# Patient Record
Sex: MALE | Race: WHITE | Employment: OTHER | URBAN - METROPOLITAN AREA
[De-identification: names, ages, dates, MRNs, and addresses within clinical notes are randomized per-mention and may not be internally consistent; named-entity substitution may affect disease eponyms.]

---

## 2017-01-05 ENCOUNTER — HOSPITAL ENCOUNTER (OUTPATIENT)
Dept: OTHER | Age: 77
Discharge: HOME | End: 2017-01-05
Attending: INTERNAL MEDICINE | Admitting: INTERNAL MEDICINE

## 2017-01-05 LAB
ALBUMIN SERPL-MCNC: 3.9 GM/DL (ref 3.4–5)
ALP BLD-CCNC: 83 IU/L (ref 40–129)
ALT SERPL-CCNC: 12 U/L (ref 10–40)
ANION GAP SERPL CALCULATED.3IONS-SCNC: 15 MMOL/L (ref 4–16)
AST SERPL-CCNC: 26 IU/L (ref 15–37)
BILIRUB SERPL-MCNC: 0.6 MG/DL (ref 0–1)
BUN BLDV-MCNC: 13 MG/DL (ref 6–23)
CALCIUM SERPL-MCNC: 9.7 MG/DL (ref 8.3–10.6)
CHLORIDE BLD-SCNC: 94 MMOL/L (ref 99–110)
CO2: 26 MMOL/L (ref 21–32)
CREAT SERPL-MCNC: 1 MG/DL (ref 0.9–1.3)
FERRITIN: 144 NG/ML (ref 30–400)
GFR AFRICAN AMERICAN: >60 ML/MIN/1.73M2
GFR NON-AFRICAN AMERICAN: >60 ML/MIN/1.73M2
GLUCOSE FASTING: 135 MG/DL (ref 70–99)
POTASSIUM SERPL-SCNC: 3.8 MMOL/L (ref 3.5–5.1)
SODIUM BLD-SCNC: 135 MMOL/L (ref 135–145)
TOTAL PROTEIN: 6.2 GM/DL (ref 6.4–8.2)
VITAMIN B-12: 658.4 PG/ML (ref 211–911)

## 2017-01-06 LAB
ALBUMIN ELP: 3 GM/DL (ref 3.2–5.6)
ALPHA-1-GLOBULIN: 0.3 GM/DL (ref 0.1–0.4)
ALPHA-2-GLOBULIN: 0.8 GM/DL (ref 0.4–1.2)
BETA GLOBULIN: 1.1 GM/DL (ref 0.5–1.3)
GAMMA GLOBULIN: 1 GM/DL (ref 0.5–1.6)
IMMUNOFIXATION ELECTROPHORESIS 1: NORMAL
PATHOLOGIST: ABNORMAL
TOTAL PROTEIN: 6.2 GM/DL (ref 6.4–8.2)

## 2017-01-08 LAB
KAPPA QUANT FREE LIGHT CHAINS: 2.55
KAPPA/LAMBDA FREE LIGHT CHAIN RATIO: 0.9
LAMBDA FREE LIGHT CHAINS URINE/ VOL: 2.83

## 2017-04-19 ENCOUNTER — HOSPITAL ENCOUNTER (OUTPATIENT)
Dept: LAB | Age: 77
Discharge: HOME | End: 2017-04-19
Attending: FAMILY MEDICINE | Admitting: FAMILY MEDICINE

## 2017-04-19 LAB
CHOLESTEROL, FASTING: 124 MG/DL
ESTIMATED AVERAGE GLUCOSE: 131 MG/DL
HBA1C MFR BLD: 6.2 % (ref 4.2–6.3)
HDLC SERPL-MCNC: 54 MG/DL
LDL CHOLESTEROL DIRECT: 70 MG/DL
TRIGLYCERIDE, FASTING: 80 MG/DL
VITAMIN D 25-HYDROXY: 60 NG/ML

## 2017-05-01 ENCOUNTER — OFFICE VISIT (OUTPATIENT)
Dept: CARDIOLOGY CLINIC | Age: 77
End: 2017-05-01

## 2017-05-01 VITALS
HEART RATE: 84 BPM | WEIGHT: 296 LBS | HEIGHT: 71 IN | BODY MASS INDEX: 41.44 KG/M2 | DIASTOLIC BLOOD PRESSURE: 66 MMHG | RESPIRATION RATE: 16 BRPM | SYSTOLIC BLOOD PRESSURE: 98 MMHG

## 2017-05-01 DIAGNOSIS — I48.19 ATRIAL FIBRILLATION, PERSISTENT (HCC): ICD-10-CM

## 2017-05-01 DIAGNOSIS — I45.10 RBBB: ICD-10-CM

## 2017-05-01 DIAGNOSIS — Z98.61 HISTORY OF PTCA: Primary | ICD-10-CM

## 2017-05-01 DIAGNOSIS — I10 ESSENTIAL HYPERTENSION: ICD-10-CM

## 2017-05-01 DIAGNOSIS — G89.29 CHRONIC MIDLINE LOW BACK PAIN WITHOUT SCIATICA: ICD-10-CM

## 2017-05-01 DIAGNOSIS — M54.50 CHRONIC MIDLINE LOW BACK PAIN WITHOUT SCIATICA: ICD-10-CM

## 2017-05-01 DIAGNOSIS — E78.2 MIXED HYPERLIPIDEMIA: ICD-10-CM

## 2017-05-01 PROCEDURE — 99214 OFFICE O/P EST MOD 30 MIN: CPT | Performed by: INTERNAL MEDICINE

## 2017-10-25 ENCOUNTER — HOSPITAL ENCOUNTER (OUTPATIENT)
Dept: MAMMOGRAPHY | Age: 77
Discharge: HOME | End: 2017-10-25
Attending: FAMILY MEDICINE | Admitting: FAMILY MEDICINE

## 2017-10-25 DIAGNOSIS — M85.80 OTHER SPECIFIED DISORDERS OF BONE DENSITY AND STRUCTURE, UNSPECIFIED SITE (CODE): ICD-10-CM

## 2017-10-25 DIAGNOSIS — M85.80 OSTEOPENIA, UNSPECIFIED LOCATION: ICD-10-CM

## 2017-10-25 LAB
ALBUMIN SERPL-MCNC: 3.8 GM/DL (ref 3.4–5)
ALP BLD-CCNC: 74 IU/L (ref 40–129)
ALT SERPL-CCNC: 15 U/L (ref 10–40)
ANION GAP SERPL CALCULATED.3IONS-SCNC: 10 MMOL/L (ref 4–16)
AST SERPL-CCNC: 32 IU/L (ref 15–37)
BASOPHILS ABSOLUTE: 0 K/CU MM
BASOPHILS RELATIVE PERCENT: 0.2 % (ref 0–1)
BILIRUB SERPL-MCNC: 0.8 MG/DL (ref 0–1)
BUN BLDV-MCNC: 10 MG/DL (ref 6–23)
CALCIUM SERPL-MCNC: 9.2 MG/DL (ref 8.3–10.6)
CHLORIDE BLD-SCNC: 95 MMOL/L (ref 99–110)
CHOLESTEROL, FASTING: 133 MG/DL
CO2: 33 MMOL/L (ref 21–32)
CREAT SERPL-MCNC: 1.1 MG/DL (ref 0.9–1.3)
DIFFERENTIAL TYPE: ABNORMAL
EOSINOPHILS ABSOLUTE: 0.1 K/CU MM
EOSINOPHILS RELATIVE PERCENT: 1.5 % (ref 0–3)
ESTIMATED AVERAGE GLUCOSE: 120 MG/DL
FERRITIN: 197 NG/ML (ref 30–400)
FOLATE: 9.1 NG/ML (ref 3.1–17.5)
GFR AFRICAN AMERICAN: >60 ML/MIN/1.73M2
GFR NON-AFRICAN AMERICAN: >60 ML/MIN/1.73M2
GLUCOSE FASTING: 123 MG/DL (ref 70–99)
HBA1C MFR BLD: 5.8 % (ref 4.2–6.3)
HCT VFR BLD CALC: 48.5 % (ref 42–52)
HDLC SERPL-MCNC: 55 MG/DL
HEMOGLOBIN: 16.5 GM/DL (ref 13.5–18)
IMMATURE NEUTROPHIL %: 0.2 % (ref 0–0.43)
IRON: 130 UG/DL (ref 59–158)
LDL CHOLESTEROL DIRECT: 68 MG/DL
LYMPHOCYTES ABSOLUTE: 1.4 K/CU MM
LYMPHOCYTES RELATIVE PERCENT: 16.8 % (ref 24–44)
MCH RBC QN AUTO: 34.7 PG (ref 27–31)
MCHC RBC AUTO-ENTMCNC: 34 % (ref 32–36)
MCV RBC AUTO: 101.9 FL (ref 78–100)
MONOCYTES ABSOLUTE: 0.8 K/CU MM
MONOCYTES RELATIVE PERCENT: 9.9 % (ref 0–4)
PCT TRANSFERRIN: 42 % (ref 10–44)
PDW BLD-RTO: 13.5 % (ref 11.7–14.9)
PLATELET # BLD: 204 K/CU MM (ref 140–440)
PMV BLD AUTO: 11.3 FL (ref 7.5–11.1)
POTASSIUM SERPL-SCNC: 3.7 MMOL/L (ref 3.5–5.1)
PROSTATE SPECIFIC ANTIGEN: 1.01 NG/ML (ref 0–4)
RBC # BLD: 4.76 M/CU MM (ref 4.6–6.2)
SEGMENTED NEUTROPHILS ABSOLUTE COUNT: 5.8 K/CU MM
SEGMENTED NEUTROPHILS RELATIVE PERCENT: 71.4 % (ref 36–66)
SODIUM BLD-SCNC: 138 MMOL/L (ref 135–145)
T3 FREE: 3 PG/ML (ref 2.3–4.2)
T4 FREE: 1.45 NG/DL (ref 0.9–1.8)
TOTAL IMMATURE NEUTOROPHIL: 0.02 K/CU MM
TOTAL IRON BINDING CAPACITY: 312 UG/DL (ref 250–450)
TOTAL PROTEIN: 6.3 GM/DL (ref 6.4–8.2)
TRIGLYCERIDE, FASTING: 82 MG/DL
TSH HIGH SENSITIVITY: 1.49 UIU/ML (ref 0.27–4.2)
UNSATURATED IRON BINDING CAPACITY: 182 UG/DL (ref 110–370)
VITAMIN B-12: 754.1 PG/ML (ref 211–911)
VITAMIN D 25-HYDROXY: 60 NG/ML
WBC # BLD: 8.2 K/CU MM (ref 4–10.5)

## 2017-10-30 ENCOUNTER — OFFICE VISIT (OUTPATIENT)
Dept: CARDIOLOGY CLINIC | Age: 77
End: 2017-10-30

## 2017-10-30 VITALS
WEIGHT: 288 LBS | DIASTOLIC BLOOD PRESSURE: 72 MMHG | BODY MASS INDEX: 40.32 KG/M2 | HEART RATE: 80 BPM | RESPIRATION RATE: 14 BRPM | SYSTOLIC BLOOD PRESSURE: 110 MMHG | HEIGHT: 71 IN

## 2017-10-30 DIAGNOSIS — I48.19 ATRIAL FIBRILLATION, PERSISTENT (HCC): Primary | ICD-10-CM

## 2017-10-30 DIAGNOSIS — E78.2 MIXED HYPERLIPIDEMIA: ICD-10-CM

## 2017-10-30 DIAGNOSIS — Z98.61 HISTORY OF PTCA: ICD-10-CM

## 2017-10-30 DIAGNOSIS — I10 ESSENTIAL HYPERTENSION: ICD-10-CM

## 2017-10-30 PROCEDURE — 93000 ELECTROCARDIOGRAM COMPLETE: CPT | Performed by: INTERNAL MEDICINE

## 2017-10-30 PROCEDURE — 99214 OFFICE O/P EST MOD 30 MIN: CPT | Performed by: INTERNAL MEDICINE

## 2017-10-30 RX ORDER — NITROGLYCERIN 0.4 MG/1
0.4 TABLET SUBLINGUAL EVERY 5 MIN PRN
Qty: 25 TABLET | Refills: 3 | Status: SHIPPED | OUTPATIENT
Start: 2017-10-30 | End: 2019-01-08 | Stop reason: SDUPTHER

## 2017-10-30 NOTE — ASSESSMENT & PLAN NOTE
Secondary prevention by aggressive risk modification and lifestyle changes such counseled. Patient is clinically stable from this point of view.

## 2017-10-30 NOTE — PROGRESS NOTES
Emma Currie Matt  [id number]  Janneth Cook MD      Chief complaint and HPI:  Bennett Cheung  is a 40-year-old gentleman follows up for persistent chronic atrial fibrillation, coronary artery disease status post PCI in the past, hyper lipidemia and obesity. He denies any palpitations, syncope or near-syncope. He denies any chest pain, orthopnea or paroxysmal nocturnal dyspnea. He does not exercise he is fairly sedentary in his lifestyle. He also admits that he does not eat healthy. Had had blood test wanted to know the results. He does not smoke anymore. Rest of the Cardiovascular system review is otherwise unchanged from prior encounter. 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 Past surgical history:  has a past surgical history that includes joint replacement (2004) and Coronary angioplasty with stent. Social History:   Social History   Substance Use Topics    Smoking status: Former Smoker     Packs/day: 1.50     Years: 25.00     Quit date: 11/5/1990    Smokeless tobacco: Current User     Types: Chew      Comment: Chews tobacco         Alcohol use 33.6 oz/week     56 Cans of beer per week      Comment: 6-8 beers daily     Family history: family history includes Cancer in his sister; Heart Disease in his father and mother. ALLERGIES:  Lovenox [enoxaparin sodium] and Morphine  Prior to Admission medications    Medication Sig Start Date End Date Taking?  Authorizing Provider   rivaroxaban daily. Yes Historical Provider, MD     Constitutional:  /72 (Site: Left Arm, Position: Sitting, Cuff Size: Large Adult)   Pulse 80   Resp 14   Ht 5' 11\" (1.803 m)   Wt 288 lb (130.6 kg)   BMI 40.17 kg/m²    Body mass index is 40.17 kg/m². Wt Readings from Last 3 Encounters:   10/30/17 288 lb (130.6 kg)   05/01/17 296 lb (134.3 kg)   10/31/16 292 lb (132.5 kg)     General exam: obese awake alert oriented x 3 in NAD   Head and Neck: Normocephalic. Neck is supple . Wears glasses   Carotids: no Bruits. No thyromegaly  Jugular venous pressure: Not elevated. Heart[de-identified] Heart sounds are normal. No murmur  Peripheral Pulses: 1+  Extremities: no edema   Chest: Normal  Lungs:right upper chest coarse rales or rub posteriorly. No basal rales   Abdomen: Soft non tender. Bowel sounds are normal. No organomegaly or ascites.   Musculoskeletal: WNL   Skin: Normal in color and texture.  No rash   Psychiatric: Normal mood and effect.    Neurologic exam:  No focal deficit    ECG today showed atrial fibrillation with RBBB, 85 bpm    LAB REVIEW:  CBC:   Lab Results   Component Value Date    WBC 8.2 10/25/2017    HGB 16.5 10/25/2017    HCT 48.5 10/25/2017     10/25/2017     Triglyceride, Fasting 82  <150 MG/DL Final 10/25/2017 10:15 AM [facility]   Cholesterol, Fasting 133  <200 MG/DL Final 10/25/2017 10:15 AM [address] [place] [address]    HDL 55  >40 MG/DL Final 10/25/2017 10:15 AM [address] [place] [address]    LDL Direct 68          Lipids:   Lab Results   Component Value Date    CHOL 132 07/14/2016    TRIG 73 07/14/2016    HDL 55 10/25/2017    LDLCALC 56 09/06/2012    LDLDIRECT 68 10/25/2017     Renal:   Lab Results   Component Value Date    BUN 10 10/25/2017    CREATININE 1.1 10/25/2017     10/25/2017    K 3.7 10/25/2017     ALT 15  10 - 40 U/L Final 10/25/2017 10:15 AM [facility]   AST 32          TSH, High Sensitivity 1.490  0.270 PT/INR:   Lab Results   Component Value Date    INR 0.92 03/14/2011     IMPRESSION and RECOMMENDATIONS:      Obesity, Class II, 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 Hyperlipidemia  Well controlled on current medications, reviewed individually with patient. History of PTCA  Secondary prevention by aggressive risk modification and lifestyle changes such counseled. Patient is clinically stable from this point of view. Hypertension  Well controlled on current medications, reviewed individually with patient. Atrial fibrillation, persistent  Rate is well-controlled and he is anticoagulated. Continue current cardiovascular medications which have been reviewed and discussed individually with you. Patient is counseled for low cholesterol, low sodium and low fat diet. Also counseled for increase in fresh fruits and vegetables and healthy lifestyles. Counseled extensively for calorie counting reduction and serving size and activity modification for weight loss. Appropriate prescriptions if needed on this visit are addressed. After visit summery is provided. Questions answered and patient verbalizes understanding. Follow up in office in  6 months, sooner if needed. Johnny Chau MD, 10/30/2017 12:08 PM     Please note this report has been produced using speech recognition software and may contain errors related to that system including errors in grammar, punctuation, and spelling, as well as words and phrases that may be inappropriate.  If there are any questions or concerns please feel free to contact the dictating provider for clarification

## 2018-05-07 ENCOUNTER — OFFICE VISIT (OUTPATIENT)
Dept: CARDIOLOGY CLINIC | Age: 78
End: 2018-05-07

## 2018-05-07 VITALS
HEIGHT: 70 IN | BODY MASS INDEX: 42.09 KG/M2 | WEIGHT: 294 LBS | HEART RATE: 80 BPM | DIASTOLIC BLOOD PRESSURE: 70 MMHG | SYSTOLIC BLOOD PRESSURE: 112 MMHG

## 2018-05-07 DIAGNOSIS — E78.2 MIXED HYPERLIPIDEMIA: ICD-10-CM

## 2018-05-07 DIAGNOSIS — I48.19 ATRIAL FIBRILLATION, PERSISTENT (HCC): ICD-10-CM

## 2018-05-07 DIAGNOSIS — I10 ESSENTIAL HYPERTENSION: ICD-10-CM

## 2018-05-07 DIAGNOSIS — Z98.61 HISTORY OF PTCA: Primary | ICD-10-CM

## 2018-05-07 PROCEDURE — G8417 CALC BMI ABV UP PARAM F/U: HCPCS | Performed by: INTERNAL MEDICINE

## 2018-05-07 PROCEDURE — 4004F PT TOBACCO SCREEN RCVD TLK: CPT | Performed by: INTERNAL MEDICINE

## 2018-05-07 PROCEDURE — 1123F ACP DISCUSS/DSCN MKR DOCD: CPT | Performed by: INTERNAL MEDICINE

## 2018-05-07 PROCEDURE — 4040F PNEUMOC VAC/ADMIN/RCVD: CPT | Performed by: INTERNAL MEDICINE

## 2018-05-07 PROCEDURE — 99214 OFFICE O/P EST MOD 30 MIN: CPT | Performed by: INTERNAL MEDICINE

## 2018-05-07 PROCEDURE — G8427 DOCREV CUR MEDS BY ELIG CLIN: HCPCS | Performed by: INTERNAL MEDICINE

## 2018-05-16 ENCOUNTER — HOSPITAL ENCOUNTER (OUTPATIENT)
Dept: LAB | Age: 78
Discharge: HOME | End: 2018-05-16
Attending: FAMILY MEDICINE | Admitting: FAMILY MEDICINE

## 2018-05-16 LAB
ALBUMIN SERPL-MCNC: 3.8 GM/DL (ref 3.4–5)
ALP BLD-CCNC: 78 IU/L (ref 40–129)
ALT SERPL-CCNC: 13 U/L (ref 10–40)
ANION GAP SERPL CALCULATED.3IONS-SCNC: 14 MMOL/L (ref 4–16)
AST SERPL-CCNC: 31 IU/L (ref 15–37)
BASOPHILS ABSOLUTE: 0 K/CU MM
BASOPHILS RELATIVE PERCENT: 0.2 % (ref 0–1)
BILIRUB SERPL-MCNC: 0.7 MG/DL (ref 0–1)
BUN BLDV-MCNC: 9 MG/DL (ref 6–23)
CALCIUM SERPL-MCNC: 9.5 MG/DL (ref 8.3–10.6)
CHLORIDE BLD-SCNC: 92 MMOL/L (ref 99–110)
CHOLESTEROL, FASTING: 133 MG/DL
CO2: 29 MMOL/L (ref 21–32)
CREAT SERPL-MCNC: 1.2 MG/DL (ref 0.9–1.3)
DIFFERENTIAL TYPE: ABNORMAL
EOSINOPHILS ABSOLUTE: 0.1 K/CU MM
EOSINOPHILS RELATIVE PERCENT: 1.1 % (ref 0–3)
ESTIMATED AVERAGE GLUCOSE: 117 MG/DL
FERRITIN: 159 NG/ML (ref 30–400)
GFR AFRICAN AMERICAN: >60 ML/MIN/1.73M2
GFR NON-AFRICAN AMERICAN: 59 ML/MIN/1.73M2
GLUCOSE FASTING: 132 MG/DL (ref 70–99)
HBA1C MFR BLD: 5.7 % (ref 4.2–6.3)
HCT VFR BLD CALC: 48.9 % (ref 42–52)
HDLC SERPL-MCNC: 57 MG/DL
HEMOGLOBIN: 16.2 GM/DL (ref 13.5–18)
IMMATURE NEUTROPHIL %: 0.4 % (ref 0–0.43)
IRON: 107 UG/DL (ref 59–158)
LDL CHOLESTEROL DIRECT: 76 MG/DL
LYMPHOCYTES ABSOLUTE: 1.2 K/CU MM
LYMPHOCYTES RELATIVE PERCENT: 13.6 % (ref 24–44)
MCH RBC QN AUTO: 33.8 PG (ref 27–31)
MCHC RBC AUTO-ENTMCNC: 33.1 % (ref 32–36)
MCV RBC AUTO: 101.9 FL (ref 78–100)
MONOCYTES ABSOLUTE: 0.7 K/CU MM
MONOCYTES RELATIVE PERCENT: 7.2 % (ref 0–4)
PCT TRANSFERRIN: 33 % (ref 10–44)
PDW BLD-RTO: 14 % (ref 11.7–14.9)
PLATELET # BLD: 203 K/CU MM (ref 140–440)
PMV BLD AUTO: 11.7 FL (ref 7.5–11.1)
POTASSIUM SERPL-SCNC: 3.4 MMOL/L (ref 3.5–5.1)
RBC # BLD: 4.8 M/CU MM (ref 4.6–6.2)
SEGMENTED NEUTROPHILS ABSOLUTE COUNT: 7 K/CU MM
SEGMENTED NEUTROPHILS RELATIVE PERCENT: 77.5 % (ref 36–66)
SODIUM BLD-SCNC: 135 MMOL/L (ref 135–145)
TOTAL IMMATURE NEUTOROPHIL: 0.04 K/CU MM
TOTAL IRON BINDING CAPACITY: 321 UG/DL (ref 250–450)
TOTAL PROTEIN: 6.5 GM/DL (ref 6.4–8.2)
TRIGLYCERIDE, FASTING: 66 MG/DL
UNSATURATED IRON BINDING CAPACITY: 214 UG/DL (ref 110–370)
VITAMIN B-12: 804.7 PG/ML (ref 211–911)
VITAMIN D 25-HYDROXY: 60 NG/ML
WBC # BLD: 9.1 K/CU MM (ref 4–10.5)

## 2018-05-18 LAB
KAPPA QUANT FREE LIGHT CHAINS: 2.16
KAPPA/LAMBDA FREE LIGHT CHAIN RATIO: 0.56
LAMBDA FREE LIGHT CHAINS URINE/ VOL: 3.88

## 2018-05-21 LAB
ALBUMIN ELP: 3.4 GM/DL (ref 3.2–5.6)
ALPHA-1-GLOBULIN: 0.3 GM/DL (ref 0.1–0.4)
ALPHA-2-GLOBULIN: 0.8 GM/DL (ref 0.4–1.2)
BETA GLOBULIN: 1.1 GM/DL (ref 0.5–1.3)
GAMMA GLOBULIN: 0.9 GM/DL (ref 0.5–1.6)
IMMUNOFIXATION ELECTROPHORESIS 1: NORMAL
TOTAL PROTEIN: 6.5 GM/DL (ref 6.4–8.2)

## 2018-05-25 ENCOUNTER — HOSPITAL ENCOUNTER (OUTPATIENT)
Dept: OTHER | Age: 78
Discharge: HOME | End: 2018-05-25
Attending: INTERNAL MEDICINE | Admitting: INTERNAL MEDICINE

## 2018-05-25 LAB
ALBUMIN SERPL-MCNC: 3.7 GM/DL (ref 3.4–5)
ALP BLD-CCNC: 77 IU/L (ref 40–129)
ALT SERPL-CCNC: 12 U/L (ref 10–40)
ANION GAP SERPL CALCULATED.3IONS-SCNC: 14 MMOL/L (ref 4–16)
AST SERPL-CCNC: 25 IU/L (ref 15–37)
BILIRUB SERPL-MCNC: 0.7 MG/DL (ref 0–1)
BUN BLDV-MCNC: 9 MG/DL (ref 6–23)
CALCIUM SERPL-MCNC: 9.2 MG/DL (ref 8.3–10.6)
CHLORIDE BLD-SCNC: 92 MMOL/L (ref 99–110)
CO2: 28 MMOL/L (ref 21–32)
CREAT SERPL-MCNC: 1 MG/DL (ref 0.9–1.3)
GFR AFRICAN AMERICAN: >60 ML/MIN/1.73M2
GFR NON-AFRICAN AMERICAN: >60 ML/MIN/1.73M2
GLUCOSE BLD-MCNC: 93 MG/DL (ref 70–99)
LACTATE DEHYDROGENASE: 149 IU/L (ref 120–246)
POTASSIUM SERPL-SCNC: 3.7 MMOL/L (ref 3.5–5.1)
SODIUM BLD-SCNC: 134 MMOL/L (ref 135–145)
TOTAL PROTEIN: 5.9 GM/DL (ref 6.4–8.2)

## 2018-05-29 LAB
ALBUMIN ELP: 3.3 GM/DL (ref 3.2–5.6)
ALPHA-1-GLOBULIN: 0.2 GM/DL (ref 0.1–0.4)
ALPHA-2-GLOBULIN: 0.7 GM/DL (ref 0.4–1.2)
BETA GLOBULIN: 1 GM/DL (ref 0.5–1.3)
GAMMA GLOBULIN: 0.8 GM/DL (ref 0.5–1.6)
IMMUNOFIXATION ELECTROPHORESIS 1: NORMAL
TOTAL PROTEIN: 5.9 GM/DL (ref 6.4–8.2)

## 2018-06-06 ENCOUNTER — HOSPITAL ENCOUNTER (OUTPATIENT)
Dept: LAB | Age: 78
Discharge: HOME | End: 2018-06-06
Attending: FAMILY MEDICINE | Admitting: FAMILY MEDICINE

## 2018-06-06 LAB
ANION GAP SERPL CALCULATED.3IONS-SCNC: 12 MMOL/L (ref 4–16)
BUN BLDV-MCNC: 13 MG/DL (ref 6–23)
CALCIUM SERPL-MCNC: 9.2 MG/DL (ref 8.3–10.6)
CHLORIDE BLD-SCNC: 93 MMOL/L (ref 99–110)
CO2: 31 MMOL/L (ref 21–32)
CREAT SERPL-MCNC: 1.2 MG/DL (ref 0.9–1.3)
GFR AFRICAN AMERICAN: >60 ML/MIN/1.73M2
GFR NON-AFRICAN AMERICAN: 59 ML/MIN/1.73M2
GLUCOSE BLD-MCNC: 109 MG/DL (ref 70–99)
POTASSIUM SERPL-SCNC: 3.3 MMOL/L (ref 3.5–5.1)
SODIUM BLD-SCNC: 136 MMOL/L (ref 135–145)

## 2018-06-28 ENCOUNTER — HOSPITAL ENCOUNTER (OUTPATIENT)
Dept: LAB | Age: 78
Discharge: HOME | End: 2018-06-28
Attending: FAMILY MEDICINE | Admitting: FAMILY MEDICINE

## 2018-06-28 LAB
ANION GAP SERPL CALCULATED.3IONS-SCNC: 15 MMOL/L (ref 4–16)
BUN BLDV-MCNC: 11 MG/DL (ref 6–23)
CALCIUM SERPL-MCNC: 9.1 MG/DL (ref 8.3–10.6)
CHLORIDE BLD-SCNC: 94 MMOL/L (ref 99–110)
CO2: 26 MMOL/L (ref 21–32)
CREAT SERPL-MCNC: 1.2 MG/DL (ref 0.9–1.3)
GFR AFRICAN AMERICAN: >60 ML/MIN/1.73M2
GFR NON-AFRICAN AMERICAN: 59 ML/MIN/1.73M2
GLUCOSE BLD-MCNC: 130 MG/DL (ref 70–99)
POTASSIUM SERPL-SCNC: 3.4 MMOL/L (ref 3.5–5.1)
SODIUM BLD-SCNC: 135 MMOL/L (ref 135–145)

## 2018-07-26 ENCOUNTER — HOSPITAL ENCOUNTER (OUTPATIENT)
Dept: LAB | Age: 78
Discharge: HOME | End: 2018-07-26
Attending: FAMILY MEDICINE | Admitting: FAMILY MEDICINE

## 2018-07-26 LAB
ANION GAP SERPL CALCULATED.3IONS-SCNC: 7 MMOL/L (ref 4–16)
BUN BLDV-MCNC: 10 MG/DL (ref 6–23)
CALCIUM SERPL-MCNC: 9.5 MG/DL (ref 8.3–10.6)
CHLORIDE BLD-SCNC: 96 MMOL/L (ref 99–110)
CO2: 35 MMOL/L (ref 21–32)
CREAT SERPL-MCNC: 1.2 MG/DL (ref 0.9–1.3)
GFR AFRICAN AMERICAN: >60 ML/MIN/1.73M2
GFR NON-AFRICAN AMERICAN: 59 ML/MIN/1.73M2
GLUCOSE BLD-MCNC: 129 MG/DL (ref 70–99)
POTASSIUM SERPL-SCNC: 4 MMOL/L (ref 3.5–5.1)
SODIUM BLD-SCNC: 138 MMOL/L (ref 135–145)

## 2018-10-01 ENCOUNTER — HOSPITAL ENCOUNTER (OUTPATIENT)
Age: 78
Discharge: HOME | End: 2018-10-01
Payer: MEDICARE

## 2018-10-01 LAB
ALBUMIN SERPL-MCNC: 3.5 GM/DL (ref 3.4–5)
ALP BLD-CCNC: 75 IU/L (ref 40–129)
ALT SERPL-CCNC: 12 U/L (ref 10–40)
ANION GAP SERPL CALCULATED.3IONS-SCNC: 13 MMOL/L (ref 4–16)
AST SERPL-CCNC: 27 IU/L (ref 15–37)
BASOPHILS ABSOLUTE: 0 K/CU MM
BASOPHILS RELATIVE PERCENT: 0.3 % (ref 0–1)
BILIRUB SERPL-MCNC: 0.7 MG/DL (ref 0–1)
BUN BLDV-MCNC: 10 MG/DL (ref 6–23)
CALCIUM SERPL-MCNC: 9.2 MG/DL (ref 8.3–10.6)
CHLORIDE BLD-SCNC: 100 MMOL/L (ref 99–110)
CHOLESTEROL, FASTING: 128 MG/DL
CO2: 30 MMOL/L (ref 21–32)
CREAT SERPL-MCNC: 1.3 MG/DL (ref 0.9–1.3)
DIFFERENTIAL TYPE: ABNORMAL
EOSINOPHILS ABSOLUTE: 0.1 K/CU MM
EOSINOPHILS RELATIVE PERCENT: 1.6 % (ref 0–3)
ESTIMATED AVERAGE GLUCOSE: 117 MG/DL
FERRITIN: 261 NG/ML (ref 30–400)
FOLATE: 10 NG/ML (ref 3.1–17.5)
GFR AFRICAN AMERICAN: >60 ML/MIN/1.73M2
GFR NON-AFRICAN AMERICAN: 53 ML/MIN/1.73M2
GLUCOSE FASTING: 128 MG/DL (ref 70–99)
HBA1C MFR BLD: 5.7 % (ref 4.2–6.3)
HCT VFR BLD CALC: 49 % (ref 42–52)
HDLC SERPL-MCNC: 53 MG/DL
HEMOGLOBIN: 15.7 GM/DL (ref 13.5–18)
IMMATURE NEUTROPHIL %: 0.4 % (ref 0–0.43)
IRON: 124 UG/DL (ref 59–158)
LDL CHOLESTEROL DIRECT: 66 MG/DL
LYMPHOCYTES ABSOLUTE: 1.2 K/CU MM
LYMPHOCYTES RELATIVE PERCENT: 17.2 % (ref 24–44)
MCH RBC QN AUTO: 33.6 PG (ref 27–31)
MCHC RBC AUTO-ENTMCNC: 32 % (ref 32–36)
MCV RBC AUTO: 104.9 FL (ref 78–100)
MONOCYTES ABSOLUTE: 0.7 K/CU MM
MONOCYTES RELATIVE PERCENT: 10.4 % (ref 0–4)
PCT TRANSFERRIN: 42 % (ref 10–44)
PDW BLD-RTO: 14.3 % (ref 11.7–14.9)
PLATELET # BLD: 186 K/CU MM (ref 140–440)
PMV BLD AUTO: 11.6 FL (ref 7.5–11.1)
POTASSIUM SERPL-SCNC: 4.4 MMOL/L (ref 3.5–5.1)
RBC # BLD: 4.67 M/CU MM (ref 4.6–6.2)
SEGMENTED NEUTROPHILS ABSOLUTE COUNT: 4.7 K/CU MM
SEGMENTED NEUTROPHILS RELATIVE PERCENT: 70.1 % (ref 36–66)
SODIUM BLD-SCNC: 143 MMOL/L (ref 135–145)
TOTAL IMMATURE NEUTOROPHIL: 0.03 K/CU MM
TOTAL IRON BINDING CAPACITY: 294 UG/DL (ref 250–450)
TOTAL PROTEIN: 6 GM/DL (ref 6.4–8.2)
TRIGLYCERIDE, FASTING: 87 MG/DL
UNSATURATED IRON BINDING CAPACITY: 170 UG/DL (ref 110–370)
VITAMIN B-12: >2000 PG/ML (ref 211–911)
VITAMIN D 25-HYDROXY: 60 NG/ML
WBC # BLD: 6.8 K/CU MM (ref 4–10.5)

## 2018-10-01 PROCEDURE — 85025 COMPLETE CBC W/AUTO DIFF WBC: CPT

## 2018-10-01 PROCEDURE — 80053 COMPREHEN METABOLIC PANEL: CPT

## 2018-10-01 PROCEDURE — 82306 VITAMIN D 25 HYDROXY: CPT

## 2018-10-01 PROCEDURE — 82746 ASSAY OF FOLIC ACID SERUM: CPT

## 2018-10-01 PROCEDURE — 83036 HEMOGLOBIN GLYCOSYLATED A1C: CPT

## 2018-10-01 PROCEDURE — 83540 ASSAY OF IRON: CPT

## 2018-10-01 PROCEDURE — 36415 COLL VENOUS BLD VENIPUNCTURE: CPT

## 2018-10-01 PROCEDURE — 80061 LIPID PANEL: CPT

## 2018-10-01 PROCEDURE — 82607 VITAMIN B-12: CPT

## 2018-10-01 PROCEDURE — 82728 ASSAY OF FERRITIN: CPT

## 2018-10-01 PROCEDURE — 83550 IRON BINDING TEST: CPT

## 2019-01-08 ENCOUNTER — OFFICE VISIT (OUTPATIENT)
Dept: CARDIOLOGY CLINIC | Age: 79
End: 2019-01-08
Payer: MEDICARE

## 2019-01-08 VITALS
HEIGHT: 71 IN | RESPIRATION RATE: 16 BRPM | DIASTOLIC BLOOD PRESSURE: 78 MMHG | BODY MASS INDEX: 40.88 KG/M2 | SYSTOLIC BLOOD PRESSURE: 106 MMHG | HEART RATE: 80 BPM | WEIGHT: 292 LBS

## 2019-01-08 DIAGNOSIS — Z98.61 HISTORY OF PTCA: Primary | ICD-10-CM

## 2019-01-08 DIAGNOSIS — I10 ESSENTIAL HYPERTENSION: ICD-10-CM

## 2019-01-08 DIAGNOSIS — I48.19 ATRIAL FIBRILLATION, PERSISTENT (HCC): ICD-10-CM

## 2019-01-08 DIAGNOSIS — E78.2 MIXED HYPERLIPIDEMIA: ICD-10-CM

## 2019-01-08 DIAGNOSIS — I25.10 CORONARY ARTERY DISEASE INVOLVING NATIVE CORONARY ARTERY OF NATIVE HEART WITHOUT ANGINA PECTORIS: ICD-10-CM

## 2019-01-08 PROCEDURE — G8427 DOCREV CUR MEDS BY ELIG CLIN: HCPCS | Performed by: INTERNAL MEDICINE

## 2019-01-08 PROCEDURE — 4040F PNEUMOC VAC/ADMIN/RCVD: CPT | Performed by: INTERNAL MEDICINE

## 2019-01-08 PROCEDURE — G8484 FLU IMMUNIZE NO ADMIN: HCPCS | Performed by: INTERNAL MEDICINE

## 2019-01-08 PROCEDURE — G8598 ASA/ANTIPLAT THER USED: HCPCS | Performed by: INTERNAL MEDICINE

## 2019-01-08 PROCEDURE — 4004F PT TOBACCO SCREEN RCVD TLK: CPT | Performed by: INTERNAL MEDICINE

## 2019-01-08 PROCEDURE — 1123F ACP DISCUSS/DSCN MKR DOCD: CPT | Performed by: INTERNAL MEDICINE

## 2019-01-08 PROCEDURE — G8417 CALC BMI ABV UP PARAM F/U: HCPCS | Performed by: INTERNAL MEDICINE

## 2019-01-08 PROCEDURE — 99214 OFFICE O/P EST MOD 30 MIN: CPT | Performed by: INTERNAL MEDICINE

## 2019-01-08 PROCEDURE — 1101F PT FALLS ASSESS-DOCD LE1/YR: CPT | Performed by: INTERNAL MEDICINE

## 2019-01-08 RX ORDER — MIRTAZAPINE 30 MG/1
30 TABLET, FILM COATED ORAL NIGHTLY
COMMUNITY

## 2019-01-08 RX ORDER — NITROGLYCERIN 0.4 MG/1
0.4 TABLET SUBLINGUAL EVERY 5 MIN PRN
Qty: 25 TABLET | Refills: 3 | Status: SHIPPED | OUTPATIENT
Start: 2019-01-08 | End: 2021-08-02 | Stop reason: SDUPTHER

## 2019-03-20 ENCOUNTER — HOSPITAL ENCOUNTER (OUTPATIENT)
Age: 79
Discharge: HOME | End: 2019-03-20
Payer: MEDICARE

## 2019-03-20 LAB
ALBUMIN SERPL-MCNC: 3.6 GM/DL (ref 3.4–5)
ALP BLD-CCNC: 74 IU/L (ref 40–129)
ALT SERPL-CCNC: 12 U/L (ref 10–40)
ANION GAP SERPL CALCULATED.3IONS-SCNC: 5 MMOL/L (ref 4–16)
AST SERPL-CCNC: 28 IU/L (ref 15–37)
BILIRUB SERPL-MCNC: 0.7 MG/DL (ref 0–1)
BUN BLDV-MCNC: 10 MG/DL (ref 6–23)
CALCIUM SERPL-MCNC: 9.2 MG/DL (ref 8.3–10.6)
CHLORIDE BLD-SCNC: 96 MMOL/L (ref 99–110)
CHOLESTEROL, FASTING: 137 MG/DL
CO2: 36 MMOL/L (ref 21–32)
CREAT SERPL-MCNC: 1.1 MG/DL (ref 0.9–1.3)
ESTIMATED AVERAGE GLUCOSE: 117 MG/DL
GFR AFRICAN AMERICAN: >60 ML/MIN/1.73M2
GFR NON-AFRICAN AMERICAN: >60 ML/MIN/1.73M2
GLUCOSE FASTING: 119 MG/DL (ref 70–99)
HBA1C MFR BLD: 5.7 % (ref 4.2–6.3)
HDLC SERPL-MCNC: 52 MG/DL
LDL CHOLESTEROL DIRECT: 87 MG/DL
POTASSIUM SERPL-SCNC: 4.1 MMOL/L (ref 3.5–5.1)
SODIUM BLD-SCNC: 137 MMOL/L (ref 135–145)
TOTAL PROTEIN: 6.2 GM/DL (ref 6.4–8.2)
TRIGLYCERIDE, FASTING: 76 MG/DL
VITAMIN D 25-HYDROXY: 60 NG/ML

## 2019-03-20 PROCEDURE — 80061 LIPID PANEL: CPT

## 2019-03-20 PROCEDURE — 36415 COLL VENOUS BLD VENIPUNCTURE: CPT

## 2019-03-20 PROCEDURE — 83036 HEMOGLOBIN GLYCOSYLATED A1C: CPT

## 2019-03-20 PROCEDURE — 82306 VITAMIN D 25 HYDROXY: CPT

## 2019-03-20 PROCEDURE — 80053 COMPREHEN METABOLIC PANEL: CPT

## 2019-07-23 ENCOUNTER — OFFICE VISIT (OUTPATIENT)
Dept: CARDIOLOGY CLINIC | Age: 79
End: 2019-07-23
Payer: MEDICARE

## 2019-07-23 VITALS
DIASTOLIC BLOOD PRESSURE: 70 MMHG | WEIGHT: 293 LBS | HEART RATE: 80 BPM | SYSTOLIC BLOOD PRESSURE: 112 MMHG | HEIGHT: 71 IN | BODY MASS INDEX: 41.02 KG/M2 | RESPIRATION RATE: 20 BRPM

## 2019-07-23 DIAGNOSIS — M54.50 CHRONIC MIDLINE LOW BACK PAIN WITHOUT SCIATICA: ICD-10-CM

## 2019-07-23 DIAGNOSIS — I10 ESSENTIAL HYPERTENSION: ICD-10-CM

## 2019-07-23 DIAGNOSIS — Z98.61 HISTORY OF PTCA: Primary | ICD-10-CM

## 2019-07-23 DIAGNOSIS — I48.19 ATRIAL FIBRILLATION, PERSISTENT (HCC): ICD-10-CM

## 2019-07-23 DIAGNOSIS — G89.29 CHRONIC MIDLINE LOW BACK PAIN WITHOUT SCIATICA: ICD-10-CM

## 2019-07-23 DIAGNOSIS — E78.2 MIXED HYPERLIPIDEMIA: ICD-10-CM

## 2019-07-23 PROCEDURE — 99214 OFFICE O/P EST MOD 30 MIN: CPT | Performed by: INTERNAL MEDICINE

## 2019-07-23 PROCEDURE — 1123F ACP DISCUSS/DSCN MKR DOCD: CPT | Performed by: INTERNAL MEDICINE

## 2019-07-23 PROCEDURE — G8417 CALC BMI ABV UP PARAM F/U: HCPCS | Performed by: INTERNAL MEDICINE

## 2019-07-23 PROCEDURE — G8427 DOCREV CUR MEDS BY ELIG CLIN: HCPCS | Performed by: INTERNAL MEDICINE

## 2019-07-23 PROCEDURE — 4004F PT TOBACCO SCREEN RCVD TLK: CPT | Performed by: INTERNAL MEDICINE

## 2019-07-23 PROCEDURE — 4040F PNEUMOC VAC/ADMIN/RCVD: CPT | Performed by: INTERNAL MEDICINE

## 2019-07-23 PROCEDURE — G8598 ASA/ANTIPLAT THER USED: HCPCS | Performed by: INTERNAL MEDICINE

## 2019-07-23 PROCEDURE — 93000 ELECTROCARDIOGRAM COMPLETE: CPT | Performed by: INTERNAL MEDICINE

## 2019-07-23 NOTE — PROGRESS NOTES
Cee Briggs Matt  [id number]  Olga Proctor MD    Chief complaint and HPI:  Khloe Villaseñor  is a 55-year-old male follows up for persistent atrial fibrillation, hypertension, hyperlipidemia and coronary artery disease status post prior coronary intervention. He denies any chest pain or increasing shortness of breath or fatigue. He complains of low back pain which has been chronic and is bothering him. At one time he has seen Dr. Marcela Velasquez for low back pain and he was advised to have surgery but patient did not want to have it done. Now he is thinking about it. He is not able to lose much weight. He has been compliant with his medications. He is not a smoker. Rest of the Cardiovascular system review is otherwise unchanged from prior encounter. 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 Past surgical history:  has a past surgical history that includes joint replacement () and Coronary angioplasty with stent. Social History:   Social History     Tobacco Use    Smoking status: Former Smoker     Packs/day: 1.50     Years: 25.00     Pack years: 37.50     Last attempt to quit: 1990     Years since quittin.7    Smokeless tobacco: Current User     Types: Chew    Tobacco comment: Chews tobacco        Substance Use Topics    Alcohol use:  Yes     Alcohol/week: 56.0 standard drinks     Types: 56 Cans of beer per week     Comment: 6-8 beers daily     Family history: family history includes Cancer in 03/20/2019     PT/INR:   Lab Results   Component Value Date    INR 0.92 03/14/2011     IMPRESSION and RECOMMENDATIONS:      History of PTCA  Clinically stable. Continue aggressive risk modification for secondary prevention. Atrial fibrillation, persistent ([address]  Rate is controlled and he is on anticoagulation therapy. Continue the same. Hypertension  Well-controlled on current medications. Continue the same. Hyperlipidemia  Recent LDL was 87. Continue current statin therapy. Chronic midline low back pain without sciatica  Patient is to follow-up with PCP also Dr. Tobi Harper for further recommendations. He is counseled to lose weight. Continue current cardiovascular medications which have been reviewed and discussed individually with you. Counseled for calorie counting, reduction in serving size and exercise and lifestyle modification for weight loss. Appropriate prescriptions if needed on this visit are addressed. After visit summery is provided. Questions answered and patient verbalizes understanding. Follow up in office in 6 months, sooner if needed. Lionel Zacarias MD, 7/23/2019 12:25 PM     Please note this report has been partially produced using speech recognition software and may contain errors related to that system including errors in grammar, punctuation, and spelling, as well as words and phrases that may be inappropriate. If there are any questions or concerns please feel free to contact the dictating provider for clarification.

## 2019-07-23 NOTE — PATIENT INSTRUCTIONS
Continue current cardiovascular medications which have been reviewed and discussed individually with you. Counseled for calorie counting, reduction in serving size and exercise and lifestyle modification for weight loss. Appropriate prescriptions if needed on this visit are addressed. After visit summery is provided. Questions answered and patient verbalizes understanding. Follow up in office in 6 months, sooner if needed.

## 2019-10-18 ENCOUNTER — HOSPITAL ENCOUNTER (OUTPATIENT)
Age: 79
Discharge: HOME | End: 2019-10-18
Payer: MEDICARE

## 2019-10-18 LAB
ALBUMIN SERPL-MCNC: 3.9 GM/DL (ref 3.4–5)
ALP BLD-CCNC: 74 IU/L (ref 40–129)
ALT SERPL-CCNC: 17 U/L (ref 10–40)
ANION GAP SERPL CALCULATED.3IONS-SCNC: 16 MMOL/L (ref 4–16)
AST SERPL-CCNC: 34 IU/L (ref 15–37)
BASOPHILS ABSOLUTE: 0 K/CU MM
BASOPHILS RELATIVE PERCENT: 0.6 % (ref 0–1)
BILIRUB SERPL-MCNC: 0.6 MG/DL (ref 0–1)
BUN BLDV-MCNC: 15 MG/DL (ref 6–23)
CALCIUM SERPL-MCNC: 9.5 MG/DL (ref 8.3–10.6)
CHLORIDE BLD-SCNC: 97 MMOL/L (ref 99–110)
CO2: 27 MMOL/L (ref 21–32)
CREAT SERPL-MCNC: 1.4 MG/DL (ref 0.9–1.3)
DIFFERENTIAL TYPE: ABNORMAL
EOSINOPHILS ABSOLUTE: 0.2 K/CU MM
EOSINOPHILS RELATIVE PERCENT: 2.3 % (ref 0–3)
ESTIMATED AVERAGE GLUCOSE: 126 MG/DL
GFR AFRICAN AMERICAN: 59 ML/MIN/1.73M2
GFR NON-AFRICAN AMERICAN: 49 ML/MIN/1.73M2
GLUCOSE FASTING: 133 MG/DL (ref 70–99)
HBA1C MFR BLD: 6 % (ref 4.2–6.3)
HCT VFR BLD CALC: 49.3 % (ref 42–52)
HEMOGLOBIN: 16.1 GM/DL (ref 13.5–18)
IMMATURE NEUTROPHIL %: 0.3 % (ref 0–0.43)
LYMPHOCYTES ABSOLUTE: 1.5 K/CU MM
LYMPHOCYTES RELATIVE PERCENT: 20.9 % (ref 24–44)
MCH RBC QN AUTO: 34.5 PG (ref 27–31)
MCHC RBC AUTO-ENTMCNC: 32.7 % (ref 32–36)
MCV RBC AUTO: 105.8 FL (ref 78–100)
MONOCYTES ABSOLUTE: 0.7 K/CU MM
MONOCYTES RELATIVE PERCENT: 10.4 % (ref 0–4)
PDW BLD-RTO: 14.2 % (ref 11.7–14.9)
PLATELET # BLD: 156 K/CU MM (ref 140–440)
PMV BLD AUTO: 12.3 FL (ref 7.5–11.1)
POTASSIUM SERPL-SCNC: 4.3 MMOL/L (ref 3.5–5.1)
RBC # BLD: 4.66 M/CU MM (ref 4.6–6.2)
SEGMENTED NEUTROPHILS ABSOLUTE COUNT: 4.6 K/CU MM
SEGMENTED NEUTROPHILS RELATIVE PERCENT: 65.5 % (ref 36–66)
SODIUM BLD-SCNC: 140 MMOL/L (ref 135–145)
TOTAL IMMATURE NEUTOROPHIL: 0.02 K/CU MM
TOTAL PROTEIN: 6.8 GM/DL (ref 6.4–8.2)
TOTAL PROTEIN: 6.8 GM/DL (ref 6.4–8.2)
WBC # BLD: 6.9 K/CU MM (ref 4–10.5)

## 2019-10-18 PROCEDURE — 83883 ASSAY NEPHELOMETRY NOT SPEC: CPT

## 2019-10-18 PROCEDURE — 80053 COMPREHEN METABOLIC PANEL: CPT

## 2019-10-18 PROCEDURE — 83036 HEMOGLOBIN GLYCOSYLATED A1C: CPT

## 2019-10-18 PROCEDURE — 86320 SERUM IMMUNOELECTROPHORESIS: CPT

## 2019-10-18 PROCEDURE — 86334 IMMUNOFIX E-PHORESIS SERUM: CPT

## 2019-10-18 PROCEDURE — 82232 ASSAY OF BETA-2 PROTEIN: CPT

## 2019-10-18 PROCEDURE — 84165 PROTEIN E-PHORESIS SERUM: CPT

## 2019-10-18 PROCEDURE — 80061 LIPID PANEL: CPT

## 2019-10-18 PROCEDURE — 85025 COMPLETE CBC W/AUTO DIFF WBC: CPT

## 2019-10-18 PROCEDURE — 82306 VITAMIN D 25 HYDROXY: CPT

## 2019-10-18 PROCEDURE — 36415 COLL VENOUS BLD VENIPUNCTURE: CPT

## 2019-10-19 LAB
BETA-2 MICROGLOBULIN: 3.4 MG/L (ref 1.1–2.4)
BETA-2 MICROGLOBULIN: ABNORMAL MG/L (ref 1.1–2.4)

## 2019-10-20 LAB
KAPPA QUANT FREE LIGHT CHAINS: 2.44 MG/DL (ref 0.33–1.94)
KAPPA/LAMBDA FREE LIGHT CHAIN RATIO: 0.85 (ref 0.26–1.65)
KAPPA/LAMBDA FREE LIGHT CHAIN RATIO: ABNORMAL (ref 0.26–1.65)
LAMBDA FREE LIGHT CHAINS URINE/ VOL: 2.88 MG/DL (ref 0.57–2.63)

## 2019-10-21 LAB
CHOLESTEROL, FASTING: 141 MG/DL
HDLC SERPL-MCNC: 46 MG/DL
LDL CHOLESTEROL DIRECT: 81 MG/DL
TRIGLYCERIDE, FASTING: 96 MG/DL
VITAMIN D 25-HYDROXY: 60 NG/ML

## 2019-10-24 LAB
Lab: NORMAL
Lab: NORMAL
TEST NAME: NORMAL

## 2019-10-25 LAB
Lab: NORMAL
Lab: NORMAL
TEST NAME: NORMAL

## 2020-01-27 ENCOUNTER — OFFICE VISIT (OUTPATIENT)
Dept: CARDIOLOGY CLINIC | Age: 80
End: 2020-01-27
Payer: MEDICARE

## 2020-01-27 VITALS
BODY MASS INDEX: 41.02 KG/M2 | SYSTOLIC BLOOD PRESSURE: 126 MMHG | HEART RATE: 84 BPM | WEIGHT: 293 LBS | RESPIRATION RATE: 16 BRPM | DIASTOLIC BLOOD PRESSURE: 74 MMHG | HEIGHT: 71 IN

## 2020-01-27 PROCEDURE — 4004F PT TOBACCO SCREEN RCVD TLK: CPT | Performed by: INTERNAL MEDICINE

## 2020-01-27 PROCEDURE — G8427 DOCREV CUR MEDS BY ELIG CLIN: HCPCS | Performed by: INTERNAL MEDICINE

## 2020-01-27 PROCEDURE — 4040F PNEUMOC VAC/ADMIN/RCVD: CPT | Performed by: INTERNAL MEDICINE

## 2020-01-27 PROCEDURE — 1123F ACP DISCUSS/DSCN MKR DOCD: CPT | Performed by: INTERNAL MEDICINE

## 2020-01-27 PROCEDURE — G8484 FLU IMMUNIZE NO ADMIN: HCPCS | Performed by: INTERNAL MEDICINE

## 2020-01-27 PROCEDURE — G8417 CALC BMI ABV UP PARAM F/U: HCPCS | Performed by: INTERNAL MEDICINE

## 2020-01-27 PROCEDURE — 99214 OFFICE O/P EST MOD 30 MIN: CPT | Performed by: INTERNAL MEDICINE

## 2020-01-27 NOTE — PATIENT INSTRUCTIONS
Continue current cardiovascular medications which have been reviewed and discussed individually with you. Counseled for calorie counting, reduction in serving size and exercise and lifestyle modification for weight loss. Primary/secondary prevention is the goal by aggressive risk modification, healthy and therapeutic life style changes for cardiovascular risk reduction. Various goals are discussed and questions answered. Appropriate prescriptions if needed on this visit are addressed. After visit summery is provided. Questions answered and patient verbalizes understanding. Follow up in 6 months with ECG,  sooner if needed.

## 2020-01-27 NOTE — PROGRESS NOTES
Stuart Encinas Matt    Rachid Villa MD    Chief complaint and HPI:  Sorin Bonilla  is a 78 y.o. male following up for known coronary artery disease status post PCI in the past and has hypertension, hyperlipidemia, persistent atrial fibrillation and chronic obesity. Denies any chest pain or increasing shortness of breath or palpitations. Denies any dizziness, syncope or near-syncope. He is physically not very active and has not been able to lose much weight. He doesn't smoke. All his medications are reviewed. He has been compliant to his medications. Rest of the Cardiovascular system review is otherwise unchanged from prior encounter. 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 Past surgical history:  has a past surgical history that includes joint replacement () and Coronary angioplasty with stent. Social History:   Social History     Tobacco Use    Smoking status: Former Smoker     Packs/day: 1.50     Years: 25.00     Pack years: 37.50     Last attempt to quit: 1990     Years since quittin.2    Smokeless tobacco: Current User     Types: Chew    Tobacco comment: Chews tobacco        Substance Use Topics    Alcohol use: Yes     Alcohol/week: 56.0 standard drinks     Types: 56 Cans of beer per week     Comment: 6-8 beers daily or less     Family history: family history includes Cancer in his sister; Heart Disease in his father and mother.   ALLERGIES:  Lovenox [enoxaparin Yes Historical Provider, MD   lansoprazole (PREVACID) 30 MG capsule Take 30 mg by mouth daily. Yes Historical Provider, MD     Vitals:    01/27/20 1112   BP: 126/74   Site: Left Upper Arm   Position: Sitting   Cuff Size: Large Adult   Pulse: 84   Resp: 16   Weight: 293 lb (132.9 kg)   Height: 5' 11\" (1.803 m)      Body mass index is 40.87 kg/m². Wt Readings from Last 3 Encounters:   01/27/20 293 lb (132.9 kg)   07/23/19 293 lb (132.9 kg)   01/08/19 292 lb (132.5 kg)     Constitutional:  Patient is obese pleasant male in no apparent distress. Eyes: Pupils are equal.  He wears glasses. NECK: No JVP or thyromegaly  Cardiovascular: Auscultation: Irregular S1 and S2.  1/6 systolic murmur noted in aortic area. .  Carotids are negative for bruits. Peripheral pulses: Pedal pulses are 1+ and equal.  Respiratory:  Respiratory effort is normal.  right posterior rib click on inspiration. Otherwise breath sounds are clear to auscultation. Extremities: Trace right ankle edema noted. No clubbing,  Cyanosis, petechiae. SKIN: Warm and well perfused, no pallor or cyanosis  Abdomen:  No masses or tenderness. No organomegaly noted. Neurologic:  Oriented to time, place, and person and non-anxious. No focal neurological deficit noted. Psychiatric: Normal mood and effect.     LAB REVIEW:    CBC:   Lab Results   Component Value Date    WBC 6.9 10/18/2019    HGB 16.1 10/18/2019    HCT 49.3 10/18/2019     10/18/2019     Lipids:   Triglyceride, Fasting 96  <150 MG/DL Final 10/18/2019 10:20 AM [facility]   Cholesterol, Fasting 141  <200 MG/DL Final 10/18/2019 10:20 AM [address] [place] [address]    (NOTE)   Cardiovascular risk evaluation guidelines:   Low Risk        <200 mg/dL   Average Risk    200-240 mg/dL   High Risk       >240 mg/dL    HDL 46  >40 MG/DL Final 10/18/2019 10:20 AM [address] [place] [address]    LDL Direct 81          Renal:   Lab Results   Component

## 2020-02-18 ENCOUNTER — HOSPITAL ENCOUNTER (OUTPATIENT)
Age: 80
Discharge: HOME | End: 2020-02-18
Payer: MEDICARE

## 2020-02-18 LAB
ANION GAP SERPL CALCULATED.3IONS-SCNC: 8 MMOL/L (ref 4–16)
BUN BLDV-MCNC: 10 MG/DL (ref 6–23)
CALCIUM SERPL-MCNC: 9.1 MG/DL (ref 8.3–10.6)
CHLORIDE BLD-SCNC: 94 MMOL/L (ref 99–110)
CO2: 33 MMOL/L (ref 21–32)
CREAT SERPL-MCNC: 1.1 MG/DL (ref 0.9–1.3)
GFR AFRICAN AMERICAN: >60 ML/MIN/1.73M2
GFR NON-AFRICAN AMERICAN: >60 ML/MIN/1.73M2
GLUCOSE FASTING: 92 MG/DL (ref 70–99)
POTASSIUM SERPL-SCNC: 3.3 MMOL/L (ref 3.5–5.1)
SODIUM BLD-SCNC: 135 MMOL/L (ref 135–145)

## 2020-02-18 PROCEDURE — 80048 BASIC METABOLIC PNL TOTAL CA: CPT

## 2020-02-18 PROCEDURE — 36415 COLL VENOUS BLD VENIPUNCTURE: CPT

## 2020-06-17 ENCOUNTER — HOSPITAL ENCOUNTER (OUTPATIENT)
Age: 80
Discharge: HOME | End: 2020-06-17
Payer: MEDICARE

## 2020-06-17 LAB
ALBUMIN SERPL-MCNC: 3.6 GM/DL (ref 3.4–5)
ALP BLD-CCNC: 82 IU/L (ref 40–129)
ALT SERPL-CCNC: 15 U/L (ref 10–40)
ANION GAP SERPL CALCULATED.3IONS-SCNC: 8 MMOL/L (ref 4–16)
AST SERPL-CCNC: 35 IU/L (ref 15–37)
BILIRUB SERPL-MCNC: 0.6 MG/DL (ref 0–1)
BUN BLDV-MCNC: 11 MG/DL (ref 6–23)
CALCIUM SERPL-MCNC: 9.7 MG/DL (ref 8.3–10.6)
CHLORIDE BLD-SCNC: 94 MMOL/L (ref 99–110)
CHOLESTEROL: 127 MG/DL
CO2: 35 MMOL/L (ref 21–32)
CREAT SERPL-MCNC: 1.2 MG/DL (ref 0.9–1.3)
ESTIMATED AVERAGE GLUCOSE: 114 MG/DL
GFR AFRICAN AMERICAN: >60 ML/MIN/1.73M2
GFR NON-AFRICAN AMERICAN: 58 ML/MIN/1.73M2
GLUCOSE BLD-MCNC: 118 MG/DL (ref 70–99)
HBA1C MFR BLD: 5.6 % (ref 4.2–6.3)
HDLC SERPL-MCNC: 53 MG/DL
LDL CHOLESTEROL DIRECT: 65 MG/DL
POTASSIUM SERPL-SCNC: 3.8 MMOL/L (ref 3.5–5.1)
SODIUM BLD-SCNC: 137 MMOL/L (ref 135–145)
TOTAL PROTEIN: 6.1 GM/DL (ref 6.4–8.2)
TRIGL SERPL-MCNC: 81 MG/DL

## 2020-06-17 PROCEDURE — 83036 HEMOGLOBIN GLYCOSYLATED A1C: CPT

## 2020-06-17 PROCEDURE — 83721 ASSAY OF BLOOD LIPOPROTEIN: CPT

## 2020-06-17 PROCEDURE — 80053 COMPREHEN METABOLIC PANEL: CPT

## 2020-06-17 PROCEDURE — 36415 COLL VENOUS BLD VENIPUNCTURE: CPT

## 2020-06-17 PROCEDURE — 80061 LIPID PANEL: CPT

## 2020-07-27 ENCOUNTER — VIRTUAL VISIT (OUTPATIENT)
Dept: CARDIOLOGY CLINIC | Age: 80
End: 2020-07-27
Payer: MEDICARE

## 2020-07-27 PROCEDURE — 99213 OFFICE O/P EST LOW 20 MIN: CPT | Performed by: INTERNAL MEDICINE

## 2020-07-27 NOTE — PROGRESS NOTES
Jayla Clark is a 78 y.o. male evaluated via telephone on 7/27/2020. Consent:  He and/or health care decision maker is aware that that he may receive a bill for this telephone service, depending on his insurance coverage, and has provided verbal consent to proceed: Yes  Prior to Visit Medications    Medication Sig Taking? Authorizing Provider   rivaroxaban (XARELTO) 20 MG TABS tablet Take 1 tablet by mouth every 24 hours Yes Ephraim Bañuelos MD   mirtazapine (REMERON) 30 MG tablet Take 30 mg by mouth nightly Yes Historical Provider, MD   nitroGLYCERIN (NITROSTAT) 0.4 MG SL tablet Place 1 tablet under the tongue every 5 minutes as needed for Chest pain Yes Ephraim Bañuelos MD   Cholecalciferol (VITAMIN D3) 2000 UNITS CAPS Take 1 capsule by mouth daily Yes Historical Provider, MD   losartan (COZAAR) 25 MG tablet Take 25 mg by mouth daily Yes Historical Provider, MD   pravastatin (PRAVACHOL) 80 MG tablet TAKE ONE TABLET BY MOUTH EVERY DAY Yes Ephraim Bañuelos MD   carvedilol (COREG) 3.125 MG tablet Take 3.125 mg by mouth 2 times daily (with meals) Yes Historical Provider, MD   hydrochlorothiazide (MICROZIDE) 12.5 MG capsule Take 2 tablets every AM and 1 tablet every PM. Yes Historical Provider, MD   fluorouracil (EFUDEX) 5 % cream Apply topically 2 times daily as needed  Yes Historical Provider, MD   HYDROcodone-acetaminophen (NORCO)  MG per tablet Take 1 tablet by mouth every 6 hours as needed for Pain Yes Historical Provider, MD   vitamin B-12 (CYANOCOBALAMIN) 1000 MCG tablet Take 1,000 mcg by mouth daily Yes Historical Provider, MD   albuterol (PROVENTIL HFA;VENTOLIN HFA) 108 (90 BASE) MCG/ACT inhaler Inhale 2 puffs into the lungs every 6 hours as needed for Wheezing Yes Historical Provider, MD   ferrous gluconate 325 (37.5 FE) MG TABS Take 325 mg by mouth 2 times daily. Yes Historical Provider, MD   potassium chloride (MICRO-K) 10 MEQ CR capsule Takes 2 tabs in a.m. and 1 tab in p.m.  Yes Historical Provider, MD   aspirin 81 MG EC tablet Take 1 tablet by mouth daily. Yes Lorena Walker MD   tiotropium (SPIRIVA) 18 MCG inhalation capsule Inhale 18 mcg into the lungs daily  Yes Historical Provider, MD   lansoprazole (PREVACID) 30 MG capsule Take 30 mg by mouth daily. Yes Historical Provider, MD     Social History     Tobacco Use    Smoking status: Former Smoker     Packs/day: 1.50     Years: 25.00     Pack years: 37.50     Last attempt to quit: 1990     Years since quittin.7    Smokeless tobacco: Current User     Types: Chew    Tobacco comment: Chews tobacco        Substance Use Topics    Alcohol use: Yes     Alcohol/week: 56.0 standard drinks     Types: 56 Cans of beer per week     Comment: 6-8 beers daily or less    Drug use: No      Social History     Tobacco Use    Smoking status: Former Smoker     Packs/day: 1.50     Years: 25.00     Pack years: 37.50     Last attempt to quit: 1990     Years since quittin.7    Smokeless tobacco: Current User     Types: Chew    Tobacco comment: Chews tobacco        Substance Use Topics    Alcohol use:  Yes     Alcohol/week: 56.0 standard drinks     Types: 56 Cans of beer per week     Comment: 6-8 beers daily or less     Vital Signs: (As obtained by patient/caregiver or practitioner observation)  Patient-Reported Vitals 2020   Patient-Reported Weight 290   Patient-Reported Height 5 11        Wt Readings from Last 3 Encounters:   20 293 lb (132.9 kg)   19 293 lb (132.9 kg)   19 292 lb (132.5 kg)     Lab Results   Component Value Date    WBC 6.9 10/18/2019    HGB 16.1 10/18/2019    HCT 49.3 10/18/2019     10/18/2019     Lab Results   Component Value Date    CHOL 127 2020    TRIG 81 2020    HDL 53 2020    LDLCALC 56 2012    LDLDIRECT 65 2020     Lab Results   Component Value Date    BUN 11 2020    CREATININE 1.2 2020     2020    K 3.8 2020     Lab Results Component Value Date    LABA1C 5.6 06/17/2020     Documentation: I reviewed patient's cardiovascular medications and active cardiovascular diagnosis in his chart. I reviewed the last office visit note and followed up on symptoms patient  had reported on previous visit. Multiple questions related to his medications and cardiovascular problem list addressed. Most recent relevant labs are reviewed and discussed with patient. Concerns and questions related to lab results are also discussed and patient verbalized understanding and asked relevant questions. I communicated with the patient and/or health care decision maker about coronary artery disease, hypertension and hyperlipidemia and persistent at fibrillation. Denies any palpitations or chest pains or shortness of breath. She stays home mostly and tries to walk on treadmill for exercise and has been compliant to his medications. Details of this discussion including any medical advice provided:     CAD s/p prox lad bare metal stent 2011  Clinically stable. Continue aggressive risk modification for secondary prevention. Atrial fibrillation, persistent  Rate is controlled and patient is anticoagulative. Continue both. Hypertension  Patient does not monitor blood pressure at home. Continue current medications. Hyperlipidemia  Well-controlled on current medications continue the same. I affirm this is a Patient Initiated Episode with an Established Patient who has not had a related appointment within my department in the past 7 days or scheduled within the next 24 hours.     Total Time: minutes: 11-20 minutes    Note: not billable if this call serves to triage the patient into an appointment for the relevant concern      nIgris Dumont

## 2021-02-01 ENCOUNTER — VIRTUAL VISIT (OUTPATIENT)
Dept: CARDIOLOGY CLINIC | Age: 81
End: 2021-02-01
Payer: MEDICARE

## 2021-02-01 DIAGNOSIS — E78.00 PURE HYPERCHOLESTEROLEMIA: ICD-10-CM

## 2021-02-01 DIAGNOSIS — Z98.61 HISTORY OF PTCA: ICD-10-CM

## 2021-02-01 DIAGNOSIS — I48.19 ATRIAL FIBRILLATION, PERSISTENT (HCC): Primary | ICD-10-CM

## 2021-02-01 DIAGNOSIS — I10 ESSENTIAL HYPERTENSION: ICD-10-CM

## 2021-02-01 PROCEDURE — G8484 FLU IMMUNIZE NO ADMIN: HCPCS | Performed by: INTERNAL MEDICINE

## 2021-02-01 PROCEDURE — G8427 DOCREV CUR MEDS BY ELIG CLIN: HCPCS | Performed by: INTERNAL MEDICINE

## 2021-02-01 PROCEDURE — G8421 BMI NOT CALCULATED: HCPCS | Performed by: INTERNAL MEDICINE

## 2021-02-01 PROCEDURE — 99214 OFFICE O/P EST MOD 30 MIN: CPT | Performed by: INTERNAL MEDICINE

## 2021-02-01 PROCEDURE — 4040F PNEUMOC VAC/ADMIN/RCVD: CPT | Performed by: INTERNAL MEDICINE

## 2021-02-01 PROCEDURE — 1123F ACP DISCUSS/DSCN MKR DOCD: CPT | Performed by: INTERNAL MEDICINE

## 2021-02-01 PROCEDURE — 4004F PT TOBACCO SCREEN RCVD TLK: CPT | Performed by: INTERNAL MEDICINE

## 2021-02-01 NOTE — ASSESSMENT & PLAN NOTE
Rate is controlled and he is anticoagulated on Xarelto. Chads vascular score is 3. Continue current therapy.

## 2021-02-01 NOTE — PROGRESS NOTES
Colby Bundy (: 1940) is a [de-identified] y.o. male,Established patient, here for evaluation of the following chief complaint(s): Atrial Fibrillation (VV for 6 month check. Pt denies any new cardiac sx.), 6 Month Follow-Up, and Hypertension    Prior to Admission medications    Medication Sig Start Date End Date Taking? Authorizing Provider   rivaroxaban (XARELTO) 20 MG TABS tablet Take 1 tablet by mouth every 24 hours 20  Yes Aydin Mansfield MD   mirtazapine (REMERON) 30 MG tablet Take 30 mg by mouth nightly   Yes Historical Provider, MD   nitroGLYCERIN (NITROSTAT) 0.4 MG SL tablet Place 1 tablet under the tongue every 5 minutes as needed for Chest pain 19  Yes Aydin Mansfield MD   Cholecalciferol (VITAMIN D3) 2000 UNITS CAPS Take 1 capsule by mouth daily   Yes Historical Provider, MD   losartan (COZAAR) 25 MG tablet Take 25 mg by mouth daily   Yes Historical Provider, MD   pravastatin (PRAVACHOL) 80 MG tablet TAKE ONE TABLET BY MOUTH EVERY DAY 11/2/15  Yes Aydin Mansfield MD   carvedilol (COREG) 3.125 MG tablet Take 3.125 mg by mouth 2 times daily (with meals)   Yes Historical Provider, MD   hydrochlorothiazide (MICROZIDE) 12.5 MG capsule Take 2 tablets every AM and 1 tablet every PM.   Yes Historical Provider, MD   fluorouracil (EFUDEX) 5 % cream Apply topically 2 times daily as needed    Yes Historical Provider, MD   HYDROcodone-acetaminophen (NORCO)  MG per tablet Take 1 tablet by mouth every 6 hours as needed for Pain   Yes Historical Provider, MD   vitamin B-12 (CYANOCOBALAMIN) 1000 MCG tablet Take 1,000 mcg by mouth daily   Yes Historical Provider, MD   albuterol (PROVENTIL HFA;VENTOLIN HFA) 108 (90 BASE) MCG/ACT inhaler Inhale 2 puffs into the lungs every 6 hours as needed for Wheezing   Yes Historical Provider, MD   ferrous gluconate 325 (37.5 FE) MG TABS Take 325 mg by mouth 2 times daily.    Yes Historical Provider, MD potassium chloride (MICRO-K) 10 MEQ CR capsule Takes 2 tabs in a.m. and 1 tab in p.m. Yes Historical Provider, MD   aspirin 81 MG EC tablet Take 1 tablet by mouth daily. 4/12/13  Yes Hemal Song MD   tiotropium (SPIRIVA) 18 MCG inhalation capsule Inhale 18 mcg into the lungs daily    Yes Historical Provider, MD   lansoprazole (PREVACID) 30 MG capsule Take 30 mg by mouth daily. Yes Historical Provider, MD     Allergies   Allergen Reactions    Lovenox [Enoxaparin Sodium]     Morphine Rash     SUBJECTIVE/OBJECTIVE:    HPI 51-year-old male who follows up for known coronary artery disease status post percutaneous interventions in 2011 and has persistent atrial fibrillation hypertension and hyperlipidemia. He denies any cardiac symptoms. He stays home by himself and using all the precautions for COVID-19 and has already had his second vaccine last week. He follows CDC guidelines very well. Does not need any prescriptions today and I have reviewed his medications and most recent labs with him and addressed related questions. Review of Systems unchanged from his previous visits. Patient-Reported Vitals 2/1/2021   Patient-Reported Weight 288   Patient-Reported Height 5 11   Patient-Reported Systolic 948   Patient-Reported Diastolic 66   Patient-Reported Pulse 107      Physical Exam is not done as it was an audio visit. Patient does not have a smart phone or iPad.   Lab Results   Component Value Date    WBC 6.9 10/18/2019    HGB 16.1 10/18/2019    HCT 49.3 10/18/2019     10/18/2019     Lab Results   Component Value Date    CHOL 127 06/17/2020    CHOLFAST 141 10/18/2019    TRIG 81 06/17/2020    TRIGLYCFAST 96 10/18/2019    HDL 53 06/17/2020    LDLCALC 56 09/06/2012   LDL was 65 on June 17, 2020  Lab Results   Component Value Date    BUN 11 06/17/2020    CREATININE 1.2 06/17/2020     06/17/2020    K 3.8 06/17/2020     Lab Results   Component Value Date    LABA1C 5.6 06/17/2020 ASSESSMENT/PLAN:  1. Atrial fibrillation, persistent  Assessment & Plan:  Rate is controlled and he is anticoagulated on Xarelto. Chads vascular score is 3. Continue current therapy. 2. CAD s/p prox lad bare metal stent 2011  Assessment & Plan:  Clinically stable. Continue aggressive risk factor modification for secondary prevention. 3. Pure hypercholesterolemia  Assessment & Plan:  Well-controlled with the last LDL reported 64. Continue current statin therapy. 4. Essential hypertension  Assessment & Plan:  Fairly well-controlled on current medications continue the same. On this date 02/01/21 I have spent 20 minutes reviewing previous notes, test results and face to face with the patient discussing the diagnosis and importance of compliance with the treatment plan as well as documenting on the day of the visit. Chloe Caceres is a [de-identified] y.o. male being evaluated by a Virtual Visit (video visit) encounter to address concerns as mentioned above. A caregiver was present when appropriate. Due to this being a TeleHealth encounter (During ANUZJ-25 public health emergency), evaluation of the following organ systems was limited: Vitals/Constitutional/EENT/Resp/CV/GI//MS/Neuro/Skin/Heme-Lymph-Imm. Pursuant to the emergency declaration under the [facility], [address] [place] [address] authority and the [employer] Act, this Virtual Visit was conducted with patient's (and/or legal guardian's) consent, to reduce the patient's risk of exposure to COVID-19 and provide necessary medical care. The patient (and/or legal guardian) has also been advised to contact this office for worsening conditions or problems, and seek emergency medical treatment and/or call 911 if deemed necessary.      Patient identification was verified at the start of the visit: Yes Services were provided through phone discussion virtually to substitute for in-person clinic visit. Patient and provider were located at their individual homes. An electronic signature was used to authenticate this note.     --Caden Monge MD

## 2021-03-16 ENCOUNTER — HOSPITAL ENCOUNTER (OUTPATIENT)
Age: 81
Discharge: HOME | End: 2021-03-16
Payer: MEDICARE

## 2021-03-16 LAB
ALBUMIN SERPL-MCNC: 3.7 GM/DL (ref 3.4–5)
ALP BLD-CCNC: 73 IU/L (ref 40–129)
ALT SERPL-CCNC: 10 U/L (ref 10–40)
ANION GAP SERPL CALCULATED.3IONS-SCNC: 8 MMOL/L (ref 4–16)
AST SERPL-CCNC: 25 IU/L (ref 15–37)
BASOPHILS ABSOLUTE: 0 K/CU MM
BASOPHILS RELATIVE PERCENT: 0.3 % (ref 0–1)
BILIRUB SERPL-MCNC: 0.4 MG/DL (ref 0–1)
BUN BLDV-MCNC: 14 MG/DL (ref 6–23)
CALCIUM SERPL-MCNC: 9.6 MG/DL (ref 8.3–10.6)
CHLORIDE BLD-SCNC: 96 MMOL/L (ref 99–110)
CO2: 30 MMOL/L (ref 21–32)
CREAT SERPL-MCNC: 1.3 MG/DL (ref 0.9–1.3)
DIFFERENTIAL TYPE: ABNORMAL
EOSINOPHILS ABSOLUTE: 0.1 K/CU MM
EOSINOPHILS RELATIVE PERCENT: 1.8 % (ref 0–3)
FERRITIN: 104 NG/ML (ref 30–400)
FOLATE: 11.4 NG/ML (ref 3.1–17.5)
GFR AFRICAN AMERICAN: >60 ML/MIN/1.73M2
GFR NON-AFRICAN AMERICAN: 53 ML/MIN/1.73M2
GLUCOSE FASTING: 127 MG/DL (ref 70–99)
HCT VFR BLD CALC: 46.7 % (ref 42–52)
HEMOGLOBIN: 15 GM/DL (ref 13.5–18)
IMMATURE NEUTROPHIL %: 2.4 % (ref 0–0.43)
IRON: 120 UG/DL (ref 59–158)
LYMPHOCYTES ABSOLUTE: 1.4 K/CU MM
LYMPHOCYTES RELATIVE PERCENT: 19.2 % (ref 24–44)
MCH RBC QN AUTO: 32.8 PG (ref 27–31)
MCHC RBC AUTO-ENTMCNC: 32.1 % (ref 32–36)
MCV RBC AUTO: 102.2 FL (ref 78–100)
MONOCYTES ABSOLUTE: 0.5 K/CU MM
MONOCYTES RELATIVE PERCENT: 6.6 % (ref 0–4)
PCT TRANSFERRIN: 34 % (ref 10–44)
PDW BLD-RTO: 14.3 % (ref 11.7–14.9)
PLATELET # BLD: 154 K/CU MM (ref 140–440)
PMV BLD AUTO: 12.2 FL (ref 7.5–11.1)
POTASSIUM SERPL-SCNC: 4 MMOL/L (ref 3.5–5.1)
RBC # BLD: 4.57 M/CU MM (ref 4.6–6.2)
SEGMENTED NEUTROPHILS ABSOLUTE COUNT: 5 K/CU MM
SEGMENTED NEUTROPHILS RELATIVE PERCENT: 69.7 % (ref 36–66)
SODIUM BLD-SCNC: 134 MMOL/L (ref 135–145)
TOTAL IMMATURE NEUTOROPHIL: 0.17 K/CU MM
TOTAL IRON BINDING CAPACITY: 351 UG/DL (ref 250–450)
TOTAL PROTEIN: 6 GM/DL (ref 6.4–8.2)
UNSATURATED IRON BINDING CAPACITY: 231 UG/DL (ref 110–370)
VITAMIN B-12: 850.1 PG/ML (ref 211–911)
WBC # BLD: 7.2 K/CU MM (ref 4–10.5)

## 2021-03-16 PROCEDURE — 82607 VITAMIN B-12: CPT

## 2021-03-16 PROCEDURE — 83540 ASSAY OF IRON: CPT

## 2021-03-16 PROCEDURE — 80053 COMPREHEN METABOLIC PANEL: CPT

## 2021-03-16 PROCEDURE — 82746 ASSAY OF FOLIC ACID SERUM: CPT

## 2021-03-16 PROCEDURE — 82728 ASSAY OF FERRITIN: CPT

## 2021-03-16 PROCEDURE — 83550 IRON BINDING TEST: CPT

## 2021-03-16 PROCEDURE — 85025 COMPLETE CBC W/AUTO DIFF WBC: CPT

## 2021-03-16 PROCEDURE — 36415 COLL VENOUS BLD VENIPUNCTURE: CPT

## 2021-08-02 ENCOUNTER — OFFICE VISIT (OUTPATIENT)
Dept: CARDIOLOGY CLINIC | Age: 81
End: 2021-08-02
Payer: MEDICARE

## 2021-08-02 VITALS
HEART RATE: 88 BPM | SYSTOLIC BLOOD PRESSURE: 122 MMHG | HEIGHT: 70 IN | DIASTOLIC BLOOD PRESSURE: 74 MMHG | WEIGHT: 282.6 LBS | BODY MASS INDEX: 40.46 KG/M2

## 2021-08-02 DIAGNOSIS — E78.2 MIXED HYPERLIPIDEMIA: ICD-10-CM

## 2021-08-02 DIAGNOSIS — I10 ESSENTIAL HYPERTENSION: ICD-10-CM

## 2021-08-02 DIAGNOSIS — Z98.61 HISTORY OF PTCA: ICD-10-CM

## 2021-08-02 DIAGNOSIS — I48.19 ATRIAL FIBRILLATION, PERSISTENT (HCC): Primary | ICD-10-CM

## 2021-08-02 PROCEDURE — 99214 OFFICE O/P EST MOD 30 MIN: CPT | Performed by: INTERNAL MEDICINE

## 2021-08-02 PROCEDURE — 4004F PT TOBACCO SCREEN RCVD TLK: CPT | Performed by: INTERNAL MEDICINE

## 2021-08-02 PROCEDURE — 1123F ACP DISCUSS/DSCN MKR DOCD: CPT | Performed by: INTERNAL MEDICINE

## 2021-08-02 PROCEDURE — 4040F PNEUMOC VAC/ADMIN/RCVD: CPT | Performed by: INTERNAL MEDICINE

## 2021-08-02 PROCEDURE — G8417 CALC BMI ABV UP PARAM F/U: HCPCS | Performed by: INTERNAL MEDICINE

## 2021-08-02 PROCEDURE — G8427 DOCREV CUR MEDS BY ELIG CLIN: HCPCS | Performed by: INTERNAL MEDICINE

## 2021-08-02 RX ORDER — NITROGLYCERIN 0.4 MG/1
0.4 TABLET SUBLINGUAL EVERY 5 MIN PRN
Qty: 25 TABLET | Refills: 3 | Status: SHIPPED | OUTPATIENT
Start: 2021-08-02

## 2021-08-02 NOTE — ASSESSMENT & PLAN NOTE
Last lipids are from June last year. LDL was 65. Continue pravastatin 80 mg daily and repeat lipids.

## 2021-08-02 NOTE — PROGRESS NOTES
Andrei Ricks (:  1940) is a [de-identified] y.o. male,     Chief Complaint   Patient presents with    6 Month Follow-Up     Patient here for 6 month follow up. Patient denies CP, palpitations or dizziness. Does report SOB with exertion and edema but reports no changes for several years. Patient did not bring medication list or bottles, did verbally confirm    Atrial Fibrillation    Hypertension     Patient is here for follow up for chronic persistent atrial fibrillation and has coronary artery disease status post PCI in  and has hypertension hyperlipidemia chronic obesity and chronic back problems. He walks with a cane. Denies any cardiac symptoms. He has been compliant with his medications. His encounters with PCP are reviewed and care everywhere. He is fully vaccinated against COVID-19. Allergies   Allergen Reactions    Lovenox [Enoxaparin Sodium]     Morphine Rash     Prior to Admission medications    Medication Sig Start Date End Date Taking?  Authorizing Provider   nitroGLYCERIN (NITROSTAT) 0.4 MG SL tablet Place 1 tablet under the tongue every 5 minutes as needed for Chest pain 21  Yes Cecile Souza MD   rivaroxaban (XARELTO) 20 MG TABS tablet Take 1 tablet by mouth every 24 hours 20  Yes Cecile Souza MD   mirtazapine (REMERON) 30 MG tablet Take 30 mg by mouth nightly   Yes Historical Provider, MD   Cholecalciferol (VITAMIN D3) 2000 UNITS CAPS Take 1 capsule by mouth daily   Yes Historical Provider, MD   losartan (COZAAR) 25 MG tablet Take 25 mg by mouth daily   Yes Historical Provider, MD   pravastatin (PRAVACHOL) 80 MG tablet TAKE ONE TABLET BY MOUTH EVERY DAY 11/2/15  Yes Cecile Souza MD   carvedilol (COREG) 3.125 MG tablet Take 3.125 mg by mouth 2 times daily (with meals)   Yes Historical Provider, MD   hydrochlorothiazide (MICROZIDE) 12.5 MG capsule Take 2 tablets every AM and 1 tablet every PM.   Yes Historical Provider, MD   fluorouracil (EFUDEX) 5 % cream Apply topically 2 times daily as needed    Yes Historical Provider, MD   HYDROcodone-acetaminophen (NORCO)  MG per tablet Take 1 tablet by mouth every 6 hours as needed for Pain   Yes Historical Provider, MD   vitamin B-12 (CYANOCOBALAMIN) 1000 MCG tablet Take 1,000 mcg by mouth daily   Yes Historical Provider, MD   albuterol (PROVENTIL HFA;VENTOLIN HFA) 108 (90 BASE) MCG/ACT inhaler Inhale 2 puffs into the lungs every 6 hours as needed for Wheezing   Yes Historical Provider, MD   ferrous gluconate 325 (37.5 FE) MG TABS Take 325 mg by mouth 2 times daily. Yes Historical Provider, MD   potassium chloride (MICRO-K) 10 MEQ CR capsule Takes 2 tabs in a.m. and 1 tab in p.m. Yes Historical Provider, MD   aspirin 81 MG EC tablet Take 1 tablet by mouth daily. Patient taking differently: Take 81 mg by mouth daily \"take occassionally\" 4/12/13  Yes Noah Flood MD   tiotropium (SPIRIVA) 18 MCG inhalation capsule Inhale 18 mcg into the lungs daily    Yes Historical Provider, MD   lansoprazole (PREVACID) 30 MG capsule Take 30 mg by mouth daily. Yes Historical Provider, MD     Past Medical History:   Diagnosis Date    AR (aortic regurgitation)     mild to mod    Atrial fibrillation, new onset ([address]     CAD (coronary artery disease)     History of angioplasty    Chronic midline low back pain without sciatica 5/1/2017    Has seen Dr. Columbus Harada and had shots    COPD ([facility]) [facility]     Family history of coronary artery disease     Fatigue 10/2016    H/O cardiovascular stress test 2/21/2012    mild ischemia in the basal inferior and mid inferior regions ef is 47    H/O chest x-ray 10/20/2016    Right pleural effusion resolved.  H/O Doppler ultrasound 10/2016    CAROTID-[place]. arteries patent w/less than 50% stenosis in the internal carotid arteries.     H/O echocardiogram 2/17/15    Aortic sclerosis without stenosis, normal LV size and wall motion w/low normal systolic function, LA dilatation, RV dilatation, mild pulmonary HTN, EF 50-55%.  History of cardiac cath 7/2/1996    LV uniform LV contractility RCa dominatn 50-60 prox stenosis  LM patent  LAD mild mid plaquing diag has 70 ostial narrowing ramus minimal plaquing cx normal    History of PTCA 08/15/2011    prox lad bare metal stent 8/2011    Hyperlipidemia     Hypertension     Obesity     Obesity, Class II, BMI 35-39.9, with comorbidity 4/17/2014    RBBB     Risk for falls 10/26/2015    Tachycardia       Vitals:    08/02/21 1105   BP: 122/74   Pulse: 88   Weight: 282 lb 9.6 oz (128.2 kg)   Height: 5' 10\" (1.778 m)      Body mass index is 40.55 kg/m². Wt Readings from Last 3 Encounters:   08/02/21 282 lb 9.6 oz (128.2 kg)   01/27/20 293 lb (132.9 kg)   07/23/19 293 lb (132.9 kg)     Constitutional:  Patient is obese pleasant man in no apparent distress. HEENT: Is wearing glasses and facemask. Cardiovascular: Auscultation: Normal S1 and S2. No significant murmurs noted. Carotids are negative for bruits. Abdominal aorta is nonpalpable. No epigastric bruit noted. Peripheral pulses: Pedal pulses are diminished. Respiratory:  Respiratory effort is normal. Breath sounds are clear to auscultation. Extremities:  No edema, clubbing,  Cyanosis, petechiae. Abdomen:  No masses or tenderness. No organomegaly noted. Neurologic:  Oriented to time, place, and person and non-anxious. No focal neurological deficit noted. Psychiatric: Normal mood and effect.      Pertinent records reviewed and discussed with patient and results are as follow:    Lab Results   Component Value Date    WBC 7.2 03/16/2021    HGB 15.0 03/16/2021    HCT 46.7 03/16/2021     03/16/2021     Lab Results   Component Value Date    CHOL 127 06/17/2020    CHOLFAST 141 10/18/2019    TRIG 81 06/17/2020    TRIGLYCFAST 96 10/18/2019    HDL 53 06/17/2020    LDLCALC 56 09/06/2012    LDLDIRECT 65 06/17/2020     Lab Results   Component Value Date    BUN 14 03/16/2021    CREATININE 1.3 03/16/2021     03/16/2021    K 4.0 03/16/2021     Lab Results   Component Value Date    INR 0.92 03/14/2011     Lab Results   Component Value Date    LABA1C 5.6 06/17/2020     ASSESSMENT/PLAN:    1. Atrial fibrillation, persistent  Assessment & Plan:  Chads vascular score is 3. He is on Xarelto for anticoagulation therapy. Rate is well controlled. 2. CAD s/p prox lad bare metal stent 2011  Assessment & Plan:  Clinically stable. Continue aggressive risk factor modification for secondary prevention. 3. Mixed hyperlipidemia  Assessment & Plan:  Last lipids are from June last year. LDL was 65. Continue pravastatin 80 mg daily and repeat lipids. 4. Essential hypertension  Assessment & Plan:  Well-controlled on current medications. Continue the same. Continue current cardiovascular medications which have been reviewed and discussed individually with you. Counseled for calorie counting, reduction in serving size and exercise and lifestyle modification for weight loss. Use maximum fall precautions. Follow-up in 6 months, sooner if needed. An electronic signature was used to authenticate this note.     --Denise Nash MD

## 2021-08-02 NOTE — PATIENT INSTRUCTIONS
Continue current cardiovascular medications which have been reviewed and discussed individually with you. Counseled for calorie counting, reduction in serving size and exercise and lifestyle modification for weight loss. Use maximum fall precautions. Follow-up in 6 months, sooner if needed.

## 2021-09-07 NOTE — ASSESSMENT & PLAN NOTE
Focus notes completed by HARIS Herrera during recent admission. Will route to her for further review to verify if appt on 9/15 is needed per patient request.    Well-controlled on current medications continue the same.

## 2021-10-26 ENCOUNTER — HOSPITAL ENCOUNTER (OUTPATIENT)
Age: 81
Discharge: HOME | End: 2021-10-26
Payer: MEDICARE

## 2021-10-26 LAB
ALBUMIN SERPL-MCNC: 3.5 GM/DL (ref 3.4–5)
ALP BLD-CCNC: 81 IU/L (ref 40–129)
ALT SERPL-CCNC: 8 U/L (ref 10–40)
ANION GAP SERPL CALCULATED.3IONS-SCNC: 8 MMOL/L (ref 4–16)
AST SERPL-CCNC: 23 IU/L (ref 15–37)
BASOPHILS ABSOLUTE: 0 K/CU MM
BASOPHILS RELATIVE PERCENT: 0.1 % (ref 0–1)
BILIRUB SERPL-MCNC: 0.6 MG/DL (ref 0–1)
BUN BLDV-MCNC: 13 MG/DL (ref 6–23)
CALCIUM SERPL-MCNC: 9.4 MG/DL (ref 8.3–10.6)
CHLORIDE BLD-SCNC: 96 MMOL/L (ref 99–110)
CHOLESTEROL, FASTING: 136 MG/DL
CO2: 29 MMOL/L (ref 21–32)
CREAT SERPL-MCNC: 1.2 MG/DL (ref 0.9–1.3)
DIFFERENTIAL TYPE: ABNORMAL
EOSINOPHILS ABSOLUTE: 0.1 K/CU MM
EOSINOPHILS RELATIVE PERCENT: 1.5 % (ref 0–3)
ESTIMATED AVERAGE GLUCOSE: 114 MG/DL
FERRITIN: 136 NG/ML (ref 30–400)
GFR AFRICAN AMERICAN: >60 ML/MIN/1.73M2
GFR NON-AFRICAN AMERICAN: 58 ML/MIN/1.73M2
GLUCOSE FASTING: 112 MG/DL (ref 70–99)
HBA1C MFR BLD: 5.6 % (ref 4.2–6.3)
HCT VFR BLD CALC: 46.1 % (ref 42–52)
HDLC SERPL-MCNC: 49 MG/DL
HEMOGLOBIN: 15.4 GM/DL (ref 13.5–18)
IMMATURE NEUTROPHIL %: 1.2 % (ref 0–0.43)
IRON: 103 UG/DL (ref 59–158)
LDL CHOLESTEROL DIRECT: 62 MG/DL
LYMPHOCYTES ABSOLUTE: 1.8 K/CU MM
LYMPHOCYTES RELATIVE PERCENT: 24.5 % (ref 24–44)
MCH RBC QN AUTO: 33 PG (ref 27–31)
MCHC RBC AUTO-ENTMCNC: 33.4 % (ref 32–36)
MCV RBC AUTO: 98.9 FL (ref 78–100)
MONOCYTES ABSOLUTE: 0.6 K/CU MM
MONOCYTES RELATIVE PERCENT: 7.4 % (ref 0–4)
PCT TRANSFERRIN: 31 % (ref 10–44)
PDW BLD-RTO: 14.1 % (ref 11.7–14.9)
PLATELET # BLD: 106 K/CU MM (ref 140–440)
PMV BLD AUTO: 13.1 FL (ref 7.5–11.1)
POTASSIUM SERPL-SCNC: 3.5 MMOL/L (ref 3.5–5.1)
RBC # BLD: 4.66 M/CU MM (ref 4.6–6.2)
SEGMENTED NEUTROPHILS ABSOLUTE COUNT: 4.8 K/CU MM
SEGMENTED NEUTROPHILS RELATIVE PERCENT: 65.3 % (ref 36–66)
SODIUM BLD-SCNC: 133 MMOL/L (ref 135–145)
TOTAL IMMATURE NEUTOROPHIL: 0.09 K/CU MM
TOTAL IRON BINDING CAPACITY: 331 UG/DL (ref 250–450)
TOTAL PROTEIN: 6.2 GM/DL (ref 6.4–8.2)
TRIGLYCERIDE, FASTING: 69 MG/DL
UNSATURATED IRON BINDING CAPACITY: 228 UG/DL (ref 110–370)
WBC # BLD: 7.4 K/CU MM (ref 4–10.5)

## 2021-10-26 PROCEDURE — 83550 IRON BINDING TEST: CPT

## 2021-10-26 PROCEDURE — 85025 COMPLETE CBC W/AUTO DIFF WBC: CPT

## 2021-10-26 PROCEDURE — 80061 LIPID PANEL: CPT

## 2021-10-26 PROCEDURE — 82728 ASSAY OF FERRITIN: CPT

## 2021-10-26 PROCEDURE — 83540 ASSAY OF IRON: CPT

## 2021-10-26 PROCEDURE — 83036 HEMOGLOBIN GLYCOSYLATED A1C: CPT

## 2021-10-26 PROCEDURE — 80053 COMPREHEN METABOLIC PANEL: CPT

## 2021-10-26 PROCEDURE — 36415 COLL VENOUS BLD VENIPUNCTURE: CPT

## 2022-03-15 ENCOUNTER — OFFICE VISIT (OUTPATIENT)
Dept: CARDIOLOGY CLINIC | Age: 82
End: 2022-03-15
Payer: MEDICARE

## 2022-03-15 VITALS
HEIGHT: 71 IN | SYSTOLIC BLOOD PRESSURE: 128 MMHG | DIASTOLIC BLOOD PRESSURE: 68 MMHG | BODY MASS INDEX: 39.48 KG/M2 | HEART RATE: 85 BPM | WEIGHT: 282 LBS

## 2022-03-15 DIAGNOSIS — E66.01 CLASS 2 SEVERE OBESITY DUE TO EXCESS CALORIES WITH SERIOUS COMORBIDITY AND BODY MASS INDEX (BMI) OF 39.0 TO 39.9 IN ADULT (HCC): ICD-10-CM

## 2022-03-15 DIAGNOSIS — I10 PRIMARY HYPERTENSION: ICD-10-CM

## 2022-03-15 DIAGNOSIS — I48.19 ATRIAL FIBRILLATION, PERSISTENT (HCC): Primary | ICD-10-CM

## 2022-03-15 DIAGNOSIS — Z98.61 HISTORY OF PTCA: ICD-10-CM

## 2022-03-15 DIAGNOSIS — G89.29 CHRONIC MIDLINE LOW BACK PAIN WITHOUT SCIATICA: ICD-10-CM

## 2022-03-15 DIAGNOSIS — M54.50 CHRONIC MIDLINE LOW BACK PAIN WITHOUT SCIATICA: ICD-10-CM

## 2022-03-15 DIAGNOSIS — E78.00 PURE HYPERCHOLESTEROLEMIA: ICD-10-CM

## 2022-03-15 PROCEDURE — G8484 FLU IMMUNIZE NO ADMIN: HCPCS | Performed by: INTERNAL MEDICINE

## 2022-03-15 PROCEDURE — G8427 DOCREV CUR MEDS BY ELIG CLIN: HCPCS | Performed by: INTERNAL MEDICINE

## 2022-03-15 PROCEDURE — 4004F PT TOBACCO SCREEN RCVD TLK: CPT | Performed by: INTERNAL MEDICINE

## 2022-03-15 PROCEDURE — 4040F PNEUMOC VAC/ADMIN/RCVD: CPT | Performed by: INTERNAL MEDICINE

## 2022-03-15 PROCEDURE — 99214 OFFICE O/P EST MOD 30 MIN: CPT | Performed by: INTERNAL MEDICINE

## 2022-03-15 PROCEDURE — 93000 ELECTROCARDIOGRAM COMPLETE: CPT | Performed by: INTERNAL MEDICINE

## 2022-03-15 PROCEDURE — G8417 CALC BMI ABV UP PARAM F/U: HCPCS | Performed by: INTERNAL MEDICINE

## 2022-03-15 PROCEDURE — 1123F ACP DISCUSS/DSCN MKR DOCD: CPT | Performed by: INTERNAL MEDICINE

## 2022-03-15 NOTE — PROGRESS NOTES
Sam Neal (:  1940) is a 80 y.o. male,     Chief Complaint   Patient presents with    Hyperlipidemia     Patient denies chest palpitations and dizziness, SOB on exertion and edema that hasn't increased on the last several years.  Hypertension    Coronary Artery Disease     Patient is here for follow up for chronic atrial fibrillation, hypertension and hyperlipidemia and chronic obesity and disabling sciatica. Has been walking with a cane. Has taken vaccination against COVID-19. Unable to lose much weight as he has weaknesses with a lot of unhealthy snacks which she admits to it. He is short of breath on any activities which has not changed much since last visit. As long as he is not doing anything he is asymptomatic. Denies any orthopnea or paroxysmal nocturnal dyspnea or significant leg swelling. Allergies   Allergen Reactions    Lovenox [Enoxaparin Sodium]     Morphine Rash     Prior to Admission medications    Medication Sig Start Date End Date Taking?  Authorizing Provider   nitroGLYCERIN (NITROSTAT) 0.4 MG SL tablet Place 1 tablet under the tongue every 5 minutes as needed for Chest pain 21  Yes Marjan Lynn MD   rivaroxaban (XARELTO) 20 MG TABS tablet Take 1 tablet by mouth every 24 hours 20  Yes Marjan Lynn MD   mirtazapine (REMERON) 30 MG tablet Take 30 mg by mouth nightly   Yes Historical Provider, MD   Cholecalciferol (VITAMIN D3) 2000 UNITS CAPS Take 1 capsule by mouth daily   Yes Historical Provider, MD   losartan (COZAAR) 25 MG tablet Take 25 mg by mouth daily   Yes Historical Provider, MD   pravastatin (PRAVACHOL) 80 MG tablet TAKE ONE TABLET BY MOUTH EVERY DAY 11/2/15  Yes Marjan Lynn MD   carvedilol (COREG) 3.125 MG tablet Take 3.125 mg by mouth 2 times daily (with meals)   Yes Historical Provider, MD   hydrochlorothiazide (MICROZIDE) 12.5 MG capsule Take 2 tablets every AM and 1 tablet every PM.   Yes Historical Provider, MD   fluorouracil (EFUDEX) 5 % cream Apply topically 2 times daily as needed    Yes Historical Provider, MD   HYDROcodone-acetaminophen (NORCO)  MG per tablet Take 1 tablet by mouth every 6 hours as needed for Pain   Yes Historical Provider, MD   vitamin B-12 (CYANOCOBALAMIN) 1000 MCG tablet Take 1,000 mcg by mouth daily   Yes Historical Provider, MD   albuterol (PROVENTIL HFA;VENTOLIN HFA) 108 (90 BASE) MCG/ACT inhaler Inhale 2 puffs into the lungs every 6 hours as needed for Wheezing   Yes Historical Provider, MD   ferrous gluconate 325 (37.5 FE) MG TABS Take 325 mg by mouth 2 times daily. Yes Historical Provider, MD   potassium chloride (MICRO-K) 10 MEQ CR capsule Takes 2 tabs in a.m. and 1 tab in p.m. Yes Historical Provider, MD   aspirin 81 MG EC tablet Take 1 tablet by mouth daily. Patient taking differently: Take 81 mg by mouth daily \"take occassionally\" 4/12/13  Yes Little Bedoya MD   tiotropium (SPIRIVA) 18 MCG inhalation capsule Inhale 18 mcg into the lungs daily    Yes Historical Provider, MD   lansoprazole (PREVACID) 30 MG capsule Take 30 mg by mouth daily. Yes Historical Provider, MD     Past Medical History:   Diagnosis Date    AR (aortic regurgitation)     mild to mod    Atrial fibrillation, new onset ([address]     CAD (coronary artery disease)     History of angioplasty    Chronic midline low back pain without sciatica 5/1/2017    Has seen Dr. Artis Ramon and had shots    COPD (ch[facility] obstructive [facility] disease) [facility] [address]     Family history of coronary artery disease     Fatigue 10/2016    H/O cardiovascular stress test 2/21/2012    mild ischemia in the basal inferior and mid inferior regions ef is 47    H/O chest x-ray 10/20/2016    Right pleural effusion resolved.  H/O Doppler ultrasound 10/2016    CAROTID-[place]. arteries patent w/less than 50% stenosis in the internal carotid arteries.     H/O echocardiogram 2/17/15    Aortic sclerosis without stenosis, normal LV size and wall motion w/low normal systolic function, LA dilatation, RV dilatation, mild pulmonary HTN, EF 50-55%.  History of cardiac cath 7/2/1996    LV uniform LV contractility RCa dominatn 50-60 prox stenosis  LM patent  LAD mild mid plaquing diag has 70 ostial narrowing ramus minimal plaquing cx normal    History of PTCA 08/15/2011    prox lad bare metal stent 8/2011    Hyperlipidemia     Hypertension     Obesity     Obesity, Class II, BMI 35-39.9, with comorbidity 4/17/2014    RBBB     Risk for falls 10/26/2015    Tachycardia       Vitals:    03/15/22 1113   BP: 128/68   Pulse: 85   Weight: 282 lb (127.9 kg)   Height: 5' 11\" (1.803 m)      Body mass index is 39.33 kg/m². Wt Readings from Last 3 Encounters:   03/15/22 282 lb (127.9 kg)   08/02/21 282 lb 9.6 oz (128.2 kg)   01/27/20 293 lb (132.9 kg)     Constitutional:  Patient is obese male in no apparent distress. Weight has remained unchanged since last visit. Maribelus Corbett HEENT: Is wearing glasses and facemask. Cardiovascular: Auscultation: Normal S1 and S2. No significant murmurs or gallops noted. Respiratory:  Respiratory effort is normal. Breath sounds are diminished but clear to auscultation. Extremities:  No edema, clubbing,  Cyanosis, petechiae. Abdomen:  No masses or tenderness. No organomegaly noted. Neurologic:  Oriented to time, place, and person and non-anxious. No focal neurological deficit noted. Psychiatric: Normal mood and effect. EKG is consistent with atrial fibrillation with rate of 85 bpm and right bundle branch block. Nonspecific ST abnormalities are noted. Left axis deviation is present. Prior to EKG of 2019 there is no significant change.     Pertinent records reviewed and discussed with patient and results are as follow:    Lab Results   Component Value Date    WBC 7.4 10/26/2021    HGB 15.4 10/26/2021    HCT 46.1 10/26/2021     10/26/2021     Lab Results   Component Value Date    CHOL 127 06/17/2020    CHOLFAST 136 10/26/2021    TRIG 81 06/17/2020    TRIGLYCFAST 69 10/26/2021    HDL 49 10/26/2021    LDLCALC 56 09/06/2012    LDLDIRECT 62 10/26/2021     Lab Results   Component Value Date    BUN 13 10/26/2021    CREATININE 1.2 10/26/2021     10/26/2021    K 3.5 10/26/2021     Lab Results   Component Value Date    INR 0.92 03/14/2011     Lab Results   Component Value Date    LABA1C 5.6 10/26/2021     ASSESSMENT/PLAN:    1. Atrial fibrillation, persistent ([address]  Assessment & Plan:  Rate is well controlled on carvedilol. Chads vascular score is 3 and he is on Xarelto for anticoagulation therapy. Continue the same. Orders:  -     EKG 12 lead; Future  2. CAD s/p prox LAD bare metal stent 2011  Assessment & Plan:  Clinically stable. Continue aggressive risk factor modification for primary prevention. 3. Primary hypertension  Assessment & Plan:  Well-controlled on losartan and carvedilol. Continue the same. 4. Pure hypercholesterolemia  Assessment & Plan:  Recent LDL was 62 on October 26, 2021 on pravastatin 80 mg daily. Continue the same   5. Chronic midline low back pain without sciatica  Assessment & Plan:  Counseled for maximum fall precautions and weight management. 6. Class 2 severe obesity due to excess calories with serious comorbidity and body mass index (BMI) of 39.0 to 39.9 in adult [facility])  Assessment & Plan:  Counseled extensively for dietary modification for weight loss. Patient has been eating donuts and ice cream and we have advised him to replace this with  fruits and vegetables and he can make smoothies for healthier consumption without much added sugar in it. He agrees and will try. Continue current cardiovascular medications which have been reviewed and discussed individually with you. Counseled for calorie counting, reduction in serving size and exercise and lifestyle modification for weight loss.   Primary prevention is the goal by aggressive risk modification, healthy and therapeutic life style changes for cardiovascular risk reduction. Various goals are discussed and questions answered. Follow-up in 12 months, sooner if needed. On this date 3/15/2022 I have spent 25 minutes reviewing previous notes, test results and face to face with the patient discussing the diagnosis and importance of compliance with the treatment plan as well as documenting on the day of the visit. An electronic signature was used to authenticate this note.     --Meghan Gonzales MD

## 2022-03-15 NOTE — PATIENT INSTRUCTIONS
Continue current cardiovascular medications which have been reviewed and discussed individually with you. Counseled for calorie counting, reduction in serving size and exercise and lifestyle modification for weight loss. Primary prevention is the goal by aggressive risk modification, healthy and therapeutic life style changes for cardiovascular risk reduction. Various goals are discussed and questions answered. Follow-up in 12 months, sooner if needed.

## 2022-03-15 NOTE — ASSESSMENT & PLAN NOTE
Rate is well controlled on carvedilol. Chads vascular score is 3 and he is on Xarelto for anticoagulation therapy. Continue the same.

## 2022-03-15 NOTE — ASSESSMENT & PLAN NOTE
Counseled extensively for dietary modification for weight loss. Patient has been eating donuts and ice cream and we have advised him to replace this with  fruits and vegetables and he can make smoothies for healthier consumption without much added sugar in it. He agrees and will try.

## 2022-04-19 ENCOUNTER — HOSPITAL ENCOUNTER (OUTPATIENT)
Age: 82
Discharge: HOME | End: 2022-04-19
Payer: MEDICARE

## 2022-04-19 LAB
ALBUMIN SERPL-MCNC: 3.4 GM/DL (ref 3.4–5)
ALP BLD-CCNC: 75 IU/L (ref 40–129)
ALT SERPL-CCNC: 9 U/L (ref 10–40)
ANION GAP SERPL CALCULATED.3IONS-SCNC: 10 MMOL/L (ref 4–16)
AST SERPL-CCNC: 23 IU/L (ref 15–37)
BASOPHILS ABSOLUTE: 0 K/CU MM
BASOPHILS RELATIVE PERCENT: 0.1 % (ref 0–1)
BILIRUB SERPL-MCNC: 0.5 MG/DL (ref 0–1)
BUN BLDV-MCNC: 15 MG/DL (ref 6–23)
CALCIUM SERPL-MCNC: 9.4 MG/DL (ref 8.3–10.6)
CHLORIDE BLD-SCNC: 98 MMOL/L (ref 99–110)
CHOLESTEROL, FASTING: 115 MG/DL
CO2: 27 MMOL/L (ref 21–32)
CREAT SERPL-MCNC: 1.2 MG/DL (ref 0.9–1.3)
DIFFERENTIAL TYPE: ABNORMAL
EOSINOPHILS ABSOLUTE: 0.1 K/CU MM
EOSINOPHILS RELATIVE PERCENT: 0.7 % (ref 0–3)
ESTIMATED AVERAGE GLUCOSE: 117 MG/DL
FERRITIN: 118 NG/ML (ref 30–400)
GFR AFRICAN AMERICAN: >60 ML/MIN/1.73M2
GFR NON-AFRICAN AMERICAN: 58 ML/MIN/1.73M2
GLUCOSE FASTING: 108 MG/DL (ref 70–99)
HBA1C MFR BLD: 5.7 % (ref 4.2–6.3)
HCT VFR BLD CALC: 43.2 % (ref 42–52)
HDLC SERPL-MCNC: 47 MG/DL
HEMOGLOBIN: 14.2 GM/DL (ref 13.5–18)
IMMATURE NEUTROPHIL %: 1.2 % (ref 0–0.43)
IRON: 92 UG/DL (ref 59–158)
LDL CHOLESTEROL CALCULATED: 57 MG/DL
LYMPHOCYTES ABSOLUTE: 1.4 K/CU MM
LYMPHOCYTES RELATIVE PERCENT: 19.6 % (ref 24–44)
MCH RBC QN AUTO: 33.1 PG (ref 27–31)
MCHC RBC AUTO-ENTMCNC: 32.9 % (ref 32–36)
MCV RBC AUTO: 100.7 FL (ref 78–100)
MONOCYTES ABSOLUTE: 0.5 K/CU MM
MONOCYTES RELATIVE PERCENT: 6.3 % (ref 0–4)
PCT TRANSFERRIN: 28 % (ref 10–44)
PDW BLD-RTO: 14.6 % (ref 11.7–14.9)
PLATELET # BLD: 86 K/CU MM (ref 140–440)
PMV BLD AUTO: ABNORMAL FL (ref 7.5–11.1)
POTASSIUM SERPL-SCNC: 4.7 MMOL/L (ref 3.5–5.1)
RBC # BLD: 4.29 M/CU MM (ref 4.6–6.2)
SEGMENTED NEUTROPHILS ABSOLUTE COUNT: 5.2 K/CU MM
SEGMENTED NEUTROPHILS RELATIVE PERCENT: 72.1 % (ref 36–66)
SODIUM BLD-SCNC: 135 MMOL/L (ref 135–145)
T3 FREE: 2.7 PG/ML (ref 2.3–4.2)
T4 FREE: 1.38 NG/DL (ref 0.9–1.8)
TOTAL IMMATURE NEUTOROPHIL: 0.09 K/CU MM
TOTAL IRON BINDING CAPACITY: 331 UG/DL (ref 250–450)
TOTAL PROTEIN: 5.5 GM/DL (ref 6.4–8.2)
TRIGLYCERIDE, FASTING: 57 MG/DL
TSH HIGH SENSITIVITY: 1.93 UIU/ML (ref 0.27–4.2)
UNSATURATED IRON BINDING CAPACITY: 239 UG/DL (ref 110–370)
WBC # BLD: 7.3 K/CU MM (ref 4–10.5)

## 2022-04-19 PROCEDURE — 36415 COLL VENOUS BLD VENIPUNCTURE: CPT

## 2022-04-19 PROCEDURE — 80061 LIPID PANEL: CPT

## 2022-04-19 PROCEDURE — 83550 IRON BINDING TEST: CPT

## 2022-04-19 PROCEDURE — 85025 COMPLETE CBC W/AUTO DIFF WBC: CPT

## 2022-04-19 PROCEDURE — 84481 FREE ASSAY (FT-3): CPT

## 2022-04-19 PROCEDURE — 83540 ASSAY OF IRON: CPT

## 2022-04-19 PROCEDURE — 82728 ASSAY OF FERRITIN: CPT

## 2022-04-19 PROCEDURE — 82607 VITAMIN B-12: CPT

## 2022-04-19 PROCEDURE — 84439 ASSAY OF FREE THYROXINE: CPT

## 2022-04-19 PROCEDURE — 80053 COMPREHEN METABOLIC PANEL: CPT

## 2022-04-19 PROCEDURE — 84443 ASSAY THYROID STIM HORMONE: CPT

## 2022-04-19 PROCEDURE — 83036 HEMOGLOBIN GLYCOSYLATED A1C: CPT

## 2022-04-19 PROCEDURE — 82746 ASSAY OF FOLIC ACID SERUM: CPT

## 2022-04-23 LAB
FOLATE: 6.5 NG/ML (ref 3.1–17.5)
VITAMIN B-12: 998.5 PG/ML (ref 211–911)

## 2022-10-05 ENCOUNTER — HOSPITAL ENCOUNTER (OUTPATIENT)
Age: 82
Discharge: HOME | End: 2022-10-05
Payer: MEDICARE

## 2022-10-05 LAB
ALBUMIN SERPL-MCNC: 3.8 GM/DL (ref 3.4–5)
ALP BLD-CCNC: 83 IU/L (ref 40–129)
ALT SERPL-CCNC: 10 U/L (ref 10–40)
ANION GAP SERPL CALCULATED.3IONS-SCNC: 10 MMOL/L (ref 4–16)
AST SERPL-CCNC: 31 IU/L (ref 15–37)
BILIRUB SERPL-MCNC: 0.7 MG/DL (ref 0–1)
BUN BLDV-MCNC: 14 MG/DL (ref 6–23)
CALCIUM SERPL-MCNC: 9.4 MG/DL (ref 8.3–10.6)
CHLORIDE BLD-SCNC: 95 MMOL/L (ref 99–110)
CHOLESTEROL, FASTING: 116 MG/DL
CO2: 27 MMOL/L (ref 21–32)
CREAT SERPL-MCNC: 1 MG/DL (ref 0.9–1.3)
DIFFERENTIAL TYPE: ABNORMAL
EOSINOPHILS ABSOLUTE: 0.1 K/CU MM
EOSINOPHILS RELATIVE PERCENT: 1 % (ref 0–3)
ESTIMATED AVERAGE GLUCOSE: 103 MG/DL
FERRITIN: 143 NG/ML (ref 30–400)
GFR AFRICAN AMERICAN: >60 ML/MIN/1.73M2
GFR NON-AFRICAN AMERICAN: >60 ML/MIN/1.73M2
GLUCOSE FASTING: 107 MG/DL (ref 70–99)
HBA1C MFR BLD: 5.2 % (ref 4.2–6.3)
HCT VFR BLD CALC: 42.4 % (ref 42–52)
HDLC SERPL-MCNC: 57 MG/DL
HEMOGLOBIN: 14.3 GM/DL (ref 13.5–18)
IRON: 83 UG/DL (ref 59–158)
LDL CHOLESTEROL CALCULATED: 47 MG/DL
LYMPHOCYTES ABSOLUTE: 1.6 K/CU MM
LYMPHOCYTES RELATIVE PERCENT: 26 % (ref 24–44)
MCH RBC QN AUTO: 33.3 PG (ref 27–31)
MCHC RBC AUTO-ENTMCNC: 33.7 % (ref 32–36)
MCV RBC AUTO: 98.6 FL (ref 78–100)
MONOCYTES ABSOLUTE: 0.4 K/CU MM
MONOCYTES RELATIVE PERCENT: 7 % (ref 0–4)
PCT TRANSFERRIN: 28 % (ref 10–44)
PDW BLD-RTO: 14.4 % (ref 11.7–14.9)
PLATELET # BLD: 123 K/CU MM (ref 140–440)
PLT MORPHOLOGY: ABNORMAL
PMV BLD AUTO: 13.4 FL (ref 7.5–11.1)
POTASSIUM SERPL-SCNC: 3.6 MMOL/L (ref 3.5–5.1)
RBC # BLD: 4.3 M/CU MM (ref 4.6–6.2)
RBC # BLD: ABNORMAL 10*6/UL
SEGMENTED NEUTROPHILS ABSOLUTE COUNT: 3.9 K/CU MM
SEGMENTED NEUTROPHILS RELATIVE PERCENT: 66 % (ref 36–66)
SODIUM BLD-SCNC: 132 MMOL/L (ref 135–145)
T4 FREE: 1.42 NG/DL (ref 0.9–1.8)
TOTAL IRON BINDING CAPACITY: 300 UG/DL (ref 250–450)
TOTAL PROTEIN: 6.5 GM/DL (ref 6.4–8.2)
TRIGLYCERIDE, FASTING: 59 MG/DL
TSH HIGH SENSITIVITY: 1.45 UIU/ML (ref 0.27–4.2)
UNSATURATED IRON BINDING CAPACITY: 217 UG/DL (ref 110–370)
WBC # BLD: 6 K/CU MM (ref 4–10.5)

## 2022-10-05 PROCEDURE — 80053 COMPREHEN METABOLIC PANEL: CPT

## 2022-10-05 PROCEDURE — 36415 COLL VENOUS BLD VENIPUNCTURE: CPT

## 2022-10-05 PROCEDURE — 83550 IRON BINDING TEST: CPT

## 2022-10-05 PROCEDURE — 83540 ASSAY OF IRON: CPT

## 2022-10-05 PROCEDURE — 85027 COMPLETE CBC AUTOMATED: CPT

## 2022-10-05 PROCEDURE — 83036 HEMOGLOBIN GLYCOSYLATED A1C: CPT

## 2022-10-05 PROCEDURE — 84443 ASSAY THYROID STIM HORMONE: CPT

## 2022-10-05 PROCEDURE — 84439 ASSAY OF FREE THYROXINE: CPT

## 2022-10-05 PROCEDURE — 80061 LIPID PANEL: CPT

## 2022-10-05 PROCEDURE — 85007 BL SMEAR W/DIFF WBC COUNT: CPT

## 2022-10-05 PROCEDURE — 82728 ASSAY OF FERRITIN: CPT

## 2022-12-24 ENCOUNTER — APPOINTMENT (OUTPATIENT)
Dept: CT IMAGING | Age: 82
End: 2022-12-24
Payer: MEDICARE

## 2022-12-24 ENCOUNTER — HOSPITAL ENCOUNTER (EMERGENCY)
Age: 82
Discharge: ANOTHER HOSPITAL | End: 2022-12-24
Attending: EMERGENCY MEDICINE
Payer: MEDICARE

## 2022-12-24 ENCOUNTER — HOSPITAL ENCOUNTER (INPATIENT)
Age: 82
LOS: 4 days | Discharge: HOME HEALTH | DRG: 392 | End: 2022-12-28
Attending: INTERNAL MEDICINE | Admitting: INTERNAL MEDICINE
Payer: MEDICARE

## 2022-12-24 ENCOUNTER — APPOINTMENT (OUTPATIENT)
Dept: GENERAL RADIOLOGY | Age: 82
End: 2022-12-24
Payer: MEDICARE

## 2022-12-24 VITALS
TEMPERATURE: 98 F | RESPIRATION RATE: 18 BRPM | DIASTOLIC BLOOD PRESSURE: 56 MMHG | OXYGEN SATURATION: 100 % | SYSTOLIC BLOOD PRESSURE: 86 MMHG | HEART RATE: 110 BPM

## 2022-12-24 DIAGNOSIS — K92.2 GASTROINTESTINAL HEMORRHAGE, UNSPECIFIED GASTROINTESTINAL HEMORRHAGE TYPE: ICD-10-CM

## 2022-12-24 DIAGNOSIS — E86.1 HYPOTENSION DUE TO HYPOVOLEMIA: ICD-10-CM

## 2022-12-24 DIAGNOSIS — R53.83 OTHER FATIGUE: ICD-10-CM

## 2022-12-24 DIAGNOSIS — I95.89 HYPOTENSION DUE TO HYPOVOLEMIA: ICD-10-CM

## 2022-12-24 DIAGNOSIS — K92.2 GASTROINTESTINAL HEMORRHAGE, UNSPECIFIED GASTROINTESTINAL HEMORRHAGE TYPE: Primary | ICD-10-CM

## 2022-12-24 DIAGNOSIS — N17.9 AKI (ACUTE KIDNEY INJURY) (HCC): Primary | ICD-10-CM

## 2022-12-24 LAB
ABO/RH: NORMAL
ALBUMIN SERPL-MCNC: 2.8 GM/DL (ref 3.4–5)
ALP BLD-CCNC: 48 IU/L (ref 40–129)
ALT SERPL-CCNC: 8 U/L (ref 10–40)
ANION GAP SERPL CALCULATED.3IONS-SCNC: 10 MMOL/L (ref 4–16)
ANION GAP SERPL CALCULATED.3IONS-SCNC: 16 MMOL/L (ref 4–16)
ANTIBODY SCREEN: NEGATIVE
AST SERPL-CCNC: 17 IU/L (ref 15–37)
BILIRUB SERPL-MCNC: 0.4 MG/DL (ref 0–1)
BUN BLDV-MCNC: 39 MG/DL (ref 6–23)
BUN BLDV-MCNC: 42 MG/DL (ref 6–23)
CALCIUM SERPL-MCNC: 8.5 MG/DL (ref 8.3–10.6)
CALCIUM SERPL-MCNC: 8.9 MG/DL (ref 8.3–10.6)
CHLORIDE BLD-SCNC: 102 MMOL/L (ref 99–110)
CHLORIDE BLD-SCNC: 105 MMOL/L (ref 99–110)
CO2: 20 MMOL/L (ref 21–32)
CO2: 24 MMOL/L (ref 21–32)
CREAT SERPL-MCNC: 1.4 MG/DL (ref 0.9–1.3)
CREAT SERPL-MCNC: 1.5 MG/DL (ref 0.9–1.3)
DIFFERENTIAL TYPE: ABNORMAL
EKG DIAGNOSIS: NORMAL
EKG Q-T INTERVAL: 376 MS
EKG QRS DURATION: 144 MS
EKG QTC CALCULATION (BAZETT): 513 MS
EKG R AXIS: -62 DEGREES
EKG T AXIS: 6 DEGREES
EKG VENTRICULAR RATE: 112 BPM
GFR SERPL CREATININE-BSD FRML MDRD: 46 ML/MIN/1.73M2
GFR SERPL CREATININE-BSD FRML MDRD: 50 ML/MIN/1.73M2
GLUCOSE BLD-MCNC: 158 MG/DL (ref 70–99)
GLUCOSE BLD-MCNC: 98 MG/DL (ref 70–99)
HCT VFR BLD CALC: 22.4 % (ref 42–52)
HCT VFR BLD CALC: 24.1 % (ref 42–52)
HEMOGLOBIN: 7 GM/DL (ref 13.5–18)
HEMOGLOBIN: 7.3 GM/DL (ref 13.5–18)
LACTATE: 1.7 MMOL/L (ref 0.5–1.9)
LACTIC ACID, SEPSIS: 5.6 MMOL/L (ref 0.5–1.9)
LYMPHOCYTES ABSOLUTE: 0.3 K/CU MM
LYMPHOCYTES RELATIVE PERCENT: 2 % (ref 24–44)
MAGNESIUM: 1.4 MG/DL (ref 1.8–2.4)
MCH RBC QN AUTO: 35.1 PG (ref 27–31)
MCHC RBC AUTO-ENTMCNC: 30.3 % (ref 32–36)
MCV RBC AUTO: 115.9 FL (ref 78–100)
PDW BLD-RTO: 17.3 % (ref 11.7–14.9)
PLATELET # BLD: 91 K/CU MM (ref 140–440)
PLT MORPHOLOGY: ABNORMAL
PMV BLD AUTO: 14.4 FL (ref 7.5–11.1)
POTASSIUM SERPL-SCNC: 3.6 MMOL/L (ref 3.5–5.1)
POTASSIUM SERPL-SCNC: 3.8 MMOL/L (ref 3.5–5.1)
RBC # BLD: 2.08 M/CU MM (ref 4.6–6.2)
RBC # BLD: ABNORMAL 10*6/UL
SARS-COV-2, NAAT: NOT DETECTED
SEGMENTED NEUTROPHILS ABSOLUTE COUNT: 13.9 K/CU MM
SEGMENTED NEUTROPHILS RELATIVE PERCENT: 98 % (ref 36–66)
SODIUM BLD-SCNC: 138 MMOL/L (ref 135–145)
SODIUM BLD-SCNC: 139 MMOL/L (ref 135–145)
TOTAL PROTEIN: 4.6 GM/DL (ref 6.4–8.2)
TROPONIN T: 0.02 NG/ML
WBC # BLD: 14.2 K/CU MM (ref 4–10.5)

## 2022-12-24 PROCEDURE — 85018 HEMOGLOBIN: CPT

## 2022-12-24 PROCEDURE — 80053 COMPREHEN METABOLIC PANEL: CPT

## 2022-12-24 PROCEDURE — 85027 COMPLETE CBC AUTOMATED: CPT

## 2022-12-24 PROCEDURE — 2500000003 HC RX 250 WO HCPCS: Performed by: EMERGENCY MEDICINE

## 2022-12-24 PROCEDURE — 87186 SC STD MICRODIL/AGAR DIL: CPT

## 2022-12-24 PROCEDURE — 70450 CT HEAD/BRAIN W/O DYE: CPT

## 2022-12-24 PROCEDURE — 2060000000 HC ICU INTERMEDIATE R&B

## 2022-12-24 PROCEDURE — 87040 BLOOD CULTURE FOR BACTERIA: CPT

## 2022-12-24 PROCEDURE — 96375 TX/PRO/DX INJ NEW DRUG ADDON: CPT

## 2022-12-24 PROCEDURE — 96365 THER/PROPH/DIAG IV INF INIT: CPT

## 2022-12-24 PROCEDURE — 86850 RBC ANTIBODY SCREEN: CPT

## 2022-12-24 PROCEDURE — 86900 BLOOD TYPING SEROLOGIC ABO: CPT

## 2022-12-24 PROCEDURE — 6360000002 HC RX W HCPCS: Performed by: EMERGENCY MEDICINE

## 2022-12-24 PROCEDURE — 96361 HYDRATE IV INFUSION ADD-ON: CPT

## 2022-12-24 PROCEDURE — 2580000003 HC RX 258: Performed by: EMERGENCY MEDICINE

## 2022-12-24 PROCEDURE — G0379 DIRECT REFER HOSPITAL OBSERV: HCPCS

## 2022-12-24 PROCEDURE — 93005 ELECTROCARDIOGRAM TRACING: CPT | Performed by: EMERGENCY MEDICINE

## 2022-12-24 PROCEDURE — 2580000003 HC RX 258: Performed by: INTERNAL MEDICINE

## 2022-12-24 PROCEDURE — 71046 X-RAY EXAM CHEST 2 VIEWS: CPT

## 2022-12-24 PROCEDURE — 86901 BLOOD TYPING SEROLOGIC RH(D): CPT

## 2022-12-24 PROCEDURE — 83605 ASSAY OF LACTIC ACID: CPT

## 2022-12-24 PROCEDURE — 80048 BASIC METABOLIC PNL TOTAL CA: CPT

## 2022-12-24 PROCEDURE — 85007 BL SMEAR W/DIFF WBC COUNT: CPT

## 2022-12-24 PROCEDURE — 85014 HEMATOCRIT: CPT

## 2022-12-24 PROCEDURE — 96366 THER/PROPH/DIAG IV INF ADDON: CPT

## 2022-12-24 PROCEDURE — 87077 CULTURE AEROBIC IDENTIFY: CPT

## 2022-12-24 PROCEDURE — 70496 CT ANGIOGRAPHY HEAD: CPT

## 2022-12-24 PROCEDURE — G0378 HOSPITAL OBSERVATION PER HR: HCPCS

## 2022-12-24 PROCEDURE — 99285 EMERGENCY DEPT VISIT HI MDM: CPT

## 2022-12-24 PROCEDURE — 83735 ASSAY OF MAGNESIUM: CPT

## 2022-12-24 PROCEDURE — 6360000004 HC RX CONTRAST MEDICATION: Performed by: EMERGENCY MEDICINE

## 2022-12-24 PROCEDURE — C9113 INJ PANTOPRAZOLE SODIUM, VIA: HCPCS | Performed by: EMERGENCY MEDICINE

## 2022-12-24 PROCEDURE — 84484 ASSAY OF TROPONIN QUANT: CPT

## 2022-12-24 PROCEDURE — 93010 ELECTROCARDIOGRAM REPORT: CPT | Performed by: INTERNAL MEDICINE

## 2022-12-24 PROCEDURE — 87635 SARS-COV-2 COVID-19 AMP PRB: CPT

## 2022-12-24 PROCEDURE — 36415 COLL VENOUS BLD VENIPUNCTURE: CPT

## 2022-12-24 PROCEDURE — 86922 COMPATIBILITY TEST ANTIGLOB: CPT

## 2022-12-24 RX ORDER — 0.9 % SODIUM CHLORIDE 0.9 %
1000 INTRAVENOUS SOLUTION INTRAVENOUS ONCE
Status: COMPLETED | OUTPATIENT
Start: 2022-12-24 | End: 2022-12-24

## 2022-12-24 RX ORDER — HYDROCODONE BITARTRATE AND ACETAMINOPHEN 10; 325 MG/1; MG/1
1 TABLET ORAL EVERY 6 HOURS PRN
Status: DISCONTINUED | OUTPATIENT
Start: 2022-12-24 | End: 2022-12-28 | Stop reason: HOSPADM

## 2022-12-24 RX ORDER — MAGNESIUM SULFATE IN WATER 40 MG/ML
2000 INJECTION, SOLUTION INTRAVENOUS ONCE
Status: COMPLETED | OUTPATIENT
Start: 2022-12-24 | End: 2022-12-24

## 2022-12-24 RX ORDER — TRANEXAMIC ACID 10 MG/ML
1000 INJECTION, SOLUTION INTRAVENOUS ONCE
Status: COMPLETED | OUTPATIENT
Start: 2022-12-24 | End: 2022-12-24

## 2022-12-24 RX ORDER — ACETAMINOPHEN 325 MG/1
650 TABLET ORAL EVERY 6 HOURS PRN
Status: DISCONTINUED | OUTPATIENT
Start: 2022-12-24 | End: 2022-12-28 | Stop reason: HOSPADM

## 2022-12-24 RX ORDER — ONDANSETRON 4 MG/1
4 TABLET, ORALLY DISINTEGRATING ORAL EVERY 8 HOURS PRN
Status: DISCONTINUED | OUTPATIENT
Start: 2022-12-24 | End: 2022-12-24

## 2022-12-24 RX ORDER — LANOLIN ALCOHOL/MO/W.PET/CERES
1000 CREAM (GRAM) TOPICAL DAILY
Status: DISCONTINUED | OUTPATIENT
Start: 2022-12-25 | End: 2022-12-28 | Stop reason: HOSPADM

## 2022-12-24 RX ORDER — SODIUM CHLORIDE 9 MG/ML
INJECTION, SOLUTION INTRAVENOUS PRN
Status: DISCONTINUED | OUTPATIENT
Start: 2022-12-24 | End: 2022-12-25

## 2022-12-24 RX ORDER — SODIUM CHLORIDE 0.9 % (FLUSH) 0.9 %
5-40 SYRINGE (ML) INJECTION PRN
Status: DISCONTINUED | OUTPATIENT
Start: 2022-12-24 | End: 2022-12-28 | Stop reason: HOSPADM

## 2022-12-24 RX ORDER — SODIUM CHLORIDE 0.9 % (FLUSH) 0.9 %
5-40 SYRINGE (ML) INJECTION EVERY 12 HOURS SCHEDULED
Status: DISCONTINUED | OUTPATIENT
Start: 2022-12-24 | End: 2022-12-28 | Stop reason: HOSPADM

## 2022-12-24 RX ORDER — ONDANSETRON 2 MG/ML
4 INJECTION INTRAMUSCULAR; INTRAVENOUS EVERY 6 HOURS PRN
Status: DISCONTINUED | OUTPATIENT
Start: 2022-12-24 | End: 2022-12-24

## 2022-12-24 RX ORDER — SODIUM CHLORIDE 9 MG/ML
INJECTION, SOLUTION INTRAVENOUS PRN
Status: DISCONTINUED | OUTPATIENT
Start: 2022-12-24 | End: 2022-12-28 | Stop reason: HOSPADM

## 2022-12-24 RX ORDER — SODIUM CHLORIDE 9 MG/ML
INJECTION, SOLUTION INTRAVENOUS CONTINUOUS
Status: DISCONTINUED | OUTPATIENT
Start: 2022-12-24 | End: 2022-12-25

## 2022-12-24 RX ORDER — ALBUTEROL SULFATE 90 UG/1
2 AEROSOL, METERED RESPIRATORY (INHALATION) EVERY 6 HOURS PRN
Status: DISCONTINUED | OUTPATIENT
Start: 2022-12-24 | End: 2022-12-28 | Stop reason: HOSPADM

## 2022-12-24 RX ORDER — ACETAMINOPHEN 650 MG/1
650 SUPPOSITORY RECTAL EVERY 6 HOURS PRN
Status: DISCONTINUED | OUTPATIENT
Start: 2022-12-24 | End: 2022-12-28 | Stop reason: HOSPADM

## 2022-12-24 RX ORDER — PANTOPRAZOLE SODIUM 40 MG/10ML
40 INJECTION, POWDER, LYOPHILIZED, FOR SOLUTION INTRAVENOUS ONCE
Status: DISCONTINUED | OUTPATIENT
Start: 2022-12-24 | End: 2022-12-24

## 2022-12-24 RX ORDER — SODIUM CHLORIDE 9 MG/ML
INJECTION, SOLUTION INTRAVENOUS PRN
Status: DISCONTINUED | OUTPATIENT
Start: 2022-12-24 | End: 2022-12-24 | Stop reason: HOSPADM

## 2022-12-24 RX ORDER — PANTOPRAZOLE SODIUM 40 MG/10ML
80 INJECTION, POWDER, LYOPHILIZED, FOR SOLUTION INTRAVENOUS ONCE
Status: COMPLETED | OUTPATIENT
Start: 2022-12-24 | End: 2022-12-24

## 2022-12-24 RX ADMIN — SODIUM CHLORIDE, PRESERVATIVE FREE 10 ML: 5 INJECTION INTRAVENOUS at 21:52

## 2022-12-24 RX ADMIN — TRANEXAMIC ACID 1000 MG: 10 INJECTION, SOLUTION INTRAVENOUS at 13:40

## 2022-12-24 RX ADMIN — PANTOPRAZOLE SODIUM 80 MG: 40 INJECTION, POWDER, FOR SOLUTION INTRAVENOUS at 13:36

## 2022-12-24 RX ADMIN — SODIUM CHLORIDE 1000 ML: 9 INJECTION, SOLUTION INTRAVENOUS at 12:36

## 2022-12-24 RX ADMIN — SODIUM CHLORIDE: 9 INJECTION, SOLUTION INTRAVENOUS at 23:24

## 2022-12-24 RX ADMIN — MAGNESIUM SULFATE HEPTAHYDRATE 2000 MG: 40 INJECTION, SOLUTION INTRAVENOUS at 13:46

## 2022-12-24 RX ADMIN — IOPAMIDOL 75 ML: 755 INJECTION, SOLUTION INTRAVENOUS at 13:27

## 2022-12-24 RX ADMIN — SODIUM CHLORIDE 1000 ML: 9 INJECTION, SOLUTION INTRAVENOUS at 21:46

## 2022-12-24 ASSESSMENT — PAIN - FUNCTIONAL ASSESSMENT: PAIN_FUNCTIONAL_ASSESSMENT: NONE - DENIES PAIN

## 2022-12-24 ASSESSMENT — LIFESTYLE VARIABLES
HOW OFTEN DO YOU HAVE A DRINK CONTAINING ALCOHOL: NEVER
HOW MANY STANDARD DRINKS CONTAINING ALCOHOL DO YOU HAVE ON A TYPICAL DAY: PATIENT DOES NOT DRINK

## 2022-12-24 NOTE — ED NOTES
Called [facility] and spoke with Sirisha Helm, was given bed #2012 and will callback with JAY JAY [place]  12/24/22 [id number]

## 2022-12-24 NOTE — ED TRIAGE NOTES
To ER per EMS with complaints of weakness. Per EMS patient with near syncopal episode.   Patient states emesis x 1 this AM that was \"dark\"

## 2022-12-24 NOTE — CONSENT
Informed Consent for Blood Component Transfusion Note    I have discussed with the patient the rationale for blood component transfusion; its benefits in treating or preventing fatigue, organ damage, or death; and its risk which includes mild transfusion reactions, rare risk of blood borne infection, or more serious but rare reactions. I have discussed the alternatives to transfusion, including the risk and consequences of not receiving transfusion. The patient had an opportunity to ask questions and had agreed to proceed with transfusion of blood components.     Electronically signed by Jyoti Cruz MD on 12/24/22 at 2:31 PM EST

## 2022-12-24 NOTE — ED PROVIDER NOTES
[address]      Pt Name: Narciso Avitia  MRN: [id number]  [facility] [id number]/7247  Date of evaluation: 12/24/2022  Provider: Usha Jacobson MD  Insurance:  Payor: [employer] Case / Plan: [employer] [employer] [facility] [employer] / Product Type: *No Product type* /   Current Code Status   Code Status: Not on file     CHIEF COMPLAINT       Chief Complaint   Patient presents with    Fatigue         HISTORY OF PRESENT ILLNESS    HPI    Nursing Notes were reviewed. This is a 80 y.o. male who presents to the emergency department with some lethargy. Patient describes some mild generalized abdominal pain over the past 24 hours. He describes ongoing weakness this morning. After waking, the patient found he was no longer able to stand and called 911 due to his severe weakness and inability to ambulate. He denies headache. Denies chest pain difficulty breathing. He does describe 1 episode of emesis that appeared to be coffee-ground in nature. He describes ongoing black/tarry stools. Denies recent fevers. He denies any prior history of gastrointestinal bleeding. Patient is currently taking Xarelto. REVIEW OF SYSTEMS       Review of Systems    10 Systems were reviewed with this patient and all were negative with exception of those noted in the history of present illness above.       PAST MEDICAL HISTORY     Past Medical History:   Diagnosis Date    AR (aortic regurgitation)     mild to mod    Atrial fibrillation, new onset (HCC)     CAD (coronary artery disease)     History of angioplasty    Chronic midline low back pain without sciatica 5/1/2017    Has seen Dr. Lyudmila Lal and had shots    COPD (chronic obstructive pulmonary disease) ([facility] [address]     Family history of coronary artery disease     Fatigue 10/2016    H/O cardiovascular stress test 2/21/2012    mild ischemia in the basal inferior and mid inferior regions ef is 47    H/O chest x-ray 10/20/2016    Right pleural effusion resolved. H/O Doppler ultrasound 10/2016    CAROTID-[place]. arteries patent w/less than 50% stenosis in the internal carotid arteries. H/O echocardiogram 2/17/15    Aortic sclerosis without stenosis, normal LV size and wall motion w/low normal systolic function, LA dilatation, RV dilatation, mild pulmonary HTN, EF 50-55%.     History of cardiac cath 7/2/1996    LV uniform LV contractility RCa dominatn 50-60 prox stenosis  LM patent  LAD mild mid plaquing diag has 70 ostial narrowing ramus minimal plaquing cx normal    History of PTCA 08/15/2011    prox lad bare metal stent 8/2011    Hyperlipidemia     Hypertension     Obesity     Obesity, Class II, BMI 35-39.9, with comorbidity 4/17/2014    RBBB     Risk for falls 10/26/2015    Tachycardia          SURGICAL HISTORY       Past Surgical History:   Procedure Laterality Date    CORONARY ANGIOPLASTY WITH STENT PLACEMENT      JOINT REPLACEMENT  2004    partial left knee         CURRENT MEDICATIONS       Discharge Medication List as of 12/24/2022  5:19 PM        CONTINUE these medications which have NOT CHANGED    Details   nitroGLYCERIN (NITROSTAT) 0.4 MG SL tablet Place 1 tablet under the tongue every 5 minutes as needed for Chest pain, Disp-25 tablet, R-3Normal      rivaroxaban (XARELTO) 20 MG TABS tablet Take 1 tablet by mouth every 24 hours, Disp-90 tablet, R-3Normal      mirtazapine (REMERON) 30 MG tablet Take 30 mg by mouth nightlyHistorical Med      Cholecalciferol (VITAMIN D3) 2000 UNITS CAPS Take 1 capsule by mouth daily      losartan (COZAAR) 25 MG tablet Take 25 mg by mouth daily      pravastatin (PRAVACHOL) 80 MG tablet TAKE ONE TABLET BY MOUTH EVERY DAY, Disp-30 tablet, R-6      carvedilol (COREG) 3.125 MG tablet Take 3.125 mg by mouth 2 times daily (with meals)      hydrochlorothiazide (MICROZIDE) 12.5 MG capsule Take 2 tablets every AM and 1 tablet every PM.      fluorouracil (EFUDEX) 5 % cream Apply topically 2 times daily as needed , Topical, 2 TIMES DAILY PRN, Until Discontinued, Historical Med      HYDROcodone-acetaminophen (NORCO)  MG per tablet Take 1 tablet by mouth every 6 hours as needed for Pain      vitamin B-12 (CYANOCOBALAMIN) 1000 MCG tablet Take 1,000 mcg by mouth daily      albuterol (PROVENTIL HFA;VENTOLIN HFA) 108 (90 BASE) MCG/ACT inhaler Inhale 2 puffs into the lungs every 6 hours as needed for Wheezing      ferrous gluconate 325 (37.5 FE) MG TABS Take 325 mg by mouth 2 times daily. potassium chloride (MICRO-K) 10 MEQ CR capsule Takes 2 tabs in a.m. and 1 tab in p. m. Historical Med      aspirin 81 MG EC tablet Take 1 tablet by mouth daily. , Disp-30 tablet, R-6      tiotropium (SPIRIVA) 18 MCG inhalation capsule Inhale 18 mcg into the lungs daily Historical Med      lansoprazole (PREVACID) 30 MG capsule Take 30 mg by mouth daily. ALLERGIES     Lovenox [enoxaparin sodium] and Morphine    FAMILY HISTORY       Family History   Problem Relation Age of Onset    Heart Disease Mother     Heart Disease Father     Cancer Sister           SOCIAL HISTORY       Social History     Socioeconomic History    Marital status:      Spouse name: None    Number of children: 4    Years of education: None    Highest education level: None   Occupational History    Occupation: retired   Tobacco Use    Smoking status: Former     Packs/day: 1.50     Years: 25.00     Pack years: 37.50     Types: Cigarettes     Quit date: 1990     Years since quittin.1    Smokeless tobacco: Current     Types: Chew    Tobacco comments:     Chews tobacco        Vaping Use    Vaping Use: Never used   Substance and Sexual Activity    Alcohol use:  Yes     Alcohol/week: 56.0 standard drinks     Types: 56 Cans of beer per week     Comment: 6-8 beers daily or less    Drug use: No    Sexual activity: Not Currently     Partners: Female     Comment:        PHYSICAL EXAM       ED Triage Vitals [22 1213]   BP Temp Temp Source Heart Rate Resp SpO2 Height Weight   (!) 70/42 98 °F (36.7 °C) Oral (!) 116 18 96 % -- --       Physical Exam  Vitals and nursing note reviewed. Constitutional:       Appearance: He is ill-appearing. HENT:      Head: Normocephalic and atraumatic. Nose: Nose normal.      Mouth/Throat:      Mouth: Mucous membranes are moist.   Eyes:      Extraocular Movements: Extraocular movements intact. Comments: Pale conjunctiva   Cardiovascular:      Rate and Rhythm: Normal rate and regular rhythm. Pulses: Normal pulses. Heart sounds: Murmur heard. Pulmonary:      Effort: Pulmonary effort is normal.      Breath sounds: Normal breath sounds. Abdominal:      Palpations: Abdomen is soft. Tenderness: There is no abdominal tenderness. Musculoskeletal:         General: Normal range of motion. Cervical back: Normal range of motion. Right lower leg: Edema present. Left lower leg: Edema present. Skin:     General: Skin is warm and dry. Capillary Refill: Capillary refill takes 2 to 3 seconds. Coloration: Skin is pale. Skin is not jaundiced. Neurological:      General: No focal deficit present. Mental Status: He is alert and oriented to person, place, and time. Psychiatric:         Mood and Affect: Mood normal.         Behavior: Behavior normal.       Vitals:    Vitals:    12/24/22 1213 12/24/22 1300 12/24/22 1348 12/24/22 1633   BP: (!) 70/42 (!) 91/58 95/61 (!) 86/56   Pulse: (!) 116 (!) 106 (!) 108 (!) 110   Resp: 18 20  18   Temp: 98 °F (36.7 °C)      TempSrc: Oral      SpO2: 96% 97%  100%       DIAGNOSTIC RESULTS     EKG: All EKG's are interpreted by the Emergency Department Physician who either signs or Co-signs this chart in the absence of a cardiologist.    Atrial fibrillation with rapid ventricular response. Ventricular rate of 112. . QTc is 513. There are no abnormal ST elevations or depressions.   There is no ectopy prior EKG from March 2022 shows rate controlled atrial fibrillation, otherwise unchanged    RADIOLOGY:   Non-plain film images such as CT, Ultrasound and MRI are read by the radiologist. Plain radiographic images are visualized and preliminarily interpreted by the emergency physician with the below findings:    Interpretation per the Radiologist below, if available at the time of this note:    XR CHEST (2 VW)   Final Result   No radiographic evidence of acute pulmonary disease. CT Head W/O Contrast   Final Result   1. No acute intracranial abnormality. 2. Mild-to-moderate global parenchymal volume loss with mild chronic   microvascular ischemic changes. 3. Minimal string of enhancement within the entirety of the left cervical   vertebral artery with no significant enhancement within the proximal left V4   segment. This is of uncertain chronicity. 4. Atherosclerosis contributes to less than 50% stenosis of the cervical ICAs   bilaterally by NASCET criteria. 5. Atherosclerosis involving the intracranial ICAs contribute to moderate   stenosis of the right cavernous ICA and moderate stenosis of the left   supraclinoid ICA. CTA HEAD NECK W CONTRAST   Final Result   1. No acute intracranial abnormality. 2. Mild-to-moderate global parenchymal volume loss with mild chronic   microvascular ischemic changes. 3. Minimal string of enhancement within the entirety of the left cervical   vertebral artery with no significant enhancement within the proximal left V4   segment. This is of uncertain chronicity. 4. Atherosclerosis contributes to less than 50% stenosis of the cervical ICAs   bilaterally by NASCET criteria. 5. Atherosclerosis involving the intracranial ICAs contribute to moderate   stenosis of the right cavernous ICA and moderate stenosis of the left   supraclinoid ICA.              LABS:  Labs Reviewed   CBC WITH AUTO DIFFERENTIAL - Abnormal; Notable for the following components:       Result Value    WBC 14.2 (*)     RBC 2.08 (*) Hemoglobin 7.3 (*)     Hematocrit 24.1 (*)     .9 (*)     MCH 35.1 (*)     MCHC 30.3 (*)     RDW 17.3 (*)     Platelets 91 (*)     MPV 14.4 (*)     Segs Relative 98.0 (*)     Lymphocytes % 2.0 (*)     All other components within normal limits   COMPREHENSIVE METABOLIC PANEL - Abnormal; Notable for the following components:    CO2 20 (*)     BUN 42 (*)     Creatinine 1.5 (*)     Est, Glom Filt Rate 46 (*)     Glucose 158 (*)     Albumin 2.8 (*)     Total Protein 4.6 (*)     ALT 8 (*)     All other components within normal limits   MAGNESIUM - Abnormal; Notable for the following components:    Magnesium 1.4 (*)     All other components within normal limits   TROPONIN - Abnormal; Notable for the following components:    Troponin T 0.016 (*)     All other components within normal limits   LACTATE, SEPSIS - Abnormal; Notable for the following components:    Lactic Acid, Sepsis 5.6 (*)     All other components within normal limits   COVID-19, RAPID   CULTURE, BLOOD 1   CULTURE, BLOOD 1   URINALYSIS   LACTATE, SEPSIS   HEMOGLOBIN AND HEMATOCRIT   POCT GLUCOSE   TYPE AND SCREEN   PREPARE RBC (CROSSMATCH)       All other labs were within normal range or not returned as of this dictation.     MEDICAL DECISION MAKING     Medications   0.9 % sodium chloride infusion (has no administration in time range)   0.9 % sodium chloride bolus (0 mLs IntraVENous Stopped 12/24/22 1327)   iopamidol (ISOVUE-370) 76 % injection 75 mL (75 mLs IntraVENous Given 12/24/22 1327)   pantoprazole (PROTONIX) injection 80 mg (80 mg IntraVENous Given 12/24/22 1336)   tranexamic acid-NaCl IVPB premix 1,000 mg (0 mg IntraVENous Stopped 12/24/22 1355)   magnesium sulfate 2000 mg in 50 mL IVPB premix (0 mg IntraVENous Stopped 12/24/22 1633)             [place] Coma Scale  Eye Opening: Spontaneous  Best Verbal Response: Oriented  Best Motor Response: Obeys commands  [place] Coma Scale Score: 15                     [place] Assessment  BP: (!) 86/56  Heart Rate: (!) 110                 MDM  Patient arrived in the emergency department hypotensive. Physical exam, clinical signs and symptoms, indicate ongoing GI hemorrhage. Hemoglobin is greater than 7, however the patient has hypotension and evidence of ongoing hemorrhage and as a result he is appropriate for urgent transfusion with packed red blood cells. His anticoagulation was first with Kcentra. He was given TXA. Given Protonix 80 mg    Patient was also shown to be hypomagnesemic, this was replaced in the emergency department. The patient was discussed with Dr Roger Dupree external neurologist at [phone]. He is recommended transfer to [facility] and he will have endoscopy in the morning. 1400 was discussed with the intensivist at [facility] and she feels the patient is appropriate for stepdown given his improvement of his blood pressure 95/61        Clinical Diagnoses Addressed  1. Gastrointestinal hemorrhage, unspecified gastrointestinal hemorrhage type    2. Other fatigue    3. Hypotension due to hypovolemia            CRITICAL CARE TIME   Total Critical Care time was 90 minutes, excluding separately reportable procedures. There was a high probability of clinically significant/life threatening deterioration in the patient's condition which required my urgent intervention. CONSULTS:  IP CONSULT TO GI    PROCEDURES:  Unless otherwise noted below, none     Procedures      FINAL IMPRESSION      1. Gastrointestinal hemorrhage, unspecified gastrointestinal hemorrhage type    2. Other fatigue    3. Hypotension due to hypovolemia          DISPOSITION/PLAN   DISPOSITION Decision To Transfer 12/24/2022 02:33:31 PM      PATIENT REFERRED TO:  No follow-up provider specified. DISCHARGE MEDICATIONS:  Discharge Medication List as of 12/24/2022  5:19 PM        Controlled Substances Monitoring:     No flowsheet data found.     Lou Strong MD (electronically signed)  Attending Emergency Physician            Danelle Xiao MD  12/24/22 3921

## 2022-12-24 NOTE — ED NOTES
LA 5.6 reported to [facility].      Mandy Nolen RN  12/24/22 [address], RN  12/24/22 9925

## 2022-12-25 ENCOUNTER — ANESTHESIA (OUTPATIENT)
Dept: ENDOSCOPY | Age: 82
End: 2022-12-25
Payer: MEDICARE

## 2022-12-25 ENCOUNTER — ANESTHESIA EVENT (OUTPATIENT)
Dept: ENDOSCOPY | Age: 82
End: 2022-12-25
Payer: MEDICARE

## 2022-12-25 LAB
HCT VFR BLD CALC: 25.4 % (ref 42–52)
HCT VFR BLD CALC: 28.7 % (ref 42–52)
HEMOGLOBIN: 8.1 GM/DL (ref 13.5–18)
HEMOGLOBIN: 8.4 GM/DL (ref 13.5–18)

## 2022-12-25 PROCEDURE — 6360000002 HC RX W HCPCS: Performed by: INTERNAL MEDICINE

## 2022-12-25 PROCEDURE — G0378 HOSPITAL OBSERVATION PER HR: HCPCS

## 2022-12-25 PROCEDURE — 94640 AIRWAY INHALATION TREATMENT: CPT

## 2022-12-25 PROCEDURE — 2580000003 HC RX 258: Performed by: ANESTHESIOLOGY

## 2022-12-25 PROCEDURE — 94761 N-INVAS EAR/PLS OXIMETRY MLT: CPT

## 2022-12-25 PROCEDURE — C9113 INJ PANTOPRAZOLE SODIUM, VIA: HCPCS | Performed by: STUDENT IN AN ORGANIZED HEALTH CARE EDUCATION/TRAINING PROGRAM

## 2022-12-25 PROCEDURE — 2580000003 HC RX 258: Performed by: INTERNAL MEDICINE

## 2022-12-25 PROCEDURE — 3700000001 HC ADD 15 MINUTES (ANESTHESIA): Performed by: INTERNAL MEDICINE

## 2022-12-25 PROCEDURE — P9016 RBC LEUKOCYTES REDUCED: HCPCS

## 2022-12-25 PROCEDURE — 85014 HEMATOCRIT: CPT

## 2022-12-25 PROCEDURE — 6370000000 HC RX 637 (ALT 250 FOR IP): Performed by: STUDENT IN AN ORGANIZED HEALTH CARE EDUCATION/TRAINING PROGRAM

## 2022-12-25 PROCEDURE — A4216 STERILE WATER/SALINE, 10 ML: HCPCS | Performed by: INTERNAL MEDICINE

## 2022-12-25 PROCEDURE — 1200000000 HC SEMI PRIVATE

## 2022-12-25 PROCEDURE — 36430 TRANSFUSION BLD/BLD COMPNT: CPT

## 2022-12-25 PROCEDURE — 6370000000 HC RX 637 (ALT 250 FOR IP): Performed by: INTERNAL MEDICINE

## 2022-12-25 PROCEDURE — 96374 THER/PROPH/DIAG INJ IV PUSH: CPT

## 2022-12-25 PROCEDURE — 3700000000 HC ANESTHESIA ATTENDED CARE: Performed by: INTERNAL MEDICINE

## 2022-12-25 PROCEDURE — 0DJ08ZZ INSPECTION OF UPPER INTESTINAL TRACT, VIA NATURAL OR ARTIFICIAL OPENING ENDOSCOPIC: ICD-10-PCS | Performed by: INTERNAL MEDICINE

## 2022-12-25 PROCEDURE — 36415 COLL VENOUS BLD VENIPUNCTURE: CPT

## 2022-12-25 PROCEDURE — C9113 INJ PANTOPRAZOLE SODIUM, VIA: HCPCS | Performed by: INTERNAL MEDICINE

## 2022-12-25 PROCEDURE — 6360000002 HC RX W HCPCS: Performed by: STUDENT IN AN ORGANIZED HEALTH CARE EDUCATION/TRAINING PROGRAM

## 2022-12-25 PROCEDURE — 2580000003 HC RX 258: Performed by: STUDENT IN AN ORGANIZED HEALTH CARE EDUCATION/TRAINING PROGRAM

## 2022-12-25 PROCEDURE — 2709999900 HC NON-CHARGEABLE SUPPLY: Performed by: INTERNAL MEDICINE

## 2022-12-25 PROCEDURE — 6360000002 HC RX W HCPCS: Performed by: ANESTHESIOLOGY

## 2022-12-25 PROCEDURE — A4216 STERILE WATER/SALINE, 10 ML: HCPCS | Performed by: STUDENT IN AN ORGANIZED HEALTH CARE EDUCATION/TRAINING PROGRAM

## 2022-12-25 PROCEDURE — 85018 HEMOGLOBIN: CPT

## 2022-12-25 PROCEDURE — 6370000000 HC RX 637 (ALT 250 FOR IP): Performed by: NURSE PRACTITIONER

## 2022-12-25 PROCEDURE — 3609017100 HC EGD: Performed by: INTERNAL MEDICINE

## 2022-12-25 RX ORDER — PROPOFOL 10 MG/ML
INJECTION, EMULSION INTRAVENOUS PRN
Status: DISCONTINUED | OUTPATIENT
Start: 2022-12-25 | End: 2022-12-25 | Stop reason: SDUPTHER

## 2022-12-25 RX ORDER — PRAVASTATIN SODIUM 40 MG
80 TABLET ORAL NIGHTLY
Status: DISCONTINUED | OUTPATIENT
Start: 2022-12-25 | End: 2022-12-28 | Stop reason: HOSPADM

## 2022-12-25 RX ORDER — MIRTAZAPINE 15 MG/1
30 TABLET, FILM COATED ORAL NIGHTLY
Status: DISCONTINUED | OUTPATIENT
Start: 2022-12-25 | End: 2022-12-28 | Stop reason: HOSPADM

## 2022-12-25 RX ORDER — LANOLIN ALCOHOL/MO/W.PET/CERES
3 CREAM (GRAM) TOPICAL ONCE
Status: COMPLETED | OUTPATIENT
Start: 2022-12-25 | End: 2022-12-25

## 2022-12-25 RX ORDER — CARVEDILOL 6.25 MG/1
3.12 TABLET ORAL 2 TIMES DAILY WITH MEALS
Status: DISCONTINUED | OUTPATIENT
Start: 2022-12-25 | End: 2022-12-28 | Stop reason: HOSPADM

## 2022-12-25 RX ORDER — SODIUM CHLORIDE 9 MG/ML
INJECTION, SOLUTION INTRAVENOUS CONTINUOUS PRN
Status: DISCONTINUED | OUTPATIENT
Start: 2022-12-25 | End: 2022-12-25 | Stop reason: SDUPTHER

## 2022-12-25 RX ADMIN — SODIUM CHLORIDE, PRESERVATIVE FREE 10 ML: 5 INJECTION INTRAVENOUS at 21:16

## 2022-12-25 RX ADMIN — CARVEDILOL 3.12 MG: 6.25 TABLET, FILM COATED ORAL at 18:27

## 2022-12-25 RX ADMIN — Medication 3 MG: at 01:15

## 2022-12-25 RX ADMIN — PRAVASTATIN SODIUM 80 MG: 40 TABLET ORAL at 21:16

## 2022-12-25 RX ADMIN — PANTOPRAZOLE SODIUM 40 MG: 40 INJECTION, POWDER, FOR SOLUTION INTRAVENOUS at 08:35

## 2022-12-25 RX ADMIN — TIOTROPIUM BROMIDE INHALATION SPRAY 2 PUFF: 3.12 SPRAY, METERED RESPIRATORY (INHALATION) at 12:15

## 2022-12-25 RX ADMIN — HYDROCODONE BITARTRATE AND ACETAMINOPHEN 1 TABLET: 10; 325 TABLET ORAL at 04:43

## 2022-12-25 RX ADMIN — HYDROCODONE BITARTRATE AND ACETAMINOPHEN 1 TABLET: 10; 325 TABLET ORAL at 13:15

## 2022-12-25 RX ADMIN — PROPOFOL 80 MG: 10 INJECTION, EMULSION INTRAVENOUS at 10:03

## 2022-12-25 RX ADMIN — CYANOCOBALAMIN TAB 1000 MCG 1000 MCG: 1000 TAB at 08:35

## 2022-12-25 RX ADMIN — SODIUM CHLORIDE: 9 INJECTION, SOLUTION INTRAVENOUS at 09:55

## 2022-12-25 RX ADMIN — MIRTAZAPINE 30 MG: 15 TABLET, FILM COATED ORAL at 21:16

## 2022-12-25 RX ADMIN — SODIUM CHLORIDE, PRESERVATIVE FREE 40 MG: 5 INJECTION INTRAVENOUS at 21:16

## 2022-12-25 ASSESSMENT — PAIN SCALES - GENERAL
PAINLEVEL_OUTOF10: 8
PAINLEVEL_OUTOF10: 9
PAINLEVEL_OUTOF10: 5

## 2022-12-25 ASSESSMENT — PAIN DESCRIPTION - ORIENTATION
ORIENTATION: LOWER;RIGHT
ORIENTATION: RIGHT

## 2022-12-25 ASSESSMENT — PAIN DESCRIPTION - LOCATION
LOCATION: BACK
LOCATION: BACK

## 2022-12-25 NOTE — OP NOTE
Operative Note      Patient: Molina Acosta  YOB: 1940  MRN: [id number]    Date of Procedure: 12/25/2022    [facility]      BRIEF OP REPORT:    Impression:    1) EGD showing normal esophagus apart from mild grade 1 esophagitis at GE junction   2) stomach mucosa normal apart from some mild inflammation in the fundus   3) duodenal mucosa nondiagnostic        Suggest:   1) treat symptomatically   2) advance to soft diet today, will start clear liquid diet tomorrow and plan for colonoscopy on Tuesday patient agrees   3) keep hemoglobin more than 7     Full EGD/COLONOSCOPY report available by going to \"chart review\" then \"procedures\" then  \"EGD/Colonoscopy\"  then \"View Endoscopy Report\"      Electronically signed by Jeannine Bedoya MD on 12/25/2022 at 10:14 AM

## 2022-12-25 NOTE — H&P
History and Physical      Name:  Danyelle Aviles /Age/Sex:   (80 y.o. male)   MRN & CSN:  [id number] & [id number] Encounter Date/Time: 2022 9:06 PM EST   Location:  -A PCP: Kirsty Camarillo, [address] [facility] Day: 1    Assessment and Plan:     #. Acute Post hemorrhagic anemia secondary to GI bleed  -Hemoglobin 7.3, was 14.3 (10/2022). -Patient is on rivaroxaban-last dose today morning.  -Patient received Kcentra in [place] [facility]. -Repeat H&H, type and screen ordered  -Transfuse if hemoglobin less than 7. #.  GI bleed  -N.p.o. after midnight  -IV Protonix  -GI consult    #. Lactic acidosis  -Secondary to hypoperfusion from blood loss  -Patient received IV fluids  -Repeat lactic acid level    #. MC on CKD  -Creatinine 1.5, was 1-10/5/2022  -IV fluids and repeat BMP    #. Hypomagnesemia-replaced in [place] [facility]  -Monitor with repeat level    #. Detectable troponin  -Could be secondary to blood loss causing supply demand mismatch. #.  Coronary artery disease  -History of PTCA and LAD stent 2011  -Patient is on aspirin, carvedilol, losartan, pravastatin  -Hold medications and resume when appropriate. #.  Hypretension-carvedilol, losartan, hydrochlorothiazide  -Patient's blood pressure on the lower side-hold    #. Persistent atrial fibrillation  -Patient is on carvedilol, Xarelto-hold    #. COPD, not on home oxygen  -Does not appear to be in exacerbation  -Patient is on tiotropium, albuterol HFA as needed. #.  Chronic thrombocytopenia. #.  GERD-on lansoprazole. #.  Morbid obesity with BMI 39.33    #. Chronic back pain-on Norco  #. Depression/insomnia-on mirtazapine-resume when appropriate. Disposition:   Current Living situation: home    Diet Diet NPO Exceptions are: Ice Chips   DVT Prophylaxis [x] SCDs   Code Status Full Code. Discussed with patient   Surrogate Decision Maker/ POA Daughter-in-law-Claudia     History from:   EMR, patient.     History of Present Illness: Chief Complaint: Upper GI bleed  Shahab Mason is a 80 y.o. male with coronary artery disease, persistent atrial fibrillation, on anticoagulation, GERD, COPD, not on home oxygen, hypertension, chronic back pain, morbid obesity presented to [place] ED with complaints of dizziness. Patient reported that he had dizziness today morning. Unable to provide more details. Admitted to having 1 episode of coffee-ground emesis while in the ER. Denying any chest pain, has chronic shortness of breath,, denied any abdomen pain, denied any urinary complaints. Patient reports that he has dark-colored stool, but he is on iron supplements. Patient denied noticing any bright red blood. Patient lives alone, independent of his ADLs and IADLs. Vitals on presentation to ED-BP 70/42, , RR 18, temp 98, saturating 96% on room air. Lab work was significant for CO2 20, BUN 42, creatinine 1.5, lactic acid 4.6, magnesium 1.4, troponin 0.016, hemoglobin 7.3, WBC 14.2, platelets 91. Rapid COVID-negative. CT head-no acute abnormality, CTA head and neck-no LVO. Chest x-ray-no acute cardiopulmonary process. Patient received 1 L NS bolus, IV Protonix bolus, magnesium sulfate, Tranexamic acid. Gi consulted from ED. Review of Systems: Need 10 Elements   10 point review of systems conducted and pertinent positives and negatives as per HPI. Objective:   No intake or output data in the 24 hours ending 12/24/22 2106   Vitals:   Vitals:    12/24/22 1852   BP: (!) 120/48   Pulse: (!) 113   Resp: 17   Temp: 99.4 °F (37.4 °C)   TempSrc: Axillary   SpO2: 100%       Medications Prior to Admission   Reviewed medications with patient    Prior to Admission medications    Medication Sig Start Date End Date Taking?  Authorizing Provider   nitroGLYCERIN (NITROSTAT) 0.4 MG SL tablet Place 1 tablet under the tongue every 5 minutes as needed for Chest pain 8/2/21   Ti Galeas MD   rivaroxaban (XARELTO) 20 MG TABS tablet Take 1 tablet by mouth every 24 hours 1/27/20   Turner Carlin MD   mirtazapine (REMERON) 30 MG tablet Take 30 mg by mouth nightly    Historical Provider, MD   Cholecalciferol (VITAMIN D3) 2000 UNITS CAPS Take 1 capsule by mouth daily    Historical Provider, MD   losartan (COZAAR) 25 MG tablet Take 25 mg by mouth daily    Historical Provider, MD   pravastatin (PRAVACHOL) 80 MG tablet TAKE ONE TABLET BY MOUTH EVERY DAY 11/2/15   Turner Carlin MD   carvedilol (COREG) 3.125 MG tablet Take 3.125 mg by mouth 2 times daily (with meals)    Historical Provider, MD   hydrochlorothiazide (MICROZIDE) 12.5 MG capsule Take 2 tablets every AM and 1 tablet every PM.    Historical Provider, MD   fluorouracil (EFUDEX) 5 % cream Apply topically 2 times daily as needed     Historical Provider, MD   HYDROcodone-acetaminophen (NORCO)  MG per tablet Take 1 tablet by mouth every 6 hours as needed for Pain    Historical Provider, MD   vitamin B-12 (CYANOCOBALAMIN) 1000 MCG tablet Take 1,000 mcg by mouth daily    Historical Provider, MD   albuterol (PROVENTIL HFA;VENTOLIN HFA) 108 (90 BASE) MCG/ACT inhaler Inhale 2 puffs into the lungs every 6 hours as needed for Wheezing    Historical Provider, MD   ferrous gluconate 325 (37.5 FE) MG TABS Take 325 mg by mouth 2 times daily. Historical Provider, MD   potassium chloride (MICRO-K) 10 MEQ CR capsule Takes 2 tabs in a.m. and 1 tab in p.m. Historical Provider, MD   aspirin 81 MG EC tablet Take 1 tablet by mouth daily. Patient taking differently: Take 81 mg by mouth daily \"take occassionally\" 4/12/13   Turner Carlin MD   tiotropium (SPIRIVA) 18 MCG inhalation capsule Inhale 18 mcg into the lungs daily     Historical Provider, MD   lansoprazole (PREVACID) 30 MG capsule Take 30 mg by mouth daily. Historical Provider, MD       Physical Exam: Need 8 Elements   Physical Exam     GEN  -Awake, alert, NAD.   EYES   -PERRL.    HENT  -MM are moist.   RESP  -LS CTA equal bilat, no wheezes, rales or rhonchi. Symmetric chest movement. No respiratory distress noted. C/V  -irregular, tachycardia without appreciable M/R/G. No JVD or carotid bruits. Peripheral pulses equal bilaterally and palpable. No peripheral edema. No reproducible chest wall tenderness. GI  -Abdomen is soft, non-distended, no significant tenderness. No masses or guarding. + BS in all quadrants. Rectal exam deferred.   -No CVA tenderness. Santiago catheter is not present. MS  -B/L extremities -No gross joint deformities. No swelling, intact sensation symmetrical.   SKIN  -Normal coloration, warm, dry. NEURO  - Awake, alert, oriented x 3, no focal deficits. PSYC  - Appropriate affect. Past Medical History:   Reviewed patient's past medical, surgical, social, family history and allergies. PMHx   Past Medical History:   Diagnosis Date    AR (aortic regurgitation)     mild to mod    Atrial fibrillation, new onset (HCC)     CAD (coronary artery disease)     History of angioplasty    Chronic midline low back pain without sciatica 5/1/2017    Has seen Dr. Marianne Lehman and had shots    COPD (chronic obstructive pulmonary disease) ([facility] [address]     Family history of coronary artery disease     Fatigue 10/2016    H/O cardiovascular stress test 2/21/2012    mild ischemia in the basal inferior and mid inferior regions ef is 47    H/O chest x-ray 10/20/2016    Right pleural effusion resolved. H/O Doppler ultrasound 10/2016    CAROTID-[place]. arteries patent w/less than 50% stenosis in the internal carotid arteries. H/O echocardiogram 2/17/15    Aortic sclerosis without stenosis, normal LV size and wall motion w/low normal systolic function, LA dilatation, RV dilatation, mild pulmonary HTN, EF 50-55%.     History of cardiac cath 7/2/1996    LV uniform LV contractility RCa dominatn 50-60 prox stenosis  LM patent  LAD mild mid plaquing diag has 70 ostial narrowing ramus minimal plaquing cx normal    History of PTCA 08/15/2011    prox lad bare metal stent 2011    Hyperlipidemia     Hypertension     Obesity     Obesity, Class II, BMI 35-39.9, with comorbidity 2014    RBBB     Risk for falls 10/26/2015    Tachycardia      PSHX:  has a past surgical history that includes joint replacement () and Coronary angioplasty with stent. Allergies: Allergies   Allergen Reactions    Lovenox [Enoxaparin Sodium]     Morphine Rash     Fam HX: family history includes Cancer in his sister; Heart Disease in his father and mother. Soc HX:   Social History     Socioeconomic History    Marital status:     Number of children: 4   Occupational History    Occupation: retired   Tobacco Use    Smoking status: Former     Packs/day: 1.50     Years: 25.00     Pack years: 37.50     Types: Cigarettes     Quit date: 1990     Years since quittin.1    Smokeless tobacco: Current     Types: Chew    Tobacco comments:     Chews tobacco        Vaping Use    Vaping Use: Never used   Substance and Sexual Activity    Alcohol use:  Yes     Alcohol/week: 56.0 standard drinks     Types: 56 Cans of beer per week     Comment: 6-8 beers daily or less    Drug use: No    Sexual activity: Not Currently     Partners: Female     Comment:        Medications:   Medications:    sodium chloride flush  5-40 mL IntraVENous 2 times per day    [START ON 2022] pantoprazole (PROTONIX) 40 mg injection  40 mg IntraVENous Daily      Infusions:    sodium chloride      sodium chloride       PRN Meds: sodium chloride flush, 5-40 mL, PRN  sodium chloride, , PRN  ondansetron, 4 mg, Q8H PRN   Or  ondansetron, 4 mg, Q6H PRN  acetaminophen, 650 mg, Q6H PRN   Or  acetaminophen, 650 mg, Q6H PRN        Labs      CBC:   Recent Labs     22  1215   WBC 14.2*   HGB 7.3*   PLT 91*     BMP:    Recent Labs     22  1215      K 3.6      CO2 20*   BUN 42*   CREATININE 1.5*   GLUCOSE 158*     Hepatic:   Recent Labs     22  1215   AST 17   ALT 8*   BILITOT 0.4   ALKPHOS 48 Lipids:   Lab Results   Component Value Date/Time    CHOL 127 06/17/2020 08:03 AM    HDL 57 10/05/2022 09:30 AM    TRIG 81 06/17/2020 08:03 AM     Hemoglobin A1C:   Lab Results   Component Value Date/Time    LABA1C 5.2 10/05/2022 09:20 AM     TSH: No results found for: TSH  Troponin:   Lab Results   Component Value Date/Time    TROPONINT 0.016 12/24/2022 12:15 PM    TROPONINT <0.010 07/23/2015 07:30 AM    TROPONINT <0.010 07/04/2015 03:10 PM     Lactic Acid: No results for input(s): LACTA in the last 72 hours. BNP: No results for input(s): PROBNP in the last 72 hours. UA:  Lab Results   Component Value Date/Time    NITRU NEGATIVE 07/04/2015 04:55 PM    COLORU YELLOW 07/04/2015 04:55 PM    WBCUA NO CELLS SEEN 07/04/2015 04:55 PM    RBCUA NO CELLS SEEN 07/04/2015 04:55 PM    MUCUS 1+ 07/04/2015 04:55 PM    BACTERIA RARE 07/04/2015 04:55 PM    CLARITYU CLEAR 07/04/2015 04:55 PM    SPECGRAV 1.015 07/04/2015 04:55 PM    LEUKOCYTESUR NEGATIVE 07/04/2015 04:55 PM    UROBILINOGEN 0.2 07/04/2015 04:55 PM    BILIRUBINUR NEGATIVE 07/04/2015 04:55 PM    BLOODU NEGATIVE 07/04/2015 04:55 PM    KETUA NEGATIVE 07/04/2015 04:55 PM     Urine Cultures: No results found for: [place]  Blood Cultures: No results found for: BC  No results found for: BLOODCULT2  Organism: No results found for: ORG    Imaging/Diagnostics Last 24 Hours   XR CHEST (2 VW)    Result Date: 12/24/2022  EXAMINATION: TWO XRAY VIEWS OF THE CHEST 12/24/2022 1:05 pm COMPARISON: Chest x-ray dated 10/04/2016. HISTORY: ORDERING SYSTEM PROVIDED HISTORY: Weakness TECHNOLOGIST PROVIDED HISTORY: Reason for exam:->Weakness FINDINGS: LINES/TUBES/OTHER: Left subclavian port is noted with its tip projecting over the proximal SVC. HEART/MEDIASTINUM: The cardiomediastinal silhouette is stable. PLEURA/LUNGS: There are no focal consolidations or pleural effusions. There is no appreciable pneumothorax. BONES/SOFT TISSUE: No acute abnormality.      No radiographic evidence of acute pulmonary disease. CT Head W/O Contrast    Result Date: 12/24/2022  EXAMINATION: CTA OF THE HEAD AND NECK WITH CONTRAST; CT OF THE HEAD WITHOUT CONTRAST 12/24/2022 1:06 pm: TECHNIQUE: CTA of the head and neck was performed with the administration of intravenous contrast. Multiplanar reformatted images are provided for review. MIP images are provided for review. Stenosis of the internal carotid arteries measured using NASCET criteria. Automated exposure control, iterative reconstruction, and/or weight based adjustment of the mA/kV was utilized to reduce the radiation dose to as low as reasonably achievable.; CT of the head was performed without the administration of intravenous contrast. Automated exposure control, iterative reconstruction, and/or weight based adjustment of the mA/kV was utilized to reduce the radiation dose to as low as reasonably achievable. Noncontrast CT of the head with reconstructed 2-D images are also provided for review. COMPARISON: CT head 07/04/2015. HISTORY: ORDERING SYSTEM PROVIDED HISTORY: Unprovoked Syncope TECHNOLOGIST PROVIDED HISTORY: Reason for exam:->Unprovoked Syncope Has a \"code stroke\" or \"stroke alert\" been called? ->No Decision Support Exception - unselect if not a suspected or confirmed emergency medical condition->Emergency Medical Condition (MA) Initial evaluation. FINDINGS: CT HEAD: BRAIN/VENTRICLES:  No acute intracranial hemorrhage or extraaxial fluid collection. Grey-white differentiation is maintained. No evidence of mass, mass effect or midline shift. No evidence of hydrocephalus. There are areas of hypoattenuation in the periventricular and subcortical white matter, which is nonspecific, but may represent chronic microvasvular ischemic change. There is mild-to-moderate global parenchymal volume loss. Atherosclerosis of the intracranial vasculature is noted. ORBITS: The visualized portion of the orbits demonstrate no acute abnormality.  SINUSES:  The visualized paranasal sinuses and mastoid air cells demonstrate no acute abnormality. SOFT TISSUES/SKULL: No acute abnormality of the visualized skull or soft tissues. CTA NECK: Motion degrades images limiting evaluation. AORTIC ARCH/ARCH VESSELS: There is atherosclerosis of the aortic arch and arch vessels. No convincing flow limiting stenosis of the innominate or visualized subclavian arteries. The distal left subclavian artery is obscured due to in flowing contrast. CAROTID ARTERIES: Atherosclerosis is seen of the common carotid arteries bilateral without evidence of a flow limiting stenosis. Atherosclerosis of the carotid bulbs and proximal internal carotid arteries bilaterally. Atherosclerosis contributes to less than 50% stenosis of the cervical ICAs bilaterally by NASCET criteria. VERTEBRAL ARTERIES: No significant stenosis seen of the right vertebral artery. There is minimal string of enhancement within the entirety of the left vertebral artery with no significant enhancement within the proximal left V4 segment. SOFT TISSUES: No acute abnormality within the visualized portion of the lungs or mediastinum. BONES: The osseous structures appear osteopenic. No convincing acute osseous abnormality seen. Multilevel degenerative changes of the cervical spine noted. There appear to be multiple dental caries. CTA HEAD: ANTERIOR CIRCULATION: Diffuse atherosclerosis of the internal carotid arteries bilaterally. Moderate stenosis of the right cavernous ICA with moderate stenosis of the left supraclinoid ICA. No significant stenosis seen of the anterior cerebral or middle cerebral arteries. No aneurysm identified. POSTERIOR CIRCULATION: No significant enhancement within the proximal left V4 segment. There is atherosclerosis involving the right V4 segment contributing to mild stenosis. No significant stenosis seen of the basilar artery. No significant stenosis of the posterior cerebral arteries.   No convincing aneurysm identified. The left PICA is seen likely due to retrograde flow. OTHER: No dural venous sinus thrombosis on this non-dedicated study. 1. No acute intracranial abnormality. 2. Mild-to-moderate global parenchymal volume loss with mild chronic microvascular ischemic changes. 3. Minimal string of enhancement within the entirety of the left cervical vertebral artery with no significant enhancement within the proximal left V4 segment. This is of uncertain chronicity. 4. Atherosclerosis contributes to less than 50% stenosis of the cervical ICAs bilaterally by NASCET criteria. 5. Atherosclerosis involving the intracranial ICAs contribute to moderate stenosis of the right cavernous ICA and moderate stenosis of the left supraclinoid ICA. CTA HEAD NECK W CONTRAST    Result Date: 12/24/2022  EXAMINATION: CTA OF THE HEAD AND NECK WITH CONTRAST; CT OF THE HEAD WITHOUT CONTRAST 12/24/2022 1:06 pm: TECHNIQUE: CTA of the head and neck was performed with the administration of intravenous contrast. Multiplanar reformatted images are provided for review. MIP images are provided for review. Stenosis of the internal carotid arteries measured using NASCET criteria. Automated exposure control, iterative reconstruction, and/or weight based adjustment of the mA/kV was utilized to reduce the radiation dose to as low as reasonably achievable.; CT of the head was performed without the administration of intravenous contrast. Automated exposure control, iterative reconstruction, and/or weight based adjustment of the mA/kV was utilized to reduce the radiation dose to as low as reasonably achievable. Noncontrast CT of the head with reconstructed 2-D images are also provided for review. COMPARISON: CT head 07/04/2015. HISTORY: ORDERING SYSTEM PROVIDED HISTORY: Unprovoked Syncope TECHNOLOGIST PROVIDED HISTORY: Reason for exam:->Unprovoked Syncope Has a \"code stroke\" or \"stroke alert\" been called? ->No Decision Support Exception - unselect if not a suspected or confirmed emergency medical condition->Emergency Medical Condition (MA) Initial evaluation. FINDINGS: CT HEAD: BRAIN/VENTRICLES:  No acute intracranial hemorrhage or extraaxial fluid collection. Grey-white differentiation is maintained. No evidence of mass, mass effect or midline shift. No evidence of hydrocephalus. There are areas of hypoattenuation in the periventricular and subcortical white matter, which is nonspecific, but may represent chronic microvasvular ischemic change. There is mild-to-moderate global parenchymal volume loss. Atherosclerosis of the intracranial vasculature is noted. ORBITS: The visualized portion of the orbits demonstrate no acute abnormality. SINUSES:  The visualized paranasal sinuses and mastoid air cells demonstrate no acute abnormality. SOFT TISSUES/SKULL: No acute abnormality of the visualized skull or soft tissues. CTA NECK: Motion degrades images limiting evaluation. AORTIC ARCH/ARCH VESSELS: There is atherosclerosis of the aortic arch and arch vessels. No convincing flow limiting stenosis of the innominate or visualized subclavian arteries. The distal left subclavian artery is obscured due to in flowing contrast. CAROTID ARTERIES: Atherosclerosis is seen of the common carotid arteries bilateral without evidence of a flow limiting stenosis. Atherosclerosis of the carotid bulbs and proximal internal carotid arteries bilaterally. Atherosclerosis contributes to less than 50% stenosis of the cervical ICAs bilaterally by NASCET criteria. VERTEBRAL ARTERIES: No significant stenosis seen of the right vertebral artery. There is minimal string of enhancement within the entirety of the left vertebral artery with no significant enhancement within the proximal left V4 segment. SOFT TISSUES: No acute abnormality within the visualized portion of the lungs or mediastinum. BONES: The osseous structures appear osteopenic.   No convincing acute osseous abnormality seen. Multilevel degenerative changes of the cervical spine noted. There appear to be multiple dental caries. CTA HEAD: ANTERIOR CIRCULATION: Diffuse atherosclerosis of the internal carotid arteries bilaterally. Moderate stenosis of the right cavernous ICA with moderate stenosis of the left supraclinoid ICA. No significant stenosis seen of the anterior cerebral or middle cerebral arteries. No aneurysm identified. POSTERIOR CIRCULATION: No significant enhancement within the proximal left V4 segment. There is atherosclerosis involving the right V4 segment contributing to mild stenosis. No significant stenosis seen of the basilar artery. No significant stenosis of the posterior cerebral arteries. No convincing aneurysm identified. The left PICA is seen likely due to retrograde flow. OTHER: No dural venous sinus thrombosis on this non-dedicated study. 1. No acute intracranial abnormality. 2. Mild-to-moderate global parenchymal volume loss with mild chronic microvascular ischemic changes. 3. Minimal string of enhancement within the entirety of the left cervical vertebral artery with no significant enhancement within the proximal left V4 segment. This is of uncertain chronicity. 4. Atherosclerosis contributes to less than 50% stenosis of the cervical ICAs bilaterally by NASCET criteria. 5. Atherosclerosis involving the intracranial ICAs contribute to moderate stenosis of the right cavernous ICA and moderate stenosis of the left supraclinoid ICA. Personally reviewed Lab Studies, Imaging.     Electronically signed by Erica Ge MD on 12/24/2022 at 9:06 PM

## 2022-12-25 NOTE — PLAN OF CARE
Problem: Discharge Planning  Goal: Discharge to home or other facility with appropriate resources  12/25/2022 1103 by Christiano Mcdaniel RN  Outcome: Progressing  12/25/2022 0529 by Kina Ricks RN  Outcome: Progressing     Problem: Skin/Tissue Integrity  Goal: Absence of new skin breakdown  Description: 1. Monitor for areas of redness and/or skin breakdown  2. Assess vascular access sites hourly  3. Every 4-6 hours minimum:  Change oxygen saturation probe site  4. Every 4-6 hours:  If on nasal continuous positive airway pressure, respiratory therapy assess nares and determine need for appliance change or resting period.   12/25/2022 1103 by Christiano Mcdaniel RN  Outcome: Progressing  12/25/2022 0529 by Kina Ricks RN  Outcome: Progressing     Problem: ABCDS Injury Assessment  Goal: Absence of physical injury  12/25/2022 1103 by Christiano Mcdaniel RN  Outcome: Progressing  12/25/2022 0529 by Kina Ricks RN  Outcome: Progressing     Problem: Safety - Adult  Goal: Free from fall injury  12/25/2022 1103 by Christiano Mcdaniel RN  Outcome: Progressing  12/25/2022 0529 by Kina Ricks RN  Outcome: Progressing

## 2022-12-25 NOTE — CONSULTS
[address]  Gastroenterology Consultation    2022  9:41 AM    Patient:    Raysa Rangel  : [birth year]   80 y.o. MRN: [id number]  Admitted: 2022  7:58 PM ATT: Marie De La Cruz MD   [id number]  AdmitDx: Upper GI bleed [K92.2]  PCP: Fara Torres MD    Reason for Consult:   Anemia GI bleed    IMPRESSION and RECOMMENDATIONS:  Acute anemia  Hematemesis  Possible melena  On Xarelto  For A. fib  May have peptic ulcer disease  Agree with stopping Xarelto for now  Keep hemoglobin more than 7  Continue Protonix infusion  EGD today        Patient Active Problem List   Diagnosis Code    CAD s/p prox LAD bare metal stent  Z98.61    RBBB I45.10    COPD (chronic obstructive pulmonary disease) ([facility] [address] J44.9    DJD (degenerative joint disease) M19.90    Atrial fibrillation, persistent I48.19    Hypertension I10    Hyperlipidemia E78.5    Obesity E66.9    Risk for falls Z91.81    Fatigue R53.83    Family history of coronary artery disease Z82.49    Chronic midline low back pain without sciatica M54.50, G89.29    Upper GI bleed K92.2             Requesting Physician:  Marie De La Cruz MD      History Obtained From:  Patient and review of all records    HISTORY OF PRESENT ILLNESS:                The patient is a 80 y.o. male with significant past medical history as below who presents with above mentioned causes in reason for consult. He is a 80 y.o. male with coronary artery disease, persistent atrial fibrillation, on anticoagulation, GERD, COPD, not on home oxygen, hypertension, chronic back pain, morbid obesity presented to THE [facility] ED with complaints of dizziness. Patient reported that he had dizziness today morning. Unable to provide more details. Admitted to having 1 episode of coffee-ground emesis while in the ER. Denying any chest pain, has chronic shortness of breath,, denied any abdomen pain, denied any urinary complaints.   Patient reports that he has dark-colored stool, but he is on iron supplements    Past Medical History:        Diagnosis Date    AR (aortic regurgitation)     mild to mod    Atrial fibrillation, new onset (HCC)     CAD (coronary artery disease)     History of angioplasty    Chronic midline low back pain without sciatica 5/1/2017    Has seen Dr. Johnnie Gray and had shots    COPD (chronic obstructive pulmonary disease) ([address]     Family history of coronary artery disease     Fatigue 10/2016    H/O cardiovascular stress test 2/21/2012    mild ischemia in the basal inferior and mid inferior regions ef is 47    H/O chest x-ray 10/20/2016    Right pleural effusion resolved. H/O Doppler ultrasound 10/2016    CAROTID-[place]. arteries patent w/less than 50% stenosis in the internal carotid arteries. H/O echocardiogram 2/17/15    Aortic sclerosis without stenosis, normal LV size and wall motion w/low normal systolic function, LA dilatation, RV dilatation, mild pulmonary HTN, EF 50-55%. History of cardiac cath 7/2/1996    LV uniform LV contractility RCa dominatn 50-60 prox stenosis  LM patent  LAD mild mid plaquing diag has 70 ostial narrowing ramus minimal plaquing cx normal    History of PTCA 08/15/2011    prox lad bare metal stent 8/2011    Hyperlipidemia     Hypertension     Obesity     Obesity, Class II, BMI 35-39.9, with comorbidity 4/17/2014    RBBB     Risk for falls 10/26/2015    Tachycardia        Past Surgical History:        Procedure Laterality Date    [address] REPLACEMENT  2004    partial left knee         Current Medications:    Medications    Prior to Admission medications    Medication Sig Start Date End Date Taking?  Authorizing Provider   nitroGLYCERIN (NITROSTAT) 0.4 MG SL tablet Place 1 tablet under the tongue every 5 minutes as needed for Chest pain 8/2/21   Juwan Vega MD   rivaroxaban (XARELTO) 20 MG TABS tablet Take 1 tablet by mouth every 24 hours 1/27/20 Sae Cage MD   mirtazapine (REMERON) 30 MG tablet Take 30 mg by mouth nightly    Historical Provider, MD   Cholecalciferol (VITAMIN D3) 2000 UNITS CAPS Take 1 capsule by mouth daily    Historical Provider, MD   losartan (COZAAR) 25 MG tablet Take 25 mg by mouth daily    Historical Provider, MD   pravastatin (PRAVACHOL) 80 MG tablet TAKE ONE TABLET BY MOUTH EVERY DAY 11/2/15   Sae Cage MD   carvedilol (COREG) 3.125 MG tablet Take 3.125 mg by mouth 2 times daily (with meals)    Historical Provider, MD   hydrochlorothiazide (MICROZIDE) 12.5 MG capsule Take 2 tablets every AM and 1 tablet every PM.    Historical Provider, MD   fluorouracil (EFUDEX) 5 % cream Apply topically 2 times daily as needed     Historical Provider, MD   HYDROcodone-acetaminophen (NORCO)  MG per tablet Take 1 tablet by mouth every 6 hours as needed for Pain    Historical Provider, MD   vitamin B-12 (CYANOCOBALAMIN) 1000 MCG tablet Take 1,000 mcg by mouth daily    Historical Provider, MD   albuterol (PROVENTIL HFA;VENTOLIN HFA) 108 (90 BASE) MCG/ACT inhaler Inhale 2 puffs into the lungs every 6 hours as needed for Wheezing    Historical Provider, MD   ferrous gluconate 325 (37.5 FE) MG TABS Take 325 mg by mouth 2 times daily. Historical Provider, MD   potassium chloride (MICRO-K) 10 MEQ CR capsule Takes 2 tabs in a.m. and 1 tab in p.m. Historical Provider, MD   aspirin 81 MG EC tablet Take 1 tablet by mouth daily. Patient taking differently: Take 81 mg by mouth daily \"take occassionally\" 4/12/13   Sae Cage MD   tiotropium (SPIRIVA) 18 MCG inhalation capsule Inhale 18 mcg into the lungs daily     Historical Provider, MD   lansoprazole (PREVACID) 30 MG capsule Take 30 mg by mouth daily.       Historical Provider, MD      Scheduled Medications:    sodium chloride flush  5-40 mL IntraVENous 2 times per day    pantoprazole (PROTONIX) 40 mg injection  40 mg IntraVENous Daily    tiotropium  2 puff Inhalation Daily vitamin B-12  1,000 mcg Oral Daily     Infusions:    sodium chloride      sodium chloride       PRN Medications: sodium chloride flush, sodium chloride, acetaminophen **OR** acetaminophen, albuterol sulfate HFA, HYDROcodone-acetaminophen, sodium chloride  Allergies: Allergies   Allergen Reactions    Lovenox [Enoxaparin Sodium]     Morphine Rash         Allergies:  Lovenox [enoxaparin sodium] and Morphine    Social History:   TOBACCO:   reports that he quit smoking about 32 years ago. He has a 37.50 pack-year smoking history. His smokeless tobacco use includes chew. ETOH:   reports current alcohol use of about 56.0 standard drinks per week. Family History:       Problem Relation Age of Onset    Heart Disease Mother     Heart Disease Father     Cancer Sister      No family history of colon cancer, Crohn's disease, or ulcerative colitis.     REVIEW OF SYSTEMS:      Negative apart from HPI    PHYSICAL EXAM:      Vitals:    /64   Pulse (!) 109   Temp 99.3 °F (37.4 °C) (Oral)   Resp 19   Ht 5' 11\" (1.803 m)   Wt 270 lb (122.5 kg) Comment: bed scale not zeroed  SpO2 100%   BMI 37.66 kg/m²     General Appearance:    Alert, cooperative, ,mild  distress, appears stated age   HEENT:  Pale conjunctive, Conjunctiva clear, Ears Normal, Normal nares,  gums normal   Neck:   Supple, no JVD, No lymph nodes   Lungs:     Clear to auscultation bilaterally,    Chest Wall:    No tenderness or deformity    Heart:     S1 and S2 normal,   Abdomen:     Soft, non-tender, bowel sounds active all four quadrants,     no masses, no organomegaly, no ascitis   Rectal:    Patient refused   Extremities:  , no cyanosis or edema       Skin:   Skin , no major lesions   Lymph nodes:   No abnormality   Neurologic:   No focal deficits, moving all four extremities            DATA:    ABGs: No results found for: PHART, PO2ART, SVY6MXH  CBC:   Recent Labs     12/24/22  1215 12/24/22  2144 12/25/22  0548   WBC 14.2*  --   --    HGB 7.3* 7.0* 8.1*   PLT 91*  --   --      BMP:    Recent Labs     12/24/22  1215 12/24/22  2144    139   K 3.6 3.8    105   CO2 20* 24   BUN 42* 39*   CREATININE 1.5* 1.4*   GLUCOSE 158* 98     Magnesium:   Lab Results   Component Value Date/Time    MG 1.4 12/24/2022 12:15 PM     Hepatic:   Recent Labs     12/24/22  1215   AST 17   ALT 8*   BILITOT 0.4   ALKPHOS 48     No results for input(s): LIPASE, AMYLASE in the last 72 hours. No results for input(s): PROTIME, INR in the last 72 hours. No results for input(s): PTT in the last 72 hours. Lipids: No results for input(s): CHOL, HDL in the last 72 hours. Invalid input(s): LDLCALCU  INR: No results for input(s): INR in the last 72 hours.   TSH: No results found for: TSH    Intake/Output Summary (Last 24 hours) at 12/25/2022 0941  Last data filed at 12/25/2022 0450  Gross per 24 hour   Intake 1392.75 ml   Output 400 ml   Net 992.75 ml      sodium chloride      sodium chloride         Imaging Studies    Reviewed in detail        Lila Alva MD  12/25/2022  9:41 AM

## 2022-12-25 NOTE — PROGRESS NOTES
V2.0  [facility] [place] [facility]ist Progress Note      Name:  Livia Bernabe /Age/Sex:   (80 y.o. male)   MRN & CSN:  [id number] & [id number] Encounter Date/Time: 2022 3:45 PM EST    Location:  -A PCP: Lan Quijano MD       Hospital Day: 2    Assessment and Plan:   Livia Bernabe is a 80 y.o. male with past medical history of persistent atrial fibrillation, GERD, COPD, chronic back pain, hypertension, obesity who presented at [facility] with a complaint of dizziness. States that he started feeling some nausea and abdominal discomfort on Friday after which he had 1 episode of vomiting at home and another 1 in the ER. Dizziness  Unknown NIHSS on arrival  NIHSS today 0. CT head no acute abnormality  CT angio head and neck no LVO  Likely a combination of acute blood loss anemia in addition to hypotension    Acute blood loss anemia likely secondary to GI bleed  Hemoglobin 7.3 with hemodynamic manifestation  Patient given Kcentra at [facility]  EGD 2022: LA grade a esophagitis with erythematous mucosa in the stomach. No active source of bleed. Continue Protonix IV 40 mg twice daily  Trend H&H and transfuse if hemoglobin less than 7  Colonoscopy planned for Tuesday    Lactic acidosis, resolved  Likely type II in the setting of acute blood loss anemia    Persistent atrial fibrillation  Continue Coreg  Hold Xarelto until cleared by gastroenterology    GERD  Currently on IV Protonix 40 mg twice daily    COPD  Not in exacerbation  Continue Spiriva    Coronary artery disease  History of PTCA and LAD stent in 2011  Hold aspirin, continue atorvastatin    Hypertension  Currently holding losartan and hydrochlorothiazide  Resume carvedilol given the rapid ventricular response    Obesity, BMI 37.66  Counseled regarding diet modification and weight loss    Chronic back pain with opioid dependence  PDMP reviewed, continue Norco as needed      Diet ADULT DIET;  Dysphagia - Soft and Bite Sized  ADULT DIET; Clear Liquid   DVT Prophylaxis [] Lovenox, []  Heparin, [x] SCDs, [x] Ambulation,  [] Eliquis, [] Xarelto  [] Coumadin   Code Status Full Code   Disposition From: Home alone   Expected Disposition: Home  Estimated Date of Discharge: 3-4 days  Patient requires continued admission due to colonoscopy, GI clearance   Surrogate Decision Maker/ POA Child     Subjective:     Chief Complaint: Dizziness, hematemesis       Joel Hawley is a 80 y.o. male who presents with dizziness and hematemesis. Patient was seen and evaluated bedside earlier this morning. Patient was initially n.p.o. for the procedure. Reevaluated postprocedure. Heart rate above 100 but stayed below 120s throughout my encounter. Hemodynamically stable. Reports that his dizziness is better. Objective: Intake/Output Summary (Last 24 hours) at 12/25/2022 1714  Last data filed at 12/25/2022 1011  Gross per 24 hour   Intake 1542.75 ml   Output 400 ml   Net 1142.75 ml        Vitals:   Vitals:    12/25/22 1542   BP: (!) 145/75   Pulse: (!) 117   Resp: 22   Temp: 100.1 °F (37.8 °C)   SpO2: 98%       Physical Exam:   Physical Exam  Vitals reviewed. Constitutional:       Appearance: Normal appearance. He is obese. HENT:      Head: Normocephalic and atraumatic. Nose: Nose normal.      Mouth/Throat:      Mouth: Mucous membranes are dry. Pharynx: Oropharynx is clear. Eyes:      General: No scleral icterus. Conjunctiva/sclera: Conjunctivae normal.   Cardiovascular:      Rate and Rhythm: Tachycardia present. Rhythm irregular. Pulses: Normal pulses. Heart sounds: Murmur heard. Pulmonary:      Effort: Pulmonary effort is normal.      Breath sounds: Normal breath sounds. No wheezing, rhonchi or rales. Abdominal:      General: Bowel sounds are normal. There is no distension. Palpations: Abdomen is soft. Tenderness: There is no abdominal tenderness. Musculoskeletal:         General: No deformity. Normal range of motion. Cervical back: Neck supple. No rigidity. Right lower leg: Edema present. Left lower leg: Edema present. Skin:     Coloration: Skin is pale. Skin is not jaundiced. Neurological:      General: No focal deficit present. Mental Status: He is alert and oriented to person, place, and time. Mental status is at baseline.           Medications:   Medications:    pantoprazole (PROTONIX) 40 mg injection  40 mg IntraVENous Q12H    carvedilol  3.125 mg Oral BID WC    pravastatin  80 mg Oral Nightly    mirtazapine  30 mg Oral Nightly    sodium chloride flush  5-40 mL IntraVENous 2 times per day    tiotropium  2 puff Inhalation Daily    vitamin B-12  1,000 mcg Oral Daily      Infusions:    sodium chloride       PRN Meds: sodium chloride flush, 5-40 mL, PRN  sodium chloride, , PRN  acetaminophen, 650 mg, Q6H PRN   Or  acetaminophen, 650 mg, Q6H PRN  albuterol sulfate HFA, 2 puff, Q6H PRN  HYDROcodone-acetaminophen, 1 tablet, Q6H PRN      Labs      Recent Results (from the past 24 hour(s))   Basic Metabolic Panel w/ Reflex to MG    Collection Time: 12/24/22  9:44 PM   Result Value Ref Range    Sodium 139 135 - 145 MMOL/L    Potassium 3.8 3.5 - 5.1 MMOL/L    Chloride 105 99 - 110 mMol/L    CO2 24 21 - 32 MMOL/L    Anion Gap 10 4 - 16    BUN 39 (H) 6 - 23 MG/DL    Creatinine 1.4 (H) 0.9 - 1.3 MG/DL    Est, Glom Filt Rate 50 (L) >60 mL/min/1.73m2    Glucose 98 70 - 99 MG/DL    Calcium 8.5 8.3 - 10.6 MG/DL   Hemoglobin and Hematocrit    Collection Time: 12/24/22  9:44 PM   Result Value Ref Range    Hemoglobin 7.0 (L) 13.5 - 18.0 GM/DL    Hematocrit 22.4 (L) 42 - 52 %   Lactic Acid    Collection Time: 12/24/22  9:44 PM   Result Value Ref Range    Lactate 1.7 0.5 - 1.9 mMOL/L   TYPE AND SCREEN    Collection Time: 12/24/22  9:44 PM   Result Value Ref Range    ABO/Rh A POSITIVE     Antibody Screen NEGATIVE     Unit Number [id number]     Component LEUKO-POOR RED CELLS     Unit Divison 00 Status ISSUED     Transfusion Status OK TO TRANSFUSE     Crossmatch Result COMPATIBLE    Hemoglobin and Hematocrit    Collection Time: 12/25/22  5:48 AM   Result Value Ref Range    Hemoglobin 8.1 (L) 13.5 - 18.0 GM/DL    Hematocrit 25.4 (L) 42 - 52 %   Hemoglobin and Hematocrit    Collection Time: 12/25/22  2:29 PM   Result Value Ref Range    Hemoglobin 8.4 (L) 13.5 - 18.0 GM/DL    Hematocrit 28.7 (L) 42 - 52 %        Imaging/Diagnostics Last 24 Hours   XR CHEST (2 VW)    Result Date: 12/24/2022  EXAMINATION: TWO XRAY VIEWS OF THE CHEST 12/24/2022 1:05 pm COMPARISON: Chest x-ray dated 10/04/2016. HISTORY: ORDERING SYSTEM PROVIDED HISTORY: Weakness TECHNOLOGIST PROVIDED HISTORY: Reason for exam:->Weakness FINDINGS: LINES/TUBES/OTHER: Left subclavian port is noted with its tip projecting over the proximal SVC. HEART/MEDIASTINUM: The cardiomediastinal silhouette is stable. PLEURA/LUNGS: There are no focal consolidations or pleural effusions. There is no appreciable pneumothorax. BONES/SOFT TISSUE: No acute abnormality. No radiographic evidence of acute pulmonary disease. CT Head W/O Contrast    Result Date: 12/24/2022  EXAMINATION: CTA OF THE HEAD AND NECK WITH CONTRAST; CT OF THE HEAD WITHOUT CONTRAST 12/24/2022 1:06 pm: TECHNIQUE: CTA of the head and neck was performed with the administration of intravenous contrast. Multiplanar reformatted images are provided for review. MIP images are provided for review. Stenosis of the internal carotid arteries measured using NASCET criteria. Automated exposure control, iterative reconstruction, and/or weight based adjustment of the mA/kV was utilized to reduce the radiation dose to as low as reasonably achievable.; CT of the head was performed without the administration of intravenous contrast. Automated exposure control, iterative reconstruction, and/or weight based adjustment of the mA/kV was utilized to reduce the radiation dose to as low as reasonably achievable. Noncontrast CT of the head with reconstructed 2-D images are also provided for review. COMPARISON: CT head 07/04/2015. HISTORY: ORDERING SYSTEM PROVIDED HISTORY: Unprovoked Syncope TECHNOLOGIST PROVIDED HISTORY: Reason for exam:->Unprovoked Syncope Has a \"code stroke\" or \"stroke alert\" been called? ->No Decision Support Exception - unselect if not a suspected or confirmed emergency medical condition->Emergency Medical Condition (MA) Initial evaluation. FINDINGS: CT HEAD: BRAIN/VENTRICLES:  No acute intracranial hemorrhage or extraaxial fluid collection. Grey-white differentiation is maintained. No evidence of mass, mass effect or midline shift. No evidence of hydrocephalus. There are areas of hypoattenuation in the periventricular and subcortical white matter, which is nonspecific, but may represent chronic microvasvular ischemic change. There is mild-to-moderate global parenchymal volume loss. Atherosclerosis of the intracranial vasculature is noted. ORBITS: The visualized portion of the orbits demonstrate no acute abnormality. SINUSES:  The visualized paranasal sinuses and mastoid air cells demonstrate no acute abnormality. SOFT TISSUES/SKULL: No acute abnormality of the visualized skull or soft tissues. CTA NECK: Motion degrades images limiting evaluation. AORTIC ARCH/ARCH VESSELS: There is atherosclerosis of the aortic arch and arch vessels. No convincing flow limiting stenosis of the innominate or visualized subclavian arteries. The distal left subclavian artery is obscured due to in flowing contrast. CAROTID ARTERIES: Atherosclerosis is seen of the common carotid arteries bilateral without evidence of a flow limiting stenosis. Atherosclerosis of the carotid bulbs and proximal internal carotid arteries bilaterally. Atherosclerosis contributes to less than 50% stenosis of the cervical ICAs bilaterally by NASCET criteria. VERTEBRAL ARTERIES: No significant stenosis seen of the right vertebral artery.   There is minimal string of enhancement within the entirety of the left vertebral artery with no significant enhancement within the proximal left V4 segment. SOFT TISSUES: No acute abnormality within the visualized portion of the lungs or mediastinum. BONES: The osseous structures appear osteopenic. No convincing acute osseous abnormality seen. Multilevel degenerative changes of the cervical spine noted. There appear to be multiple dental caries. CTA HEAD: ANTERIOR CIRCULATION: Diffuse atherosclerosis of the internal carotid arteries bilaterally. Moderate stenosis of the right cavernous ICA with moderate stenosis of the left supraclinoid ICA. No significant stenosis seen of the anterior cerebral or middle cerebral arteries. No aneurysm identified. POSTERIOR CIRCULATION: No significant enhancement within the proximal left V4 segment. There is atherosclerosis involving the right V4 segment contributing to mild stenosis. No significant stenosis seen of the basilar artery. No significant stenosis of the posterior cerebral arteries. No convincing aneurysm identified. The left PICA is seen likely due to retrograde flow. OTHER: No dural venous sinus thrombosis on this non-dedicated study. 1. No acute intracranial abnormality. 2. Mild-to-moderate global parenchymal volume loss with mild chronic microvascular ischemic changes. 3. Minimal string of enhancement within the entirety of the left cervical vertebral artery with no significant enhancement within the proximal left V4 segment. This is of uncertain chronicity. 4. Atherosclerosis contributes to less than 50% stenosis of the cervical ICAs bilaterally by NASCET criteria. 5. Atherosclerosis involving the intracranial ICAs contribute to moderate stenosis of the right cavernous ICA and moderate stenosis of the left supraclinoid ICA.      CTA HEAD NECK W CONTRAST    Result Date: 12/24/2022  EXAMINATION: CTA OF THE HEAD AND NECK WITH CONTRAST; CT OF THE HEAD WITHOUT CONTRAST 12/24/2022 1:06 pm: TECHNIQUE: CTA of the head and neck was performed with the administration of intravenous contrast. Multiplanar reformatted images are provided for review. MIP images are provided for review. Stenosis of the internal carotid arteries measured using NASCET criteria. Automated exposure control, iterative reconstruction, and/or weight based adjustment of the mA/kV was utilized to reduce the radiation dose to as low as reasonably achievable.; CT of the head was performed without the administration of intravenous contrast. Automated exposure control, iterative reconstruction, and/or weight based adjustment of the mA/kV was utilized to reduce the radiation dose to as low as reasonably achievable. Noncontrast CT of the head with reconstructed 2-D images are also provided for review. COMPARISON: CT head 07/04/2015. HISTORY: ORDERING SYSTEM PROVIDED HISTORY: Unprovoked Syncope TECHNOLOGIST PROVIDED HISTORY: Reason for exam:->Unprovoked Syncope Has a \"code stroke\" or \"stroke alert\" been called? ->No Decision Support Exception - unselect if not a suspected or confirmed emergency medical condition->Emergency Medical Condition (MA) Initial evaluation. FINDINGS: CT HEAD: BRAIN/VENTRICLES:  No acute intracranial hemorrhage or extraaxial fluid collection. Grey-white differentiation is maintained. No evidence of mass, mass effect or midline shift. No evidence of hydrocephalus. There are areas of hypoattenuation in the periventricular and subcortical white matter, which is nonspecific, but may represent chronic microvasvular ischemic change. There is mild-to-moderate global parenchymal volume loss. Atherosclerosis of the intracranial vasculature is noted. ORBITS: The visualized portion of the orbits demonstrate no acute abnormality. SINUSES:  The visualized paranasal sinuses and mastoid air cells demonstrate no acute abnormality.  SOFT TISSUES/SKULL: No acute abnormality of the visualized skull or soft tissues. CTA NECK: Motion degrades images limiting evaluation. AORTIC ARCH/ARCH VESSELS: There is atherosclerosis of the aortic arch and arch vessels. No convincing flow limiting stenosis of the innominate or visualized subclavian arteries. The distal left subclavian artery is obscured due to in flowing contrast. CAROTID ARTERIES: Atherosclerosis is seen of the common carotid arteries bilateral without evidence of a flow limiting stenosis. Atherosclerosis of the carotid bulbs and proximal internal carotid arteries bilaterally. Atherosclerosis contributes to less than 50% stenosis of the cervical ICAs bilaterally by NASCET criteria. VERTEBRAL ARTERIES: No significant stenosis seen of the right vertebral artery. There is minimal string of enhancement within the entirety of the left vertebral artery with no significant enhancement within the proximal left V4 segment. SOFT TISSUES: No acute abnormality within the visualized portion of the lungs or mediastinum. BONES: The osseous structures appear osteopenic. No convincing acute osseous abnormality seen. Multilevel degenerative changes of the cervical spine noted. There appear to be multiple dental caries. CTA HEAD: ANTERIOR CIRCULATION: Diffuse atherosclerosis of the internal carotid arteries bilaterally. Moderate stenosis of the right cavernous ICA with moderate stenosis of the left supraclinoid ICA. No significant stenosis seen of the anterior cerebral or middle cerebral arteries. No aneurysm identified. POSTERIOR CIRCULATION: No significant enhancement within the proximal left V4 segment. There is atherosclerosis involving the right V4 segment contributing to mild stenosis. No significant stenosis seen of the basilar artery. No significant stenosis of the posterior cerebral arteries. No convincing aneurysm identified. The left PICA is seen likely due to retrograde flow. OTHER: No dural venous sinus thrombosis on this non-dedicated study.      1. No acute intracranial abnormality. 2. Mild-to-moderate global parenchymal volume loss with mild chronic microvascular ischemic changes. 3. Minimal string of enhancement within the entirety of the left cervical vertebral artery with no significant enhancement within the proximal left V4 segment. This is of uncertain chronicity. 4. Atherosclerosis contributes to less than 50% stenosis of the cervical ICAs bilaterally by NASCET criteria. 5. Atherosclerosis involving the intracranial ICAs contribute to moderate stenosis of the right cavernous ICA and moderate stenosis of the left supraclinoid ICA. EGD    Result Date: 12/25/2022  Site: [place] [id number] Patient Name: Chuy Ruiz Procedure Date: 12/25/2022 MRN: [id number] YOB: 1940 Gender: Male Attending MD: Lexus Sims Referring MD:       Valente Freeman Indications:        -  Hematemesis Medications:        -  See the Anesthesia note for documentation of the administered medications Complications:        -  No immediate complications. Estimated Blood Loss:        -  Estimated blood loss: none. Procedure:        - The scope was introduced through the mouth and advanced to the second part           of the duodenum.        -  The upper GI endoscopy was accomplished without difficulty. -  The patient tolerated the procedure well. Findings:        -  LA Grade A (one or more mucosal breaks less than 5 mm, not extending           between tops of 2 mucosal folds) esophagitis with no bleeding was found at the           gastroesophageal junction.        -  No other significant abnormalities were identified in a careful examination           of the esophagus.        -  Patchy mildly erythematous mucosa without bleeding was found in the gastric           fundus.        -  No other significant abnormalities were identified in a careful examination           of the stomach.         -  The examined duodenum was normal. Impression:        -  LA Grade A reflux esophagitis with no bleeding.        -  Erythematous mucosa in the gastric fundus.        -  Normal examined duodenum.        -  No specimens collected. Recommendation:        -  Clear liquid diet today. -  [facility]. - can consider c scope Procedure Code(s):        - 77846, Esophagogastroduodenoscopy, flexible, transoral; diagnostic,           including collection of specimen(s) by brushing or washing, when performed           (separate procedure) Diagnosis Code(s):        - K92.0, Hematemesis        - K21.00, Gastro-esophageal reflux disease with esophagitis, without bleeding        - K31.89, Other diseases of stomach and duodenum       CPT(R) - 2022 copyright [employer]. All Rights Reserved. The CPT codes, CCI edits and ICD codes generated are intended as suggestions       and were generated based on input data. These codes are preliminary and upon        review may be revised to meet current compliance and payer requirements. The provider is responsible for the final determination of appropriate codes,       and modifiers. Scope Withdrawal Time:       00:03:53 Signature Name: Jayla Wang MD Signature Statement: This document has been electronically signed.  Note Initiated On:12/25/2022 Signature Date:12/25/2022 10:17 AM      Electronically signed by Marie De La Cruz MD on 12/25/2022 at 5:14 PM

## 2022-12-25 NOTE — PROGRESS NOTES
4 Eyes Skin Assessment     NAME:  Mihai Gutierrez  YOB: 1940  MEDICAL RECORD NUMBER:  [id number]    The patient is being assessed for  Admission    I agree that One RN have performed a thorough Head to Toe Skin Assessment on the patient. ALL assessment sites listed below have been assessed. Areas assessed by both nurses:    Head, Face, Ears, Shoulders, Back, Chest, Arms, Elbows, Hands, Sacrum. Buttock, Coccyx, Ischium, and Legs. Feet and Heels        Does the Patient have a Wound?  No noted wound(s) just scattered bruising       [place] Prevention initiated by RN: NA   Wound Care Orders initiated by RN: NA    Pressure Injury (Stage 3,4, Unstageable, DTI, NWPT, and Complex wounds) if present place referral order by RN under : NA    New and Established Ostomies, if present place, referral order under : NA      Nurse 1 eSignature: Electronically signed by Brian Guo RN on 12/24/22 at 7:01 PM EST    **SHARE this note so that the co-signing nurse is able to place an eSignature**    Nurse 2 eSignature: Electronically signed by Teresa العلي RN on 12/24/22 at 7:37 PM EST

## 2022-12-25 NOTE — ANESTHESIA PRE PROCEDURE
Department of Anesthesiology  Preprocedure Note       Name:  Teri Morse   Age:  80 y.o.  :  1940                                          MRN:  [id number]         Date:  2022      Surgeon: Garfield Cartwright):  Kofi Yates MD    Procedure: Procedure(s):  EGD DIAGNOSTIC ONLY    Medications prior to admission:   Prior to Admission medications    Medication Sig Start Date End Date Taking? Authorizing Provider   nitroGLYCERIN (NITROSTAT) 0.4 MG SL tablet Place 1 tablet under the tongue every 5 minutes as needed for Chest pain 21   Tere Herndon MD   rivaroxaban (XARELTO) 20 MG TABS tablet Take 1 tablet by mouth every 24 hours 20   Tere Herndon MD   mirtazapine (REMERON) 30 MG tablet Take 30 mg by mouth nightly    Historical Provider, MD   Cholecalciferol (VITAMIN D3) 2000 UNITS CAPS Take 1 capsule by mouth daily    Historical Provider, MD   losartan (COZAAR) 25 MG tablet Take 25 mg by mouth daily    Historical Provider, MD   pravastatin (PRAVACHOL) 80 MG tablet TAKE ONE TABLET BY MOUTH EVERY DAY 11/2/15   Tere Herndon MD   carvedilol (COREG) 3.125 MG tablet Take 3.125 mg by mouth 2 times daily (with meals)    Historical Provider, MD   hydrochlorothiazide (MICROZIDE) 12.5 MG capsule Take 2 tablets every AM and 1 tablet every PM.    Historical Provider, MD   fluorouracil (EFUDEX) 5 % cream Apply topically 2 times daily as needed     Historical Provider, MD   HYDROcodone-acetaminophen (NORCO)  MG per tablet Take 1 tablet by mouth every 6 hours as needed for Pain    Historical Provider, MD   vitamin B-12 (CYANOCOBALAMIN) 1000 MCG tablet Take 1,000 mcg by mouth daily    Historical Provider, MD   albuterol (PROVENTIL HFA;VENTOLIN HFA) 108 (90 BASE) MCG/ACT inhaler Inhale 2 puffs into the lungs every 6 hours as needed for Wheezing    Historical Provider, MD   ferrous gluconate 325 (37.5 FE) MG TABS Take 325 mg by mouth 2 times daily.     Historical Provider, MD   potassium chloride (MICRO-K) 10 MEQ CR capsule Takes 2 tabs in a.m. and 1 tab in p.m. Historical Provider, MD   aspirin 81 MG EC tablet Take 1 tablet by mouth daily. Patient taking differently: Take 81 mg by mouth daily \"take occassionally\" 4/12/13   Sunita Koehler MD   tiotropium (SPIRIVA) 18 MCG inhalation capsule Inhale 18 mcg into the lungs daily     Historical Provider, MD   lansoprazole (PREVACID) 30 MG capsule Take 30 mg by mouth daily. Historical Provider, MD       Current medications:    Current Facility-Administered Medications   Medication Dose Route Frequency Provider Last Rate Last Admin    sodium chloride flush 0.9 % injection 5-40 mL  5-40 mL IntraVENous 2 times per day Renate Ortega MD   10 mL at 12/24/22 2152    sodium chloride flush 0.9 % injection 5-40 mL  5-40 mL IntraVENous PRN Renate Ortega MD        0.9 % sodium chloride infusion   IntraVENous PRN Renate Ortega MD        acetaminophen (TYLENOL) tablet 650 mg  650 mg Oral Q6H PRN Renate Ortega MD        Or   Maria Esther Abts acetaminophen (TYLENOL) suppository 650 mg  650 mg Rectal Q6H PRN Renate Ortega MD        pantoprazole (PROTONIX) 40 mg in sodium chloride (PF) 0.9 % 10 mL injection  40 mg IntraVENous Daily Renate Ortega MD   40 mg at 12/25/22 0835    albuterol sulfate HFA (PROVENTIL;VENTOLIN;PROAIR) 108 (90 Base) MCG/ACT inhaler 2 puff  2 puff Inhalation Q6H PRN Renate Ortega MD        HYDROcodone-acetaminophen (NORCO)  MG per tablet 1 tablet  1 tablet Oral Q6H PRN Renate Ortega MD   1 tablet at 12/25/22 0443    tiotropium (SPIRIVA RESPIMAT) 2.5 MCG/ACT inhaler 2 puff  2 puff Inhalation Daily Renate Ortega MD        vitamin B-12 (CYANOCOBALAMIN) tablet 1,000 mcg  1,000 mcg Oral Daily Renate Ortega MD   1,000 mcg at 12/25/22 0835    0.9 % sodium chloride infusion   IntraVENous PRN Renate Ortega MD           Allergies:     Allergies   Allergen Reactions    Lovenox [Enoxaparin Sodium]  Morphine Rash       Problem List:    Patient Active Problem List   Diagnosis Code    CAD s/p prox LAD bare metal stent 2011 Z98.61    RBBB I45.10    COPD (chronic obstructive pulmonary disease) ([facility] [address] J44.9    DJD (degenerative joint disease) M19.90    Atrial fibrillation, persistent I48.19    Hypertension I10    Hyperlipidemia E78.5    Obesity E66.9    Risk for falls Z91.81    Fatigue R53.83    Family history of coronary artery disease Z82.49    Chronic midline low back pain without sciatica M54.50, G89.29    Upper GI bleed K92.2       Past Medical History:        Diagnosis Date    AR (aortic regurgitation)     mild to mod    Atrial fibrillation, new onset ([address]     CAD (coronary artery disease)     History of angioplasty    Chronic midline low back pain without sciatica 5/1/2017    Has seen Dr. Gem Murphy and had shots    COPD (chronic obstructive pulmonary disease) ([facility] [address]     Family history of coronary artery disease     Fatigue 10/2016    H/O cardiovascular stress test 2/21/2012    mild ischemia in the basal inferior and mid inferior regions ef is 47    H/O chest x-ray 10/20/2016    Right pleural effusion resolved.  H/O Doppler ultrasound 10/2016    CAROTID-[place]. arteries patent w/less than 50% stenosis in the internal carotid arteries.  H/O echocardiogram 2/17/15    Aortic sclerosis without stenosis, normal LV size and wall motion w/low normal systolic function, LA dilatation, RV dilatation, mild pulmonary HTN, EF 50-55%.     History of cardiac cath 7/2/1996    LV uniform LV contractility RCa dominatn 50-60 prox stenosis  LM patent  LAD mild mid plaquing diag has 70 ostial narrowing ramus minimal plaquing cx normal    History of PTCA 08/15/2011    prox lad bare metal stent 8/2011    Hyperlipidemia     Hypertension     Obesity     Obesity, Class II, BMI 35-39.9, with comorbidity 4/17/2014    RBBB     Risk for falls 10/26/2015    Tachycardia        Past Surgical History: Procedure Laterality Date    CORONARY ANGIOPLASTY WITH STENT PLACEMENT      JOINT REPLACEMENT  2004    partial left knee       Social History:    Social History     Tobacco Use    Smoking status: Former     Packs/day: 1.50     Years: 25.00     Pack years: 37.50     Types: Cigarettes     Quit date: 1990     Years since quittin.1    Smokeless tobacco: Current     Types: Chew    Tobacco comments:     Chews tobacco        Substance Use Topics    Alcohol use: Yes     Alcohol/week: 56.0 standard drinks     Types: 56 Cans of beer per week     Comment: 6-8 beers daily or less                                Ready to quit: Not Answered  Counseling given: Not Answered  Tobacco comments: Chews tobacco           Vital Signs (Current):   Vitals:    22 0033 22 0129 22 0153 22 0450   BP: (!) 117/51 (!) 113/55 (!) 97/59 119/64   Pulse: (!) 120 (!) 114 (!) 112 (!) 109   Resp:    Temp: 98.1 °F (36.7 °C) 98.5 °F (36.9 °C) 98.7 °F (37.1 °C) 99.3 °F (37.4 °C)   TempSrc: Oral   Oral   SpO2: 100% 100% 100% 100%   Weight:  270 lb (122.5 kg)     Height:  5' 11\" (1.803 m)                                                BP Readings from Last 3 Encounters:   22 119/64   22 (!) 86/56   03/15/22 128/68       NPO Status: Time of last liquid consumption: 0840 (sip with med)                        Time of last solid consumption: 0100                        Date of last liquid consumption: 22                        Date of last solid food consumption: 22    BMI:   Wt Readings from Last 3 Encounters:   22 270 lb (122.5 kg)   03/15/22 282 lb (127.9 kg)   21 282 lb 9.6 oz (128.2 kg)     Body mass index is 37.66 kg/m².     CBC:   Lab Results   Component Value Date/Time    WBC 14.2 2022 12:15 PM    RBC 2.08 2022 12:15 PM    HGB 8.1 2022 05:48 AM    HCT 25.4 2022 05:48 AM    .9 2022 12:15 PM    RDW 17.3 2022 12:15 PM    PLT 91 12/24/2022 12:15 PM       CMP:   Lab Results   Component Value Date/Time     12/24/2022 09:44 PM    K 3.8 12/24/2022 09:44 PM     12/24/2022 09:44 PM    CO2 24 12/24/2022 09:44 PM    BUN 39 12/24/2022 09:44 PM    CREATININE 1.4 12/24/2022 09:44 PM    GFRAA >60 10/05/2022 09:30 AM    LABGLOM 50 12/24/2022 09:44 PM    GLUCOSE 98 12/24/2022 09:44 PM    PROT 4.6 12/24/2022 12:15 PM    PROT 6.0 03/18/2013 10:32 AM    CALCIUM 8.5 12/24/2022 09:44 PM    BILITOT 0.4 12/24/2022 12:15 PM    ALKPHOS 48 12/24/2022 12:15 PM    AST 17 12/24/2022 12:15 PM    ALT 8 12/24/2022 12:15 PM       POC Tests: No results for input(s): POCGLU, POCNA, POCK, POCCL, POCBUN, POCHEMO, POCHCT in the last 72 hours.     Coags:   Lab Results   Component Value Date/Time    PROTIME 9.9 03/14/2011 11:30 PM    INR 0.92 03/14/2011 11:30 PM    APTT 15.0 03/14/2011 11:30 PM       HCG (If Applicable): No results found for: PREGTESTUR, PREGSERUM, HCG, HCGQUANT     ABGs: No results found for: PHART, PO2ART, IGM2ZFW, AUE0ATD, BEART, [id number]     Type & Screen (If Applicable):  No results found for: LABABO, LABRH    Drug/Infectious Status (If Applicable):  No results found for: HIV, HEPCAB    COVID-19 Screening (If Applicable):   Lab Results   Component Value Date/Time    COVID19 NOT DETECTED 12/24/2022 12:40 PM           Anesthesia Evaluation  Patient summary reviewed no history of anesthetic complications:   Airway: Mallampati: III  TM distance: >3 FB   Neck ROM: full  Mouth opening: > = 3 FB   Dental:    (+) poor dentition      Pulmonary:   (+) COPD:                            ROS comment: Former smoker   Cardiovascular:  Exercise tolerance: poor (<4 METS),   (+) hypertension:, valvular problems/murmurs: AI, CAD:, CABG/stent:, dysrhythmias: atrial fibrillation,          Beta Blocker:  Dose within 24 Hrs         Neuro/Psych:   Negative Neuro/Psych ROS              GI/Hepatic/Renal:   (+) renal disease: CRI, morbid obesity          Endo/Other:    (+) blood dyscrasia: anemia, arthritis: OA., .                 Abdominal:             Vascular:   + PVD, aortic or cerebral, . Other Findings:           Anesthesia Plan      MAC     ASA 3 - emergent       Induction: intravenous. Anesthetic plan and risks discussed with patient.

## 2022-12-25 NOTE — ANESTHESIA POSTPROCEDURE EVALUATION
Department of Anesthesiology  Postprocedure Note    Patient: Annelise Chiang  MRN: [id number]  Birthdate: [birth year]  Date of evaluation: 12/25/2022      Procedure Summary     Date: 12/25/22 Room / Location: [address] [place] [address]    Anesthesia Start: 2325 Anesthesia Stop: 1535    Procedure: EGD DIAGNOSTIC ONLY Diagnosis:       Anemia, unspecified type      (Anemia, unspecified type [D64.9])    Surgeons: Ghanshyam Montgomery MD Responsible Provider: Collette Hal, DO    Anesthesia Type: MAC ASA Status: 3 - Emergent          Anesthesia Type: No value filed.     Lars Phase I:      Lars Phase II:        Anesthesia Post Evaluation    Patient location during evaluation: bedside  Patient participation: complete - patient participated  Level of consciousness: sleepy but conscious  Airway patency: patent  Nausea & Vomiting: no nausea  Complications: no  Cardiovascular status: hemodynamically stable  Respiratory status: acceptable  Hydration status: euvolemic

## 2022-12-25 NOTE — PLAN OF CARE
Problem: Discharge Planning  Goal: Discharge to home or other facility with appropriate resources  Outcome: Progressing     Problem: Skin/Tissue Integrity  Goal: Absence of new skin breakdown  Description: 1. Monitor for areas of redness and/or skin breakdown  2. Assess vascular access sites hourly  3. Every 4-6 hours minimum:  Change oxygen saturation probe site  4. Every 4-6 hours:  If on nasal continuous positive airway pressure, respiratory therapy assess nares and determine need for appliance change or resting period.   Outcome: Progressing     Problem: ABCDS Injury Assessment  Goal: Absence of physical injury  Outcome: Progressing     Problem: Safety - Adult  Goal: Free from fall injury  Outcome: Progressing   Jaylin Horner RN

## 2022-12-26 LAB
ABO/RH: NORMAL
ANION GAP SERPL CALCULATED.3IONS-SCNC: 8 MMOL/L (ref 4–16)
ANISOCYTOSIS: ABNORMAL
ANTIBODY SCREEN: NEGATIVE
ATYPICAL LYMPHOCYTE ABSOLUTE COUNT: ABNORMAL
BANDED NEUTROPHILS ABSOLUTE COUNT: 0.25 K/CU MM
BANDED NEUTROPHILS RELATIVE PERCENT: 6 % (ref 5–11)
BASOPHILIC STIPPLING: PRESENT
BUN BLDV-MCNC: 31 MG/DL (ref 6–23)
CALCIUM SERPL-MCNC: 7.9 MG/DL (ref 8.3–10.6)
CHLORIDE BLD-SCNC: 102 MMOL/L (ref 99–110)
CO2: 22 MMOL/L (ref 21–32)
COMPONENT: NORMAL
CREAT SERPL-MCNC: 1.5 MG/DL (ref 0.9–1.3)
CROSSMATCH RESULT: NORMAL
DIFFERENTIAL TYPE: ABNORMAL
GFR SERPL CREATININE-BSD FRML MDRD: 46 ML/MIN/1.73M2
GLUCOSE BLD-MCNC: 109 MG/DL (ref 70–99)
GLUCOSE BLD-MCNC: 115 MG/DL (ref 70–99)
GLUCOSE BLD-MCNC: 122 MG/DL (ref 70–99)
GLUCOSE BLD-MCNC: 122 MG/DL (ref 70–99)
GLUCOSE BLD-MCNC: 142 MG/DL (ref 70–99)
HCT VFR BLD CALC: 24.3 % (ref 42–52)
HEMOGLOBIN: 7.8 GM/DL (ref 13.5–18)
LYMPHOCYTES ABSOLUTE: 0.4 K/CU MM
LYMPHOCYTES RELATIVE PERCENT: 10 % (ref 24–44)
MAGNESIUM: 1.9 MG/DL (ref 1.8–2.4)
MCH RBC QN AUTO: 35 PG (ref 27–31)
MCHC RBC AUTO-ENTMCNC: 32.1 % (ref 32–36)
MCV RBC AUTO: 109 FL (ref 78–100)
METAMYELOCYTES ABSOLUTE COUNT: 0.04 K/CU MM
METAMYELOCYTES PERCENT: 1 %
MONOCYTES ABSOLUTE: 0.2 K/CU MM
MONOCYTES RELATIVE PERCENT: 4 % (ref 0–4)
PDW BLD-RTO: 20.5 % (ref 11.7–14.9)
PHOSPHORUS: 2.6 MG/DL (ref 2.5–4.9)
PLATELET # BLD: 165 K/CU MM (ref 140–440)
PLT MORPHOLOGY: ABNORMAL
POLYCHROMASIA: ABNORMAL
POTASSIUM SERPL-SCNC: 3.9 MMOL/L (ref 3.5–5.1)
RBC # BLD: 2.23 M/CU MM (ref 4.6–6.2)
SEGMENTED NEUTROPHILS ABSOLUTE COUNT: 3.3 K/CU MM
SEGMENTED NEUTROPHILS RELATIVE PERCENT: 79 % (ref 36–66)
SODIUM BLD-SCNC: 132 MMOL/L (ref 135–145)
STATUS: NORMAL
TRANSFUSION STATUS: NORMAL
UNIT DIVISION: 0
UNIT NUMBER: NORMAL
WBC # BLD: 4.2 K/CU MM (ref 4–10.5)

## 2022-12-26 PROCEDURE — A4216 STERILE WATER/SALINE, 10 ML: HCPCS | Performed by: STUDENT IN AN ORGANIZED HEALTH CARE EDUCATION/TRAINING PROGRAM

## 2022-12-26 PROCEDURE — 97530 THERAPEUTIC ACTIVITIES: CPT

## 2022-12-26 PROCEDURE — 6360000002 HC RX W HCPCS: Performed by: STUDENT IN AN ORGANIZED HEALTH CARE EDUCATION/TRAINING PROGRAM

## 2022-12-26 PROCEDURE — 2580000003 HC RX 258: Performed by: INTERNAL MEDICINE

## 2022-12-26 PROCEDURE — 2580000003 HC RX 258: Performed by: STUDENT IN AN ORGANIZED HEALTH CARE EDUCATION/TRAINING PROGRAM

## 2022-12-26 PROCEDURE — G0378 HOSPITAL OBSERVATION PER HR: HCPCS

## 2022-12-26 PROCEDURE — 84100 ASSAY OF PHOSPHORUS: CPT

## 2022-12-26 PROCEDURE — 6370000000 HC RX 637 (ALT 250 FOR IP): Performed by: STUDENT IN AN ORGANIZED HEALTH CARE EDUCATION/TRAINING PROGRAM

## 2022-12-26 PROCEDURE — C9113 INJ PANTOPRAZOLE SODIUM, VIA: HCPCS | Performed by: STUDENT IN AN ORGANIZED HEALTH CARE EDUCATION/TRAINING PROGRAM

## 2022-12-26 PROCEDURE — 6370000000 HC RX 637 (ALT 250 FOR IP): Performed by: NURSE PRACTITIONER

## 2022-12-26 PROCEDURE — 6370000000 HC RX 637 (ALT 250 FOR IP): Performed by: INTERNAL MEDICINE

## 2022-12-26 PROCEDURE — 83735 ASSAY OF MAGNESIUM: CPT

## 2022-12-26 PROCEDURE — 96376 TX/PRO/DX INJ SAME DRUG ADON: CPT

## 2022-12-26 PROCEDURE — 82962 GLUCOSE BLOOD TEST: CPT

## 2022-12-26 PROCEDURE — 97166 OT EVAL MOD COMPLEX 45 MIN: CPT

## 2022-12-26 PROCEDURE — 1200000000 HC SEMI PRIVATE

## 2022-12-26 PROCEDURE — 85027 COMPLETE CBC AUTOMATED: CPT

## 2022-12-26 PROCEDURE — 97535 SELF CARE MNGMENT TRAINING: CPT

## 2022-12-26 PROCEDURE — 97162 PT EVAL MOD COMPLEX 30 MIN: CPT

## 2022-12-26 PROCEDURE — 80048 BASIC METABOLIC PNL TOTAL CA: CPT

## 2022-12-26 PROCEDURE — 94640 AIRWAY INHALATION TREATMENT: CPT

## 2022-12-26 PROCEDURE — 36415 COLL VENOUS BLD VENIPUNCTURE: CPT

## 2022-12-26 PROCEDURE — 85007 BL SMEAR W/DIFF WBC COUNT: CPT

## 2022-12-26 PROCEDURE — 94761 N-INVAS EAR/PLS OXIMETRY MLT: CPT

## 2022-12-26 RX ORDER — SIMETHICONE 80 MG
80 TABLET,CHEWABLE ORAL EVERY 6 HOURS PRN
Status: DISCONTINUED | OUTPATIENT
Start: 2022-12-26 | End: 2022-12-28 | Stop reason: HOSPADM

## 2022-12-26 RX ORDER — BISACODYL 5 MG/1
10 TABLET, DELAYED RELEASE ORAL EVERY 8 HOURS
Status: COMPLETED | OUTPATIENT
Start: 2022-12-26 | End: 2022-12-26

## 2022-12-26 RX ADMIN — CARVEDILOL 3.12 MG: 6.25 TABLET, FILM COATED ORAL at 09:32

## 2022-12-26 RX ADMIN — SODIUM CHLORIDE, PRESERVATIVE FREE 10 ML: 5 INJECTION INTRAVENOUS at 09:33

## 2022-12-26 RX ADMIN — BISACODYL 10 MG: 5 TABLET, COATED ORAL at 20:54

## 2022-12-26 RX ADMIN — POLYETHYLENE GLYCOL 3350, SODIUM SULFATE ANHYDROUS, SODIUM BICARBONATE, SODIUM CHLORIDE, POTASSIUM CHLORIDE 4000 ML: 236; 22.74; 6.74; 5.86; 2.97 POWDER, FOR SOLUTION ORAL at 14:46

## 2022-12-26 RX ADMIN — TIOTROPIUM BROMIDE INHALATION SPRAY 2 PUFF: 3.12 SPRAY, METERED RESPIRATORY (INHALATION) at 08:58

## 2022-12-26 RX ADMIN — PRAVASTATIN SODIUM 80 MG: 40 TABLET ORAL at 20:54

## 2022-12-26 RX ADMIN — SODIUM CHLORIDE, PRESERVATIVE FREE 40 MG: 5 INJECTION INTRAVENOUS at 09:32

## 2022-12-26 RX ADMIN — SODIUM CHLORIDE, PRESERVATIVE FREE 10 ML: 5 INJECTION INTRAVENOUS at 20:54

## 2022-12-26 RX ADMIN — CARVEDILOL 3.12 MG: 6.25 TABLET, FILM COATED ORAL at 17:18

## 2022-12-26 RX ADMIN — CYANOCOBALAMIN TAB 1000 MCG 1000 MCG: 1000 TAB at 09:32

## 2022-12-26 RX ADMIN — MIRTAZAPINE 30 MG: 15 TABLET, FILM COATED ORAL at 20:54

## 2022-12-26 RX ADMIN — SODIUM CHLORIDE, PRESERVATIVE FREE 40 MG: 5 INJECTION INTRAVENOUS at 20:54

## 2022-12-26 RX ADMIN — BISACODYL 10 MG: 5 TABLET, COATED ORAL at 12:46

## 2022-12-26 NOTE — PROGRESS NOTES
V2.0  [facility] [place]      Name:  Judit Tan /Age/Sex:   (80 y.o. male)   MRN & CSN:  [id number] & [id number] Encounter Date/Time: 2022 3:45 PM EST    Location:  [address] [place] [address] PCP: Jeyson Fierro MD       Hospital Day: 3    Assessment and Plan:   Judit Tan is a 80 y.o. male with past medical history of persistent atrial fibrillation, GERD, COPD, chronic back pain, hypertension, obesity who presented at [facility] ER with a complaint of dizziness. States that he started feeling some nausea and abdominal discomfort on Friday after which he had 1 episode of vomiting at home and another 1 in the ER. Dizziness  Unknown NIHSS on arrival  NIHSS today 0. CT head no acute abnormality  CT angio head and neck no LVO  Likely a combination of acute blood loss anemia in addition to hypotension    Acute blood loss anemia likely secondary to GI bleed  Hemoglobin 7.3 with hemodynamic manifestation  Patient given Kcentra at [facility] ER  EGD 2022: LA grade a esophagitis with erythematous mucosa in the stomach. No active source of bleed. Continue Protonix IV 40 mg twice daily  Trend H&H and transfuse if hemoglobin less than 7  Colonoscopy planned for Tuesday    Lactic acidosis, resolved  Likely type II in the setting of acute blood loss anemia    Persistent atrial fibrillation  Continue Coreg  Hold Xarelto until cleared by gastroenterology    GERD  Currently on IV Protonix 40 mg twice daily    COPD  Not in exacerbation  Continue Spiriva    Coronary artery disease  History of PTCA and LAD stent in 2011  Hold aspirin, continue atorvastatin    Hypertension  Currently holding losartan and hydrochlorothiazide  Resume carvedilol given the rapid ventricular response    Obesity, BMI 37.66  Counseled regarding diet modification and weight loss    Chronic back pain with opioid dependence  PDMP reviewed, continue Norco as needed      Diet ADULT DIET;  Clear Liquid  Diet NPO Exceptions are: Ice Chips   DVT Prophylaxis [] Lovenox, []  Heparin, [x] SCDs, [x] Ambulation,  [] Eliquis, [] Xarelto  [] Coumadin   Code Status Full Code   Disposition From: Home alone   Expected Disposition: Home  Estimated Date of Discharge: 3-4 days  Patient requires continued admission due to colonoscopy, GI clearance   Surrogate Decision Maker/ POA Child     Subjective:     Chief Complaint: Dizziness, hematemesis       Jacquelin Escalona is a 80 y.o. male who presents with dizziness and hematemesis. Patient was seen and evaluated bedside earlier this morning. No complaints. Objective: Intake/Output Summary (Last 24 hours) at 12/26/2022 1537  Last data filed at 12/25/2022 2114  Gross per 24 hour   Intake 120 ml   Output --   Net 120 ml          Vitals:   Vitals:    12/26/22 1449   BP: 111/69   Pulse: 95   Resp: 18   Temp: 98.3 °F (36.8 °C)   SpO2: 98%       Physical Exam:   Physical Exam  Vitals reviewed. Constitutional:       Appearance: Normal appearance. He is obese. HENT:      Head: Normocephalic and atraumatic. Nose: Nose normal.      Mouth/Throat:      Mouth: Mucous membranes are dry. Pharynx: Oropharynx is clear. Eyes:      General: No scleral icterus. Conjunctiva/sclera: Conjunctivae normal.   Cardiovascular:      Rate and Rhythm: Tachycardia present. Rhythm irregular. Pulses: Normal pulses. Heart sounds: Murmur heard. Pulmonary:      Effort: Pulmonary effort is normal.      Breath sounds: Normal breath sounds. No wheezing, rhonchi or rales. Abdominal:      General: Bowel sounds are normal. There is no distension. Palpations: Abdomen is soft. Tenderness: There is no abdominal tenderness. Musculoskeletal:         General: No deformity. Normal range of motion. Cervical back: Neck supple. No rigidity. Right lower leg: Edema present. Left lower leg: Edema present. Skin:     Coloration: Skin is pale. Skin is not jaundiced.    Neurological: General: No focal deficit present. Mental Status: He is alert and oriented to person, place, and time. Mental status is at baseline.           Medications:   Medications:    bisacodyl  10 mg Oral Q8H    pantoprazole (PROTONIX) 40 mg injection  40 mg IntraVENous Q12H    carvedilol  3.125 mg Oral BID WC    pravastatin  80 mg Oral Nightly    mirtazapine  30 mg Oral Nightly    sodium chloride flush  5-40 mL IntraVENous 2 times per day    tiotropium  2 puff Inhalation Daily    vitamin B-12  1,000 mcg Oral Daily      Infusions:    sodium chloride       PRN Meds: sodium chloride flush, 5-40 mL, PRN  sodium chloride, , PRN  acetaminophen, 650 mg, Q6H PRN   Or  acetaminophen, 650 mg, Q6H PRN  albuterol sulfate HFA, 2 puff, Q6H PRN  HYDROcodone-acetaminophen, 1 tablet, Q6H PRN      Labs      Recent Results (from the past 24 hour(s))   POCT Glucose    Collection Time: 12/26/22  9:21 AM   Result Value Ref Range    POC Glucose 142 (H) 70 - 99 MG/DL   Basic Metabolic Panel    Collection Time: 12/26/22 11:13 AM   Result Value Ref Range    Sodium 132 (L) 135 - 145 MMOL/L    Potassium 3.9 3.5 - 5.1 MMOL/L    Chloride 102 99 - 110 mMol/L    CO2 22 21 - 32 MMOL/L    Anion Gap 8 4 - 16    BUN 31 (H) 6 - 23 MG/DL    Creatinine 1.5 (H) 0.9 - 1.3 MG/DL    Est, Glom Filt Rate 46 (L) >60 mL/min/1.73m2    Glucose 122 (H) 70 - 99 MG/DL    Calcium 7.9 (L) 8.3 - 10.6 MG/DL   Magnesium    Collection Time: 12/26/22 11:13 AM   Result Value Ref Range    Magnesium 1.9 1.8 - 2.4 mg/dl   Phosphorus    Collection Time: 12/26/22 11:13 AM   Result Value Ref Range    Phosphorus 2.6 2.5 - 4.9 MG/DL   CBC with Auto Differential    Collection Time: 12/26/22 11:13 AM   Result Value Ref Range    WBC 4.2 4.0 - 10.5 K/CU MM    RBC 2.23 (L) 4.6 - 6.2 M/CU MM    Hemoglobin 7.8 (L) 13.5 - 18.0 GM/DL    Hematocrit 24.3 (L) 42 - 52 %    .0 (H) 78 - 100 FL    MCH 35.0 (H) 27 - 31 PG    MCHC 32.1 32.0 - 36.0 %    RDW 20.5 (H) 11.7 - 14.9 %    Platelets 786 140 - 440 K/CU MM    Metamyelocytes Relative 1 (H) 0.0 %    Bands Relative 6 5 - 11 %    Segs Relative 79.0 (H) 36 - 66 %    Lymphocytes % 10.0 (L) 24 - 44 %    Monocytes % 4.0 0 - 4 %    Metamyelocytes Absolute 0.04 K/CU MM    Bands Absolute 0.25 K/CU MM    Segs Absolute 3.3 K/CU MM    Lymphocytes Absolute 0.4 K/CU MM    Monocytes Absolute 0.2 K/CU MM    Differential Type MANUAL DIFFERENTIAL     Anisocytosis 2+     Polychromasia 1+     Basophilic Stippling PRESENT     Atypical Lymphocytes Absolute 1+     PLT Morphology LARGE    POCT Glucose    Collection Time: 12/26/22 11:53 AM   Result Value Ref Range    POC Glucose 122 (H) 70 - 99 MG/DL        Imaging/Diagnostics Last 24 Hours   XR CHEST (2 VW)    Result Date: 12/24/2022  EXAMINATION: TWO XRAY VIEWS OF THE CHEST 12/24/2022 1:05 pm COMPARISON: Chest x-ray dated 10/04/2016. HISTORY: ORDERING SYSTEM PROVIDED HISTORY: Weakness TECHNOLOGIST PROVIDED HISTORY: Reason for exam:->Weakness FINDINGS: LINES/TUBES/OTHER: Left subclavian port is noted with its tip projecting over the proximal SVC. HEART/MEDIASTINUM: The cardiomediastinal silhouette is stable. PLEURA/LUNGS: There are no focal consolidations or pleural effusions. There is no appreciable pneumothorax. BONES/SOFT TISSUE: No acute abnormality. No radiographic evidence of acute pulmonary disease. CT Head W/O Contrast    Result Date: 12/24/2022  EXAMINATION: CTA OF THE HEAD AND NECK WITH CONTRAST; CT OF THE HEAD WITHOUT CONTRAST 12/24/2022 1:06 pm: TECHNIQUE: CTA of the head and neck was performed with the administration of intravenous contrast. Multiplanar reformatted images are provided for review. MIP images are provided for review. Stenosis of the internal carotid arteries measured using NASCET criteria.  Automated exposure control, iterative reconstruction, and/or weight based adjustment of the mA/kV was utilized to reduce the radiation dose to as low as reasonably achievable.; CT of the head was performed without the administration of intravenous contrast. Automated exposure control, iterative reconstruction, and/or weight based adjustment of the mA/kV was utilized to reduce the radiation dose to as low as reasonably achievable. Noncontrast CT of the head with reconstructed 2-D images are also provided for review. COMPARISON: CT head 07/04/2015. HISTORY: ORDERING SYSTEM PROVIDED HISTORY: Unprovoked Syncope TECHNOLOGIST PROVIDED HISTORY: Reason for exam:->Unprovoked Syncope Has a \"code stroke\" or \"stroke alert\" been called? ->No Decision Support Exception - unselect if not a suspected or confirmed emergency medical condition->Emergency Medical Condition (MA) Initial evaluation. FINDINGS: CT HEAD: BRAIN/VENTRICLES:  No acute intracranial hemorrhage or extraaxial fluid collection. Grey-white differentiation is maintained. No evidence of mass, mass effect or midline shift. No evidence of hydrocephalus. There are areas of hypoattenuation in the periventricular and subcortical white matter, which is nonspecific, but may represent chronic microvasvular ischemic change. There is mild-to-moderate global parenchymal volume loss. Atherosclerosis of the intracranial vasculature is noted. ORBITS: The visualized portion of the orbits demonstrate no acute abnormality. SINUSES:  The visualized paranasal sinuses and mastoid air cells demonstrate no acute abnormality. SOFT TISSUES/SKULL: No acute abnormality of the visualized skull or soft tissues. CTA NECK: Motion degrades images limiting evaluation. AORTIC ARCH/ARCH VESSELS: There is atherosclerosis of the aortic arch and arch vessels. No convincing flow limiting stenosis of the innominate or visualized subclavian arteries. The distal left subclavian artery is obscured due to in flowing contrast. CAROTID ARTERIES: Atherosclerosis is seen of the common carotid arteries bilateral without evidence of a flow limiting stenosis.   Atherosclerosis of the carotid bulbs and proximal internal carotid arteries bilaterally. Atherosclerosis contributes to less than 50% stenosis of the cervical ICAs bilaterally by NASCET criteria. VERTEBRAL ARTERIES: No significant stenosis seen of the right vertebral artery. There is minimal string of enhancement within the entirety of the left vertebral artery with no significant enhancement within the proximal left V4 segment. SOFT TISSUES: No acute abnormality within the visualized portion of the lungs or mediastinum. BONES: The osseous structures appear osteopenic. No convincing acute osseous abnormality seen. Multilevel degenerative changes of the cervical spine noted. There appear to be multiple dental caries. CTA HEAD: ANTERIOR CIRCULATION: Diffuse atherosclerosis of the internal carotid arteries bilaterally. Moderate stenosis of the right cavernous ICA with moderate stenosis of the left supraclinoid ICA. No significant stenosis seen of the anterior cerebral or middle cerebral arteries. No aneurysm identified. POSTERIOR CIRCULATION: No significant enhancement within the proximal left V4 segment. There is atherosclerosis involving the right V4 segment contributing to mild stenosis. No significant stenosis seen of the basilar artery. No significant stenosis of the posterior cerebral arteries. No convincing aneurysm identified. The left PICA is seen likely due to retrograde flow. OTHER: No dural venous sinus thrombosis on this non-dedicated study. 1. No acute intracranial abnormality. 2. Mild-to-moderate global parenchymal volume loss with mild chronic microvascular ischemic changes. 3. Minimal string of enhancement within the entirety of the left cervical vertebral artery with no significant enhancement within the proximal left V4 segment. This is of uncertain chronicity. 4. Atherosclerosis contributes to less than 50% stenosis of the cervical ICAs bilaterally by NASCET criteria.  5. Atherosclerosis involving the intracranial ICAs contribute to moderate stenosis of the right cavernous ICA and moderate stenosis of the left supraclinoid ICA. CTA HEAD NECK W CONTRAST    Result Date: 12/24/2022  EXAMINATION: CTA OF THE HEAD AND NECK WITH CONTRAST; CT OF THE HEAD WITHOUT CONTRAST 12/24/2022 1:06 pm: TECHNIQUE: CTA of the head and neck was performed with the administration of intravenous contrast. Multiplanar reformatted images are provided for review. MIP images are provided for review. Stenosis of the internal carotid arteries measured using NASCET criteria. Automated exposure control, iterative reconstruction, and/or weight based adjustment of the mA/kV was utilized to reduce the radiation dose to as low as reasonably achievable.; CT of the head was performed without the administration of intravenous contrast. Automated exposure control, iterative reconstruction, and/or weight based adjustment of the mA/kV was utilized to reduce the radiation dose to as low as reasonably achievable. Noncontrast CT of the head with reconstructed 2-D images are also provided for review. COMPARISON: CT head 07/04/2015. HISTORY: ORDERING SYSTEM PROVIDED HISTORY: Unprovoked Syncope TECHNOLOGIST PROVIDED HISTORY: Reason for exam:->Unprovoked Syncope Has a \"code stroke\" or \"stroke alert\" been called? ->No Decision Support Exception - unselect if not a suspected or confirmed emergency medical condition->Emergency Medical Condition (MA) Initial evaluation. FINDINGS: CT HEAD: BRAIN/VENTRICLES:  No acute intracranial hemorrhage or extraaxial fluid collection. Grey-white differentiation is maintained. No evidence of mass, mass effect or midline shift. No evidence of hydrocephalus. There are areas of hypoattenuation in the periventricular and subcortical white matter, which is nonspecific, but may represent chronic microvasvular ischemic change. There is mild-to-moderate global parenchymal volume loss. Atherosclerosis of the intracranial vasculature is noted.  ORBITS: The visualized portion of the orbits demonstrate no acute abnormality. SINUSES:  The visualized paranasal sinuses and mastoid air cells demonstrate no acute abnormality. SOFT TISSUES/SKULL: No acute abnormality of the visualized skull or soft tissues. CTA NECK: Motion degrades images limiting evaluation. AORTIC ARCH/ARCH VESSELS: There is atherosclerosis of the aortic arch and arch vessels. No convincing flow limiting stenosis of the innominate or visualized subclavian arteries. The distal left subclavian artery is obscured due to in flowing contrast. CAROTID ARTERIES: Atherosclerosis is seen of the common carotid arteries bilateral without evidence of a flow limiting stenosis. Atherosclerosis of the carotid bulbs and proximal internal carotid arteries bilaterally. Atherosclerosis contributes to less than 50% stenosis of the cervical ICAs bilaterally by NASCET criteria. VERTEBRAL ARTERIES: No significant stenosis seen of the right vertebral artery. There is minimal string of enhancement within the entirety of the left vertebral artery with no significant enhancement within the proximal left V4 segment. SOFT TISSUES: No acute abnormality within the visualized portion of the lungs or mediastinum. BONES: The osseous structures appear osteopenic. No convincing acute osseous abnormality seen. Multilevel degenerative changes of the cervical spine noted. There appear to be multiple dental caries. CTA HEAD: ANTERIOR CIRCULATION: Diffuse atherosclerosis of the internal carotid arteries bilaterally. Moderate stenosis of the right cavernous ICA with moderate stenosis of the left supraclinoid ICA. No significant stenosis seen of the anterior cerebral or middle cerebral arteries. No aneurysm identified. POSTERIOR CIRCULATION: No significant enhancement within the proximal left V4 segment. There is atherosclerosis involving the right V4 segment contributing to mild stenosis.   No significant stenosis seen of the basilar artery. No significant stenosis of the posterior cerebral arteries. No convincing aneurysm identified. The left PICA is seen likely due to retrograde flow. OTHER: No dural venous sinus thrombosis on this non-dedicated study. 1. No acute intracranial abnormality. 2. Mild-to-moderate global parenchymal volume loss with mild chronic microvascular ischemic changes. 3. Minimal string of enhancement within the entirety of the left cervical vertebral artery with no significant enhancement within the proximal left V4 segment. This is of uncertain chronicity. 4. Atherosclerosis contributes to less than 50% stenosis of the cervical ICAs bilaterally by NASCET criteria. 5. Atherosclerosis involving the intracranial ICAs contribute to moderate stenosis of the right cavernous ICA and moderate stenosis of the left supraclinoid ICA. EGD    Result Date: 12/25/2022  Site: [place] [id number] Patient Name: Ba Barrera Procedure Date: 12/25/2022 MRN: [id number] YOB: 1940 Gender: Male Attending MD: Yina Bae Referring MD:       Roya Meza Indications:        -  Hematemesis Medications:        -  See the Anesthesia note for documentation of the administered medications Complications:        -  No immediate complications. Estimated Blood Loss:        -  Estimated blood loss: none. Procedure:        - The scope was introduced through the mouth and advanced to the second part           of the duodenum.        -  The upper GI endoscopy was accomplished without difficulty. -  The patient tolerated the procedure well.  Findings:        -  LA Grade A (one or more mucosal breaks less than 5 mm, not extending           between tops of 2 mucosal folds) esophagitis with no bleeding was found at the           gastroesophageal junction.        -  No other significant abnormalities were identified in a careful examination           of the esophagus.        -  Patchy mildly erythematous mucosa without bleeding was found in the gastric           fundus.        -  No other significant abnormalities were identified in a careful examination           of the stomach. -  The examined duodenum was normal. Impression:        -  LA Grade A reflux esophagitis with no bleeding.        -  Erythematous mucosa in the gastric fundus.        -  Normal examined duodenum.        -  No specimens collected. Recommendation:        -  Clear liquid diet today. -  [facility]. - can consider c scope Procedure Code(s):        - 73334, Esophagogastroduodenoscopy, flexible, transoral; diagnostic,           including collection of specimen(s) by brushing or washing, when performed           (separate procedure) Diagnosis Code(s):        - K92.0, Hematemesis        - K21.00, Gastro-esophageal reflux disease with esophagitis, without bleeding        - K31.89, Other diseases of stomach and duodenum       CPT(R) - 2022 copyright [employer]. All Rights Reserved. The CPT codes, CCI edits and ICD codes generated are intended as suggestions       and were generated based on input data. These codes are preliminary and upon        review may be revised to meet current compliance and payer requirements. The provider is responsible for the final determination of appropriate codes,       and modifiers. Scope Withdrawal Time:       00:03:53 Signature Name: Leti De La Torre MD Signature Statement: This document has been electronically signed.  Note Initiated On:12/25/2022 Signature Date:12/25/2022 10:17 AM      Electronically signed by Gayland Curling, MD on 12/26/2022 at 3:37 PM

## 2022-12-26 NOTE — PROGRESS NOTES
Occupational Therapy    [facility] ACUTE CARE OCCUPATIONAL THERAPY EVALUATION  Paradise Gutierrez, 1940, 4106/4106-A, 12/26/2022    History  [place]:  There were no encounter diagnoses. Patient  has a past medical history of AR (aortic regurgitation), Atrial fibrillation, new onset ([address] CAD (coronary artery disease), Chronic midline low back pain without sciatica, COPD (chronic obstructive pulmonary disease) ([address] Family history of coronary artery disease, Fatigue, H/O cardiovascular stress test, H/O chest x-ray, H/O Doppler ultrasound, H/O echocardiogram, History of cardiac cath, History of PTCA, Hyperlipidemia, Hypertension, Obesity, Obesity, Class II, 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 Patient  has a past surgical history that includes joint replacement (2004); Coronary angioplasty with stent; and Upper gastrointestinal endoscopy (N/A, 12/25/2022). Subjective:  Patient states:  \"I'll do as much as I can\".     Pain:  No.    Communication with other providers:  Handoff to RN, co-eval with PT Cheryl Joaquin  Restrictions: General Precautions, Fall Risk    Home Setup/Prior level of function  Social/Functional History  Lives With: Alone  Type of Home: House  Home Layout: One level, Performs ADL's on one level  Home Access: Stairs to enter with rails  Entrance Stairs - Number of Steps: 2-3  Entrance Stairs - Rails: Left (pt has stairs to front or back door)  Bathroom Shower/Tub: Walk-in shower  Bathroom Toilet: Handicap height  Bathroom Equipment: Grab bars in shower, Built-in shower seat, Grab bars around toilet  Home Equipment: [address]  Has the patient had two or more falls in the past year or any fall with injury in the past year?: Yes (2-3 pt is unsure of #)  ADL Assistance: Independent  Homemaking Assistance: Independent  Homemaking Responsibilities: Yes  Ambulation Assistance: Independent  Transfer Assistance: Independent  Active : Yes  Occupation: Retired  Type of Occupation: tool+    Examination of body systems (includes body structures/functions, activity/participation limitations):  Observation:  Sitting upright in chair upon arrival, agreeable to therapy  Vision:  Glasses  Hearing:  [facility]  Cardiopulmonary:  No 02 needs      Body Systems and functions:  ROM R/L:  WFL. Strength R/L:  4+/5,   Sensation: WFL  Tone: Normal  Coordination: WFL  Perception: WNL    Activities of Daily Living (ADLs):  Feeding: Angie  Grooming: CGA (washing hands in stand at sink, washing face w/ warm washlcoth)  UB bathing: Supervision  LB bathing: CGA (washing suleman area/buttocks sitting upright on standard commode w/ wet wipes)  UB dressing: Supervision  LB dressing: Angel   Toileting: CGA (pt toileted on standard commode this session, see LB bathing/dressing for details)    Cognitive and Psychosocial Functioning:  Overall cognitive status: WFL  Affect: Normal        Mobility:  Supine to sit:  DNT pt received sitting upright in chair  Transfers: CGA from reclining chair up to RW, Angel from stnadard commode  Sitting balance:  Supervision. Standing balance:  CGA w/ RW.   Functional Mobility CGA w/ RW in room and to/from bathroom only (See PT notes for gait details only)  Toilet/Shower Transfers: Angel to/from standard commode w/ instruction in use of grab bars)             AM-PAC Daily Activity Inpatient   How much help for putting on and taking off regular lower body clothing?: A Little  How much help for Bathing?: A Little  How much help for Toileting?: A Little  How much help for putting on and taking off regular upper body clothing?: None  How much help for taking care of personal grooming?: A Little  How much help for eating meals?: None  AM-[facility] Inpatient Daily Activity Raw Score: 20  AM-PAC Inpatient ADL T-Scale Score : 42.03  ADL Inpatient CMS 0-100% Score: 38.32  ADL Inpatient CMS G-Code Modifier : CJ    Treatment:  Self Care Training:   Cues were given for safety, sequence, UE/LE placement, visual cues, and balance. Activities performed today included grooming, LB bathing, toileting, toilet transfer     Safety: patient left in chair with chair alarm, call light within reach, RN notified, gait belt used. Assessment:  Pt is a 79 yo male admitted from home for upper GI bleed. Pt at baseline is Independent for ADLs Independent for high level IADLs and Independent for functional transfers/mobility. Pt currently presents w/ deficits in ADL and high level IADL independence, functional activity tolerance, dynamic sitting and standing balance and tolerance and functional transfers, BUE strength. Pt would benefit from continued acute care OT services w/ discharge to ARU vs swing bed  Complexity: Moderate  Prognosis: Good, no significant barriers to participation at this time. Occupational Therapy Plan  Times Per Week: 3x+  Times Per Day:  Once a day  Current Treatment Recommendations: Strengthening, ROM, Balance training, Functional mobility training, Endurance training, Patient/Caregiver education & training, Self-Care / ADL, Safety education & training, Pain management, Equipment evaluation, education, & procurement, Positioning, Home management training     Equipment: defer    Goals:  Pt goal: go home  Time Frame for STGs: discharge  Goal 1: Pt will perform UE ADLs Independent  Goal 2: Pt will perform LE ADLs Independent  Goal 3: Pt will perform toileting Independent  Goal 4: Pt will perform functional transfer w/ AD Angie  Goal 5: Pt will perform functional mobility w/ AD Angie  Goal 6: Pt will perform therex/theract in order to increase functional activity tolerance and dynamic standing balance    Treatment plan:  Pt will perform functional task in stand reaching in all 3 planes in order to increase dynamic standing balance and functional activity tolerance    Recommendations for NURSING activity: Up to chair for all 3 meals and up to standard commode for all toileting needs    Time:   Time in: 1143  Time out: 1210  Timed treatment minutes: 12 minutes  Total time: 27 minutes    Electronically signed by:    Christi GLOVER/L [id number]  2:37 PM,12/26/2022

## 2022-12-26 NOTE — PROGRESS NOTES
Laboratory called with positive blood culture results, Gram positive Cocci in 3/4 blood culture sample bottles. This information has been passed to the attending via perfect serve.

## 2022-12-26 NOTE — CONSULTS
[address], 1940, 4106/4106-A, 12/26/2022    History  [place]:  There were no encounter diagnoses. Patient  has a past medical history of AR (aortic regurgitation), Atrial fibrillation, new onset ([address] CAD (coronary artery disease), Chronic midline low back pain without sciatica, COPD (chronic obstructive pulmonary disease) ([address] Family history of coronary artery disease, Fatigue, H/O cardiovascular stress test, H/O chest x-ray, H/O Doppler ultrasound, H/O echocardiogram, History of cardiac cath, History of PTCA, Hyperlipidemia, Hypertension, Obesity, Obesity, Class II, 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 Patient  has a past surgical history that includes joint replacement (2004); Coronary angioplasty with stent; and Upper gastrointestinal endoscopy (N/A, 12/25/2022). Subjective:  Patient states:  \"Can I ask you nice ladies what they did with my billfold? \". Pain:  Pt reports no pain.     Communication with other providers:  Handoff to RN, co-eval with Malcom PINON for pt endurance  Restrictions: Low fall risk, general    Home Setup/Prior level of function  Social/Functional History  Lives With: Alone  Type of Home: House  Home Layout: One level, Performs ADL's on one level  Home Access: Stairs to enter with rails  Entrance Stairs - Number of Steps: 2-3  Entrance Stairs - Rails: Left (pt has stairs to front or back door)  Bathroom Shower/Tub: Walk-in shower  Bathroom Toilet: Handicap height  Bathroom Equipment: Grab bars in shower, Built-in shower seat, Grab bars around toilet  Home Equipment: [employer]  Has the patient had two or more falls in the past year or any fall with injury in the past year?: Yes (2-3 pt is unsure of #)  ADL Assistance: Marli: Independent  Homemaking Responsibilities: Yes  Ambulation Assistance: Independent  Transfer Assistance: Independent  Active : Yes  Occupation: Retired  Type of Occupation: tool+    Examination of body systems (includes body structures/functions, activity/participation limitations):  Observation:  Pt is awake, up in chair upon arrival  Vision:  WFL  Hearin [address], hearing aides not present  Cardiopulmonary:  on RA, stable  Cognition: WFL, see OT/SLP note for further evaluation. Musculoskeletal  ROM R/L:  WFL BLE. Strength R/L:  Generally 4/5, L hip flexor 3+/5, decreased in function and endurance. Neuro:  Pt reports intact Le sensation, decreased balance  Gait pattern: Pt demonstrates step-through pattern with decreased foot clearance, increased UE support on RW, decreased chaya. Mobility:  Transfers: CGA-Min A  Sitting balance:  SUP. Standing balance:  CGA. Gait: CGA    [facility] 6 Clicks Inpatient Mobility:  AM-PAC Inpatient Mobility Raw Score : 17    Treatment:  *upon entry pt is seated up in recliner. Sitting balance: Pt demonstrates ability to WS anteriorly and scoot to front of chair with cues, UE support, min increased effort, SBA.     STS: From recliner to RW with CGA, min cues for UE placement. Pt with BUE push-off from arm rest, increased effort to upright d/t decreased LE power. Initial standing balance at recliner with UE support on RW, CGA. No notable postural sway, no LOB. Gait: Pt reports fatigue, requests short gait distance. Pt AMB with RW x8 ft to sink, CGA. Pt demonstrates step-through pattern with decreased foot clearance, increased UE support on RW, decreased chaya. Standing balance: Pt standing dynamic balance at in-room sink x3 min with CGA-brief SBA for hygiene tasks. Noted pt able to tolerate brief periods of unsupported stance with increased postural sway, regressed to x1 UE support for balance. Gait: AMB x8 ft to BR with RW CGA, PT cues and CGA for safety in RTS at commode, pt use of grab bar for support.   Seated on commode with nearby SUP ~4+ min, increased difficulty with suleman care see OT note. Sit>stand from commode with use of grab bars, Min A required d/t decreased LE power and low surface. Additional standing balance at BR sink x1 min for hand hygiene CGA. AMB from BR to chair x8 ft with RW, CGA. RTS in chair with UE support, decreased eccentric control, CGA. Education: Discussed role of PT, POC    Safety: patient left in recliner with chair alarm, call light within reach, RN notified, gait belt used. Assessment:    Pt is a 79 y/o male admitted 12/24 with c/o  Upper GIB. Patient with significant h/o AR (aortic regurgitation), Atrial fibrillation, new onset ([address] CAD (coronary artery disease), Chronic midline low back pain without sciatica, COPD , see chart. Per pt report pt has been performing ADLs/IADLs with Mod I at home, AMB with SPC, manages household tasks, multiple falls at home. At this time pt appears to be functioning below baseline. Pt is now presenting with impairments in LE strength, functional endurance, safety awareness, balance, gait, fall risk. Pt would benefit from skilled PT services in order to address impairments and promote return to PLOF. PT to recommend d/c to ARU v. Swing bed for rehab as pt is typically Mod I. Complexity: Moderate  Prognosis: Good, no significant barriers to participation at this time.   Plan General Plan: 3-5 times per week/week, 1 week,   Discharge Recommendations: IP Rehab, Other (comment) (ARU v Swing)  Equipment: Requires FWW    Goals:  Short Term Goals  Time Frame for Short Term Goals: 1 week  Short Term Goal 1: Pt will AMB x40 ft with RW, chair follow, SBA  Short Term Goal 2: Pt will perform x5 reps repeated STS to RW with UE support, SBA  Short Term Goal 3: Pt will tolerate standing LE TherEx x15 ea with UE support CGA  Short Term Goal 4: Pt will perform all bed mobility tasks with SBA       Treatment plan:  Bed mobility, transfers, balance, gait, TA, TX    Recommendations for NURSING mobility: AMB to BR with RW CGA, Min A on commode     Time:   Time in: 11:43  Time out: 12:10  Timed treatment minutes: 15  Total time: 27    Electronically signed by:    Tatiana Treadwell, PT  30/53/6731, 4:73 PM  PT Lic #: [id number]

## 2022-12-26 NOTE — PROGRESS NOTES
[facility]        Progress Note       2022  5:37 PM    Patient:    Carmen Valadez  :    80 y.o. MRN: [id number]  Admitted: 2022  7:58 PM ATT: Pastor Smith MD   4106/4106-A  AdmitDx: Upper GI bleed [K92.2]  PCP: Sil Anne MD    SUBJECTIVE:  Chart reviewed, events noted  Patient feeling ok. No complaints. No BM. Tolerating diet. No N/V. Denies abdominal pain. Denies gross blood loss. S/p EGD with grade 1 esophagitis otherwise normal.  Hgb 7.8 today.     ROS:  The positive ROS will be identified in bold     CONSTITUTIONAL:  Neg  Weight loss, fatigue, fever  MOUTH/THROAT:  Neg  Bleeding gums, hoarseness or sore throat  RESPIRATORY:   Neg SOB, wheeze, cough, hemoptysis or bronchitis  CARDIOVASCULAR:  Neg Chest pain, palpitations, dyspnea on exertion, edema  GASTROINTESTINAL:  SEE HPI  HEMATOLOGIC/LYMPHATIC:  Neg  Anemia, bleeding tendency  MUSCULOSKELETAL: Neg  New myalgias, joint pain, swelling or stiffness  NEUROLOGICAL:  Neg  Loss of Consciousness, memory loss, forgetfulness, periods of confusion, difficulty concentrating, seizures, insomnia, aphasia    SKIN:  Neg No itching, rashes, or sores  PSYCHIATRIC:  Neg Depression, personality changes, anxiety    OBJECTIVE:      /69   Pulse 95   Temp 98.3 °F (36.8 °C) (Oral)   Resp 18   Ht 5' 11\" (1.803 m)   Wt 270 lb (122.5 kg) Comment: bed scale not zeroed  SpO2 98%   BMI 37.66 kg/m²     NAD, appears comfortable, sitting up in chair  Lips and mucous membranes pink and moist  RRR, Nl s1s2, no murmurs, no JVD  Lungs CTA bilaterally, respirations even and unlabored   Abdomen soft, ND, NT, no HSM, bowel sounds normal  Skin pink, warm, dry and CR brisk   2+ edema bilateral lower extremities, some sores on toes  AAOx3      CBC:   Recent Labs     22  1215 22  2144 22  0548 22  1429 22  1113   WBC 14.2*  --   --   --  4.2   HGB 7.3*   < > 8.1* 8.4* 7.8*   PLT 91*  -- --   --  165    < > = values in this interval not displayed. BMP:    Recent Labs     12/24/22  1215 12/24/22  2144 12/26/22  1113    139 132*   K 3.6 3.8 3.9    105 102   CO2 20* 24 22   BUN 42* 39* 31*   CREATININE 1.5* 1.4* 1.5*   GLUCOSE 158* 98 122*     Magnesium:   Lab Results   Component Value Date/Time    MG 1.9 12/26/2022 11:13 AM     Hepatic:   Recent Labs     12/24/22  1215   AST 17   ALT 8*   BILITOT 0.4   ALKPHOS 48     INR: No results for input(s): INR in the last 72 hours.       Intake/Output Summary (Last 24 hours) at 12/26/2022 1737  Last data filed at 12/25/2022 2114  Gross per 24 hour   Intake 120 ml   Output --   Net 120 ml         Medications    Scheduled Medications:    bisacodyl  10 mg Oral Q8H    pantoprazole (PROTONIX) 40 mg injection  40 mg IntraVENous Q12H    carvedilol  3.125 mg Oral BID WC    pravastatin  80 mg Oral Nightly    mirtazapine  30 mg Oral Nightly    sodium chloride flush  5-40 mL IntraVENous 2 times per day    tiotropium  2 puff Inhalation Daily    vitamin B-12  1,000 mcg Oral Daily     PRN Medications: sodium chloride flush, sodium chloride, acetaminophen **OR** acetaminophen, albuterol sulfate HFA, HYDROcodone-acetaminophen  Infusions:    sodium chloride           Patient Active Problem List   Diagnosis Code    CAD s/p prox LAD bare metal stent 2011 Z98.61    RBBB I45.10    COPD (chronic obstructive pulmonary disease) ([facility] J44.9    DJD (degenerative joint disease) M19.90    Atrial fibrillation, persistent I48.19    Hypertension I10    Hyperlipidemia E78.5    Obesity E66.9    Risk for falls Z91.81    Fatigue R53.83    Family history of coronary artery disease Z82.49    Chronic midline low back pain without sciatica M54.50, G89.29    Upper GI bleed K92.2        ASSESSMENT AND RECOMMENDATIONS:    Anemia, GI bleed:  Hgb 7.8  Patient denies gross blood loss in the past 24 hours  S/p EGD mild grade 1 esophagitis at GE junction mild inflammation in stomach, duodenal mucosa nondiagnostic    Recommend:   Continue Protonix twice daily  Clear liquid diet today  GoLytely bowel prep today  Colonoscopy tomorrow morning around 730   Consent signed, in chart    Discussed plan of care with patient and RN     Patient clinical, biochemical, and radiological information discussed with Dr. Harkins Officer. He agrees with the assessment and plan. Lafrances Skiff, APRN - CNP, CNP  12/26/2022  5:37 PM     Unexplained anemia  EGD mild esophagitis only  C-scope a.m. Abdomen soft otherwise    I saw and evaluated the patient myself. I personally reviewed the chart, labs, and imaging studies and provided a substantive portion of the care for this patient. I personally performed all aspects of medical decision making for this encounter. I have spoken with the patient, nursing staff and provided them with appropriate verbal and written instructions. I have reviewed and verified this documentation done by [facility] and it accurately reflects our care and I have added a separate note to reflect my clinical impression and suggestions.     [id number]

## 2022-12-27 ENCOUNTER — ANESTHESIA EVENT (OUTPATIENT)
Dept: ENDOSCOPY | Age: 82
DRG: 392 | End: 2022-12-27
Payer: MEDICARE

## 2022-12-27 ENCOUNTER — ANESTHESIA (OUTPATIENT)
Dept: ENDOSCOPY | Age: 82
DRG: 392 | End: 2022-12-27
Payer: MEDICARE

## 2022-12-27 LAB
ANION GAP SERPL CALCULATED.3IONS-SCNC: 11 MMOL/L (ref 4–16)
BASOPHILS ABSOLUTE: 0 K/CU MM
BASOPHILS RELATIVE PERCENT: 0 % (ref 0–1)
BUN BLDV-MCNC: 28 MG/DL (ref 6–23)
CALCIUM SERPL-MCNC: 7.7 MG/DL (ref 8.3–10.6)
CHLORIDE BLD-SCNC: 103 MMOL/L (ref 99–110)
CO2: 24 MMOL/L (ref 21–32)
CREAT SERPL-MCNC: 1.4 MG/DL (ref 0.9–1.3)
DIFFERENTIAL TYPE: ABNORMAL
EOSINOPHILS ABSOLUTE: 0 K/CU MM
EOSINOPHILS RELATIVE PERCENT: 1.1 % (ref 0–3)
GFR SERPL CREATININE-BSD FRML MDRD: 50 ML/MIN/1.73M2
GLUCOSE BLD-MCNC: 87 MG/DL (ref 70–99)
HCT VFR BLD CALC: 26.2 % (ref 42–52)
HEMOGLOBIN: 8.4 GM/DL (ref 13.5–18)
IMMATURE NEUTROPHIL %: 1.4 % (ref 0–0.43)
LYMPHOCYTES ABSOLUTE: 0.8 K/CU MM
LYMPHOCYTES RELATIVE PERCENT: 27.1 % (ref 24–44)
MAGNESIUM: 1.8 MG/DL (ref 1.8–2.4)
MCH RBC QN AUTO: 34.3 PG (ref 27–31)
MCHC RBC AUTO-ENTMCNC: 32.1 % (ref 32–36)
MCV RBC AUTO: 106.9 FL (ref 78–100)
MONOCYTES ABSOLUTE: 0.5 K/CU MM
MONOCYTES RELATIVE PERCENT: 16.2 % (ref 0–4)
NUCLEATED RBC %: 1.1 %
PDW BLD-RTO: 19.8 % (ref 11.7–14.9)
PHOSPHORUS: 2.7 MG/DL (ref 2.5–4.9)
PLATELET # BLD: 80 K/CU MM (ref 140–440)
POTASSIUM SERPL-SCNC: 2.8 MMOL/L (ref 3.5–5.1)
RBC # BLD: 2.45 M/CU MM (ref 4.6–6.2)
SEGMENTED NEUTROPHILS ABSOLUTE COUNT: 1.5 K/CU MM
SEGMENTED NEUTROPHILS RELATIVE PERCENT: 54.2 % (ref 36–66)
SODIUM BLD-SCNC: 138 MMOL/L (ref 135–145)
TOTAL IMMATURE NEUTOROPHIL: 0.04 K/CU MM
TOTAL NUCLEATED RBC: 0 K/CU MM
WBC # BLD: 2.8 K/CU MM (ref 4–10.5)

## 2022-12-27 PROCEDURE — 6370000000 HC RX 637 (ALT 250 FOR IP): Performed by: INTERNAL MEDICINE

## 2022-12-27 PROCEDURE — 96376 TX/PRO/DX INJ SAME DRUG ADON: CPT

## 2022-12-27 PROCEDURE — 84100 ASSAY OF PHOSPHORUS: CPT

## 2022-12-27 PROCEDURE — 88305 TISSUE EXAM BY PATHOLOGIST: CPT | Performed by: PATHOLOGY

## 2022-12-27 PROCEDURE — 6360000002 HC RX W HCPCS: Performed by: NURSE ANESTHETIST, CERTIFIED REGISTERED

## 2022-12-27 PROCEDURE — G0378 HOSPITAL OBSERVATION PER HR: HCPCS

## 2022-12-27 PROCEDURE — 2709999900 HC NON-CHARGEABLE SUPPLY: Performed by: INTERNAL MEDICINE

## 2022-12-27 PROCEDURE — 3700000001 HC ADD 15 MINUTES (ANESTHESIA): Performed by: INTERNAL MEDICINE

## 2022-12-27 PROCEDURE — 94640 AIRWAY INHALATION TREATMENT: CPT

## 2022-12-27 PROCEDURE — 2580000003 HC RX 258: Performed by: INTERNAL MEDICINE

## 2022-12-27 PROCEDURE — 36415 COLL VENOUS BLD VENIPUNCTURE: CPT

## 2022-12-27 PROCEDURE — 6360000002 HC RX W HCPCS: Performed by: INTERNAL MEDICINE

## 2022-12-27 PROCEDURE — 3700000000 HC ANESTHESIA ATTENDED CARE: Performed by: INTERNAL MEDICINE

## 2022-12-27 PROCEDURE — 2500000003 HC RX 250 WO HCPCS: Performed by: NURSE ANESTHETIST, CERTIFIED REGISTERED

## 2022-12-27 PROCEDURE — 6360000002 HC RX W HCPCS: Performed by: NURSE PRACTITIONER

## 2022-12-27 PROCEDURE — 96375 TX/PRO/DX INJ NEW DRUG ADDON: CPT

## 2022-12-27 PROCEDURE — 85025 COMPLETE CBC W/AUTO DIFF WBC: CPT

## 2022-12-27 PROCEDURE — 80048 BASIC METABOLIC PNL TOTAL CA: CPT

## 2022-12-27 PROCEDURE — 94761 N-INVAS EAR/PLS OXIMETRY MLT: CPT

## 2022-12-27 PROCEDURE — 0DBL8ZZ EXCISION OF TRANSVERSE COLON, VIA NATURAL OR ARTIFICIAL OPENING ENDOSCOPIC: ICD-10-PCS | Performed by: INTERNAL MEDICINE

## 2022-12-27 PROCEDURE — C9113 INJ PANTOPRAZOLE SODIUM, VIA: HCPCS | Performed by: INTERNAL MEDICINE

## 2022-12-27 PROCEDURE — 83735 ASSAY OF MAGNESIUM: CPT

## 2022-12-27 PROCEDURE — 3609010600 HC COLONOSCOPY POLYPECTOMY SNARE/COLD BIOPSY: Performed by: INTERNAL MEDICINE

## 2022-12-27 PROCEDURE — 1200000000 HC SEMI PRIVATE

## 2022-12-27 RX ORDER — POTASSIUM CHLORIDE 7.45 MG/ML
10 INJECTION INTRAVENOUS
Status: DISPENSED | OUTPATIENT
Start: 2022-12-27 | End: 2022-12-27

## 2022-12-27 RX ORDER — POTASSIUM CHLORIDE 20 MEQ/1
40 TABLET, EXTENDED RELEASE ORAL ONCE
Status: COMPLETED | OUTPATIENT
Start: 2022-12-27 | End: 2022-12-27

## 2022-12-27 RX ORDER — PROPOFOL 10 MG/ML
INJECTION, EMULSION INTRAVENOUS PRN
Status: DISCONTINUED | OUTPATIENT
Start: 2022-12-27 | End: 2022-12-27 | Stop reason: SDUPTHER

## 2022-12-27 RX ORDER — PANTOPRAZOLE SODIUM 40 MG/1
40 TABLET, DELAYED RELEASE ORAL
Status: DISCONTINUED | OUTPATIENT
Start: 2022-12-27 | End: 2022-12-28 | Stop reason: HOSPADM

## 2022-12-27 RX ORDER — LIDOCAINE HYDROCHLORIDE 20 MG/ML
INJECTION, SOLUTION INFILTRATION; PERINEURAL PRN
Status: DISCONTINUED | OUTPATIENT
Start: 2022-12-27 | End: 2022-12-27 | Stop reason: SDUPTHER

## 2022-12-27 RX ADMIN — RIVAROXABAN 20 MG: 20 TABLET, FILM COATED ORAL at 17:55

## 2022-12-27 RX ADMIN — PHENYLEPHRINE HYDROCHLORIDE 200 MCG: 10 INJECTION INTRAVENOUS at 08:08

## 2022-12-27 RX ADMIN — PHENYLEPHRINE HYDROCHLORIDE 200 MCG: 10 INJECTION INTRAVENOUS at 07:53

## 2022-12-27 RX ADMIN — PANTOPRAZOLE SODIUM 40 MG: 40 TABLET, DELAYED RELEASE ORAL at 17:55

## 2022-12-27 RX ADMIN — SODIUM CHLORIDE, PRESERVATIVE FREE 10 ML: 5 INJECTION INTRAVENOUS at 20:16

## 2022-12-27 RX ADMIN — PROPOFOL 60 MG: 10 INJECTION, EMULSION INTRAVENOUS at 07:42

## 2022-12-27 RX ADMIN — PROPOFOL 50 MG: 10 INJECTION, EMULSION INTRAVENOUS at 07:53

## 2022-12-27 RX ADMIN — PHENYLEPHRINE HYDROCHLORIDE 200 MCG: 10 INJECTION INTRAVENOUS at 07:50

## 2022-12-27 RX ADMIN — SODIUM CHLORIDE 100 ML: 9 INJECTION, SOLUTION INTRAVENOUS at 04:07

## 2022-12-27 RX ADMIN — ACETAMINOPHEN 650 MG: 325 TABLET ORAL at 14:58

## 2022-12-27 RX ADMIN — LIDOCAINE HYDROCHLORIDE 100 MG: 20 INJECTION, SOLUTION INFILTRATION; PERINEURAL at 07:42

## 2022-12-27 RX ADMIN — PHENYLEPHRINE HYDROCHLORIDE 200 MCG: 10 INJECTION INTRAVENOUS at 07:59

## 2022-12-27 RX ADMIN — TIOTROPIUM BROMIDE INHALATION SPRAY 2 PUFF: 3.12 SPRAY, METERED RESPIRATORY (INHALATION) at 11:04

## 2022-12-27 RX ADMIN — POTASSIUM CHLORIDE 10 MEQ: 7.46 INJECTION, SOLUTION INTRAVENOUS at 06:10

## 2022-12-27 RX ADMIN — POTASSIUM CHLORIDE 40 MEQ: 1500 TABLET, EXTENDED RELEASE ORAL at 11:33

## 2022-12-27 RX ADMIN — CARVEDILOL 3.12 MG: 6.25 TABLET, FILM COATED ORAL at 17:55

## 2022-12-27 RX ADMIN — PROPOFOL 50 MG: 10 INJECTION, EMULSION INTRAVENOUS at 08:07

## 2022-12-27 RX ADMIN — SODIUM CHLORIDE, PRESERVATIVE FREE 40 MG: 5 INJECTION INTRAVENOUS at 11:34

## 2022-12-27 RX ADMIN — CYANOCOBALAMIN TAB 1000 MCG 1000 MCG: 1000 TAB at 11:34

## 2022-12-27 RX ADMIN — PROPOFOL 50 MG: 10 INJECTION, EMULSION INTRAVENOUS at 07:45

## 2022-12-27 RX ADMIN — CARVEDILOL 3.12 MG: 6.25 TABLET, FILM COATED ORAL at 11:33

## 2022-12-27 RX ADMIN — PHENYLEPHRINE HYDROCHLORIDE 200 MCG: 10 INJECTION INTRAVENOUS at 08:04

## 2022-12-27 RX ADMIN — POTASSIUM CHLORIDE 10 MEQ: 7.46 INJECTION, SOLUTION INTRAVENOUS at 05:02

## 2022-12-27 RX ADMIN — POTASSIUM CHLORIDE 10 MEQ: 7.46 INJECTION, SOLUTION INTRAVENOUS at 04:08

## 2022-12-27 RX ADMIN — PRAVASTATIN SODIUM 80 MG: 40 TABLET ORAL at 20:15

## 2022-12-27 RX ADMIN — MIRTAZAPINE 30 MG: 15 TABLET, FILM COATED ORAL at 20:15

## 2022-12-27 ASSESSMENT — PAIN SCALES - GENERAL
PAINLEVEL_OUTOF10: 0
PAINLEVEL_OUTOF10: 0

## 2022-12-27 NOTE — PLAN OF CARE
Problem: Discharge Planning  Goal: Discharge to home or other facility with appropriate resources  Outcome: Progressing  Flowsheets (Taken 12/27/2022 5843)  Discharge to home or other facility with appropriate resources:   Identify barriers to discharge with patient and caregiver   Arrange for needed discharge resources and transportation as appropriate   Arrange for interpreters to assist at discharge as needed   Identify discharge learning needs (meds, wound care, etc)   Refer to discharge planning if patient needs post-hospital services based on physician order or complex needs related to functional status, cognitive ability or social support system     Problem: Skin/Tissue Integrity  Goal: Absence of new skin breakdown  Description: 1. Monitor for areas of redness and/or skin breakdown  2. Assess vascular access sites hourly  3. Every 4-6 hours minimum:  Change oxygen saturation probe site  4. Every 4-6 hours:  If on nasal continuous positive airway pressure, respiratory therapy assess nares and determine need for appliance change or resting period.   Outcome: Progressing     Problem: ABCDS Injury Assessment  Goal: Absence of physical injury  Outcome: Progressing     Problem: Safety - Adult  Goal: Free from fall injury  Outcome: Progressing     Problem: Pain  Goal: Verbalizes/displays adequate comfort level or baseline comfort level  Outcome: Progressing

## 2022-12-27 NOTE — CARE COORDINATION
This RN CM to bedside to introduce self and initiate discharge planning. Patient is from home alone. Patient has 2 canes, 2 walkers and a wheelchair at home. Patient does not have any [facility] services at this time. Patient does not want ARU or SNF placement. Patient only wants to return home. Patient is open to [facility]. Patient does not have a preference on [facility] agency. Referral will be made by this RN CM . Pt has PCP and insurance.

## 2022-12-27 NOTE — OP NOTE
Operative Note      Patient: Livia Bernabe  YOB: 1940  MRN: [id number]    Date of Procedure: 12/27/2022    Pre-Op Diagnosis: GI bleed    [facility]      BRIEF OP REPORT:    Impression:    1) colonoscopy showing incomplete prep large stool flecks of solid and liquid all over the colon large amount of liquid stools all over  Aspirated as much as possible small to moderate lesions can be missed   2) exam could be done only up to what appeared to be hepatic flexure this was due to fact patient had poor prep, body habitus   3) 2 polyps were seen in the transverse colon both 4 to 5 mm in size cold snare and retrieved  Pancolonic diverticulosis moderate was seen  Grade 2 internal hemorrhoids seen        Suggest:   1) recommend barium enema due to incomplete prep   2) repeat colonoscopy in 1 year   3) advance diet after barium enema     Full EGD/COLONOSCOPY report available by going to \"chart review\" then \"procedures\" then  \"EGD/Colonoscopy\"  then \"View Endoscopy Report\"      Electronically signed by Dariana Heck MD on 12/27/2022 at 8:12 AM

## 2022-12-27 NOTE — ANESTHESIA PRE PROCEDURE
Department of Anesthesiology  Preprocedure Note       Name:  John Garcia   Age:  80 y.o.  :  1940                                          MRN:  [id number]         Date:  2022      Surgeon: Saumya Levin):  Lisa Arias MD    Procedure: Procedure(s):  COLONOSCOPY DIAGNOSTIC    Medications prior to admission:   Prior to Admission medications    Medication Sig Start Date End Date Taking? Authorizing Provider   nitroGLYCERIN (NITROSTAT) 0.4 MG SL tablet Place 1 tablet under the tongue every 5 minutes as needed for Chest pain 21   Bon Noyola MD   rivaroxaban (XARELTO) 20 MG TABS tablet Take 1 tablet by mouth every 24 hours 20   Bon Noyola MD   mirtazapine (REMERON) 30 MG tablet Take 30 mg by mouth nightly    Historical Provider, MD   Cholecalciferol (VITAMIN D3) 2000 UNITS CAPS Take 1 capsule by mouth daily    Historical Provider, MD   losartan (COZAAR) 25 MG tablet Take 25 mg by mouth daily    Historical Provider, MD   pravastatin (PRAVACHOL) 80 MG tablet TAKE ONE TABLET BY MOUTH EVERY DAY 11/2/15   Bon Noyola MD   carvedilol (COREG) 3.125 MG tablet Take 3.125 mg by mouth 2 times daily (with meals)    Historical Provider, MD   hydrochlorothiazide (MICROZIDE) 12.5 MG capsule Take 2 tablets every AM and 1 tablet every PM.    Historical Provider, MD   fluorouracil (EFUDEX) 5 % cream Apply topically 2 times daily as needed     Historical Provider, MD   HYDROcodone-acetaminophen (NORCO)  MG per tablet Take 1 tablet by mouth every 6 hours as needed for Pain    Historical Provider, MD   vitamin B-12 (CYANOCOBALAMIN) 1000 MCG tablet Take 1,000 mcg by mouth daily    Historical Provider, MD   albuterol (PROVENTIL HFA;VENTOLIN HFA) 108 (90 BASE) MCG/ACT inhaler Inhale 2 puffs into the lungs every 6 hours as needed for Wheezing    Historical Provider, MD   ferrous gluconate 325 (37.5 FE) MG TABS Take 325 mg by mouth 2 times daily.     Historical Provider, MD   potassium chloride (MICRO-K) 10 MEQ CR capsule Takes 2 tabs in a.m. and 1 tab in p.m. Historical Provider, MD   aspirin 81 MG EC tablet Take 1 tablet by mouth daily. Patient taking differently: Take 81 mg by mouth daily \"take occassionally\" 4/12/13   Margaret Pollock MD   tiotropium (SPIRIVA) 18 MCG inhalation capsule Inhale 18 mcg into the lungs daily     Historical Provider, MD   lansoprazole (PREVACID) 30 MG capsule Take 30 mg by mouth daily.       Historical Provider, MD       Current medications:    Current Facility-Administered Medications   Medication Dose Route Frequency Provider Last Rate Last Admin    potassium chloride 10 mEq/100 mL IVPB (Peripheral Line)  10 mEq IntraVENous Q1H Leni GISSELLE Garcia -  mL/hr at 12/27/22 0610 10 mEq at 12/27/22 0610    simethicone (MYLICON) chewable tablet 80 mg  80 mg Oral Q6H PRN Wm Rojas APRN - CNP        pantoprazole (PROTONIX) 40 mg in sodium chloride (PF) 0.9 % 10 mL injection  40 mg IntraVENous Q12H Dewane Rinne, MD   40 mg at 12/26/22 2054    carvedilol (COREG) tablet 3.125 mg  3.125 mg Oral BID WC Dewane Rinne, MD   3.125 mg at 12/26/22 1718    pravastatin (PRAVACHOL) tablet 80 mg  80 mg Oral Nightly Dewane Rinne, MD   80 mg at 12/26/22 2054    mirtazapine (REMERON) tablet 30 mg  30 mg Oral Nightly Dewane Rinne, MD   30 mg at 12/26/22 2054    sodium chloride flush 0.9 % injection 5-40 mL  5-40 mL IntraVENous 2 times per day Keyur Bañuelos MD   10 mL at 12/26/22 2054    sodium chloride flush 0.9 % injection 5-40 mL  5-40 mL IntraVENous PRN Keyur Bañuelos MD        0.9 % sodium chloride infusion   IntraVENous PRN Keyur Bañuelos MD 25 mL/hr at 12/27/22 0407 100 mL at 12/27/22 0407    acetaminophen (TYLENOL) tablet 650 mg  650 mg Oral Q6H PRN Keyur Bañuelos MD        Or   Sam Cavazos acetaminophen (TYLENOL) suppository 650 mg  650 mg Rectal Q6H PRN Keyur Bañuelos MD        albuterol sulfate HFA (PROVENTIL;VENTOLIN;PROAIR) 108 (90 Base) MCG/ACT inhaler 2 puff  2 puff Inhalation Q6H PRN Iman Callahan MD        HYDROcodone-acetaminophen [facility])  MG per tablet 1 tablet  1 tablet Oral Q6H PRN Iman Callahan MD   1 tablet at 12/25/22 1315    tiotropium (SPIRIVA RESPIMAT) 2.5 MCG/ACT inhaler 2 puff  2 puff Inhalation Daily Iman Callahan MD   2 puff at 12/26/22 0858    vitamin B-12 (CYANOCOBALAMIN) tablet 1,000 mcg  1,000 mcg Oral Daily Iman Callahan MD   1,000 mcg at 12/26/22 0932       Allergies: Allergies   Allergen Reactions    Lovenox [Enoxaparin Sodium]     Morphine Rash       Problem List:    Patient Active Problem List   Diagnosis Code    CAD s/p prox LAD bare metal stent 2011 Z98.61    RBBB I45.10    COPD (chronic obstructive pulmonary disease) ([facility] [address] J44.9    DJD (degenerative joint disease) M19.90    Atrial fibrillation, persistent I48.19    Hypertension I10    Hyperlipidemia E78.5    Obesity E66.9    Risk for falls Z91.81    Fatigue R53.83    Family history of coronary artery disease Z82.49    Chronic midline low back pain without sciatica M54.50, G89.29    Upper GI bleed K92.2       Past Medical History:        Diagnosis Date    AR (aortic regurgitation)     mild to mod    Atrial fibrillation, new onset ([facility] [address]     CAD (coronary artery disease)     History of angioplasty    Chronic midline low back pain without sciatica 5/1/2017    Has seen Dr. Pako Andrea and had shots    COPD (chronic obstructive pulmonary disease) ([facility] [address]     Family history of coronary artery disease     Fatigue 10/2016    H/O cardiovascular stress test 2/21/2012    mild ischemia in the basal inferior and mid inferior regions ef is 47    H/O chest x-ray 10/20/2016    Right pleural effusion resolved.  H/O Doppler ultrasound 10/2016    CAROTID-[place]. arteries patent w/less than 50% stenosis in the internal carotid arteries.     H/O echocardiogram 2/17/15    Aortic sclerosis without stenosis, normal LV size and wall motion w/low normal systolic function, LA dilatation, RV dilatation, mild pulmonary HTN, EF 50-55%.  History of cardiac cath 1996    LV uniform LV contractility RCa dominatn 50-60 prox stenosis  LM patent  LAD mild mid plaquing diag has 70 ostial narrowing ramus minimal plaquing cx normal    History of PTCA 08/15/2011    prox lad bare metal stent 2011    Hyperlipidemia     Hypertension     Obesity     Obesity, Class II, BMI 35-39.9, with comorbidity 2014    RBBB     Risk for falls 10/26/2015    Tachycardia        Past Surgical History:        Procedure Laterality Date    CORONARY ANGIOPLASTY WITH STENT PLACEMENT      JOINT REPLACEMENT      partial left knee    UPPER GASTROINTESTINAL ENDOSCOPY N/A 2022    EGD DIAGNOSTIC ONLY performed by Liliam Phillip MD at [facility] ENDOSCOPY       Social History:    Social History     Tobacco Use    Smoking status: Former     Packs/day: 1.50     Years: 25.00     Pack years: 37.50     Types: Cigarettes     Quit date: 1990     Years since quittin.1    Smokeless tobacco: Current     Types: Chew    Tobacco comments:     Chews tobacco        Substance Use Topics    Alcohol use:  Yes     Alcohol/week: 56.0 standard drinks     Types: 56 Cans of beer per week     Comment: 6-8 beers daily or less                                Ready to quit: Not Answered  Counseling given: Not Answered  Tobacco comments: Chews tobacco           Vital Signs (Current):   Vitals:    22 1449 22 2215 22 0345 22 0657   BP: 111/69 132/69 132/82 129/88   Pulse: 95 (!) 103 (!) 120 (!) 117   Resp: 18 18 17 18   Temp: 98.3 °F (36.8 °C) 98.4 °F (36.9 °C) 97.1 °F (36.2 °C) 97.5 °F (36.4 °C)   TempSrc: Oral Oral Oral Tympanic   SpO2: 98% 98% 95% 95%   Weight:       Height:                                                  BP Readings from Last 3 Encounters:   22 129/88   22 (!) 86/56   03/15/22 128/68       NPO Status: Time of last liquid consumption: 0000                        Time of last solid consumption: 0100                        Date of last liquid consumption: 12/26/22                        Date of last solid food consumption: 12/24/22    BMI:   Wt Readings from Last 3 Encounters:   12/25/22 270 lb (122.5 kg)   03/15/22 282 lb (127.9 kg)   08/02/21 282 lb 9.6 oz (128.2 kg)     Body mass index is 37.66 kg/m².     CBC:   Lab Results   Component Value Date/Time    WBC 2.8 12/27/2022 02:50 AM    RBC 2.45 12/27/2022 02:50 AM    HGB 8.4 12/27/2022 02:50 AM    HCT 26.2 12/27/2022 02:50 AM    .9 12/27/2022 02:50 AM    RDW 19.8 12/27/2022 02:50 AM    PLT 80 12/27/2022 02:50 AM       CMP:   Lab Results   Component Value Date/Time     12/27/2022 02:50 AM    K 2.8 12/27/2022 02:50 AM     12/27/2022 02:50 AM    CO2 24 12/27/2022 02:50 AM    BUN 28 12/27/2022 02:50 AM    CREATININE 1.4 12/27/2022 02:50 AM    GFRAA >60 10/05/2022 09:30 AM    LABGLOM 50 12/27/2022 02:50 AM    GLUCOSE 87 12/27/2022 02:50 AM    PROT 4.6 12/24/2022 12:15 PM    PROT 6.0 03/18/2013 10:32 AM    CALCIUM 7.7 12/27/2022 02:50 AM    BILITOT 0.4 12/24/2022 12:15 PM    ALKPHOS 48 12/24/2022 12:15 PM    AST 17 12/24/2022 12:15 PM    ALT 8 12/24/2022 12:15 PM       POC Tests:   Recent Labs     12/26/22 1953   POCGLU 115*       Coags:   Lab Results   Component Value Date/Time    PROTIME 9.9 03/14/2011 11:30 PM    INR 0.92 03/14/2011 11:30 PM    APTT 15.0 03/14/2011 11:30 PM       HCG (If Applicable): No results found for: PREGTESTUR, PREGSERUM, HCG, HCGQUANT     ABGs: No results found for: PHART, PO2ART, JYT7WEQ, MKS1HEE, BEART, W1QUGDKX     Type & Screen (If Applicable):  No results found for: LABABO, LABRH    Drug/Infectious Status (If Applicable):  No results found for: HIV, HEPCAB    COVID-19 Screening (If Applicable):   Lab Results   Component Value Date/Time    COVID19 NOT DETECTED 12/24/2022 12:40 PM           Anesthesia Evaluation  Patient summary reviewed no history of anesthetic complications:   Airway: Mallampati: III  TM distance: >3 FB   Neck ROM: full  Mouth opening: > = 3 FB   Dental:    (+) poor dentition      Pulmonary:   (+) COPD:                            ROS comment: Former smoker   Cardiovascular:  Exercise tolerance: poor (<4 METS),   (+) hypertension:, valvular problems/murmurs: AI, CAD:, CABG/stent:, dysrhythmias: atrial fibrillation, hyperlipidemia         Beta Blocker:  Dose within 24 Hrs         Neuro/Psych:   Negative Neuro/Psych ROS              GI/Hepatic/Renal:   (+) renal disease: CRI,           Endo/Other:    (+) blood dyscrasia: anemia, thrombocytopenia and anticoagulation therapy:., electrolyte abnormalities (hypokalemia 2.8; replaced ~25meq prior to procedure), . Abdominal:             Vascular: negative vascular ROS. Other Findings:           Anesthesia Plan      MAC     ASA 4 - emergent       Induction: intravenous. Anesthetic plan and risks discussed with patient. Plan discussed with CRNA. Pre Anesthesia Evaluation complete. Anesthesia plan, risks, benefits, alternatives, and personal involved discussed with patient. Patients and/or legal guardian verbalized an understanding  and agreed to proceed.   Ashley Dunn DO  12/27/2022

## 2022-12-27 NOTE — PROGRESS NOTES
[facility] Patient alert and oriented. Verified patient's name, , allergies and procedure. Took carvedilol on 22, took xarelto on 22,has cardiac stent implants and left knee inplant. H&P on chart. No family/friend at bedside.

## 2022-12-27 NOTE — ANESTHESIA POSTPROCEDURE EVALUATION
Department of Anesthesiology  Postprocedure Note    Patient: Grayson Dorsey  MRN: [id number]  Birthdate: [birth year]  Date of evaluation: 12/27/2022      Procedure Summary     Date: 12/27/22 Room / Location: [address] [place] [address]    Anesthesia Start: 3785 Anesthesia Stop: 0815    Procedure: COLONOSCOPY POLYPECTOMY SNARE/COLD BIOPSY Diagnosis:       Gastrointestinal hemorrhage, unspecified gastrointestinal hemorrhage type      (GI bleed)    Surgeons: Richar Nicole MD Responsible Provider: Gray Chiang DO    Anesthesia Type: MAC ASA Status: 4 - Emergent          Anesthesia Type: MAC    Lars Phase I:      Lars Phase II:        Anesthesia Post Evaluation    Patient location during evaluation: bedside  Patient participation: waiting for patient participation  Level of consciousness: sleepy but conscious  Pain score: 0  Airway patency: patent  Nausea & Vomiting: no nausea and no vomiting  Complications: no  Cardiovascular status: hemodynamically stable  Respiratory status: room air  Hydration status: euvolemic  There was medical reason for not using a multimodal analgesia pain management approach.

## 2022-12-27 NOTE — PROGRESS NOTES
Patient transferred to [address] [place] [address], room 4106 by bed with portable cardiac monitor and paperwork by Deonte Nieves

## 2022-12-27 NOTE — PROGRESS NOTES
V2.0  [facility] [place] [facility] Hospitalist Progress Note      Name:  Bina Ham /Age/Sex:   (80 y.o. male)   MRN & CSN:  [id number] & [id number] Encounter Date/Time: 2022 3:45 PM EST    Location:  [address] [place] [address] PCP: Lashay Phillips MD       Hospital Day: 4    Assessment and Plan:   Bina Ham is a 80 y.o. male with past medical history of persistent atrial fibrillation, GERD, COPD, chronic back pain, hypertension, obesity who presented at [facility] ER with a complaint of dizziness. States that he started feeling some nausea and abdominal discomfort on Friday after which he had 1 episode of vomiting at home and another 1 in the ER. Dizziness  Unknown NIHSS on arrival  NIHSS today 0. CT head no acute abnormality  CT angio head and neck no LVO  Likely a combination of acute blood loss anemia in addition to hypotension    Acute blood loss anemia likely secondary to GI bleed  Hemoglobin 7.3 with hemodynamic manifestation  Patient given Kcentra at [facility] ER  EGD 2022: LA grade a esophagitis with erythematous mucosa in the stomach. No active source of bleed. Chnge IV protonix to PO   Colonoscopy completed today. Had poor bowel prep. Grade 2 internal hemorrhoids. 2 polyps seen in the transverse colon, biopsy done. Plan was to do barium in the midline due to poor bowel prep. However unable to be done inpatient due to biopsy. Resume Xarelto today. Continue to monitor hemoglobin. Lactic acidosis, resolved  Likely type II in the setting of acute blood loss anemia    Persistent atrial fibrillation  Continue Coreg  Resume Xarelto today    GERD  Continue p.o.  Protonix twice daily    COPD  Not in exacerbation  Continue Spiriva    Coronary artery disease  History of PTCA and LAD stent in 2011  Hold aspirin, continue atorvastatin    Hypertension  Currently holding losartan and hydrochlorothiazide  Resume carvedilol given the rapid ventricular response    Obesity, BMI 37.66  Counseled regarding diet modification and weight loss    Chronic back pain with opioid dependence  PDMP reviewed, continue Norco as needed      Diet ADULT DIET; Easy to Chew   DVT Prophylaxis [] Lovenox, []  Heparin, [x] SCDs, [x] Ambulation,  [] Eliquis, [] Xarelto  [] Coumadin   Code Status Full Code   Disposition From: Home alone   Expected Disposition: Home  Estimated Date of Discharge: Likely tomorrow. Surrogate Decision Maker/ POA Child     Subjective:     Chief Complaint: Dizziness, hematemesis       Yusef Naqvi is a 80 y.o. male who presents with dizziness and hematemesis. Seen and examined in the morning after colonoscopy. Patient was slightly groggy due to anesthesia. Denies any abdominal pain or nausea or vomiting. Objective:   No intake or output data in the 24 hours ending 12/27/22 1446       Vitals:   Vitals:    12/27/22 1105   BP:    Pulse:    Resp:    Temp:    SpO2: 95%       Physical Exam:   Physical Exam  Vitals reviewed. Constitutional:       Appearance: Normal appearance. He is obese. HENT:      Head: Normocephalic and atraumatic. Nose: Nose normal.      Mouth/Throat:      Mouth: Mucous membranes are dry. Pharynx: Oropharynx is clear. Eyes:      General: No scleral icterus. Conjunctiva/sclera: Conjunctivae normal.   Cardiovascular:      Rate and Rhythm: Tachycardia present. Rhythm irregular. Pulses: Normal pulses. Heart sounds: Murmur heard. Pulmonary:      Effort: Pulmonary effort is normal.      Breath sounds: Normal breath sounds. No wheezing, rhonchi or rales. Abdominal:      General: Bowel sounds are normal. There is no distension. Palpations: Abdomen is soft. Tenderness: There is no abdominal tenderness. Musculoskeletal:         General: No deformity. Normal range of motion. Cervical back: Neck supple. No rigidity. Right lower leg: Edema present. Left lower leg: Edema present. Skin:     Coloration: Skin is pale. Skin is not jaundiced. Neurological:      General: No focal deficit present. Mental Status: He is alert and oriented to person, place, and time. Mental status is at baseline.           Medications:   Medications:    rivaroxaban  20 mg Oral Daily    pantoprazole (PROTONIX) 40 mg injection  40 mg IntraVENous Q12H    carvedilol  3.125 mg Oral BID WC    pravastatin  80 mg Oral Nightly    mirtazapine  30 mg Oral Nightly    sodium chloride flush  5-40 mL IntraVENous 2 times per day    tiotropium  2 puff Inhalation Daily    vitamin B-12  1,000 mcg Oral Daily      Infusions:    sodium chloride 25 mL/hr at 12/27/22 0729     PRN Meds: simethicone, 80 mg, Q6H PRN  sodium chloride flush, 5-40 mL, PRN  sodium chloride, , PRN  acetaminophen, 650 mg, Q6H PRN   Or  acetaminophen, 650 mg, Q6H PRN  albuterol sulfate HFA, 2 puff, Q6H PRN  HYDROcodone-acetaminophen, 1 tablet, Q6H PRN      Labs      Recent Results (from the past 24 hour(s))   POCT Glucose    Collection Time: 12/26/22  5:16 PM   Result Value Ref Range    POC Glucose 109 (H) 70 - 99 MG/DL   POCT Glucose    Collection Time: 12/26/22  7:53 PM   Result Value Ref Range    POC Glucose 115 (H) 70 - 99 MG/DL   Basic Metabolic Panel    Collection Time: 12/27/22  2:50 AM   Result Value Ref Range    Sodium 138 135 - 145 MMOL/L    Potassium 2.8 (LL) 3.5 - 5.1 MMOL/L    Chloride 103 99 - 110 mMol/L    CO2 24 21 - 32 MMOL/L    Anion Gap 11 4 - 16    BUN 28 (H) 6 - 23 MG/DL    Creatinine 1.4 (H) 0.9 - 1.3 MG/DL    Est, Glom Filt Rate 50 (L) >60 mL/min/1.73m2    Glucose 87 70 - 99 MG/DL    Calcium 7.7 (L) 8.3 - 10.6 MG/DL   Magnesium    Collection Time: 12/27/22  2:50 AM   Result Value Ref Range    Magnesium 1.8 1.8 - 2.4 mg/dl   Phosphorus    Collection Time: 12/27/22  2:50 AM   Result Value Ref Range    Phosphorus 2.7 2.5 - 4.9 MG/DL   CBC with Auto Differential    Collection Time: 12/27/22  2:50 AM   Result Value Ref Range    WBC 2.8 (L) 4.0 - 10.5 K/CU MM    RBC 2.45 (L) 4.6 - 6.2 M/CU MM Hemoglobin 8.4 (L) 13.5 - 18.0 GM/DL    Hematocrit 26.2 (L) 42 - 52 %    .9 (H) 78 - 100 FL    MCH 34.3 (H) 27 - 31 PG    MCHC 32.1 32.0 - 36.0 %    RDW 19.8 (H) 11.7 - 14.9 %    Platelets 80 (L) 078 - 440 K/CU MM    Differential Type AUTOMATED DIFFERENTIAL     Segs Relative 54.2 36 - 66 %    Lymphocytes % 27.1 24 - 44 %    Monocytes % 16.2 (H) 0 - 4 %    Eosinophils % 1.1 0 - 3 %    Basophils % 0.0 0 - 1 %    Segs Absolute 1.5 K/CU MM    Lymphocytes Absolute 0.8 K/CU MM    Monocytes Absolute 0.5 K/CU MM    Eosinophils Absolute 0.0 K/CU MM    Basophils Absolute 0.0 K/CU MM    Nucleated RBC % 1.1 %    Total Nucleated RBC 0.0 K/CU MM    Total Immature Neutrophil 0.04 K/CU MM    Immature Neutrophil % 1.4 (H) 0 - 0.43 %        Imaging/Diagnostics Last 24 Hours   XR CHEST (2 VW)    Result Date: 12/24/2022  EXAMINATION: TWO XRAY VIEWS OF THE CHEST 12/24/2022 1:05 pm COMPARISON: Chest x-ray dated 10/04/2016. HISTORY: ORDERING SYSTEM PROVIDED HISTORY: Weakness TECHNOLOGIST PROVIDED HISTORY: Reason for exam:->Weakness FINDINGS: LINES/TUBES/OTHER: Left subclavian port is noted with its tip projecting over the proximal SVC. HEART/MEDIASTINUM: The cardiomediastinal silhouette is stable. PLEURA/LUNGS: There are no focal consolidations or pleural effusions. There is no appreciable pneumothorax. BONES/SOFT TISSUE: No acute abnormality. No radiographic evidence of acute pulmonary disease. CT Head W/O Contrast    Result Date: 12/24/2022  EXAMINATION: CTA OF THE HEAD AND NECK WITH CONTRAST; CT OF THE HEAD WITHOUT CONTRAST 12/24/2022 1:06 pm: TECHNIQUE: CTA of the head and neck was performed with the administration of intravenous contrast. Multiplanar reformatted images are provided for review. MIP images are provided for review. Stenosis of the internal carotid arteries measured using NASCET criteria.  Automated exposure control, iterative reconstruction, and/or weight based adjustment of the mA/kV was utilized to reduce the radiation dose to as low as reasonably achievable.; CT of the head was performed without the administration of intravenous contrast. Automated exposure control, iterative reconstruction, and/or weight based adjustment of the mA/kV was utilized to reduce the radiation dose to as low as reasonably achievable. Noncontrast CT of the head with reconstructed 2-D images are also provided for review. COMPARISON: CT head 07/04/2015. HISTORY: ORDERING SYSTEM PROVIDED HISTORY: Unprovoked Syncope TECHNOLOGIST PROVIDED HISTORY: Reason for exam:->Unprovoked Syncope Has a \"code stroke\" or \"stroke alert\" been called? ->No Decision Support Exception - unselect if not a suspected or confirmed emergency medical condition->Emergency Medical Condition (MA) Initial evaluation. FINDINGS: CT HEAD: BRAIN/VENTRICLES:  No acute intracranial hemorrhage or extraaxial fluid collection. Grey-white differentiation is maintained. No evidence of mass, mass effect or midline shift. No evidence of hydrocephalus. There are areas of hypoattenuation in the periventricular and subcortical white matter, which is nonspecific, but may represent chronic microvasvular ischemic change. There is mild-to-moderate global parenchymal volume loss. Atherosclerosis of the intracranial vasculature is noted. ORBITS: The visualized portion of the orbits demonstrate no acute abnormality. SINUSES:  The visualized paranasal sinuses and mastoid air cells demonstrate no acute abnormality. SOFT TISSUES/SKULL: No acute abnormality of the visualized skull or soft tissues. CTA NECK: Motion degrades images limiting evaluation. AORTIC ARCH/ARCH VESSELS: There is atherosclerosis of the aortic arch and arch vessels. No convincing flow limiting stenosis of the innominate or visualized subclavian arteries.   The distal left subclavian artery is obscured due to in flowing contrast. CAROTID ARTERIES: Atherosclerosis is seen of the common carotid arteries bilateral without evidence of a flow limiting stenosis. Atherosclerosis of the carotid bulbs and proximal internal carotid arteries bilaterally. Atherosclerosis contributes to less than 50% stenosis of the cervical ICAs bilaterally by NASCET criteria. VERTEBRAL ARTERIES: No significant stenosis seen of the right vertebral artery. There is minimal string of enhancement within the entirety of the left vertebral artery with no significant enhancement within the proximal left V4 segment. SOFT TISSUES: No acute abnormality within the visualized portion of the lungs or mediastinum. BONES: The osseous structures appear osteopenic. No convincing acute osseous abnormality seen. Multilevel degenerative changes of the cervical spine noted. There appear to be multiple dental caries. CTA HEAD: ANTERIOR CIRCULATION: Diffuse atherosclerosis of the internal carotid arteries bilaterally. Moderate stenosis of the right cavernous ICA with moderate stenosis of the left supraclinoid ICA. No significant stenosis seen of the anterior cerebral or middle cerebral arteries. No aneurysm identified. POSTERIOR CIRCULATION: No significant enhancement within the proximal left V4 segment. There is atherosclerosis involving the right V4 segment contributing to mild stenosis. No significant stenosis seen of the basilar artery. No significant stenosis of the posterior cerebral arteries. No convincing aneurysm identified. The left PICA is seen likely due to retrograde flow. OTHER: No dural venous sinus thrombosis on this non-dedicated study. 1. No acute intracranial abnormality. 2. Mild-to-moderate global parenchymal volume loss with mild chronic microvascular ischemic changes. 3. Minimal string of enhancement within the entirety of the left cervical vertebral artery with no significant enhancement within the proximal left V4 segment. This is of uncertain chronicity.  4. Atherosclerosis contributes to less than 50% stenosis of the cervical ICAs bilaterally by NASCET criteria. 5. Atherosclerosis involving the intracranial ICAs contribute to moderate stenosis of the right cavernous ICA and moderate stenosis of the left supraclinoid ICA. CTA HEAD NECK W CONTRAST    Result Date: 12/24/2022  EXAMINATION: CTA OF THE HEAD AND NECK WITH CONTRAST; CT OF THE HEAD WITHOUT CONTRAST 12/24/2022 1:06 pm: TECHNIQUE: CTA of the head and neck was performed with the administration of intravenous contrast. Multiplanar reformatted images are provided for review. MIP images are provided for review. Stenosis of the internal carotid arteries measured using NASCET criteria. Automated exposure control, iterative reconstruction, and/or weight based adjustment of the mA/kV was utilized to reduce the radiation dose to as low as reasonably achievable.; CT of the head was performed without the administration of intravenous contrast. Automated exposure control, iterative reconstruction, and/or weight based adjustment of the mA/kV was utilized to reduce the radiation dose to as low as reasonably achievable. Noncontrast CT of the head with reconstructed 2-D images are also provided for review. COMPARISON: CT head 07/04/2015. HISTORY: ORDERING SYSTEM PROVIDED HISTORY: Unprovoked Syncope TECHNOLOGIST PROVIDED HISTORY: Reason for exam:->Unprovoked Syncope Has a \"code stroke\" or \"stroke alert\" been called? ->No Decision Support Exception - unselect if not a suspected or confirmed emergency medical condition->Emergency Medical Condition (MA) Initial evaluation. FINDINGS: CT HEAD: BRAIN/VENTRICLES:  No acute intracranial hemorrhage or extraaxial fluid collection. Grey-white differentiation is maintained. No evidence of mass, mass effect or midline shift. No evidence of hydrocephalus. There are areas of hypoattenuation in the periventricular and subcortical white matter, which is nonspecific, but may represent chronic microvasvular ischemic change.  There is mild-to-moderate global parenchymal volume loss. Atherosclerosis of the intracranial vasculature is noted. ORBITS: The visualized portion of the orbits demonstrate no acute abnormality. SINUSES:  The visualized paranasal sinuses and mastoid air cells demonstrate no acute abnormality. SOFT TISSUES/SKULL: No acute abnormality of the visualized skull or soft tissues. CTA NECK: Motion degrades images limiting evaluation. AORTIC ARCH/ARCH VESSELS: There is atherosclerosis of the aortic arch and arch vessels. No convincing flow limiting stenosis of the innominate or visualized subclavian arteries. The distal left subclavian artery is obscured due to in flowing contrast. CAROTID ARTERIES: Atherosclerosis is seen of the common carotid arteries bilateral without evidence of a flow limiting stenosis. Atherosclerosis of the carotid bulbs and proximal internal carotid arteries bilaterally. Atherosclerosis contributes to less than 50% stenosis of the cervical ICAs bilaterally by NASCET criteria. VERTEBRAL ARTERIES: No significant stenosis seen of the right vertebral artery. There is minimal string of enhancement within the entirety of the left vertebral artery with no significant enhancement within the proximal left V4 segment. SOFT TISSUES: No acute abnormality within the visualized portion of the lungs or mediastinum. BONES: The osseous structures appear osteopenic. No convincing acute osseous abnormality seen. Multilevel degenerative changes of the cervical spine noted. There appear to be multiple dental caries. CTA HEAD: ANTERIOR CIRCULATION: Diffuse atherosclerosis of the internal carotid arteries bilaterally. Moderate stenosis of the right cavernous ICA with moderate stenosis of the left supraclinoid ICA. No significant stenosis seen of the anterior cerebral or middle cerebral arteries. No aneurysm identified. POSTERIOR CIRCULATION: No significant enhancement within the proximal left V4 segment.   There is atherosclerosis involving the right V4 segment contributing to mild stenosis. No significant stenosis seen of the basilar artery. No significant stenosis of the posterior cerebral arteries. No convincing aneurysm identified. The left PICA is seen likely due to retrograde flow. OTHER: No dural venous sinus thrombosis on this non-dedicated study. 1. No acute intracranial abnormality. 2. Mild-to-moderate global parenchymal volume loss with mild chronic microvascular ischemic changes. 3. Minimal string of enhancement within the entirety of the left cervical vertebral artery with no significant enhancement within the proximal left V4 segment. This is of uncertain chronicity. 4. Atherosclerosis contributes to less than 50% stenosis of the cervical ICAs bilaterally by NASCET criteria. 5. Atherosclerosis involving the intracranial ICAs contribute to moderate stenosis of the right cavernous ICA and moderate stenosis of the left supraclinoid ICA. EGD    Result Date: 12/25/2022  Site: [place] [id number] Patient Name: Yury Boston Procedure Date: 12/25/2022 MRN: [id number] YOB: 1940 Gender: Male Attending MD: Aubree Kapoor Referring MD:       Natalee Small Indications:        -  Hematemesis Medications:        -  See the Anesthesia note for documentation of the administered medications Complications:        -  No immediate complications. Estimated Blood Loss:        -  Estimated blood loss: none. Procedure:        - The scope was introduced through the mouth and advanced to the second part           of the duodenum.        -  The upper GI endoscopy was accomplished without difficulty. -  The patient tolerated the procedure well.  Findings:        -  LA Grade A (one or more mucosal breaks less than 5 mm, not extending           between tops of 2 mucosal folds) esophagitis with no bleeding was found at the           gastroesophageal junction.        -  No other significant abnormalities were identified in a careful examination           of the esophagus.        -  Patchy mildly erythematous mucosa without bleeding was found in the gastric           fundus.        -  No other significant abnormalities were identified in a careful examination           of the stomach. -  The examined duodenum was normal. Impression:        -  LA Grade A reflux esophagitis with no bleeding.        -  Erythematous mucosa in the gastric fundus.        -  Normal examined duodenum.        -  No specimens collected. Recommendation:        -  Clear liquid diet today. -  [facility] patient [facility]. - can consider c scope Procedure Code(s):        - 79015, Esophagogastroduodenoscopy, flexible, transoral; diagnostic,           including collection of specimen(s) by brushing or washing, when performed           (separate procedure) Diagnosis Code(s):        - K92.0, Hematemesis        - K21.00, Gastro-esophageal reflux disease with esophagitis, without bleeding        - K31.89, Other diseases of stomach and duodenum       CPT(R) - 2022 copyright [employer]. All Rights Reserved. The CPT codes, CCI edits and ICD codes generated are intended as suggestions       and were generated based on input data. These codes are preliminary and upon        review may be revised to meet current compliance and payer requirements. The provider is responsible for the final determination of appropriate codes,       and modifiers. Scope Withdrawal Time:       00:03:53 Signature Name: Jayla Wang MD Signature Statement: This document has been electronically signed.  Note Initiated On:12/25/2022 Signature Date:12/25/2022 10:17 AM      Electronically signed by Rneato Mckeon MD on 12/27/2022 at 2:46 PM

## 2022-12-27 NOTE — PROGRESS NOTES
Report called to KELLIE See ([facility]) on [facility]. Per Dr. Jacob Good, Barium Enema will be ordered and okay to start xarelto after Barium Emena or tomorrow.

## 2022-12-28 VITALS
SYSTOLIC BLOOD PRESSURE: 126 MMHG | OXYGEN SATURATION: 94 % | RESPIRATION RATE: 16 BRPM | HEIGHT: 71 IN | TEMPERATURE: 98.1 F | DIASTOLIC BLOOD PRESSURE: 68 MMHG | WEIGHT: 270 LBS | HEART RATE: 103 BPM | BODY MASS INDEX: 37.8 KG/M2

## 2022-12-28 LAB
ANION GAP SERPL CALCULATED.3IONS-SCNC: 10 MMOL/L (ref 4–16)
ANISOCYTOSIS: ABNORMAL
BANDED NEUTROPHILS ABSOLUTE COUNT: 0.92 K/CU MM
BANDED NEUTROPHILS RELATIVE PERCENT: 17 % (ref 5–11)
BUN BLDV-MCNC: 25 MG/DL (ref 6–23)
CALCIUM SERPL-MCNC: 7.3 MG/DL (ref 8.3–10.6)
CHLORIDE BLD-SCNC: 103 MMOL/L (ref 99–110)
CO2: 23 MMOL/L (ref 21–32)
CREAT SERPL-MCNC: 1.4 MG/DL (ref 0.9–1.3)
CULTURE: ABNORMAL
DIFFERENTIAL TYPE: ABNORMAL
EOSINOPHILS ABSOLUTE: 0.1 K/CU MM
EOSINOPHILS RELATIVE PERCENT: 1 % (ref 0–3)
GFR SERPL CREATININE-BSD FRML MDRD: 50 ML/MIN/1.73M2
GLUCOSE BLD-MCNC: 111 MG/DL (ref 70–99)
GLUCOSE BLD-MCNC: 115 MG/DL (ref 70–99)
GLUCOSE BLD-MCNC: 93 MG/DL (ref 70–99)
HCT VFR BLD CALC: 25.7 % (ref 42–52)
HEMOGLOBIN: 8 GM/DL (ref 13.5–18)
LYMPHOCYTES ABSOLUTE: 0.8 K/CU MM
LYMPHOCYTES RELATIVE PERCENT: 14 % (ref 24–44)
Lab: ABNORMAL
Lab: ABNORMAL
MAGNESIUM: 1.9 MG/DL (ref 1.8–2.4)
MCH RBC QN AUTO: 34 PG (ref 27–31)
MCHC RBC AUTO-ENTMCNC: 31.1 % (ref 32–36)
MCV RBC AUTO: 109.4 FL (ref 78–100)
METAMYELOCYTES ABSOLUTE COUNT: 0.05 K/CU MM
METAMYELOCYTES PERCENT: 1 %
MONOCYTES ABSOLUTE: 0.8 K/CU MM
MONOCYTES RELATIVE PERCENT: 15 % (ref 0–4)
PDW BLD-RTO: 19.2 % (ref 11.7–14.9)
PHOSPHORUS: 1.9 MG/DL (ref 2.5–4.9)
PLATELET # BLD: 74 K/CU MM (ref 140–440)
POTASSIUM SERPL-SCNC: 3.3 MMOL/L (ref 3.5–5.1)
RBC # BLD: 2.35 M/CU MM (ref 4.6–6.2)
RBC # BLD: ABNORMAL 10*6/UL
SEGMENTED NEUTROPHILS ABSOLUTE COUNT: 2.7 K/CU MM
SEGMENTED NEUTROPHILS RELATIVE PERCENT: 52 % (ref 36–66)
SODIUM BLD-SCNC: 136 MMOL/L (ref 135–145)
SPECIMEN: ABNORMAL
SPECIMEN: ABNORMAL
WBC # BLD: 5.4 K/CU MM (ref 4–10.5)
WBC # BLD: ABNORMAL 10*3/UL

## 2022-12-28 PROCEDURE — 6370000000 HC RX 637 (ALT 250 FOR IP): Performed by: INTERNAL MEDICINE

## 2022-12-28 PROCEDURE — G0378 HOSPITAL OBSERVATION PER HR: HCPCS

## 2022-12-28 PROCEDURE — 94761 N-INVAS EAR/PLS OXIMETRY MLT: CPT

## 2022-12-28 PROCEDURE — 85007 BL SMEAR W/DIFF WBC COUNT: CPT

## 2022-12-28 PROCEDURE — 80048 BASIC METABOLIC PNL TOTAL CA: CPT

## 2022-12-28 PROCEDURE — 85027 COMPLETE CBC AUTOMATED: CPT

## 2022-12-28 PROCEDURE — 83735 ASSAY OF MAGNESIUM: CPT

## 2022-12-28 PROCEDURE — 84100 ASSAY OF PHOSPHORUS: CPT

## 2022-12-28 PROCEDURE — 94640 AIRWAY INHALATION TREATMENT: CPT

## 2022-12-28 PROCEDURE — 36415 COLL VENOUS BLD VENIPUNCTURE: CPT

## 2022-12-28 PROCEDURE — 82962 GLUCOSE BLOOD TEST: CPT

## 2022-12-28 RX ORDER — POTASSIUM CHLORIDE 20 MEQ/1
40 TABLET, EXTENDED RELEASE ORAL ONCE
Status: COMPLETED | OUTPATIENT
Start: 2022-12-28 | End: 2022-12-28

## 2022-12-28 RX ADMIN — PANTOPRAZOLE SODIUM 40 MG: 40 TABLET, DELAYED RELEASE ORAL at 05:23

## 2022-12-28 RX ADMIN — CYANOCOBALAMIN TAB 1000 MCG 1000 MCG: 1000 TAB at 10:13

## 2022-12-28 RX ADMIN — TIOTROPIUM BROMIDE INHALATION SPRAY 2 PUFF: 3.12 SPRAY, METERED RESPIRATORY (INHALATION) at 12:40

## 2022-12-28 RX ADMIN — POTASSIUM CHLORIDE 40 MEQ: 1500 TABLET, EXTENDED RELEASE ORAL at 10:13

## 2022-12-28 RX ADMIN — CARVEDILOL 3.12 MG: 6.25 TABLET, FILM COATED ORAL at 10:13

## 2022-12-28 NOTE — DISCHARGE SUMMARY
Discharge Summary    Name:  Danyelle Aviles /Age/Sex:   [de-identified]80 y.o. male)   MRN & CSN:  [id number] & [id number] Admission Date/Time: 2022  7:58 PM   Attending:  Marina Reyes MD Discharging Physician: Marina Reyes MD       Admission Diagnosis:   Dizziness  Acute blood loss anemia  Lactic acidosis  Persistent atrial fibrillation  GERD  COPD  History of CAD  Hypertension  Obesity  Chronic back pain    Discharge Diagnosis:    Dizziness  Acute blood loss anemia  Lactic acidosis  Persistent atrial fibrillation  GERD  COPD  History of CAD  Hypertension  Obesity  Chronic back pain      Discharge Exam  Physical Exam  Vitals reviewed. Constitutional:       Appearance: Normal appearance. He is obese. HENT:      Head: Normocephalic and atraumatic. Nose: Nose normal.      Mouth/Throat:      Mouth: Mucous membranes are dry. Pharynx: Oropharynx is clear. Eyes:      General: No scleral icterus. Conjunctiva/sclera: Conjunctivae normal.   Cardiovascular:      Rate and Rhythm: Tachycardia present. Rhythm irregular. Pulses: Normal pulses. Heart sounds: Murmur heard. Pulmonary:      Effort: Pulmonary effort is normal.      Breath sounds: Normal breath sounds. No wheezing, rhonchi or rales. Abdominal:      General: Bowel sounds are normal. There is no distension. Palpations: Abdomen is soft. Tenderness: There is no abdominal tenderness. Musculoskeletal:         General: No deformity. Normal range of motion. Cervical back: Neck supple. No rigidity. Right lower leg: Edema present. Left lower leg: Edema present. Skin:     Coloration: Skin is pale. Skin is not jaundiced. Neurological:      General: No focal deficit present. Mental Status: He is alert and oriented to person, place, and time. Mental status is at baseline.      Hospital Course:   Danyelle Aviles is a 80 y.o.  male  who presents with Upper GI bleed    Above patient presented to hospital on 12/24/2022 with complaints of dizziness. Patient also had 1 episode of coffee-ground emesis in the ER. Vitals on presentation to ED-BP 70/42, , RR 18, temp 98, saturating 96% on room air. Lab work was significant for CO2 20, BUN 42, creatinine 1.5, lactic acid 4.6, magnesium 1.4, troponin 0.016, hemoglobin 7.3, WBC 14.2, platelets 91. Rapid COVID-negative. CT head-no acute abnormality, CTA head and neck-no LVO. Chest x-ray-no acute cardiopulmonary process. Patient received 1 L NS bolus, IV Protonix bolus, magnesium sulfate, Tranexamic acid. He was evaluated by GI. Patient underwent EGD on 12/25/2022 which showed normal esophagus apart from mild grade 1 esophagitis at GE junction. Patient also underwent colonoscopy on 12/27/2022 however it was a poor study due to poor bowel prep. 2 polyps were seen in the transverse colon and it was biopsied. Plan was for barium enema however it could not be completed due to patient having biopsy. He will complete these as outpatient. Prior to discharge, patient 's Xarelto was resumed and his hemoglobin was stable. His aspirin was also resumed upon discharge. Patient creatinine was stable prior to discharge, however it was not completely back to baseline. Hence his losartan and hydrochlorothiazide are on hold upon discharge. Patient has been advised to follow-up with a CBC and a BMP with his PCP for resumption of his antihypertensives and to make sure his hemoglobin is stable. The patient expressed appropriate understanding of and agreement with the discharge recommendations, medications, and plan.      Consults this admission:  IP CONSULT TO GI  IP CONSULT TO HOME CARE NEEDS      Discharge Instruction:   Handoff to PCP:     Follow up appointments: PCP, GI   Primary care physician:     Diet:  regular diet   Activity: activity as tolerated  Disposition: Discharged to:   [x]Home, []HHC, []SNF, []Acute Rehab, []Hospice   Condition on discharge: Stable    Discharge Medications:        Medication List        CONTINUE taking these medications      albuterol sulfate  (90 Base) MCG/ACT inhaler  Commonly known as: PROVENTIL;VENTOLIN;PROAIR     carvedilol 3.125 MG tablet  Commonly known as: COREG     ferrous gluconate 325 (37.5 Fe) MG Tabs     fluorouracil 5 % cream  Commonly known as: EFUDEX     HYDROcodone-acetaminophen  MG per tablet  Commonly known as: NORCO     mirtazapine 30 MG tablet  Commonly known as: REMERON     nitroGLYCERIN 0.4 MG SL tablet  Commonly known as: Nitrostat  Place 1 tablet under the tongue every 5 minutes as needed for Chest pain     potassium chloride 10 MEQ extended release capsule  Commonly known as: MICRO-K     pravastatin 80 MG tablet  Commonly known as: PRAVACHOL  TAKE ONE TABLET BY MOUTH EVERY DAY     Prevacid 30 MG delayed release capsule  Generic drug: lansoprazole     rivaroxaban 20 MG Tabs tablet  Commonly known as: Xarelto  Take 1 tablet by mouth every 24 hours     tiotropium 18 MCG inhalation capsule  Commonly known as: SPIRIVA     vitamin B-12 1000 MCG tablet  Commonly known as: CYANOCOBALAMIN     Vitamin D3 50 MCG (2000 UT) Caps            STOP taking these medications      aspirin 81 MG EC tablet     hydroCHLOROthiazide 12.5 MG capsule  Commonly known as: MICROZIDE     losartan 25 MG tablet  Commonly known as: COZAAR              Objective Findings at Discharge:       BMP/CBC  Recent Labs     12/26/22  1113 12/27/22  0250 12/28/22  0447   * 138 136   K 3.9 2.8* 3.3*    103 103   CO2 22 24 23   BUN 31* 28* 25*   CREATININE 1.5* 1.4* 1.4*   WBC 4.2 2.8* 5.4   HCT 24.3* 26.2* 25.7*    80* 74*       IMAGING:      Additional Information: Patient seen and examined day of discharge.  For more information regarding patient's care please contact [facility] [phone]    Discharge Time of 35 minutes    Electronically signed by Galindo Berger MD on 12/28/2022 at 3:10 PM

## 2022-12-28 NOTE — PROGRESS NOTES
[employer] [facility]       Progress Note       2022  1:42 PM    Patient:    Dinora Galan  :    80 y.o. MRN: [id number]  Admitted: 2022  7:58 PM ATT: Deanna Acuña MD   [id number]/4106-A  AdmitDx: Upper GI bleed [K92.2]  PCP: Penelope Kapoor MD    ASSESSMENT AND PLAN :    Anemia  Multifactorial  EGd mild gastritis  C scope incomplete - poor prep, unable to recah cecum, but on obvious cause to extent examined    Barium Enema planned but cannot be done for 7 days    At this point can start cautiously all antiplatelets and anticoagulents  Follow CBC    Can be discharged from GI standpoint    FU as OP    BE as OP, If patient does not want BE can do C scope in 1 year    Patient Active Problem List   Diagnosis Code    CAD s/p prox LAD bare metal stent  Z98.61    RBBB I45.10    COPD (chronic obstructive pulmonary disease) ([facility] [address] J44.9    DJD (degenerative joint disease) M19.90    Atrial fibrillation, persistent I48.19    Hypertension I10    Hyperlipidemia E78.5    Obesity E66.9    Risk for falls Z91.81    Fatigue R53.83    Family history of coronary artery disease Z82.49    Chronic midline low back pain without sciatica M54.50, G89.29    Upper GI bleed K92.2       Dr Janell Danielson MD  2022  1:42 PM      SUBJECTIVE:  Chart reviewed, events noted  Patient feeling well. No complaints. Having BM. Tolerating po diet. No N/V.  no abdominal pain.     ROS:  Cardiovascular ROS: No chest pain  Gastrointestinal ROS: see above  Respiratory ROS: No SOB    OBJECTIVE:      /68   Pulse (!) 103   Temp 98.1 °F (36.7 °C) (Oral)   Resp 16   Ht 5' 11\" (1.803 m)   Wt 270 lb (122.5 kg) Comment: bed scale not zeroed  SpO2 94%   BMI 37.66 kg/m²     NAD  RRR, Nl s1s2  Lungs CTA Bilaterally, normal effort  Abdomen soft, ND, NT, no HSM, Bowel sounds normal  AAOx3, No asterixis     CBC:   Recent Labs     22  1113 12/27/22  0250 12/28/22  0447   WBC 4.2 2.8* 5.4   HGB 7.8* 8.4* 8.0*    80* 74*     BMP:    Recent Labs     12/26/22  1113 12/27/22  0250 12/28/22  0447   * 138 136   K 3.9 2.8* 3.3*    103 103   CO2 22 24 23   BUN 31* 28* 25*   CREATININE 1.5* 1.4* 1.4*   GLUCOSE 122* 87 93     Magnesium:   Lab Results   Component Value Date/Time    MG 1.9 12/28/2022 04:47 AM     Hepatic: No results for input(s): AST, ALT, ALB, BILITOT, ALKPHOS in the last 72 hours. INR: No results for input(s): INR in the last 72 hours. Intake/Output Summary (Last 24 hours) at 12/28/2022 1342  Last data filed at 12/27/2022 1837  Gross per 24 hour   Intake --   Output 950 ml   Net -950 ml         Medications      Prior to Admission medications    Medication Sig Start Date End Date Taking?  Authorizing Provider   nitroGLYCERIN (NITROSTAT) 0.4 MG SL tablet Place 1 tablet under the tongue every 5 minutes as needed for Chest pain 8/2/21   Mariah Sánchez MD   rivaroxaban (XARELTO) 20 MG TABS tablet Take 1 tablet by mouth every 24 hours 1/27/20   Mariah Sánchez MD   mirtazapine (REMERON) 30 MG tablet Take 30 mg by mouth nightly    Historical Provider, MD   Cholecalciferol (VITAMIN D3) 2000 UNITS CAPS Take 1 capsule by mouth daily    Historical Provider, MD   pravastatin (PRAVACHOL) 80 MG tablet TAKE ONE TABLET BY MOUTH EVERY DAY 11/2/15   Mariah Sánchez MD   carvedilol (COREG) 3.125 MG tablet Take 3.125 mg by mouth 2 times daily (with meals)    Historical Provider, MD   fluorouracil (EFUDEX) 5 % cream Apply topically 2 times daily as needed     Historical Provider, MD   HYDROcodone-acetaminophen (NORCO)  MG per tablet Take 1 tablet by mouth every 6 hours as needed for Pain    Historical Provider, MD   vitamin B-12 (CYANOCOBALAMIN) 1000 MCG tablet Take 1,000 mcg by mouth daily    Historical Provider, MD   albuterol (PROVENTIL HFA;VENTOLIN HFA) 108 (90 BASE) MCG/ACT inhaler Inhale 2 puffs into the lungs every 6 hours as needed for Wheezing    Historical Provider, MD   ferrous gluconate 325 (37.5 FE) MG TABS Take 325 mg by mouth 2 times daily. Historical Provider, MD   potassium chloride (MICRO-K) 10 MEQ CR capsule Takes 2 tabs in a.m. and 1 tab in p.m. Historical Provider, MD   tiotropium (SPIRIVA) 18 MCG inhalation capsule Inhale 18 mcg into the lungs daily     Historical Provider, MD   lansoprazole (PREVACID) 30 MG capsule Take 30 mg by mouth daily. Historical Provider, MD      Scheduled Medications:    rivaroxaban  20 mg Oral Dinner    pantoprazole  40 mg Oral BID AC    carvedilol  3.125 mg Oral BID WC    pravastatin  80 mg Oral Nightly    mirtazapine  30 mg Oral Nightly    sodium chloride flush  5-40 mL IntraVENous 2 times per day    tiotropium  2 puff Inhalation Daily    vitamin B-12  1,000 mcg Oral Daily     Infusions:    sodium chloride 25 mL/hr at 12/27/22 0729     PRN Medications: simethicone, sodium chloride flush, sodium chloride, acetaminophen **OR** acetaminophen, albuterol sulfate HFA, HYDROcodone-acetaminophen  Allergies:    Allergies   Allergen Reactions    Lovenox [Enoxaparin Sodium]     Morphine Rash

## 2022-12-28 NOTE — PROGRESS NOTES
12/28/22 1217   Encounter Summary   Encounter Overview/Reason  Volunteer Encounter   Service Provided For: Patient   Referral/Consult From: Volunteer;[facility]   Support System Children   Last Encounter  12/28/22  (Patient received communion from Volunteer)   Complexity of Encounter Low   Begin Time 1025   End Time  1030   Total Time Calculated 5 min   Encounter    Type Initial Screen/Assessment   Spiritual/Emotional needs   Type Spiritual Support   Rituals, Rites and Sacraments   Type [employer]ion   Assessment/Intervention/Outcome   Assessment Peaceful   Intervention Prayer (assurance of)/[place];Sustaining Presence/Ministry of presence   Outcome Expressed Gratitude   Plan and Referrals   Plan/Referrals Continue to visit, (comment)

## 2022-12-28 NOTE — DISCHARGE INSTRUCTIONS
Please do your blood work before your primary care/GI appointment   Please note your losartan and hydrochlorothiazide has been stopped at discharge due to your kidney numbers being high.  Your family doctor will decide on whether to restart them depending on your blood work

## 2022-12-28 NOTE — PLAN OF CARE
Problem: Discharge Planning  Goal: Discharge to home or other facility with appropriate resources  Outcome: Completed     Problem: Skin/Tissue Integrity  Goal: Absence of new skin breakdown  Description: 1. Monitor for areas of redness and/or skin breakdown  2. Assess vascular access sites hourly  3. Every 4-6 hours minimum:  Change oxygen saturation probe site  4. Every 4-6 hours:  If on nasal continuous positive airway pressure, respiratory therapy assess nares and determine need for appliance change or resting period.   Outcome: Completed     Problem: ABCDS Injury Assessment  Goal: Absence of physical injury  Outcome: Completed     Problem: Safety - Adult  Goal: Free from fall injury  Outcome: Completed     Problem: Pain  Goal: Verbalizes/displays adequate comfort level or baseline comfort level  Outcome: Completed

## 2022-12-28 NOTE — CARE COORDINATION
[id number] Liaison spoke with pt and pt is agreeable to c at discharge. Verified address, pcp and numbers.  Please place inpatient consult to home health needs order in [facility] [place].

## 2023-01-02 ENCOUNTER — HOSPITAL ENCOUNTER (OUTPATIENT)
Age: 83
Setting detail: SPECIMEN
Discharge: HOME | End: 2023-01-02
Payer: MEDICARE

## 2023-01-02 LAB
ANION GAP SERPL CALCULATED.3IONS-SCNC: 10 MMOL/L (ref 4–16)
ANISOCYTOSIS: ABNORMAL
BUN BLDV-MCNC: 10 MG/DL (ref 6–23)
CALCIUM SERPL-MCNC: 8.1 MG/DL (ref 8.3–10.6)
CHLORIDE BLD-SCNC: 104 MMOL/L (ref 99–110)
CO2: 22 MMOL/L (ref 21–32)
CREAT SERPL-MCNC: 1.3 MG/DL (ref 0.9–1.3)
DIFFERENTIAL TYPE: ABNORMAL
EOSINOPHILS ABSOLUTE: 0.1 K/CU MM
EOSINOPHILS RELATIVE PERCENT: 1 % (ref 0–3)
GFR SERPL CREATININE-BSD FRML MDRD: 55 ML/MIN/1.73M2
GIANT PLATELETS: ABNORMAL
GLUCOSE FASTING: 115 MG/DL (ref 70–99)
HCT VFR BLD CALC: 28.6 % (ref 42–52)
HEMOGLOBIN: 9 GM/DL (ref 13.5–18)
HYPOCHROMIA: ABNORMAL
LYMPHOCYTES ABSOLUTE: 1.6 K/CU MM
LYMPHOCYTES RELATIVE PERCENT: 27 % (ref 24–44)
MCH RBC QN AUTO: 33.3 PG (ref 27–31)
MCHC RBC AUTO-ENTMCNC: 31.5 % (ref 32–36)
MCV RBC AUTO: 105.9 FL (ref 78–100)
MONOCYTES ABSOLUTE: 0.2 K/CU MM
MONOCYTES RELATIVE PERCENT: 3 % (ref 0–4)
PDW BLD-RTO: 17.4 % (ref 11.7–14.9)
PLATELET # BLD: 126 K/CU MM (ref 140–440)
PMV BLD AUTO: ABNORMAL FL (ref 7.5–11.1)
POTASSIUM SERPL-SCNC: 3.7 MMOL/L (ref 3.5–5.1)
RBC # BLD: 2.7 M/CU MM (ref 4.6–6.2)
SEGMENTED NEUTROPHILS ABSOLUTE COUNT: 3.9 K/CU MM
SEGMENTED NEUTROPHILS RELATIVE PERCENT: 69 % (ref 36–66)
SODIUM BLD-SCNC: 136 MMOL/L (ref 135–145)
WBC # BLD: 5.8 K/CU MM (ref 4–10.5)

## 2023-01-02 PROCEDURE — 85027 COMPLETE CBC AUTOMATED: CPT

## 2023-01-02 PROCEDURE — 85007 BL SMEAR W/DIFF WBC COUNT: CPT

## 2023-01-02 PROCEDURE — 80048 BASIC METABOLIC PNL TOTAL CA: CPT

## 2023-01-10 ENCOUNTER — OFFICE VISIT (OUTPATIENT)
Dept: CARDIOLOGY CLINIC | Age: 83
End: 2023-01-10
Payer: MEDICARE

## 2023-01-10 VITALS
SYSTOLIC BLOOD PRESSURE: 137 MMHG | HEART RATE: 97 BPM | DIASTOLIC BLOOD PRESSURE: 89 MMHG | WEIGHT: 277.2 LBS | BODY MASS INDEX: 52.34 KG/M2 | HEIGHT: 61 IN

## 2023-01-10 DIAGNOSIS — I48.19 ATRIAL FIBRILLATION, PERSISTENT (HCC): Primary | ICD-10-CM

## 2023-01-10 DIAGNOSIS — I10 PRIMARY HYPERTENSION: ICD-10-CM

## 2023-01-10 DIAGNOSIS — E78.00 PURE HYPERCHOLESTEROLEMIA: ICD-10-CM

## 2023-01-10 DIAGNOSIS — Z98.61 HISTORY OF PTCA: ICD-10-CM

## 2023-01-10 DIAGNOSIS — K92.2 UPPER GI BLEED: ICD-10-CM

## 2023-01-10 PROCEDURE — 1124F ACP DISCUSS-NO DSCNMKR DOCD: CPT | Performed by: INTERNAL MEDICINE

## 2023-01-10 PROCEDURE — 1111F DSCHRG MED/CURRENT MED MERGE: CPT | Performed by: INTERNAL MEDICINE

## 2023-01-10 PROCEDURE — 99214 OFFICE O/P EST MOD 30 MIN: CPT | Performed by: INTERNAL MEDICINE

## 2023-01-10 PROCEDURE — 3075F SYST BP GE 130 - 139MM HG: CPT | Performed by: INTERNAL MEDICINE

## 2023-01-10 PROCEDURE — 4004F PT TOBACCO SCREEN RCVD TLK: CPT | Performed by: INTERNAL MEDICINE

## 2023-01-10 PROCEDURE — G8484 FLU IMMUNIZE NO ADMIN: HCPCS | Performed by: INTERNAL MEDICINE

## 2023-01-10 PROCEDURE — G8417 CALC BMI ABV UP PARAM F/U: HCPCS | Performed by: INTERNAL MEDICINE

## 2023-01-10 PROCEDURE — G8427 DOCREV CUR MEDS BY ELIG CLIN: HCPCS | Performed by: INTERNAL MEDICINE

## 2023-01-10 PROCEDURE — 3079F DIAST BP 80-89 MM HG: CPT | Performed by: INTERNAL MEDICINE

## 2023-01-10 RX ORDER — FUROSEMIDE 20 MG/1
20 TABLET ORAL 2 TIMES DAILY
COMMUNITY

## 2023-01-10 NOTE — ASSESSMENT & PLAN NOTE
Well-controlled on current medications including carvedilol and furosemide which she is taking 20 mg twice a day along with potassium. Continue the same.

## 2023-01-10 NOTE — ASSESSMENT & PLAN NOTE
Recent upper endoscopy did not show any fresh blood however hemoglobin dropping to 7 given his chronic anticoagulation there was no other explanations suspect chronic intermittent blood loss. Xarelto has been resumed cautiously and he can start low-dose aspirin and have close follow-up with PCP for CBCs. We will refer him for possible watchman implantation as a better alternative to long-term anticoagulation.

## 2023-01-10 NOTE — ASSESSMENT & PLAN NOTE
Rate is well controlled on current dose of carvedilol. Continue the same. Chads vascular score is 3 and has bled score is 5. Patient is high risk to bleeding requiring blood transfusion and is also high risk for fall. He is a good candidate for watchman device as an alternative to long-term anticoagulation for stroke prevention. Does not have any previous history of DVT or pulm embolism. I have discussed with him and his son and he is interested in pursuing with this and I will have watchman device team to see him in consultation for further recommendation.

## 2023-01-10 NOTE — ASSESSMENT & PLAN NOTE
Clinically stable. Continue aggressive risk factor modification for secondary prevention and resume low-dose aspirin.

## 2023-01-10 NOTE — PATIENT INSTRUCTIONS
See colleagues depending on availability for Watchman consideration as alternative for long term anticoagulation. Resume low dose enteric coated Aspirin. Continue current cardiovascular medications which have been reviewed and discussed individually with you. Appropriate prescriptions if needed on this visit are addressed. After visit summery is provided. Questions answered and patient verbalizes understanding. Follow up in 6 months,  sooner if needed.

## 2023-01-19 ENCOUNTER — OFFICE VISIT (OUTPATIENT)
Dept: CARDIOLOGY CLINIC | Age: 83
End: 2023-01-19
Payer: MEDICARE

## 2023-01-19 VITALS
SYSTOLIC BLOOD PRESSURE: 118 MMHG | WEIGHT: 283 LBS | RESPIRATION RATE: 16 BRPM | BODY MASS INDEX: 40.52 KG/M2 | HEIGHT: 70 IN | HEART RATE: 94 BPM | DIASTOLIC BLOOD PRESSURE: 60 MMHG

## 2023-01-19 DIAGNOSIS — Z91.81 RISK FOR FALLS: ICD-10-CM

## 2023-01-19 DIAGNOSIS — Z98.61 HISTORY OF PTCA: ICD-10-CM

## 2023-01-19 DIAGNOSIS — I48.19 ATRIAL FIBRILLATION, PERSISTENT (HCC): Primary | ICD-10-CM

## 2023-01-19 DIAGNOSIS — E78.00 PURE HYPERCHOLESTEROLEMIA: ICD-10-CM

## 2023-01-19 DIAGNOSIS — K92.2 UPPER GI BLEED: ICD-10-CM

## 2023-01-19 DIAGNOSIS — I10 PRIMARY HYPERTENSION: ICD-10-CM

## 2023-01-19 PROCEDURE — 99204 OFFICE O/P NEW MOD 45 MIN: CPT | Performed by: INTERNAL MEDICINE

## 2023-01-19 PROCEDURE — G8484 FLU IMMUNIZE NO ADMIN: HCPCS | Performed by: INTERNAL MEDICINE

## 2023-01-19 PROCEDURE — G8417 CALC BMI ABV UP PARAM F/U: HCPCS | Performed by: INTERNAL MEDICINE

## 2023-01-19 PROCEDURE — G8427 DOCREV CUR MEDS BY ELIG CLIN: HCPCS | Performed by: INTERNAL MEDICINE

## 2023-01-19 PROCEDURE — 3074F SYST BP LT 130 MM HG: CPT | Performed by: INTERNAL MEDICINE

## 2023-01-19 PROCEDURE — 3078F DIAST BP <80 MM HG: CPT | Performed by: INTERNAL MEDICINE

## 2023-01-19 RX ORDER — HYDROCHLOROTHIAZIDE 12.5 MG/1
TABLET ORAL
COMMUNITY
Start: 2022-11-29 | End: 2023-01-19 | Stop reason: ALTCHOICE

## 2023-01-19 NOTE — PROGRESS NOTES
[id number] Score for Atrial Fibrillation Stroke Risk   Risk   Factors  Component Value   C CHF No 0   H HTN Yes 1   A2 Age >= 76 Yes,  (80 y.o.) 2   D DM No 0   S2 Prior Stroke/TIA No 0   V Vascular Disease No 0   A Age 74-69 No,  (80 y.o.) 0   Sc Sex male 0    [id number]  Score  3   Score last updated 1/19/23 37:66 PM EST    Click here for a link to the UpToDate guideline \"Atrial Fibrillation: Anticoagulation therapy to prevent embolization    Disclaimer: Risk Score calculation is dependent on accuracy of patient problem list and past encounter diagnosis.

## 2023-01-19 NOTE — ASSESSMENT & PLAN NOTE
Chads Vascor of at least 4 he is high risk for stroke he had detailed long discussion he would like to cut down or stop anticoagulation.   Since his renal function is abnormal we will reduce his Xarelto to 15 mg daily  He says that he wants to think about watchman and is not ready as yet  Advised to follow-up with Dr. Victor Manuel Greco , I will check on him and 3 to 6 months in the meanwhile if he decides to pursue watchman or has any more episodes of bleeding or anemia will revisit watchman procedure

## 2023-01-19 NOTE — ASSESSMENT & PLAN NOTE
He reports occasional dizziness currently on carvedilol on Lasix 20 mg daily get echo to evaluate LV function

## 2023-01-19 NOTE — PROGRESS NOTES
CARDIOLOGY  NOTE    Chief Complaint: GI Bleeding    HPI:   Tomas Barber is a 80y.o. year old who has Past medical history as noted below. Devaughn Galan comes in for discussion about anticoagulation management he has history of coronary artery disease s/p stent many years ago in 2011 he has not had any recent echo or stress test but he was admitted with profound anemia with hemoglobin down to 7 requiring blood transfusion in Dec 2022  History of persistent atrial fibrillation and takes Xarelto 20 mg daily  He is quite adamant that he would rather not take anticoagulation anymore since it makes him bruise and bleed easily. GI work-up was negative for upper GI bleed but lower colonoscopy was not a good quality. He says he has sob with exertion but denies any chest pain  He says that he is traumatized from his admission in December and at this point cannot even think about getting admitted or having any procedures done he said he was bad experience overall being in the hospital anyway. He has multiple questions and is questioning why he is taking blood thinners.   3.125 mg twice daily continue to monitor      Current Outpatient Medications   Medication Sig Dispense Refill    rivaroxaban (XARELTO) 15 MG TABS tablet Take 1 tablet by mouth every 24 hours 90 tablet 4    furosemide (LASIX) 20 MG tablet Take 20 mg by mouth 2 times daily 2 times a day for 7 days then once a day      aspirin EC 81 MG EC tablet Take 1 tablet by mouth daily 90 tablet 1    nitroGLYCERIN (NITROSTAT) 0.4 MG SL tablet Place 1 tablet under the tongue every 5 minutes as needed for Chest pain 25 tablet 3    mirtazapine (REMERON) 30 MG tablet Take 30 mg by mouth nightly      Cholecalciferol (VITAMIN D3) 2000 UNITS CAPS Take 1 capsule by mouth daily      pravastatin (PRAVACHOL) 80 MG tablet TAKE ONE TABLET BY MOUTH EVERY DAY 30 tablet 6    carvedilol (COREG) 3.125 MG tablet Take 3.125 mg by mouth 2 times daily (with meals) vitamin B-12 (CYANOCOBALAMIN) 1000 MCG tablet Take 1,000 mcg by mouth daily      albuterol (PROVENTIL HFA;VENTOLIN HFA) 108 (90 BASE) MCG/ACT inhaler Inhale 2 puffs into the lungs every 6 hours as needed for Wheezing      ferrous gluconate 325 (37.5 FE) MG TABS Take 325 mg by mouth 2 times daily. potassium chloride (MICRO-K) 10 MEQ CR capsule Takes 2 tabs in a.m. and 1 tab in p.m.      tiotropium (SPIRIVA) 18 MCG inhalation capsule Inhale 18 mcg into the lungs daily       lansoprazole (PREVACID) 30 MG delayed release capsule Take 30 mg by mouth daily. No current facility-administered medications for this visit. Allergies:   Lovenox [enoxaparin sodium] and Morphine    Patient History:  Past Medical History:   Diagnosis Date    AR (aortic regurgitation)     mild to mod    Atrial fibrillation, new onset (HCC)     CAD (coronary artery disease)     History of angioplasty    Chronic midline low back pain without sciatica 5/1/2017    Has seen Dr. Maninder Buck and had shots    COPD (chronic obstructive pulmonary disease) ([facility] [address]     Family history of coronary artery disease     Fatigue 10/2016    H/O cardiovascular stress test 2/21/2012    mild ischemia in the basal inferior and mid inferior regions ef is 47    H/O chest x-ray 10/20/2016    Right pleural effusion resolved. H/O Doppler ultrasound 10/2016    CAROTID-Kolby. arteries patent w/less than 50% stenosis in the internal carotid arteries. H/O echocardiogram 2/17/15    Aortic sclerosis without stenosis, normal LV size and wall motion w/low normal systolic function, LA dilatation, RV dilatation, mild pulmonary HTN, EF 50-55%.     History of cardiac cath 7/2/1996    LV uniform LV contractility RCa dominatn 50-60 prox stenosis  LM patent  LAD mild mid plaquing diag has 70 ostial narrowing ramus minimal plaquing cx normal    History of PTCA 08/15/2011    prox lad bare metal stent 8/2011    Hyperlipidemia     Hypertension     Obesity     Obesity, Class II, BMI 35-39.9, with comorbidity 2014    RBBB     Risk for falls 10/26/2015    Tachycardia      Past Surgical History:   Procedure Laterality Date    COLONOSCOPY N/A 2022    COLONOSCOPY POLYPECTOMY SNARE/COLD BIOPSY performed by Edilberto Deleon MD at [address] REPLACEMENT  2004    partial left knee    UPPER GASTROINTESTINAL ENDOSCOPY N/A 2022    EGD DIAGNOSTIC ONLY performed by Edilberto Deleon MD at [facility] ENDOSCOPY     Family History   Problem Relation Age of Onset    Heart Disease Mother     Heart Disease Father     Cancer Sister      Social History     Tobacco Use    Smoking status: Former     Packs/day: 1.50     Years: 25.00     Pack years: 37.50     Types: Cigarettes     Quit date: 1990     Years since quittin.2    Smokeless tobacco: Current     Types: Chew    Tobacco comments:     Chews tobacco        Substance Use Topics    Alcohol use: Yes     Alcohol/week: 56.0 standard drinks     Types: 56 Cans of beer per week     Comment: 6-8 beers daily or less        Review of Systems:   Constitutional: No Fever or Weight Loss   Eyes: No Decreased Vision  ENT: No Headaches, Hearing Loss or Vertigo  Cardiovascular: as per note above   Respiratory: No cough or wheezing and as per note above.    Gastrointestinal: No abdominal pain, appetite loss, blood in stools, constipation, diarrhea or heartburn  Genitourinary: No dysuria, trouble voiding, or hematuria  Musculoskeletal:  denies any new  joint aches , swelling  or pain   Integumentary: No rash or pruritis  Neurological: No TIA or stroke symptoms  Psychiatric: No anxiety or depression  Endocrine: No malaise, fatigue or temperature intolerance  Hematologic/Lymphatic: No bleeding problems, blood clots or swollen lymph nodes  Allergic/Immunologic: No nasal congestion or hives    Objective:      Physical Exam:  /60   Pulse 94   Resp 16   Ht 5' 10\" (1.778 m)   Wt 283 lb (128.4 kg)   BMI 40.61 kg/m²   Wt Readings from Last 3 Encounters:   01/19/23 283 lb (128.4 kg)   01/10/23 277 lb 3.2 oz (125.7 kg)   12/25/22 270 lb (122.5 kg)     Body mass index is 40.61 kg/m². Vitals:    01/19/23 1209   BP: 118/60   Pulse: 94   Resp: 16        General Appearance:  No distress, conversant  Constitutional:  Well developed, Well nourished, No acute distress, Non-toxic appearance. HENT:  Normocephalic, Atraumatic, Bilateral external ears normal, Oropharynx moist, No oral exudates, Nose normal. Neck- Normal range of motion, No tenderness, Supple, No stridor,no apical-carotid delay  Eyes:  PERRL, EOMI, Conjunctiva normal, No discharge. Respiratory:  Normal breath sounds, No respiratory distress, No wheezing, No chest tenderness. ,no use of accessory muscles, NO crackles  Cardiovascular: (PMI) apex non displaced,no lifts no thrills,S1 and S2 audible, No added heart sounds, No signs of ankle edema, or volume overload, No evidence of JVD, No crackles   GI:  Bowel sounds normal, Soft, No tenderness, No masses, No gross visceromegaly   :  No costovertebral angle tenderness   Musculoskeletal:  No edema, no tenderness, no deformities.  Back- no tenderness  Integument:  Well hydrated, no rash   Lymphatic:  No lymphadenopathy noted   Neurologic:  Alert & oriented x 3, CN 2-12 normal, normal motor function, normal sensory function, no focal deficits noted   Psychiatric:  Speech and behavior appropriate       Medical decision making and Data review:  DATA:  Lab Results   Component Value Date    TROPONINT 0.016 (H) 12/24/2022     BNP:    Lab Results   Component Value Date    PROBNP 999.6 (H) 07/21/2015     PT/INR:  No results found for: PTINR  Lab Results   Component Value Date    LABA1C 5.2 10/05/2022    LABA1C 5.7 04/19/2022     Lab Results   Component Value Date    CHOL 127 06/17/2020    TRIG 81 06/17/2020    HDL 57 10/05/2022    LDLCALC 47 10/05/2022    LDLDIRECT 62 10/26/2021     Lab Results   Component Value Date    ALT 8 (L) 12/24/2022    AST 17 12/24/2022     No results for input(s): WBC, HGB, HCT, MCV, PLT in the last 72 hours. TSH: No results found for: TSH  Lab Results   Component Value Date    AST 17 12/24/2022    ALT 8 (L) 12/24/2022    BILIDIR 0.2 04/19/2012    BILITOT 0.4 12/24/2022    ALKPHOS 48 12/24/2022     Lab Results   Component Value Date    PROBNP 999.6 (H) 07/21/2015     Lab Results   Component Value Date    LABA1C 5.2 10/05/2022    LABA1C 5.7 04/19/2022     Lab Results   Component Value Date    WBC 5.8 01/02/2023    HGB 9.0 (L) 01/02/2023    HCT 28.6 (L) 01/02/2023     (L) 01/02/2023     All labs, medications and tests reviewed by myself including data and history from outside source , patient and available family . Assessment & Plan:      1. Atrial fibrillation, persistent    2. CAD s/p prox LAD bare metal stent 2011    3. Pure hypercholesterolemia    4. Primary hypertension    5. Risk for falls    6. Upper GI bleed         CAD s/p prox LAD bare metal stent 2011   PCI in 2011 currently denies any symptomsBut does have shortness of breath we will get Lexiscan    Hypertension  He reports occasional dizziness currently on carvedilol on Lasix 20 mg daily get echo to evaluate LV function    Atrial fibrillation, persistent   Chads Vascor of at least 4 he is high risk for stroke he had detailed long discussion he would like to cut down or stop anticoagulation. Since his renal function is abnormal we will reduce his Xarelto to 15 mg daily  He says that he wants to think about watchman and is not ready as yet  Advised to follow-up with Dr. Francis Rizvi , I will check on him and 3 to 6 months in the meanwhile if he decides to pursue watchman or has any more episodes of bleeding or anemia will revisit watchman procedure     Dyslipidemia :  All available lab work was reviewed. Patient was advised to repeat lab work before next visit.  Necessary orders were placed , instructions given by myself       Counseled extensively and medication compliance urged. We discussed that for the  prevention of ASCVD our  goal is aggressive risk modification. Patient is encouraged to exercise if they can , educated about  brisk walk for 30 minutes  at least 3 to 4 times a week if there are no physical limitations  Various goals were discussed and questions answered. Continue current medications. Appropriate prescriptions are addressed and refills ordered. Questions answered and patient verbalizes understanding. Call for any problems, questions, or concerns. Greater than 60 % of time spent counseling besides reviewing data and images     Continue all other medications of all above medical condition listed as is. Return in about 3 months (around 4/19/2023). Please note this report has been partially produced using speech recognition software and may contain errors related to that system including errors in grammar, punctuation, and spelling, as well as words and phrases that may be inappropriate. If there are any questions or concerns please feel free to contact the dictating provider for clarification.

## 2023-02-21 ENCOUNTER — PROCEDURE VISIT (OUTPATIENT)
Dept: CARDIOLOGY CLINIC | Age: 83
End: 2023-02-21

## 2023-02-21 DIAGNOSIS — K92.2 UPPER GI BLEED: ICD-10-CM

## 2023-02-21 DIAGNOSIS — Z98.61 HISTORY OF PTCA: ICD-10-CM

## 2023-02-21 DIAGNOSIS — I48.19 ATRIAL FIBRILLATION, PERSISTENT (HCC): ICD-10-CM

## 2023-02-21 DIAGNOSIS — R06.02 SOB (SHORTNESS OF BREATH): Primary | ICD-10-CM

## 2023-02-21 LAB
LV EF: 58 %
LV EF: 60 %
LVEF MODALITY: NORMAL
LVEF MODALITY: NORMAL

## 2023-02-23 ENCOUNTER — TELEPHONE (OUTPATIENT)
Dept: CARDIOLOGY CLINIC | Age: 83
End: 2023-02-23

## 2023-02-23 NOTE — TELEPHONE ENCOUNTER
Called patient left a message that based on his NM test results which were abnormal. Dr. Andrea Watson would like to see him in the office to discuss poss heart cath. PLEASE CALL THE OFFICE TO SCHEDULE AND APPOINTMENT.

## 2023-03-09 ENCOUNTER — OFFICE VISIT (OUTPATIENT)
Dept: CARDIOLOGY CLINIC | Age: 83
End: 2023-03-09

## 2023-03-09 ENCOUNTER — PROCEDURE VISIT (OUTPATIENT)
Dept: CARDIOLOGY CLINIC | Age: 83
End: 2023-03-09

## 2023-03-09 VITALS
SYSTOLIC BLOOD PRESSURE: 136 MMHG | HEIGHT: 70 IN | BODY MASS INDEX: 39.28 KG/M2 | DIASTOLIC BLOOD PRESSURE: 70 MMHG | HEART RATE: 98 BPM | WEIGHT: 274.4 LBS

## 2023-03-09 DIAGNOSIS — E66.01 CLASS 2 SEVERE OBESITY DUE TO EXCESS CALORIES WITH SERIOUS COMORBIDITY AND BODY MASS INDEX (BMI) OF 39.0 TO 39.9 IN ADULT (HCC): ICD-10-CM

## 2023-03-09 DIAGNOSIS — I10 PRIMARY HYPERTENSION: Primary | ICD-10-CM

## 2023-03-09 DIAGNOSIS — Z01.818 PRE-OP TESTING: Primary | ICD-10-CM

## 2023-03-09 DIAGNOSIS — E78.00 PURE HYPERCHOLESTEROLEMIA: ICD-10-CM

## 2023-03-09 DIAGNOSIS — I45.10 RBBB: ICD-10-CM

## 2023-03-09 DIAGNOSIS — R53.83 FATIGUE, UNSPECIFIED TYPE: ICD-10-CM

## 2023-03-09 DIAGNOSIS — Z98.61 HISTORY OF PTCA: ICD-10-CM

## 2023-03-09 DIAGNOSIS — I48.19 ATRIAL FIBRILLATION, PERSISTENT (HCC): ICD-10-CM

## 2023-03-09 RX ORDER — ISOSORBIDE MONONITRATE 30 MG/1
30 TABLET, EXTENDED RELEASE ORAL DAILY
Qty: 30 TABLET | Refills: 3 | Status: SHIPPED | OUTPATIENT
Start: 2023-03-09 | End: 2023-03-09 | Stop reason: SDUPTHER

## 2023-03-09 RX ORDER — ISOSORBIDE MONONITRATE 30 MG/1
30 TABLET, EXTENDED RELEASE ORAL DAILY
Qty: 30 TABLET | Refills: 3 | Status: SHIPPED | OUTPATIENT
Start: 2023-03-09

## 2023-03-09 NOTE — PROGRESS NOTES
CARDIOLOGY  NOTE    Chief Complaint: GI Bleeding    HPI:   [employer] is a 80y.o. year old who has Past medical history as noted below. Nika Sue comes in for discussion about anticoagulation management he has history of coronary artery disease s/p stent many years ago in 2011 he has not had any recent echo or stress test but he was admitted with profound anemia with hemoglobin down to 7 requiring blood transfusion in Dec 2022  History of persistent atrial fibrillation and takes Xarelto 20 mg daily  He is quite adamant that he would rather not take anticoagulation anymore since it makes him bruise and bleed easily. GI work-up was negative for upper GI bleed but lower colonoscopy was not a good quality. He had stress test shows anterior apical ischemia   He says he has sob with exertion but denies any chest pain  He says that he is traumatized from his admission in December and at this point cannot even think about getting admitted or having any procedures done he said he was bad experience overall being in the hospital anyway. He has multiple questions and is questioning why he is taking blood thinners.       Current Outpatient Medications   Medication Sig Dispense Refill    furosemide (LASIX) 20 MG tablet Take 20 mg by mouth 2 times daily 2 times a day for 7 days then once a day      aspirin EC 81 MG EC tablet Take 1 tablet by mouth daily 90 tablet 1    nitroGLYCERIN (NITROSTAT) 0.4 MG SL tablet Place 1 tablet under the tongue every 5 minutes as needed for Chest pain 25 tablet 3    mirtazapine (REMERON) 30 MG tablet Take 30 mg by mouth nightly      pravastatin (PRAVACHOL) 80 MG tablet TAKE ONE TABLET BY MOUTH EVERY DAY 30 tablet 6    carvedilol (COREG) 3.125 MG tablet Take 3.125 mg by mouth 2 times daily (with meals)      albuterol (PROVENTIL HFA;VENTOLIN HFA) 108 (90 BASE) MCG/ACT inhaler Inhale 2 puffs into the lungs every 6 hours as needed for Wheezing      ferrous gluconate 325 (37.5 FE) MG TABS Take 325 mg by mouth 2 times daily. lansoprazole (PREVACID) 30 MG delayed release capsule Take 30 mg by mouth daily. rivaroxaban (XARELTO) 15 MG TABS tablet Take 1 tablet by mouth every 24 hours 90 tablet 4    Cholecalciferol (VITAMIN D3) 2000 UNITS CAPS Take 1 capsule by mouth daily (Patient not taking: Reported on 3/9/2023)      vitamin B-12 (CYANOCOBALAMIN) 1000 MCG tablet Take 1,000 mcg by mouth daily      potassium chloride (MICRO-K) 10 MEQ CR capsule Takes 2 tabs in a.m. and 1 tab in p.m. (Patient not taking: Reported on 3/9/2023)      tiotropium (SPIRIVA) 18 MCG inhalation capsule Inhale 18 mcg into the lungs daily        No current facility-administered medications for this visit. Allergies:   Lovenox [enoxaparin sodium] and Morphine    Patient History:  Past Medical History:   Diagnosis Date    AR (aortic regurgitation)     mild to mod    Atrial fibrillation, new onset (HCC)     CAD (coronary artery disease)     History of angioplasty    Chronic midline low back pain without sciatica 5/1/2017    Has seen Dr. Davion Guerra and had shots    COPD (chronic obstructive pulmonary disease) ([facility] [address]     Family history of coronary artery disease     Fatigue 10/2016    H/O cardiovascular stress test 2/21/2012    mild ischemia in the basal inferior and mid inferior regions ef is 47    H/O chest x-ray 10/20/2016    Right pleural effusion resolved. H/O Doppler ultrasound 10/2016    CAROTID-[place]olby. arteries patent w/less than 50% stenosis in the internal carotid arteries. H/O echocardiogram 2/17/15    Aortic sclerosis without stenosis, normal LV size and wall motion w/low normal systolic function, LA dilatation, RV dilatation, mild pulmonary HTN, EF 50-55%.     History of cardiac cath 7/2/1996    LV uniform LV contractility RCa dominatn 50-60 prox stenosis  LM patent  LAD mild mid plaquing diag has 70 ostial narrowing ramus minimal plaquing cx normal    History of PTCA 08/15/2011    prox lad bare metal stent 2011    Hyperlipidemia     Hypertension     Obesity     Obesity, Class II, BMI 35-39.9, with comorbidity 2014    RBBB     Risk for falls 10/26/2015    Tachycardia      Past Surgical History:   Procedure Laterality Date    COLONOSCOPY N/A 2022    COLONOSCOPY POLYPECTOMY SNARE/COLD BIOPSY performed by Bronwyn Tavera MD at [address] [place] [address] Avenue REPLACEMENT  2004    partial left knee    UPPER GASTROINTESTINAL ENDOSCOPY N/A 2022    EGD DIAGNOSTIC ONLY performed by Bronwyn Tavera MD at [facility] ENDOSCOPY     Family History   Problem Relation Age of Onset    Heart Disease Mother     Heart Disease Father     Cancer Sister      Social History     Tobacco Use    Smoking status: Former     Packs/day: 1.50     Years: 25.00     Pack years: 37.50     Types: Cigarettes     Quit date: 1990     Years since quittin.3    Smokeless tobacco: Current     Types: Chew    Tobacco comments:     Chews tobacco        Substance Use Topics    Alcohol use: Yes     Alcohol/week: 56.0 standard drinks     Types: 56 Cans of beer per week     Comment: 6-8 beers daily or less        Review of Systems:   Constitutional: No Fever or Weight Loss   Eyes: No Decreased Vision  ENT: No Headaches, Hearing Loss or Vertigo  Cardiovascular: as per note above   Respiratory: No cough or wheezing and as per note above.    Gastrointestinal: No abdominal pain, appetite loss, blood in stools, constipation, diarrhea or heartburn  Genitourinary: No dysuria, trouble voiding, or hematuria  Musculoskeletal:  denies any new  joint aches , swelling  or pain   Integumentary: No rash or pruritis  Neurological: No TIA or stroke symptoms  Psychiatric: No anxiety or depression  Endocrine: No malaise, fatigue or temperature intolerance  Hematologic/Lymphatic: No bleeding problems, blood clots or swollen lymph nodes  Allergic/Immunologic: No nasal congestion or hives    Objective:      Physical Exam:  /70   Pulse 98   Ht 5' 10.2\" (1.783 m)   Wt 274 lb 6.4 oz (124.5 kg)   BMI 39.15 kg/m²   Wt Readings from Last 3 Encounters:   03/09/23 274 lb 6.4 oz (124.5 kg)   01/19/23 283 lb (128.4 kg)   01/10/23 277 lb 3.2 oz (125.7 kg)     Body mass index is 39.15 kg/m². Vitals:    03/09/23 1428   BP: 136/70   Pulse: 98        General Appearance:  No distress, conversant  Constitutional:  Well developed, Well nourished, No acute distress, Non-toxic appearance. HENT:  Normocephalic, Atraumatic, Bilateral external ears normal, Oropharynx moist, No oral exudates, Nose normal. Neck- Normal range of motion, No tenderness, Supple, No stridor,no apical-carotid delay  Eyes:  PERRL, EOMI, Conjunctiva normal, No discharge. Respiratory:  Normal breath sounds, No respiratory distress, No wheezing, No chest tenderness. ,no use of accessory muscles, NO crackles  Cardiovascular: (PMI) apex non displaced,no lifts no thrills,S1 and S2 audible, No added heart sounds, No signs of ankle edema, or volume overload, No evidence of JVD, No crackles   GI:  Bowel sounds normal, Soft, No tenderness, No masses, No gross visceromegaly   :  No costovertebral angle tenderness   Musculoskeletal:  No edema, no tenderness, no deformities.  Back- no tenderness  Integument:  Well hydrated, no rash   Lymphatic:  No lymphadenopathy noted   Neurologic:  Alert & oriented x 3, CN 2-12 normal, normal motor function, normal sensory function, no focal deficits noted   Psychiatric:  Speech and behavior appropriate       Medical decision making and Data review:  DATA:  Lab Results   Component Value Date    TROPONINT 0.016 (H) 12/24/2022     BNP:    Lab Results   Component Value Date    PROBNP 999.6 (H) 07/21/2015     PT/INR:  No results found for: PTINR  Lab Results   Component Value Date    LABA1C 5.2 10/05/2022    LABA1C 5.7 04/19/2022     Lab Results   Component Value Date    CHOL 127 06/17/2020    TRIG 81 06/17/2020    HDL 57 10/05/2022 LDLCALC 47 10/05/2022    LDLDIRECT 62 10/26/2021     Lab Results   Component Value Date    ALT 8 (L) 12/24/2022    AST 17 12/24/2022     No results for input(s): WBC, HGB, HCT, MCV, PLT in the last 72 hours. TSH: No results found for: TSH  Lab Results   Component Value Date    AST 17 12/24/2022    ALT 8 (L) 12/24/2022    BILIDIR 0.2 04/19/2012    BILITOT 0.4 12/24/2022    ALKPHOS 48 12/24/2022     Lab Results   Component Value Date    PROBNP 999.6 (H) 07/21/2015     Lab Results   Component Value Date    LABA1C 5.2 10/05/2022    LABA1C 5.7 04/19/2022     Lab Results   Component Value Date    WBC 5.8 01/02/2023    HGB 9.0 (L) 01/02/2023    HCT 28.6 (L) 01/02/2023     (L) 01/02/2023       Stress test 2/21/2023  Summary    Supervising physician Dr. Rosibel Bragg . Normal EF 60 % with normal ventricular contractility. Apical inferior wall severe ischemia of medium size    Abnormal stress test     Echo 2/21/2023  Summary   Left ventricular systolic function is normal with an ejection fraction of   55-60%. Dilatation of the aortic root measuring 3.8 cm. Sclerotic, but non-stenotic aortic valve. Mild-to-moderate aortic regurgitation is noted with a pressure half time of   454 msec. Normal pulmonary artery pressure with a RVSP of 33 mmHg. No evidence of pericardial effusion. All labs, medications and tests reviewed by myself including data and history from outside source , patient and available family . Assessment & Plan:      1. Primary hypertension    2. Class 2 severe obesity due to excess calories with serious comorbidity and body mass index (BMI) of 39.0 to 39.9 in adult ([address]    3. RBBB    4. Pure hypercholesterolemia    5. Fatigue, unspecified type    6. CAD s/p prox LAD bare metal stent 2011    7.  Atrial fibrillation, persistent           CAD s/p prox LAD bare metal stent 2011   PCI in 2011 currently denies any symptomsBut does have shortness of breath , stress test shows anterior apical inferior wall ischemia   Will plan cath Alternates and risk of the procedure were dicussed in detail  He will think about cath and let me know   We went over risks and benefits     Hypertension  He reports occasional dizziness currently on Lasix 20 mg daily get echo shows normal EF       Atrial fibrillation, persistent   Chads Vascor of at least 4 he is high risk for stroke he had detailed long discussion he would like to cut down or stop anticoagulation. Since his renal function is abnormal we will reduce his Xarelto to 15 mg daily  He says that he wants to think about watchman and is not ready as yet  Advised to follow-up with Dr. Nilton Mayen , I will check on him and 3 to 6 months in the meanwhile if he decides to pursue watchman or has any more episodes of bleeding or anemia will revisit watchman procedure     Anemia    Stable Hct of 28 , he has had endoscopy and colonoscopy work up was negative in Dec 2022       Dyslipidemia :  All available lab work was reviewed. Patient was advised to repeat lab work before next visit. Necessary orders were placed , instructions given by myself       Counseled extensively and medication compliance urged. We discussed that for the  prevention of ASCVD our  goal is aggressive risk modification. Patient is encouraged to exercise if they can , educated about  brisk walk for 30 minutes  at least 3 to 4 times a week if there are no physical limitations  Various goals were discussed and questions answered. Continue current medications. Appropriate prescriptions are addressed and refills ordered. Questions answered and patient verbalizes understanding. Call for any problems, questions, or concerns. Greater than 60 % of time spent counseling besides reviewing data and images     Continue all other medications of all above medical condition listed as is. No follow-ups on file.     Please note this report has been partially produced using speech recognition software and may contain errors related to that system including errors in grammar, punctuation, and spelling, as well as words and phrases that may be inappropriate. If there are any questions or concerns please feel free to contact the dictating provider for clarification.

## 2023-03-27 ENCOUNTER — HOSPITAL ENCOUNTER (OUTPATIENT)
Age: 83
Discharge: HOME | End: 2023-03-27
Payer: MEDICARE

## 2023-03-27 LAB
ABO/RH: NORMAL
ANION GAP SERPL CALCULATED.3IONS-SCNC: 9 MMOL/L (ref 4–16)
ANTIBODY SCREEN: NEGATIVE
BASOPHILS ABSOLUTE: 0 K/CU MM
BASOPHILS RELATIVE PERCENT: 0.1 % (ref 0–1)
BUN SERPL-MCNC: 13 MG/DL (ref 6–23)
CALCIUM SERPL-MCNC: 8.6 MG/DL (ref 8.3–10.6)
CHLORIDE BLD-SCNC: 101 MMOL/L (ref 99–110)
CO2: 30 MMOL/L (ref 21–32)
COMMENT: NORMAL
CREAT SERPL-MCNC: 1.2 MG/DL (ref 0.9–1.3)
DIFFERENTIAL TYPE: ABNORMAL
EOSINOPHILS ABSOLUTE: 0 K/CU MM
EOSINOPHILS RELATIVE PERCENT: 0.6 % (ref 0–3)
GFR SERPL CREATININE-BSD FRML MDRD: >60 ML/MIN/1.73M2
GLUCOSE P FAST SERPL-MCNC: 200 MG/DL (ref 70–99)
HCT VFR BLD CALC: 42.2 % (ref 42–52)
HEMOGLOBIN: 13.5 GM/DL (ref 13.5–18)
IMMATURE NEUTROPHIL %: 0.7 % (ref 0–0.43)
LYMPHOCYTES ABSOLUTE: 1.1 K/CU MM
LYMPHOCYTES RELATIVE PERCENT: 16.4 % (ref 24–44)
MCH RBC QN AUTO: 33.6 PG (ref 27–31)
MCHC RBC AUTO-ENTMCNC: 32 % (ref 32–36)
MCV RBC AUTO: 105 FL (ref 78–100)
MONOCYTES ABSOLUTE: 0.3 K/CU MM
MONOCYTES RELATIVE PERCENT: 5 % (ref 0–4)
PDW BLD-RTO: 15.3 % (ref 11.7–14.9)
PLATELET # BLD: 61 K/CU MM (ref 140–440)
PMV BLD AUTO: ABNORMAL FL (ref 7.5–11.1)
POTASSIUM SERPL-SCNC: 3.5 MMOL/L (ref 3.5–5.1)
RBC # BLD: 4.02 M/CU MM (ref 4.6–6.2)
SEGMENTED NEUTROPHILS ABSOLUTE COUNT: 5.3 K/CU MM
SEGMENTED NEUTROPHILS RELATIVE PERCENT: 77.2 % (ref 36–66)
SODIUM BLD-SCNC: 140 MMOL/L (ref 135–145)
TOTAL IMMATURE NEUTOROPHIL: 0.05 K/CU MM
WBC # BLD: 6.8 K/CU MM (ref 4–10.5)

## 2023-03-27 PROCEDURE — 86900 BLOOD TYPING SEROLOGIC ABO: CPT

## 2023-03-27 PROCEDURE — 86850 RBC ANTIBODY SCREEN: CPT

## 2023-03-27 PROCEDURE — 86901 BLOOD TYPING SEROLOGIC RH(D): CPT

## 2023-03-27 PROCEDURE — 85025 COMPLETE CBC W/AUTO DIFF WBC: CPT

## 2023-03-27 PROCEDURE — 36415 COLL VENOUS BLD VENIPUNCTURE: CPT

## 2023-03-27 PROCEDURE — 80048 BASIC METABOLIC PNL TOTAL CA: CPT

## 2023-03-29 ENCOUNTER — HOSPITAL ENCOUNTER (OUTPATIENT)
Dept: CARDIAC CATH/INVASIVE PROCEDURES | Age: 83
Discharge: HOME | End: 2023-03-29
Attending: INTERNAL MEDICINE | Admitting: INTERNAL MEDICINE
Payer: MEDICARE

## 2023-03-29 ENCOUNTER — HOSPITAL ENCOUNTER (OUTPATIENT)
Dept: NON INVASIVE DIAGNOSTICS | Age: 83
Discharge: HOME | End: 2023-03-29
Payer: MEDICARE

## 2023-03-29 VITALS
RESPIRATION RATE: 18 BRPM | SYSTOLIC BLOOD PRESSURE: 124 MMHG | HEIGHT: 70 IN | HEART RATE: 86 BPM | WEIGHT: 268 LBS | TEMPERATURE: 97.2 F | DIASTOLIC BLOOD PRESSURE: 64 MMHG | BODY MASS INDEX: 38.37 KG/M2 | OXYGEN SATURATION: 97 %

## 2023-03-29 VITALS
HEART RATE: 95 BPM | OXYGEN SATURATION: 98 % | SYSTOLIC BLOOD PRESSURE: 141 MMHG | RESPIRATION RATE: 14 BRPM | DIASTOLIC BLOOD PRESSURE: 58 MMHG

## 2023-03-29 PROCEDURE — 93320 DOPPLER ECHO COMPLETE: CPT | Performed by: INTERNAL MEDICINE

## 2023-03-29 PROCEDURE — 93312 ECHO TRANSESOPHAGEAL: CPT

## 2023-03-29 PROCEDURE — 93458 L HRT ARTERY/VENTRICLE ANGIO: CPT

## 2023-03-29 PROCEDURE — C1894 INTRO/SHEATH, NON-LASER: HCPCS

## 2023-03-29 PROCEDURE — 93458 L HRT ARTERY/VENTRICLE ANGIO: CPT | Performed by: INTERNAL MEDICINE

## 2023-03-29 PROCEDURE — 93325 DOPPLER ECHO COLOR FLOW MAPG: CPT | Performed by: INTERNAL MEDICINE

## 2023-03-29 PROCEDURE — 6360000004 HC RX CONTRAST MEDICATION

## 2023-03-29 PROCEDURE — 7100000001 HC PACU RECOVERY - ADDTL 15 MIN

## 2023-03-29 PROCEDURE — 2709999900 HC NON-CHARGEABLE SUPPLY

## 2023-03-29 PROCEDURE — 93312 ECHO TRANSESOPHAGEAL: CPT | Performed by: INTERNAL MEDICINE

## 2023-03-29 PROCEDURE — C1769 GUIDE WIRE: HCPCS

## 2023-03-29 PROCEDURE — 2500000003 HC RX 250 WO HCPCS

## 2023-03-29 PROCEDURE — 7100000000 HC PACU RECOVERY - FIRST 15 MIN

## 2023-03-29 PROCEDURE — 6360000002 HC RX W HCPCS

## 2023-03-29 RX ORDER — ACETAMINOPHEN 325 MG/1
650 TABLET ORAL EVERY 4 HOURS PRN
Status: CANCELLED | OUTPATIENT
Start: 2023-03-29

## 2023-03-29 RX ORDER — SODIUM CHLORIDE 9 MG/ML
INJECTION, SOLUTION INTRAVENOUS PRN
Status: CANCELLED | OUTPATIENT
Start: 2023-03-29

## 2023-03-29 RX ORDER — SODIUM CHLORIDE 9 MG/ML
INJECTION, SOLUTION INTRAVENOUS CONTINUOUS
Status: DISCONTINUED | OUTPATIENT
Start: 2023-03-29 | End: 2023-03-29 | Stop reason: HOSPADM

## 2023-03-29 RX ORDER — SODIUM CHLORIDE 0.9 % (FLUSH) 0.9 %
5-40 SYRINGE (ML) INJECTION EVERY 12 HOURS SCHEDULED
Status: CANCELLED | OUTPATIENT
Start: 2023-03-29

## 2023-03-29 RX ORDER — SODIUM CHLORIDE 0.9 % (FLUSH) 0.9 %
5-40 SYRINGE (ML) INJECTION PRN
Status: CANCELLED | OUTPATIENT
Start: 2023-03-29

## 2023-03-29 RX ORDER — HYDRALAZINE HYDROCHLORIDE 20 MG/ML
10 INJECTION INTRAMUSCULAR; INTRAVENOUS EVERY 10 MIN PRN
Status: CANCELLED | OUTPATIENT
Start: 2023-03-29

## 2023-03-29 RX ORDER — 0.9 % SODIUM CHLORIDE 0.9 %
500 INTRAVENOUS SOLUTION INTRAVENOUS ONCE
Status: CANCELLED | OUTPATIENT
Start: 2023-03-29 | End: 2023-03-29

## 2023-03-29 NOTE — H&P
capsule Take 30 mg by mouth daily. Historical Provider, MD       Review of systems: Review of Systems:   Constitutional: No Fever or Weight Loss   Eyes: No Decreased Vision  ENT: No Headaches, Hearing Loss or Vertigo  Cardiovascular: No chest pain, dyspnea on exertion, palpitations or loss of consciousness  Respiratory: No cough or wheezing    Gastrointestinal: No abdominal pain, appetite loss, blood in stools, constipation, diarrhea or heartburn  Genitourinary: No dysuria, trouble voiding, or hematuria  Musculoskeletal:  No gait disturbance, weakness or joint complaints  Integumentary: No rash or pruritis  Neurological: No TIA or stroke symptoms  Psychiatric: No anxiety or depression  Endocrine: No malaise, fatigue or temperature intolerance  Hematologic/Lymphatic: No bleeding problems, blood clots or swollen lymph nodes  Allergic/Immunologic: No nasal congestion or hives    Physical Examination:    BP (!) 157/89   Temp 97.2 °F (36.2 °C) (Temporal)   Resp 18   Ht 5' 10\" (1.778 m)   Wt 268 lb (121.6 kg)   SpO2 97%   BMI 38.45 kg/m²      General Appearance:  No distress, conversant  Constitutional:  Well developed, Well nourished, No acute distress, Non-toxic appearance. HENT:  Normocephalic, Atraumatic, Bilateral external ears normal, Oropharynx moist, No oral exudates, Nose normal. Neck- Normal range of motion, No tenderness, Supple, No stridor,no apical-carotid delay  Eyes:  PERRL, EOMI, Conjunctiva normal, No discharge. Lymphatics: no palpable lymph nodes  Respiratory:  Normal breath sounds, No respiratory distress, No wheezing, No chest tenderness. ,no uise of accessory muscles, JVP not elevated  Cardiovascular: (PMI) apex non displaced,no lifts no thrills, no s3,no s4, Normal heart rate, Normal rhythm, No murmurs, No rubs, No gallops.    GI:  Bowel sounds normal, Soft, No tenderness, No masses, No pulsatile masses, no hepatosplenomegally, no bruits  Musculoskeletal:  Intact distal pulses, No edema, No tenderness, No cyanosis, No clubbing. Good range of motion in all major joints. No tenderness to palpation or major deformities noted. Back- No tenderness. Integument:  Warm, Dry, No erythema, No rash. Skin: no rash, no ulcers  Lymphatic:  No lymphadenopathy noted. Neurologic:  Alert & oriented x 3, Normal motor function, Normal sensory function, No focal deficits noted. Lab Review   Recent Labs     03/27/23  0900   WBC 6.8   HGB 13.5   HCT 42.2   PLT 61*      Recent Labs     03/27/23  0900      K 3.5      CO2 30   BUN 13   CREATININE 1.2     No results for input(s): AST, ALT, ALB, BILIDIR, BILITOT, ALKPHOS in the last 72 hours. No results for input(s): TROPONINI in the last 72 hours. Lab Results   Component Value Date    INR 0.92 03/14/2011    PROTIME 9.9 (L) 03/14/2011     No results found for: BNP      Assessment:    Active Problems:    * No active hospital problems. *     ASA : 2 , Mallampati class II  Plan:   1. : Alternate and risks versus benefits were discussed in detail. We will plan EMILIANO. We  discussed the risk of but not limited to  potential kidney failure, emergent surgery,blood transfusion  transfusion, infection, potential even death.   Patient is in agreement and wants to proceed

## 2023-03-29 NOTE — CARE COORDINATION
Saw patient in the cath lab holding. Patient referred for Watchman and will have Pre EMILIANO today. My role as Watchman Coordinator explained. Jooce discussed, Brochure provided and [employer] shared. Explained to the patient:  Part of the FDA approval of the Watchman procedure in 2015 mandated that each procedure must participate in a national registry, this requires several follow up appointments and testing following the procedure. These expectations Include:      7-10 day follow up with the Nurse practitioner or Dr Maria De Jesus Goldberg    45 day follow up lab work and EMILIANO to check positioning of the device. 6 month follow up telephone call     1 year follow up appointment, EMILIANO and lab work     2  year follow up appointment and lab work . Discussed the importance of blood thinners as prescribed and that the patient cannot come off those blood thinners until discontinued by the implanting physician. Patient has no surgery or procedures planned that would disrupt these needed blood thinners. Claudia RUANO at bedside. All questions and concerns answered. Will follow.     Karl Leal RN  Watchman Coordinator

## 2023-04-04 ENCOUNTER — TELEPHONE (OUTPATIENT)
Dept: CARDIAC CATH/INVASIVE PROCEDURES | Age: 83
End: 2023-04-04

## 2023-04-04 RX ORDER — ISOSORBIDE MONONITRATE 30 MG/1
TABLET, EXTENDED RELEASE ORAL
Qty: 30 TABLET | Refills: 3 | Status: SHIPPED | OUTPATIENT
Start: 2023-04-04

## 2023-04-04 NOTE — TELEPHONE ENCOUNTER
Attempted to call the patient regarding Watchman procedure. No answer. Message left for call back. Will follow.   Electronically signed by Joi Colon RN on 4/4/2023 at 1:35 PM [facility]

## 2023-04-05 ENCOUNTER — TELEPHONE (OUTPATIENT)
Dept: CARDIAC CATH/INVASIVE PROCEDURES | Age: 83
End: 2023-04-05

## 2023-04-05 NOTE — TELEPHONE ENCOUNTER
Attempted to reach the patient to discuss scheduling Watchman procedure. No answer. Message left. Will follow.   Electronically signed by Andrei De RN on 4/5/2023 at 4:02 PM [facility]

## 2023-04-20 ENCOUNTER — OFFICE VISIT (OUTPATIENT)
Dept: CARDIOLOGY CLINIC | Age: 83
End: 2023-04-20
Payer: MEDICARE

## 2023-04-20 VITALS
WEIGHT: 275.4 LBS | SYSTOLIC BLOOD PRESSURE: 90 MMHG | DIASTOLIC BLOOD PRESSURE: 60 MMHG | BODY MASS INDEX: 39.43 KG/M2 | HEIGHT: 70 IN | HEART RATE: 83 BPM

## 2023-04-20 DIAGNOSIS — I10 PRIMARY HYPERTENSION: ICD-10-CM

## 2023-04-20 DIAGNOSIS — I45.10 RBBB: ICD-10-CM

## 2023-04-20 DIAGNOSIS — Z82.49 FAMILY HISTORY OF CORONARY ARTERY DISEASE: ICD-10-CM

## 2023-04-20 DIAGNOSIS — Z98.61 HISTORY OF PTCA: ICD-10-CM

## 2023-04-20 DIAGNOSIS — R53.83 FATIGUE, UNSPECIFIED TYPE: Primary | ICD-10-CM

## 2023-04-20 DIAGNOSIS — I25.10 ASCVD (ARTERIOSCLEROTIC CARDIOVASCULAR DISEASE): ICD-10-CM

## 2023-04-20 DIAGNOSIS — I48.19 ATRIAL FIBRILLATION, PERSISTENT (HCC): ICD-10-CM

## 2023-04-20 DIAGNOSIS — E78.00 PURE HYPERCHOLESTEROLEMIA: ICD-10-CM

## 2023-04-20 PROCEDURE — 3074F SYST BP LT 130 MM HG: CPT | Performed by: INTERNAL MEDICINE

## 2023-04-20 PROCEDURE — G8427 DOCREV CUR MEDS BY ELIG CLIN: HCPCS | Performed by: INTERNAL MEDICINE

## 2023-04-20 PROCEDURE — 1124F ACP DISCUSS-NO DSCNMKR DOCD: CPT | Performed by: INTERNAL MEDICINE

## 2023-04-20 PROCEDURE — 93000 ELECTROCARDIOGRAM COMPLETE: CPT | Performed by: INTERNAL MEDICINE

## 2023-04-20 PROCEDURE — G8417 CALC BMI ABV UP PARAM F/U: HCPCS | Performed by: INTERNAL MEDICINE

## 2023-04-20 PROCEDURE — 3078F DIAST BP <80 MM HG: CPT | Performed by: INTERNAL MEDICINE

## 2023-04-20 PROCEDURE — 4004F PT TOBACCO SCREEN RCVD TLK: CPT | Performed by: INTERNAL MEDICINE

## 2023-04-20 PROCEDURE — 99214 OFFICE O/P EST MOD 30 MIN: CPT | Performed by: INTERNAL MEDICINE

## 2023-04-20 RX ORDER — NITROGLYCERIN 0.4 MG/1
0.4 TABLET SUBLINGUAL EVERY 5 MIN PRN
Qty: 25 TABLET | Refills: 3 | Status: SHIPPED | OUTPATIENT
Start: 2023-04-20

## 2023-04-20 NOTE — PROGRESS NOTES
pravastatin (PRAVACHOL) 80 MG tablet TAKE ONE TABLET BY MOUTH EVERY DAY 30 tablet 6    carvedilol (COREG) 3.125 MG tablet Take 1 tablet by mouth 2 times daily (with meals)      vitamin B-12 (CYANOCOBALAMIN) 1000 MCG tablet Take 1 tablet by mouth daily      albuterol (PROVENTIL HFA;VENTOLIN HFA) 108 (90 BASE) MCG/ACT inhaler Inhale 2 puffs into the lungs every 6 hours as needed for Wheezing      ferrous gluconate 325 (37.5 FE) MG TABS Take 1 tablet by mouth 2 times daily      potassium chloride (MICRO-K) 10 MEQ CR capsule Takes 2 tabs in a.m. and 1 tab in p.m.      tiotropium (SPIRIVA) 18 MCG inhalation capsule Inhale 1 capsule into the lungs daily      lansoprazole (PREVACID) 30 MG delayed release capsule Take 1 capsule by mouth daily      mirtazapine (REMERON) 30 MG tablet Take 30 mg by mouth nightly (Patient not taking: Reported on 4/20/2023)       No current facility-administered medications for this visit. Allergies:   Lovenox [enoxaparin sodium] and Morphine    Patient History:  Past Medical History:   Diagnosis Date    AR (aortic regurgitation)     mild to mod    Atrial fibrillation, new onset (HCC)     CAD (coronary artery disease)     History of angioplasty    Chronic midline low back pain without sciatica 5/1/2017    Has seen Dr. Mecca Melendez and had shots    COPD (chronic obstructive pulmonary disease) ([facility] [address]     Family history of coronary artery disease     Fatigue 10/2016    H/O cardiovascular stress test 2/21/2012    mild ischemia in the basal inferior and mid inferior regions ef is 47    H/O chest x-ray 10/20/2016    Right pleural effusion resolved. H/O Doppler ultrasound 10/2016    CAROTID-[place]. arteries patent w/less than 50% stenosis in the internal carotid arteries. H/O echocardiogram 2/17/15    Aortic sclerosis without stenosis, normal LV size and wall motion w/low normal systolic function, LA dilatation, RV dilatation, mild pulmonary HTN, EF 50-55%.     History of cardiac cath 7/2/1996

## 2023-07-06 RX ORDER — ISOSORBIDE MONONITRATE 30 MG/1
TABLET, EXTENDED RELEASE ORAL
Qty: 90 TABLET | Refills: 3 | Status: SHIPPED | OUTPATIENT
Start: 2023-07-06

## 2023-08-03 ENCOUNTER — HOSPITAL ENCOUNTER (INPATIENT)
Age: 83
LOS: 1 days | Discharge: ANOTHER HOSPITAL | DRG: 871 | End: 2023-08-04
Attending: EMERGENCY MEDICINE | Admitting: FAMILY MEDICINE
Payer: MEDICARE

## 2023-08-03 ENCOUNTER — TELEPHONE (OUTPATIENT)
Dept: OTHER | Age: 83
End: 2023-08-03

## 2023-08-03 ENCOUNTER — APPOINTMENT (OUTPATIENT)
Dept: CT IMAGING | Age: 83
DRG: 871 | End: 2023-08-03
Payer: MEDICARE

## 2023-08-03 DIAGNOSIS — R79.89 ELEVATED BRAIN NATRIURETIC PEPTIDE (BNP) LEVEL: ICD-10-CM

## 2023-08-03 DIAGNOSIS — E87.6 HYPOKALEMIA: ICD-10-CM

## 2023-08-03 DIAGNOSIS — D64.9 ANEMIA, UNSPECIFIED TYPE: ICD-10-CM

## 2023-08-03 DIAGNOSIS — R77.8 ELEVATED TROPONIN: ICD-10-CM

## 2023-08-03 DIAGNOSIS — I48.91 ATRIAL FIBRILLATION WITH RVR (HCC): Primary | ICD-10-CM

## 2023-08-03 DIAGNOSIS — D72.829 LEUKOCYTOSIS, UNSPECIFIED TYPE: ICD-10-CM

## 2023-08-03 DIAGNOSIS — N28.9 RENAL INSUFFICIENCY: ICD-10-CM

## 2023-08-03 PROCEDURE — 84443 ASSAY THYROID STIM HORMONE: CPT

## 2023-08-03 PROCEDURE — 99285 EMERGENCY DEPT VISIT HI MDM: CPT

## 2023-08-03 PROCEDURE — 96375 TX/PRO/DX INJ NEW DRUG ADDON: CPT

## 2023-08-03 PROCEDURE — 70450 CT HEAD/BRAIN W/O DYE: CPT

## 2023-08-03 PROCEDURE — 72125 CT NECK SPINE W/O DYE: CPT

## 2023-08-03 PROCEDURE — G0480 DRUG TEST DEF 1-7 CLASSES: HCPCS

## 2023-08-03 PROCEDURE — 84439 ASSAY OF FREE THYROXINE: CPT

## 2023-08-03 PROCEDURE — 96365 THER/PROPH/DIAG IV INF INIT: CPT

## 2023-08-03 PROCEDURE — 93005 ELECTROCARDIOGRAM TRACING: CPT | Performed by: EMERGENCY MEDICINE

## 2023-08-03 PROCEDURE — 85027 COMPLETE CBC AUTOMATED: CPT

## 2023-08-03 PROCEDURE — 76376 3D RENDER W/INTRP POSTPROCES: CPT

## 2023-08-03 PROCEDURE — 96374 THER/PROPH/DIAG INJ IV PUSH: CPT

## 2023-08-03 PROCEDURE — 96368 THER/DIAG CONCURRENT INF: CPT

## 2023-08-03 PROCEDURE — 83880 ASSAY OF NATRIURETIC PEPTIDE: CPT

## 2023-08-03 PROCEDURE — 85007 BL SMEAR W/DIFF WBC COUNT: CPT

## 2023-08-03 PROCEDURE — 96376 TX/PRO/DX INJ SAME DRUG ADON: CPT

## 2023-08-03 RX ORDER — FUROSEMIDE 10 MG/ML
40 INJECTION INTRAMUSCULAR; INTRAVENOUS ONCE
Status: COMPLETED | OUTPATIENT
Start: 2023-08-03 | End: 2023-08-04

## 2023-08-03 RX ORDER — DILTIAZEM HYDROCHLORIDE 5 MG/ML
0.25 INJECTION INTRAVENOUS ONCE
Status: COMPLETED | OUTPATIENT
Start: 2023-08-04 | End: 2023-08-04

## 2023-08-03 ASSESSMENT — PAIN - FUNCTIONAL ASSESSMENT: PAIN_FUNCTIONAL_ASSESSMENT: NONE - DENIES PAIN

## 2023-08-04 ENCOUNTER — APPOINTMENT (OUTPATIENT)
Dept: CT IMAGING | Age: 83
DRG: 871 | End: 2023-08-04
Payer: MEDICARE

## 2023-08-04 ENCOUNTER — HOSPITAL ENCOUNTER (INPATIENT)
Age: 83
LOS: 13 days | Discharge: NURSING, REHAB OR LONG-TERM CARE | End: 2023-08-17
Attending: FAMILY MEDICINE | Admitting: INTERNAL MEDICINE
Payer: MEDICARE

## 2023-08-04 ENCOUNTER — PARAMEDICINE (OUTPATIENT)
Dept: OTHER | Age: 83
End: 2023-08-04

## 2023-08-04 VITALS
HEIGHT: 70 IN | OXYGEN SATURATION: 100 % | TEMPERATURE: 98.2 F | SYSTOLIC BLOOD PRESSURE: 94 MMHG | WEIGHT: 261.1 LBS | HEART RATE: 114 BPM | RESPIRATION RATE: 19 BRPM | BODY MASS INDEX: 37.38 KG/M2 | DIASTOLIC BLOOD PRESSURE: 51 MMHG

## 2023-08-04 PROBLEM — I48.91 ATRIAL FIBRILLATION (HCC): Status: ACTIVE | Noted: 2023-08-04

## 2023-08-04 PROBLEM — A41.9 SEPSIS (HCC): Status: ACTIVE | Noted: 2023-08-04

## 2023-08-04 LAB
ALBUMIN SERPL-MCNC: 2.2 GM/DL (ref 3.4–5)
ALBUMIN SERPL-MCNC: 2.6 GM/DL (ref 3.4–5)
ALCOHOL SCREEN SERUM: <0.01 %WT/VOL
ALP BLD-CCNC: 109 IU/L (ref 40–129)
ALP BLD-CCNC: 134 IU/L (ref 40–129)
ALT SERPL-CCNC: 11 U/L (ref 10–40)
ALT SERPL-CCNC: 8 U/L (ref 10–40)
ANION GAP SERPL CALCULATED.3IONS-SCNC: 10 MMOL/L (ref 4–16)
ANION GAP SERPL CALCULATED.3IONS-SCNC: 13 MMOL/L (ref 4–16)
ANISOCYTOSIS: ABNORMAL
AST SERPL-CCNC: 23 IU/L (ref 15–37)
AST SERPL-CCNC: 28 IU/L (ref 15–37)
BANDED NEUTROPHILS ABSOLUTE COUNT: 0.25 K/CU MM
BANDED NEUTROPHILS RELATIVE PERCENT: 1 % (ref 5–11)
BASOPHILS ABSOLUTE: 0 K/CU MM
BASOPHILS RELATIVE PERCENT: 0.1 % (ref 0–1)
BILIRUB SERPL-MCNC: 0.4 MG/DL (ref 0–1)
BILIRUB SERPL-MCNC: 0.5 MG/DL (ref 0–1)
BILIRUBIN URINE: NEGATIVE MG/DL
BLOOD, URINE: NEGATIVE
BUN SERPL-MCNC: 22 MG/DL (ref 6–23)
BUN SERPL-MCNC: 22 MG/DL (ref 6–23)
CALCIUM SERPL-MCNC: 8 MG/DL (ref 8.3–10.6)
CALCIUM SERPL-MCNC: 8.6 MG/DL (ref 8.3–10.6)
CHLORIDE BLD-SCNC: 92 MMOL/L (ref 99–110)
CHLORIDE BLD-SCNC: 97 MMOL/L (ref 99–110)
CHOLEST SERPL-MCNC: 54 MG/DL
CLARITY: CLEAR
CO2: 28 MMOL/L (ref 21–32)
CO2: 28 MMOL/L (ref 21–32)
COLOR: YELLOW
COMMENT UA: NORMAL
CREAT SERPL-MCNC: 1.4 MG/DL (ref 0.9–1.3)
CREAT SERPL-MCNC: 1.5 MG/DL (ref 0.9–1.3)
DIFFERENTIAL TYPE: ABNORMAL
DIFFERENTIAL TYPE: ABNORMAL
EKG ATRIAL RATE: 125 BPM
EKG ATRIAL RATE: 136 BPM
EKG DIAGNOSIS: NORMAL
EKG DIAGNOSIS: NORMAL
EKG P AXIS: 0 DEGREES
EKG P-R INTERVAL: 130 MS
EKG Q-T INTERVAL: 318 MS
EKG Q-T INTERVAL: 362 MS
EKG QRS DURATION: 118 MS
EKG QRS DURATION: 162 MS
EKG QTC CALCULATION (BAZETT): 478 MS
EKG QTC CALCULATION (BAZETT): 485 MS
EKG R AXIS: -55 DEGREES
EKG R AXIS: -69 DEGREES
EKG T AXIS: 19 DEGREES
EKG T AXIS: 28 DEGREES
EKG VENTRICULAR RATE: 108 BPM
EKG VENTRICULAR RATE: 136 BPM
EOSINOPHILS ABSOLUTE: 0 K/CU MM
EOSINOPHILS RELATIVE PERCENT: 0.1 % (ref 0–3)
FERRITIN: 121 NG/ML (ref 30–400)
FOLATE SERPL-MCNC: 10.8 NG/ML (ref 3.1–17.5)
GFR SERPL CREATININE-BSD FRML MDRD: 46 ML/MIN/1.73M2
GFR SERPL CREATININE-BSD FRML MDRD: 50 ML/MIN/1.73M2
GLUCOSE SERPL-MCNC: 101 MG/DL (ref 70–99)
GLUCOSE SERPL-MCNC: 92 MG/DL (ref 70–99)
GLUCOSE, URINE: NEGATIVE MG/DL
HCT VFR BLD CALC: 24.5 % (ref 42–52)
HCT VFR BLD CALC: 24.7 % (ref 42–52)
HDLC SERPL-MCNC: 25 MG/DL
HEMOGLOBIN: 7.3 GM/DL (ref 13.5–18)
HEMOGLOBIN: 7.6 GM/DL (ref 13.5–18)
HYPOCHROMIA: ABNORMAL
IMMATURE NEUTROPHIL %: 0.9 % (ref 0–0.43)
IRON: 16 UG/DL (ref 59–158)
KETONES, URINE: NEGATIVE MG/DL
LACTIC ACID, SEPSIS: 1.2 MMOL/L (ref 0.5–1.9)
LACTIC ACID, SEPSIS: 2.1 MMOL/L (ref 0.5–1.9)
LDLC SERPL CALC-MCNC: 17 MG/DL
LEUKOCYTE ESTERASE, URINE: NEGATIVE
LV EF: 58 %
LVEF MODALITY: NORMAL
LYMPHOCYTES ABSOLUTE: 0.7 K/CU MM
LYMPHOCYTES ABSOLUTE: 0.8 K/CU MM
LYMPHOCYTES RELATIVE PERCENT: 2.6 % (ref 24–44)
LYMPHOCYTES RELATIVE PERCENT: 3 % (ref 24–44)
MACROCYTES: ABNORMAL
MAGNESIUM: 1.7 MG/DL (ref 1.8–2.4)
MCH RBC QN AUTO: 27.5 PG (ref 27–31)
MCH RBC QN AUTO: 27.9 PG (ref 27–31)
MCHC RBC AUTO-ENTMCNC: 29.8 % (ref 32–36)
MCHC RBC AUTO-ENTMCNC: 30.8 % (ref 32–36)
MCV RBC AUTO: 90.8 FL (ref 78–100)
MCV RBC AUTO: 92.5 FL (ref 78–100)
MICROCYTES: ABNORMAL
MONOCYTES ABSOLUTE: 1.5 K/CU MM
MONOCYTES ABSOLUTE: 2.1 K/CU MM
MONOCYTES RELATIVE PERCENT: 6 % (ref 0–4)
MONOCYTES RELATIVE PERCENT: 7.4 % (ref 0–4)
NITRITE URINE, QUANTITATIVE: NEGATIVE
PCT TRANSFERRIN: 7 % (ref 10–44)
PDW BLD-RTO: 17 % (ref 11.7–14.9)
PDW BLD-RTO: 17.1 % (ref 11.7–14.9)
PH, URINE: 7 (ref 5–8)
PLATELET # BLD: 249 K/CU MM (ref 140–440)
PLATELET # BLD: 331 K/CU MM (ref 140–440)
PLT MORPHOLOGY: ABNORMAL
PMV BLD AUTO: 11.5 FL (ref 7.5–11.1)
PMV BLD AUTO: 11.8 FL (ref 7.5–11.1)
POLYCHROMASIA: ABNORMAL
POTASSIUM SERPL-SCNC: 3.1 MMOL/L (ref 3.5–5.1)
POTASSIUM SERPL-SCNC: 3.3 MMOL/L (ref 3.5–5.1)
PRO-BNP: 3985 PG/ML
PROCALCITONIN SERPL-MCNC: 0.61 NG/ML
PROTEIN UA: NEGATIVE MG/DL
RBC # BLD: 2.65 M/CU MM (ref 4.6–6.2)
RBC # BLD: 2.72 M/CU MM (ref 4.6–6.2)
RETICULOCYTE COUNT PCT: 5 % (ref 0.2–2.2)
SEGMENTED NEUTROPHILS ABSOLUTE COUNT: 22.6 K/CU MM
SEGMENTED NEUTROPHILS ABSOLUTE COUNT: 25 K/CU MM
SEGMENTED NEUTROPHILS RELATIVE PERCENT: 88.9 % (ref 36–66)
SEGMENTED NEUTROPHILS RELATIVE PERCENT: 90 % (ref 36–66)
SODIUM BLD-SCNC: 133 MMOL/L (ref 135–145)
SODIUM BLD-SCNC: 135 MMOL/L (ref 135–145)
SPECIFIC GRAVITY UA: 1.01 (ref 1–1.03)
STOMATOCYTES: ABNORMAL
T4 FREE SERPL-MCNC: 1.4 NG/DL (ref 0.9–1.8)
TARGET CELLS: ABNORMAL
TOTAL IMMATURE NEUTOROPHIL: 0.24 K/CU MM
TOTAL IRON BINDING CAPACITY: 222 UG/DL (ref 250–450)
TOTAL PROTEIN: 5.7 GM/DL (ref 6.4–8.2)
TOTAL PROTEIN: 6.7 GM/DL (ref 6.4–8.2)
TRIGL SERPL-MCNC: 59 MG/DL
TROPONIN T: 0.01 NG/ML
TROPONIN T: <0.01 NG/ML
TSH SERPL DL<=0.005 MIU/L-ACNC: 0.41 UIU/ML (ref 0.27–4.2)
TSH SERPL DL<=0.005 MIU/L-ACNC: 0.72 UIU/ML (ref 0.27–4.2)
UNSATURATED IRON BINDING CAPACITY: 206 UG/DL (ref 110–370)
UROBILINOGEN, URINE: 0.2 MG/DL (ref 0.2–1)
VITAMIN B-12: >2000 PG/ML (ref 211–911)
WBC # BLD: 25.1 K/CU MM (ref 4–10.5)
WBC # BLD: 28.1 K/CU MM (ref 4–10.5)

## 2023-08-04 PROCEDURE — 2580000003 HC RX 258

## 2023-08-04 PROCEDURE — 85027 COMPLETE CBC AUTOMATED: CPT

## 2023-08-04 PROCEDURE — 6360000002 HC RX W HCPCS: Performed by: STUDENT IN AN ORGANIZED HEALTH CARE EDUCATION/TRAINING PROGRAM

## 2023-08-04 PROCEDURE — 6370000000 HC RX 637 (ALT 250 FOR IP): Performed by: NURSE PRACTITIONER

## 2023-08-04 PROCEDURE — 86850 RBC ANTIBODY SCREEN: CPT

## 2023-08-04 PROCEDURE — 2580000003 HC RX 258: Performed by: STUDENT IN AN ORGANIZED HEALTH CARE EDUCATION/TRAINING PROGRAM

## 2023-08-04 PROCEDURE — 84484 ASSAY OF TROPONIN QUANT: CPT

## 2023-08-04 PROCEDURE — 82728 ASSAY OF FERRITIN: CPT

## 2023-08-04 PROCEDURE — 87150 DNA/RNA AMPLIFIED PROBE: CPT

## 2023-08-04 PROCEDURE — 82607 VITAMIN B-12: CPT

## 2023-08-04 PROCEDURE — 82746 ASSAY OF FOLIC ACID SERUM: CPT

## 2023-08-04 PROCEDURE — 83735 ASSAY OF MAGNESIUM: CPT

## 2023-08-04 PROCEDURE — 36415 COLL VENOUS BLD VENIPUNCTURE: CPT

## 2023-08-04 PROCEDURE — 94761 N-INVAS EAR/PLS OXIMETRY MLT: CPT

## 2023-08-04 PROCEDURE — 87186 SC STD MICRODIL/AGAR DIL: CPT

## 2023-08-04 PROCEDURE — 93005 ELECTROCARDIOGRAM TRACING: CPT | Performed by: NURSE PRACTITIONER

## 2023-08-04 PROCEDURE — 83550 IRON BINDING TEST: CPT

## 2023-08-04 PROCEDURE — 94664 DEMO&/EVAL PT USE INHALER: CPT

## 2023-08-04 PROCEDURE — 83605 ASSAY OF LACTIC ACID: CPT

## 2023-08-04 PROCEDURE — 2060000000 HC ICU INTERMEDIATE R&B

## 2023-08-04 PROCEDURE — 80061 LIPID PANEL: CPT

## 2023-08-04 PROCEDURE — 87449 NOS EACH ORGANISM AG IA: CPT

## 2023-08-04 PROCEDURE — 85025 COMPLETE CBC W/AUTO DIFF WBC: CPT

## 2023-08-04 PROCEDURE — P9016 RBC LEUKOCYTES REDUCED: HCPCS

## 2023-08-04 PROCEDURE — 86901 BLOOD TYPING SEROLOGIC RH(D): CPT

## 2023-08-04 PROCEDURE — 1200000000 HC SEMI PRIVATE

## 2023-08-04 PROCEDURE — 6360000002 HC RX W HCPCS: Performed by: EMERGENCY MEDICINE

## 2023-08-04 PROCEDURE — 80048 BASIC METABOLIC PNL TOTAL CA: CPT

## 2023-08-04 PROCEDURE — 6360000002 HC RX W HCPCS

## 2023-08-04 PROCEDURE — 74176 CT ABD & PELVIS W/O CONTRAST: CPT

## 2023-08-04 PROCEDURE — 2500000003 HC RX 250 WO HCPCS: Performed by: NURSE PRACTITIONER

## 2023-08-04 PROCEDURE — 71250 CT THORAX DX C-: CPT

## 2023-08-04 PROCEDURE — 2580000003 HC RX 258: Performed by: NURSE PRACTITIONER

## 2023-08-04 PROCEDURE — 84443 ASSAY THYROID STIM HORMONE: CPT

## 2023-08-04 PROCEDURE — 87899 AGENT NOS ASSAY W/OPTIC: CPT

## 2023-08-04 PROCEDURE — 2700000000 HC OXYGEN THERAPY PER DAY

## 2023-08-04 PROCEDURE — 6370000000 HC RX 637 (ALT 250 FOR IP): Performed by: STUDENT IN AN ORGANIZED HEALTH CARE EDUCATION/TRAINING PROGRAM

## 2023-08-04 PROCEDURE — 86900 BLOOD TYPING SEROLOGIC ABO: CPT

## 2023-08-04 PROCEDURE — 86922 COMPATIBILITY TEST ANTIGLOB: CPT

## 2023-08-04 PROCEDURE — 83540 ASSAY OF IRON: CPT

## 2023-08-04 PROCEDURE — 94640 AIRWAY INHALATION TREATMENT: CPT

## 2023-08-04 PROCEDURE — 2500000003 HC RX 250 WO HCPCS: Performed by: EMERGENCY MEDICINE

## 2023-08-04 PROCEDURE — 2580000003 HC RX 258: Performed by: EMERGENCY MEDICINE

## 2023-08-04 PROCEDURE — 87040 BLOOD CULTURE FOR BACTERIA: CPT

## 2023-08-04 PROCEDURE — 84145 PROCALCITONIN (PCT): CPT

## 2023-08-04 PROCEDURE — 85045 AUTOMATED RETICULOCYTE COUNT: CPT

## 2023-08-04 PROCEDURE — 76376 3D RENDER W/INTRP POSTPROCES: CPT

## 2023-08-04 PROCEDURE — 80053 COMPREHEN METABOLIC PANEL: CPT

## 2023-08-04 PROCEDURE — 81003 URINALYSIS AUTO W/O SCOPE: CPT

## 2023-08-04 PROCEDURE — 93306 TTE W/DOPPLER COMPLETE: CPT

## 2023-08-04 RX ORDER — 0.9 % SODIUM CHLORIDE 0.9 %
1000 INTRAVENOUS SOLUTION INTRAVENOUS ONCE
Status: COMPLETED | OUTPATIENT
Start: 2023-08-04 | End: 2023-08-04

## 2023-08-04 RX ORDER — ONDANSETRON 4 MG/1
4 TABLET, ORALLY DISINTEGRATING ORAL EVERY 8 HOURS PRN
Status: DISCONTINUED | OUTPATIENT
Start: 2023-08-04 | End: 2023-08-18 | Stop reason: HOSPADM

## 2023-08-04 RX ORDER — BISACODYL 10 MG
10 SUPPOSITORY, RECTAL RECTAL ONCE
Status: DISCONTINUED | OUTPATIENT
Start: 2023-08-04 | End: 2023-08-04 | Stop reason: HOSPADM

## 2023-08-04 RX ORDER — SODIUM CHLORIDE 0.9 % (FLUSH) 0.9 %
5-40 SYRINGE (ML) INJECTION EVERY 12 HOURS SCHEDULED
Status: DISCONTINUED | OUTPATIENT
Start: 2023-08-04 | End: 2023-08-04 | Stop reason: HOSPADM

## 2023-08-04 RX ORDER — VITAMIN B COMPLEX
2000 TABLET ORAL DAILY
Status: DISCONTINUED | OUTPATIENT
Start: 2023-08-04 | End: 2023-08-04 | Stop reason: HOSPADM

## 2023-08-04 RX ORDER — CARVEDILOL 3.12 MG/1
3.12 TABLET ORAL 2 TIMES DAILY WITH MEALS
Status: DISCONTINUED | OUTPATIENT
Start: 2023-08-04 | End: 2023-08-04

## 2023-08-04 RX ORDER — CARVEDILOL 6.25 MG/1
3.12 TABLET ORAL 2 TIMES DAILY WITH MEALS
Status: DISCONTINUED | OUTPATIENT
Start: 2023-08-04 | End: 2023-08-05

## 2023-08-04 RX ORDER — SODIUM CHLORIDE 0.9 % (FLUSH) 0.9 %
5-40 SYRINGE (ML) INJECTION PRN
Status: DISCONTINUED | OUTPATIENT
Start: 2023-08-04 | End: 2023-08-04 | Stop reason: HOSPADM

## 2023-08-04 RX ORDER — SODIUM CHLORIDE 0.9 % (FLUSH) 0.9 %
5-40 SYRINGE (ML) INJECTION PRN
Status: DISCONTINUED | OUTPATIENT
Start: 2023-08-04 | End: 2023-08-18 | Stop reason: HOSPADM

## 2023-08-04 RX ORDER — ACETAMINOPHEN 325 MG/1
650 TABLET ORAL EVERY 6 HOURS PRN
Status: DISCONTINUED | OUTPATIENT
Start: 2023-08-04 | End: 2023-08-18 | Stop reason: HOSPADM

## 2023-08-04 RX ORDER — POLYETHYLENE GLYCOL 3350 17 G/17G
17 POWDER, FOR SOLUTION ORAL DAILY PRN
Status: DISCONTINUED | OUTPATIENT
Start: 2023-08-04 | End: 2023-08-18 | Stop reason: HOSPADM

## 2023-08-04 RX ORDER — ALBUTEROL SULFATE 90 UG/1
2 AEROSOL, METERED RESPIRATORY (INHALATION) EVERY 6 HOURS PRN
Status: DISCONTINUED | OUTPATIENT
Start: 2023-08-04 | End: 2023-08-04 | Stop reason: HOSPADM

## 2023-08-04 RX ORDER — PRAVASTATIN SODIUM 40 MG
80 TABLET ORAL NIGHTLY
Status: DISCONTINUED | OUTPATIENT
Start: 2023-08-04 | End: 2023-08-18 | Stop reason: HOSPADM

## 2023-08-04 RX ORDER — METOPROLOL TARTRATE 5 MG/5ML
5 INJECTION INTRAVENOUS ONCE
Status: COMPLETED | OUTPATIENT
Start: 2023-08-04 | End: 2023-08-04

## 2023-08-04 RX ORDER — SODIUM CHLORIDE 9 MG/ML
INJECTION, SOLUTION INTRAVENOUS CONTINUOUS
Status: DISCONTINUED | OUTPATIENT
Start: 2023-08-04 | End: 2023-08-05

## 2023-08-04 RX ORDER — PANTOPRAZOLE SODIUM 40 MG/1
40 TABLET, DELAYED RELEASE ORAL
Status: DISCONTINUED | OUTPATIENT
Start: 2023-08-04 | End: 2023-08-04 | Stop reason: HOSPADM

## 2023-08-04 RX ORDER — ISOSORBIDE MONONITRATE 30 MG/1
30 TABLET, EXTENDED RELEASE ORAL DAILY
Status: DISCONTINUED | OUTPATIENT
Start: 2023-08-05 | End: 2023-08-18 | Stop reason: HOSPADM

## 2023-08-04 RX ORDER — ASPIRIN 81 MG/1
81 TABLET, CHEWABLE ORAL DAILY
Status: DISCONTINUED | OUTPATIENT
Start: 2023-08-05 | End: 2023-08-18 | Stop reason: HOSPADM

## 2023-08-04 RX ORDER — SODIUM CHLORIDE 9 MG/ML
INJECTION, SOLUTION INTRAVENOUS PRN
Status: DISCONTINUED | OUTPATIENT
Start: 2023-08-04 | End: 2023-08-04 | Stop reason: HOSPADM

## 2023-08-04 RX ORDER — POTASSIUM CHLORIDE 20 MEQ/1
20 TABLET, EXTENDED RELEASE ORAL ONCE
Status: COMPLETED | OUTPATIENT
Start: 2023-08-04 | End: 2023-08-04

## 2023-08-04 RX ORDER — ACETAMINOPHEN 325 MG/1
650 TABLET ORAL EVERY 6 HOURS PRN
Status: DISCONTINUED | OUTPATIENT
Start: 2023-08-04 | End: 2023-08-04 | Stop reason: HOSPADM

## 2023-08-04 RX ORDER — POLYETHYLENE GLYCOL 3350 17 G/17G
17 POWDER, FOR SOLUTION ORAL DAILY PRN
Status: DISCONTINUED | OUTPATIENT
Start: 2023-08-04 | End: 2023-08-04 | Stop reason: HOSPADM

## 2023-08-04 RX ORDER — FUROSEMIDE 20 MG/1
20 TABLET ORAL 2 TIMES DAILY
Status: DISCONTINUED | OUTPATIENT
Start: 2023-08-04 | End: 2023-08-18 | Stop reason: HOSPADM

## 2023-08-04 RX ORDER — POLYVINYL ALCOHOL 14 MG/ML
1 SOLUTION/ DROPS OPHTHALMIC PRN
Status: DISCONTINUED | OUTPATIENT
Start: 2023-08-04 | End: 2023-08-04 | Stop reason: HOSPADM

## 2023-08-04 RX ORDER — VANCOMYCIN 1.75 G/350ML
1250 INJECTION, SOLUTION INTRAVENOUS EVERY 24 HOURS
Status: DISCONTINUED | OUTPATIENT
Start: 2023-08-05 | End: 2023-08-04 | Stop reason: HOSPADM

## 2023-08-04 RX ORDER — VITAMIN B COMPLEX
2000 TABLET ORAL DAILY
Status: DISCONTINUED | OUTPATIENT
Start: 2023-08-05 | End: 2023-08-18 | Stop reason: HOSPADM

## 2023-08-04 RX ORDER — SODIUM CHLORIDE 0.9 % (FLUSH) 0.9 %
5-40 SYRINGE (ML) INJECTION EVERY 12 HOURS SCHEDULED
Status: DISCONTINUED | OUTPATIENT
Start: 2023-08-04 | End: 2023-08-18 | Stop reason: HOSPADM

## 2023-08-04 RX ORDER — ACETAMINOPHEN 650 MG/1
650 SUPPOSITORY RECTAL EVERY 6 HOURS PRN
Status: DISCONTINUED | OUTPATIENT
Start: 2023-08-04 | End: 2023-08-04 | Stop reason: HOSPADM

## 2023-08-04 RX ORDER — ENOXAPARIN SODIUM 100 MG/ML
30 INJECTION SUBCUTANEOUS 2 TIMES DAILY
Status: DISCONTINUED | OUTPATIENT
Start: 2023-08-04 | End: 2023-08-04

## 2023-08-04 RX ORDER — CARVEDILOL 3.12 MG/1
3.12 TABLET ORAL 2 TIMES DAILY WITH MEALS
Status: DISCONTINUED | OUTPATIENT
Start: 2023-08-04 | End: 2023-08-04 | Stop reason: HOSPADM

## 2023-08-04 RX ORDER — POTASSIUM CHLORIDE 7.45 MG/ML
10 INJECTION INTRAVENOUS
Status: COMPLETED | OUTPATIENT
Start: 2023-08-04 | End: 2023-08-04

## 2023-08-04 RX ORDER — VANCOMYCIN 1.75 G/350ML
1250 INJECTION, SOLUTION INTRAVENOUS EVERY 12 HOURS
Status: DISCONTINUED | OUTPATIENT
Start: 2023-08-04 | End: 2023-08-04 | Stop reason: CLARIF

## 2023-08-04 RX ORDER — VANCOMYCIN 2 G/400ML
2000 INJECTION, SOLUTION INTRAVENOUS ONCE
Status: COMPLETED | OUTPATIENT
Start: 2023-08-04 | End: 2023-08-04

## 2023-08-04 RX ORDER — LANOLIN ALCOHOL/MO/W.PET/CERES
1000 CREAM (GRAM) TOPICAL DAILY
Status: DISCONTINUED | OUTPATIENT
Start: 2023-08-04 | End: 2023-08-04 | Stop reason: HOSPADM

## 2023-08-04 RX ORDER — VANCOMYCIN 1.75 G/350ML
1250 INJECTION, SOLUTION INTRAVENOUS EVERY 24 HOURS
Status: DISCONTINUED | OUTPATIENT
Start: 2023-08-05 | End: 2023-08-04

## 2023-08-04 RX ORDER — SODIUM CHLORIDE 9 MG/ML
INJECTION, SOLUTION INTRAVENOUS CONTINUOUS
Status: DISCONTINUED | OUTPATIENT
Start: 2023-08-04 | End: 2023-08-04 | Stop reason: HOSPADM

## 2023-08-04 RX ORDER — ASPIRIN 81 MG/1
81 TABLET ORAL DAILY
Status: DISCONTINUED | OUTPATIENT
Start: 2023-08-04 | End: 2023-08-04 | Stop reason: HOSPADM

## 2023-08-04 RX ORDER — FERROUS GLUCONATE 324(37.5)
324 TABLET ORAL 2 TIMES DAILY
Status: DISCONTINUED | OUTPATIENT
Start: 2023-08-04 | End: 2023-08-04 | Stop reason: HOSPADM

## 2023-08-04 RX ORDER — ACETAMINOPHEN 650 MG/1
650 SUPPOSITORY RECTAL EVERY 6 HOURS PRN
Status: DISCONTINUED | OUTPATIENT
Start: 2023-08-04 | End: 2023-08-18 | Stop reason: HOSPADM

## 2023-08-04 RX ORDER — ISOSORBIDE MONONITRATE 30 MG/1
30 TABLET, EXTENDED RELEASE ORAL DAILY
Status: DISCONTINUED | OUTPATIENT
Start: 2023-08-04 | End: 2023-08-04 | Stop reason: HOSPADM

## 2023-08-04 RX ORDER — ONDANSETRON 2 MG/ML
4 INJECTION INTRAMUSCULAR; INTRAVENOUS EVERY 6 HOURS PRN
Status: DISCONTINUED | OUTPATIENT
Start: 2023-08-04 | End: 2023-08-18 | Stop reason: HOSPADM

## 2023-08-04 RX ORDER — SODIUM CHLORIDE 9 MG/ML
500 INJECTION, SOLUTION INTRAVENOUS PRN
Status: DISCONTINUED | OUTPATIENT
Start: 2023-08-04 | End: 2023-08-18 | Stop reason: HOSPADM

## 2023-08-04 RX ORDER — VANCOMYCIN 1.75 G/350ML
1250 INJECTION, SOLUTION INTRAVENOUS EVERY 24 HOURS
Status: CANCELLED | OUTPATIENT
Start: 2023-08-04 | End: 2023-08-10

## 2023-08-04 RX ORDER — SODIUM CHLORIDE 9 MG/ML
INJECTION, SOLUTION INTRAVENOUS PRN
Status: DISCONTINUED | OUTPATIENT
Start: 2023-08-04 | End: 2023-08-18 | Stop reason: HOSPADM

## 2023-08-04 RX ADMIN — FUROSEMIDE 20 MG: 20 TABLET ORAL at 18:36

## 2023-08-04 RX ADMIN — SODIUM CHLORIDE 500 ML: 9 INJECTION, SOLUTION INTRAVENOUS at 18:39

## 2023-08-04 RX ADMIN — Medication 324 MG: at 08:45

## 2023-08-04 RX ADMIN — VANCOMYCIN HYDROCHLORIDE 1250 MG: 1.25 INJECTION, POWDER, LYOPHILIZED, FOR SOLUTION INTRAVENOUS at 23:40

## 2023-08-04 RX ADMIN — SODIUM CHLORIDE: 9 INJECTION, SOLUTION INTRAVENOUS at 22:16

## 2023-08-04 RX ADMIN — DEXTROSE MONOHYDRATE 2.5 MG/HR: 50 INJECTION, SOLUTION INTRAVENOUS at 05:08

## 2023-08-04 RX ADMIN — POTASSIUM CHLORIDE 10 MEQ: 7.46 INJECTION, SOLUTION INTRAVENOUS at 06:36

## 2023-08-04 RX ADMIN — CYANOCOBALAMIN TAB 1000 MCG 1000 MCG: 1000 TAB at 08:45

## 2023-08-04 RX ADMIN — TIOTROPIUM BROMIDE INHALATION SPRAY 2 PUFF: 3.12 SPRAY, METERED RESPIRATORY (INHALATION) at 08:25

## 2023-08-04 RX ADMIN — POTASSIUM CHLORIDE 10 MEQ: 7.46 INJECTION, SOLUTION INTRAVENOUS at 01:28

## 2023-08-04 RX ADMIN — SODIUM CHLORIDE 1000 ML: 9 INJECTION, SOLUTION INTRAVENOUS at 01:29

## 2023-08-04 RX ADMIN — DEXTROSE MONOHYDRATE 5 MG/HR: 50 INJECTION, SOLUTION INTRAVENOUS at 01:20

## 2023-08-04 RX ADMIN — DILTIAZEM HYDROCHLORIDE 19 MG: 5 INJECTION INTRAVENOUS at 01:13

## 2023-08-04 RX ADMIN — ASPIRIN 81 MG: 81 TABLET, COATED ORAL at 08:45

## 2023-08-04 RX ADMIN — CEFEPIME 2000 MG: 2 INJECTION, POWDER, FOR SOLUTION INTRAVENOUS at 18:40

## 2023-08-04 RX ADMIN — CARVEDILOL 3.12 MG: 6.25 TABLET, FILM COATED ORAL at 18:36

## 2023-08-04 RX ADMIN — SODIUM CHLORIDE, PRESERVATIVE FREE 10 ML: 5 INJECTION INTRAVENOUS at 21:09

## 2023-08-04 RX ADMIN — CHOLECALCIFEROL TAB 25 MCG (1000 UNIT) 2000 UNITS: 25 TAB at 08:45

## 2023-08-04 RX ADMIN — RIVAROXABAN 20 MG: 20 TABLET, FILM COATED ORAL at 08:45

## 2023-08-04 RX ADMIN — POTASSIUM CHLORIDE 20 MEQ: 1500 TABLET, EXTENDED RELEASE ORAL at 10:32

## 2023-08-04 RX ADMIN — POTASSIUM CHLORIDE 10 MEQ: 7.46 INJECTION, SOLUTION INTRAVENOUS at 05:17

## 2023-08-04 RX ADMIN — PANTOPRAZOLE SODIUM 40 MG: 40 TABLET, DELAYED RELEASE ORAL at 08:45

## 2023-08-04 RX ADMIN — POTASSIUM CHLORIDE 10 MEQ: 7.46 INJECTION, SOLUTION INTRAVENOUS at 02:30

## 2023-08-04 RX ADMIN — VANCOMYCIN 2000 MG: 2 INJECTION, SOLUTION INTRAVENOUS at 02:10

## 2023-08-04 RX ADMIN — METOPROLOL TARTRATE 5 MG: 1 INJECTION, SOLUTION INTRAVENOUS at 11:54

## 2023-08-04 RX ADMIN — CEFEPIME 2000 MG: 2 INJECTION, POWDER, FOR SOLUTION INTRAVENOUS at 01:37

## 2023-08-04 RX ADMIN — PRAVASTATIN SODIUM 80 MG: 40 TABLET ORAL at 21:09

## 2023-08-04 RX ADMIN — FUROSEMIDE 40 MG: 10 INJECTION, SOLUTION INTRAMUSCULAR; INTRAVENOUS at 01:14

## 2023-08-04 ASSESSMENT — ENCOUNTER SYMPTOMS
ABDOMINAL DISTENTION: 1
SHORTNESS OF BREATH: 1

## 2023-08-04 NOTE — DISCHARGE SUMMARY
V2.0  Discharge Summary    Name:  Jad Falcon /Age/Sex:  (80 y.o. male)   Admit Date: 8/3/2023  Discharge Date: 23    MRN & CSN:  [id number] & [id number] Encounter Date and Time 23 10:42 AM EDT    Attending:  Homer Reed MD Discharging Provider: GISSELLE Linton - NIKA       Hospital Course:     PATIENT IS A DNR-CCA      Brief HPI:   Jad Falcon is a 80 y.o. male with pmh of COPD, afib, CAD, HLD, HTN  who presents with Multiple falls, weakness, lower leg swelling. Patient reports multiple falls yesterday, the first time he called  but asked that they he;p him back up and he would follow up with PCP. He did it again later in the day and he then cam into ED. He denied light headedness or LOC he denied hitting his head of note he is on blood thinner for afib. He reports his legs have been getting increase swelling x 1 month and that has been making it hard to walk even with is walker he has become more SOB, reports hx of COPD denied new cough, does not use home oxygen denied fevers, N/V/D. He reports having to sleep in his chair because of this new SOB. He reports constipation last BM was 3-4 days ago. Denied abdominal pain. Abdominal swelling noted patient repots that is his baseline. Denied burning or freq urination. Reports bilateral lower leg swelling x 1 month progressively getting worse does not wear finesse hose does take low dose of po lasix daily. Reports having knee surgery( left) several times with infection causing him to be on multiple abx several years. Patient seen and examined today, he is lying in bed he is ill-appearing and pale, he is afebrile, he is in atrial fibrillation rate between 110 and 120. He did receive x1 dose of IV metoprolol heart rate is now 110-112. Did discuss with patient transfer to [facility] for consultation with cardiology, possible infectious disease consultation, orthopedic consultation for possible septic knee.

## 2023-08-04 NOTE — ED NOTES
Pt has upper and lower partials and bilateral hearing aids in his belongings bag.        Avis Huertas RN  08/04/23 9908

## 2023-08-04 NOTE — PROGRESS NOTES
SpO2 noted to be low during sleep. SpO2 decreases as low as 85% with great pleth waveform and verified using another finger. 2L NC applied and Lisbet Chowdary RN notified. Physician also notified. Patient remains on continuous pulse oximeter at this time. Will continue to monitor.    08/04/23 0031   Oxygen Therapy/Pulse Ox   O2 Therapy Oxygen   O2 Device Nasal cannula   O2 Flow Rate (L/min) 2 L/min   Pulse (!) 128   Respirations 14   SpO2 93 %

## 2023-08-04 NOTE — PROGRESS NOTES
Patient being transferred to [facility]. Report called and given to Robyn Angel. Patient is going to 2012 and will be picked up around 1415. Pk Deannemargret, patient's daughter in law called and updated.

## 2023-08-04 NOTE — PROGRESS NOTES
Met with patient on the inpatient unit room 3. Introduced myself since we have only spoken on the phone. Advised that I would follow until discharge. Will follow up at discharge to make sure he has what he needs. Will continue to follow.

## 2023-08-04 NOTE — PROGRESS NOTES
Spoke with daughter-in-law [place] this morning and let her know that patient was going to be transferred. [place] stated she would call back after work to find out where he was at so that she could see him.

## 2023-08-04 NOTE — PROGRESS NOTES
[facility] being [facility]. Patient discharged with 2 pairs of glasses, partial upper and lower dentures, bilateral hearing aids, and cell phone. Patient wore 1 pair of glasses and his dentures and the second pair of glasses, hearing aids and cell phone were placed in his green belongings bag. Right and left IV intact at discharge.

## 2023-08-04 NOTE — PLAN OF CARE
Problem: Safety - Adult  Goal: Free from fall injury  Outcome: Progressing     Problem: Discharge Planning  Goal: Discharge to home or other facility with appropriate resources  Outcome: Progressing  Flowsheets  Taken 8/4/2023 1726  Discharge to home or other facility with appropriate resources:   Identify barriers to discharge with patient and caregiver   Arrange for needed discharge resources and transportation as appropriate  Taken 8/4/2023 1540  Discharge to home or other facility with appropriate resources:   Identify barriers to discharge with patient and caregiver   Arrange for needed discharge resources and transportation as appropriate     Problem: Skin/Tissue Integrity  Goal: Absence of new skin breakdown  Description: 1. Monitor for areas of redness and/or skin breakdown  2. Assess vascular access sites hourly  3. Every 4-6 hours minimum:  Change oxygen saturation probe site  4. Every 4-6 hours:  If on nasal continuous positive airway pressure, respiratory therapy assess nares and determine need for appliance change or resting period.   Outcome: Progressing     Problem: ABCDS Injury Assessment  Goal: Absence of physical injury  Outcome: Progressing

## 2023-08-04 NOTE — PROGRESS NOTES
4 Eyes Skin Assessment     NAME:  Levi Gutierrez  YOB: 1940  MEDICAL RECORD NUMBER:  [id number]    The patient is being assessed for  Admission    I agree that at least one RN has performed a thorough Head to Toe Skin Assessment on the patient. ALL assessment sites listed below have been assessed. Areas assessed by both nurses:    Head, Face, Ears, Shoulders, Back, Chest, Arms, Elbows, Hands, Sacrum. Buttock, Coccyx, Ischium, Legs. Feet and Heels, and Under Medical Devices         Does the Patient have a Wound?  No noted wound(s)       Holland Prevention initiated by RN: Yes  Wound Care Orders initiated by RN: No    Pressure Injury (Stage 3,4, Unstageable, DTI, NWPT, and Complex wounds) if present, place Wound referral order by RN under : No    New Ostomies, if present place, Ostomy referral order under : No     Nurse 1 eSignature: Electronically signed by Chaim Guzman RN on 8/4/23 at 5:49 AM EDT    **SHARE this note so that the co-signing nurse can place an eSignature**    Nurse 2 eSignature: Electronically signed by Rachell Craig LPN on 0/6/99 at 6:80 AM EDT

## 2023-08-04 NOTE — FLOWSHEET NOTE
SN reports pt is likely being transported to [facility] today due to sepsis, recommended to place OT evaluation on hold.

## 2023-08-04 NOTE — PROGRESS NOTES
4 Eyes Skin Assessment     NAME:  Adrián Gutierrez  YOB: 1940  MEDICAL RECORD NUMBER:  [id number]    The patient is being assessed for  Admission    I agree that at least one RN has performed a thorough Head to Toe Skin Assessment on the patient. ALL assessment sites listed below have been assessed. Areas assessed by both nurses:    Head, Face, Ears, Shoulders, Back, Chest, Arms, Elbows, Hands, Sacrum. Buttock, Coccyx, Ischium, and Legs. Feet and Heels        Does the Patient have a Wound? Yes wound(s) were present on assessment.  LDA wound assessment was Initiated and completed by RN       Holland Prevention initiated by RN: Yes  Wound Care Orders initiated by RN: No    Pressure Injury (Stage 3,4, Unstageable, DTI, NWPT, and Complex wounds) if present, place Wound referral order by RN under : No    New Ostomies, if present place, Ostomy referral order under : No     Nurse 1 eSignature: Electronically signed by Olivia Covington LPN on 2/4/83 at 3:89 PM EDT    **SHARE this note so that the co-signing nurse can place an eSignature**    Nurse 2 eSignature: Electronically signed by Linda Caal RN on 8/5/23 at 4:28 PM EDT

## 2023-08-04 NOTE — H&P
50-55%. History of cardiac cath 1996    LV uniform LV contractility RCa dominatn 50-60 prox stenosis  LM patent  LAD mild mid plaquing diag has 70 ostial narrowing ramus minimal plaquing cx normal    History of PTCA 08/15/2011    prox lad bare metal stent 2011    Hyperlipidemia     Hypertension     Obesity     Obesity, Class II, BMI 35-39.9, with comorbidity 2014    RBBB     Risk for falls 10/26/2015    Tachycardia      PSHX:  has a past surgical history that includes joint replacement (); Coronary angioplasty with stent; Upper gastrointestinal endoscopy (N/A, 2022); and Colonoscopy (N/A, 2022). Allergies: Allergies   Allergen Reactions    Lovenox [Enoxaparin Sodium]     Morphine Rash     Fam HX:  family history includes Cancer in his sister; Heart Disease in his father and mother. Soc HX:   Social History     Socioeconomic History    Marital status:      Spouse name: None    Number of children: 4    Years of education: None    Highest education level: None   Occupational History    Occupation: retired   Tobacco Use    Smoking status: Former     Packs/day: 1.50     Years: 25.00     Pack years: 37.50     Types: Cigarettes     Quit date: 1990     Years since quittin.7    Smokeless tobacco: Current     Types: Chew    Tobacco comments:     Chews tobacco        Vaping Use    Vaping Use: Never used   Substance and Sexual Activity    Alcohol use:  Yes     Alcohol/week: 56.0 standard drinks     Types: 56 Cans of beer per week     Comment: 1 beer a day    Drug use: No    Sexual activity: Not Currently     Partners: Female     Comment:        Medications:   Medications:    sodium chloride  1,000 mL IntraVENous Once    potassium chloride  10 mEq IntraVENous Q1H    vancomycin  2,000 mg IntraVENous Once      Infusions:    dilTIAZem 5 mg/hr (23 0201)     PRN Meds:      Labs      CBC:   Recent Labs     23  2356   WBC 25.1*   HGB 7.6*        BMP:    Recent RECONSTRUCTION WO POST PROCESS    Result Date: 8/4/2023  EXAMINATION: CT OF THE THORACIC SPINE WITHOUT CONTRAST  8/3/2023 11:44 pm: TECHNIQUE: CT of the thoracic spine was performed without the administration of intravenous contrast. Multiplanar reformatted images are provided for review. Automated exposure control, iterative reconstruction, and/or weight based adjustment of the mA/kV was utilized to reduce the radiation dose to as low as reasonably achievable. COMPARISON: CT chest done July 4, 2015. HISTORY: ORDERING SYSTEM PROVIDED HISTORY: trauma TECHNOLOGIST PROVIDED HISTORY: Reason for exam:->trauma Decision Support Exception - unselect if not a suspected or confirmed emergency medical condition->Emergency Medical Condition (MA) Reason for Exam: fall FINDINGS: BONES/ALIGNMENT: No acute fracture or listhesis. Mild multilevel chronic anterior vertebral body wedging. Chronic T11 superior endplate compression deformity is similar to the prior study. Normal thoracic kyphosis. Suggestion of osteopenia. No destructive osseous lesion. DEGENERATIVE CHANGES: Multilevel degenerative changes in the thoracic spine. No definite severe thoracic spinal canal stenosis. SOFT TISSUES: No paraspinal mass is seen. No acute traumatic injury in the thoracic spine.          Electronically signed by GISSELLE Regan CNP on 8/4/2023 at 3:11 AM

## 2023-08-04 NOTE — FLOWSHEET NOTE
Pt is on hold today per nursing secondary to possible transfer to [facility] and sepsis.      Homero Lucas PT DPT [id number]

## 2023-08-04 NOTE — CARE COORDINATION
08/04/23 0705   Service Assessment   Patient Orientation Alert and Oriented   Cognition Alert   History Provided By Patient   Primary Alvarado Ham [place] Family Members   Patient's Healthcare Decision Maker is: Patient Declined (Legal Next of Kin Remains as Decision Maker)   PCP Verified by CM Yes   Prior Functional Level Independent in ADLs/IADLs   Current Functional Level Bathing;Dressing; Toileting;Cooking;Housework; Shopping;Mobility   Can patient return to prior living arrangement Unknown at present   Ability to make needs known: Good   Family able to assist with home care needs: Other (comment)  (Limited assistance from family)   Would you like for me to discuss the discharge plan with any other family members/significant others, and if so, who? No   Financial Resources Medicare   Community Resources None   Social/Functional History   Lives With Alone   Type of [address] [place] [address] Dr One level   [address] to enter with rails   Entrance Stairs - Number of Steps 3   Home Equipment Cane;Walker, [place] Help From Family   ADL Assistance Needs assistance   Homemaking Responsibilities Yes   Ambulation Assistance Independent   Transfer Assistance Independent   Active  No   Patient's  Info Patient was driving up until approximately 2 weeks ago   Occupation Retired   Discharge Planning   Type of [address] Prior To Admission None   [address]   DME Ordered? No   Potential Assistance Purchasing Medications No   Type of Home Care Services None   Patient expects to be discharged to: Skilled nursing facility   One/Two Story Residence One story   History of falls? [address] Discharge   Transition of Care Consult (CM Consult) N/A   Services At/After Discharge [facility] (SNF)   [facility] Provided?  No   Mode of Transport at Discharge [facility] Confirm Follow Up Transport Other (see comment)  (Will need medical transport to SNF)   Condition of Participation: Discharge Planning   The Plan for Transition of Care is related to the following treatment goals: Patient plans to be admitted to a SNF   The Patient and/or Patient Representative was provided with a Choice of Provider? Patient   The Patient and/Or Patient Representative agree with the Discharge Plan? Yes   Freedom of Choice list was provided with basic dialogue that supports the patient's individualized plan of care/goals, treatment preferences, and shares the quality data associated with the providers? Yes     CM met with the patient to initiate discharge planning. Patient lives alone in a single level home (3 steps w/railing to enter), has medical coverage & PCP, stated that he was independent with ADLs up until a couple weeks ago, and was an active  until a couple weeks ago. Patient stated that he normally uses a cane at home but feels he needs to use a walker now due to increased weakness. Patient did not require home oxygen prior to admission. Patient stated that his son is  but his daughter-in-law who lives in [place] assists him as she is able. Patient feels he will likely need to be placed in a SNF for short-term skilled therapy. Patient reported that he has been to a SNF in \"[place]\" previously but is unable to remember the name. PT/OT evaluations are pending at this time and the patient's medical insurance will require pre-certification. CM will follow.

## 2023-08-04 NOTE — H&P
V2.0  History and Physical      Name:  Mack Walters /Age/Sex: [birth year]  (80 y.o. male)   MRN & CSN:  [id number] & [id number] Encounter Date/Time: 2023 5:28 PM EDT   Location:  [address] [place] [address] PCP: David Escobar MD       Hospital Day: 1    Assessment and Plan:   Mack Walters is a 80 y.o. male  who presents with Sepsis ([address])      1. Sepsis  -Rule out septic arthritis  -Presenting with severe left knee swelling  -CT pending  -Orthopedic surgery consulted  -Started on broad-spectrum antibiotics  -Continue supportive care    2. A-fib with RVR  -Was initially started on Cardizem drip in the ED but became hypotensive  -We will resume home BB  -We will resume home rivaroxaban  -Cardiology consulted    3. Hypomagnesemia  -Replete and monitor with repeat labs    4. Hypokalemia  -Replete and monitor with repeat labs    5. CKD  -Creatinine stable  -Renally dose medications    6. Acute on chronic CHF  -2D echo 2023 show EF of 55%  -Continue diuretics  -Cardiology consulted    7. History of COPD  -Continue home inhalers  Hospital Problems             Last Modified POA    * (Principal) Sepsis ([address]) 2023 Yes    Atrial fibrillation ([address]) 2023 Yes       Disposition:   Current Living situation: home  Expected Disposition: home  Estimated D/C: tbd    Diet ADULT DIET; Regular   DVT Prophylaxis [] Lovenox, []  Heparin, [] SCDs, [] Ambulation,  [] Eliquis, [] Xarelto, [] Coumadin   Code Status Full Code   Surrogate Decision Maker/ POA      Personally reviewed Lab Studies and Imaging           History from:     patient    History of Present Illness:     Chief Complaint: a fib  Mack Walters is a 80 y.o. male with pmh of fibrillation, CHF, hyperlipidemia, hypertension, COPD who presents as a transfer from [facility] due to sepsis secondary to possible septic arthritis. Patient was originally admitted overnight in [facility] due to multiple falls, weakness and lower leg swelling.   While on admission the 11:44 pm TECHNIQUE: CT of the cervical spine was performed without the administration of intravenous contrast. Multiplanar reformatted images are provided for review. Automated exposure control, iterative reconstruction, and/or weight based adjustment of the mA/kV was utilized to reduce the radiation dose to as low as reasonably achievable. COMPARISON: July 4, 2015. HISTORY: ORDERING SYSTEM PROVIDED HISTORY: Trauma TECHNOLOGIST PROVIDED HISTORY: Reason for exam:->Trauma Decision Support Exception - unselect if not a suspected or confirmed emergency medical condition->Emergency Medical Condition (MA) Reason for Exam: fall FINDINGS: BONES/ALIGNMENT: There is no acute fracture or traumatic malalignment. DEGENERATIVE CHANGES: Multilevel moderate to severe degenerative changes in the cervical spine. SOFT TISSUES: There is no prevertebral soft tissue swelling. No acute abnormality of the cervical spine. CT LUMBAR RECONSTRUCTION WO POST PROCESS    Result Date: 8/4/2023  EXAMINATION: CT OF THE LUMBAR SPINE WITHOUT CONTRAST  8/4/2023 TECHNIQUE: CT of the lumbar spine was performed without the administration of intravenous contrast. Multiplanar reformatted images are provided for review. Adjustment of mA and/or kV according to patient size was utilized. Automated exposure control, iterative reconstruction, and/or weight based adjustment of the mA/kV was utilized to reduce the radiation dose to as low as reasonably achievable. COMPARISON: CT abdomen pelvis done July 4, 2015. HISTORY: ORDERING SYSTEM PROVIDED HISTORY: trauma TECHNOLOGIST PROVIDED HISTORY: Reason for exam:->trauma Decision Support Exception - unselect if not a suspected or confirmed emergency medical condition->Emergency Medical Condition (MA) Reason for Exam: fall FINDINGS: BONES/ALIGNMENT: No acute fracture. Mild leftward convex curvature of the lower lumbar spine. No significant listhesis on sagittal images. Suggestion of osteopenia.   No

## 2023-08-05 ENCOUNTER — APPOINTMENT (OUTPATIENT)
Dept: GENERAL RADIOLOGY | Age: 83
End: 2023-08-05
Attending: FAMILY MEDICINE
Payer: MEDICARE

## 2023-08-05 LAB
ABO/RH: NORMAL
ANION GAP SERPL CALCULATED.3IONS-SCNC: 11 MMOL/L (ref 4–16)
ANION GAP SERPL CALCULATED.3IONS-SCNC: 14 MMOL/L (ref 4–16)
ANISOCYTOSIS: ABNORMAL
ANTIBODY SCREEN: NEGATIVE
BANDED NEUTROPHILS ABSOLUTE COUNT: 1.52 K/CU MM
BANDED NEUTROPHILS ABSOLUTE COUNT: 6.68 K/CU MM
BANDED NEUTROPHILS RELATIVE PERCENT: 21 % (ref 5–11)
BANDED NEUTROPHILS RELATIVE PERCENT: 5 % (ref 5–11)
BUN SERPL-MCNC: 20 MG/DL (ref 6–23)
BUN SERPL-MCNC: 20 MG/DL (ref 6–23)
CALCIUM SERPL-MCNC: 7.7 MG/DL (ref 8.3–10.6)
CALCIUM SERPL-MCNC: 8.1 MG/DL (ref 8.3–10.6)
CHLORIDE BLD-SCNC: 98 MMOL/L (ref 99–110)
CHLORIDE BLD-SCNC: 98 MMOL/L (ref 99–110)
CO2: 24 MMOL/L (ref 21–32)
CO2: 25 MMOL/L (ref 21–32)
COMPONENT: NORMAL
CREAT SERPL-MCNC: 1.2 MG/DL (ref 0.9–1.3)
CREAT SERPL-MCNC: 1.3 MG/DL (ref 0.9–1.3)
CROSSMATCH RESULT: NORMAL
DIFFERENTIAL TYPE: ABNORMAL
DIFFERENTIAL TYPE: ABNORMAL
DOSE AMOUNT: NORMAL
DOSE TIME: NORMAL
EKG DIAGNOSIS: NORMAL
EKG Q-T INTERVAL: 388 MS
EKG QRS DURATION: 154 MS
EKG QTC CALCULATION (BAZETT): 541 MS
EKG R AXIS: -54 DEGREES
EKG T AXIS: 17 DEGREES
EKG VENTRICULAR RATE: 117 BPM
GFR SERPL CREATININE-BSD FRML MDRD: 55 ML/MIN/1.73M2
GFR SERPL CREATININE-BSD FRML MDRD: >60 ML/MIN/1.73M2
GLUCOSE SERPL-MCNC: 104 MG/DL (ref 70–99)
GLUCOSE SERPL-MCNC: 109 MG/DL (ref 70–99)
HCT VFR BLD CALC: 23.2 % (ref 42–52)
HCT VFR BLD CALC: 23.3 % (ref 42–52)
HCT VFR BLD CALC: 23.5 % (ref 42–52)
HCT VFR BLD CALC: 23.7 % (ref 42–52)
HCT VFR BLD CALC: 25.6 % (ref 42–52)
HCT VFR BLD CALC: 30.2 % (ref 42–52)
HEMOGLOBIN: 6.8 GM/DL (ref 13.5–18)
HEMOGLOBIN: 7.1 GM/DL (ref 13.5–18)
HEMOGLOBIN: 7.2 GM/DL (ref 13.5–18)
HEMOGLOBIN: 7.2 GM/DL (ref 13.5–18)
HEMOGLOBIN: 7.5 GM/DL (ref 13.5–18)
HEMOGLOBIN: 8.3 GM/DL (ref 13.5–18)
HYPOCHROMIA: ABNORMAL
L PNEUMO AG UR QL IA: NEGATIVE
LYMPHOCYTES ABSOLUTE: 0.3 K/CU MM
LYMPHOCYTES RELATIVE PERCENT: 1 % (ref 24–44)
MAGNESIUM: 1.8 MG/DL (ref 1.8–2.4)
MCH RBC QN AUTO: 27.6 PG (ref 27–31)
MCH RBC QN AUTO: 27.9 PG (ref 27–31)
MCHC RBC AUTO-ENTMCNC: 29.2 % (ref 32–36)
MCHC RBC AUTO-ENTMCNC: 30.6 % (ref 32–36)
MCV RBC AUTO: 91.1 FL (ref 78–100)
MCV RBC AUTO: 94.7 FL (ref 78–100)
MONOCYTES ABSOLUTE: 0.6 K/CU MM
MONOCYTES ABSOLUTE: 0.9 K/CU MM
MONOCYTES RELATIVE PERCENT: 2 % (ref 0–4)
MONOCYTES RELATIVE PERCENT: 3 % (ref 0–4)
PDW BLD-RTO: 16.6 % (ref 11.7–14.9)
PDW BLD-RTO: 17.2 % (ref 11.7–14.9)
PLATELET # BLD: 231 K/CU MM (ref 140–440)
PLATELET # BLD: 252 K/CU MM (ref 140–440)
PMV BLD AUTO: 11.4 FL (ref 7.5–11.1)
PMV BLD AUTO: 11.9 FL (ref 7.5–11.1)
POLYCHROMASIA: ABNORMAL
POTASSIUM SERPL-SCNC: 2.7 MMOL/L (ref 3.5–5.1)
POTASSIUM SERPL-SCNC: 2.8 MMOL/L (ref 3.5–5.1)
RBC # BLD: 2.46 M/CU MM (ref 4.6–6.2)
RBC # BLD: 2.58 M/CU MM (ref 4.6–6.2)
S PNEUM AG CSF QL: NORMAL
SEGMENTED NEUTROPHILS ABSOLUTE COUNT: 24.5 K/CU MM
SEGMENTED NEUTROPHILS ABSOLUTE COUNT: 27.6 K/CU MM
SEGMENTED NEUTROPHILS RELATIVE PERCENT: 77 % (ref 36–66)
SEGMENTED NEUTROPHILS RELATIVE PERCENT: 91 % (ref 36–66)
SODIUM BLD-SCNC: 134 MMOL/L (ref 135–145)
SODIUM BLD-SCNC: 136 MMOL/L (ref 135–145)
STATUS: NORMAL
TRANSFUSION STATUS: NORMAL
UNIT DIVISION: 0
UNIT NUMBER: NORMAL
VANCOMYCIN RANDOM: 17.3 UG/ML
WBC # BLD: 30.3 K/CU MM (ref 4–10.5)
WBC # BLD: 31.8 K/CU MM (ref 4–10.5)

## 2023-08-05 PROCEDURE — 2060000000 HC ICU INTERMEDIATE R&B

## 2023-08-05 PROCEDURE — 36415 COLL VENOUS BLD VENIPUNCTURE: CPT

## 2023-08-05 PROCEDURE — 85018 HEMOGLOBIN: CPT

## 2023-08-05 PROCEDURE — 2580000003 HC RX 258

## 2023-08-05 PROCEDURE — 80048 BASIC METABOLIC PNL TOTAL CA: CPT

## 2023-08-05 PROCEDURE — 6360000002 HC RX W HCPCS: Performed by: NURSE PRACTITIONER

## 2023-08-05 PROCEDURE — 73560 X-RAY EXAM OF KNEE 1 OR 2: CPT

## 2023-08-05 PROCEDURE — 30233N1 TRANSFUSION OF NONAUTOLOGOUS RED BLOOD CELLS INTO PERIPHERAL VEIN, PERCUTANEOUS APPROACH: ICD-10-PCS | Performed by: STUDENT IN AN ORGANIZED HEALTH CARE EDUCATION/TRAINING PROGRAM

## 2023-08-05 PROCEDURE — 99223 1ST HOSP IP/OBS HIGH 75: CPT | Performed by: ORTHOPAEDIC SURGERY

## 2023-08-05 PROCEDURE — 6370000000 HC RX 637 (ALT 250 FOR IP): Performed by: INTERNAL MEDICINE

## 2023-08-05 PROCEDURE — 6360000002 HC RX W HCPCS: Performed by: STUDENT IN AN ORGANIZED HEALTH CARE EDUCATION/TRAINING PROGRAM

## 2023-08-05 PROCEDURE — 80202 ASSAY OF VANCOMYCIN: CPT

## 2023-08-05 PROCEDURE — 6370000000 HC RX 637 (ALT 250 FOR IP): Performed by: STUDENT IN AN ORGANIZED HEALTH CARE EDUCATION/TRAINING PROGRAM

## 2023-08-05 PROCEDURE — 94761 N-INVAS EAR/PLS OXIMETRY MLT: CPT

## 2023-08-05 PROCEDURE — 85014 HEMATOCRIT: CPT

## 2023-08-05 PROCEDURE — 6360000002 HC RX W HCPCS

## 2023-08-05 PROCEDURE — 85027 COMPLETE CBC AUTOMATED: CPT

## 2023-08-05 PROCEDURE — 99222 1ST HOSP IP/OBS MODERATE 55: CPT | Performed by: INTERNAL MEDICINE

## 2023-08-05 PROCEDURE — 85007 BL SMEAR W/DIFF WBC COUNT: CPT

## 2023-08-05 PROCEDURE — 2580000003 HC RX 258: Performed by: STUDENT IN AN ORGANIZED HEALTH CARE EDUCATION/TRAINING PROGRAM

## 2023-08-05 PROCEDURE — 93005 ELECTROCARDIOGRAM TRACING: CPT | Performed by: NURSE PRACTITIONER

## 2023-08-05 PROCEDURE — 36430 TRANSFUSION BLD/BLD COMPNT: CPT

## 2023-08-05 PROCEDURE — 93010 ELECTROCARDIOGRAM REPORT: CPT | Performed by: INTERNAL MEDICINE

## 2023-08-05 PROCEDURE — 83735 ASSAY OF MAGNESIUM: CPT

## 2023-08-05 PROCEDURE — 94640 AIRWAY INHALATION TREATMENT: CPT

## 2023-08-05 RX ORDER — POTASSIUM CHLORIDE 20 MEQ/1
40 TABLET, EXTENDED RELEASE ORAL ONCE
Status: COMPLETED | OUTPATIENT
Start: 2023-08-05 | End: 2023-08-05

## 2023-08-05 RX ORDER — LABETALOL HYDROCHLORIDE 5 MG/ML
2.5 INJECTION, SOLUTION INTRAVENOUS ONCE
Status: COMPLETED | OUTPATIENT
Start: 2023-08-05 | End: 2023-08-05

## 2023-08-05 RX ORDER — POTASSIUM CHLORIDE 7.45 MG/ML
10 INJECTION INTRAVENOUS
Status: COMPLETED | OUTPATIENT
Start: 2023-08-05 | End: 2023-08-05

## 2023-08-05 RX ORDER — CARVEDILOL 6.25 MG/1
12.5 TABLET ORAL 2 TIMES DAILY WITH MEALS
Status: DISCONTINUED | OUTPATIENT
Start: 2023-08-05 | End: 2023-08-07

## 2023-08-05 RX ORDER — MAGNESIUM SULFATE IN WATER 40 MG/ML
2000 INJECTION, SOLUTION INTRAVENOUS ONCE
Status: COMPLETED | OUTPATIENT
Start: 2023-08-05 | End: 2023-08-05

## 2023-08-05 RX ADMIN — FUROSEMIDE 20 MG: 20 TABLET ORAL at 15:52

## 2023-08-05 RX ADMIN — Medication 2000 UNITS: at 09:44

## 2023-08-05 RX ADMIN — FUROSEMIDE 20 MG: 20 TABLET ORAL at 09:44

## 2023-08-05 RX ADMIN — VANCOMYCIN HYDROCHLORIDE 1250 MG: 1.25 INJECTION, POWDER, LYOPHILIZED, FOR SOLUTION INTRAVENOUS at 22:21

## 2023-08-05 RX ADMIN — CEFEPIME 2000 MG: 2 INJECTION, POWDER, FOR SOLUTION INTRAVENOUS at 17:26

## 2023-08-05 RX ADMIN — SODIUM CHLORIDE, PRESERVATIVE FREE 10 ML: 5 INJECTION INTRAVENOUS at 21:54

## 2023-08-05 RX ADMIN — ASPIRIN 81 MG: 81 TABLET, CHEWABLE ORAL at 09:45

## 2023-08-05 RX ADMIN — MAGNESIUM SULFATE HEPTAHYDRATE 2000 MG: 40 INJECTION, SOLUTION INTRAVENOUS at 17:11

## 2023-08-05 RX ADMIN — TIOTROPIUM BROMIDE INHALATION SPRAY 2 PUFF: 3.12 SPRAY, METERED RESPIRATORY (INHALATION) at 07:55

## 2023-08-05 RX ADMIN — POTASSIUM CHLORIDE 10 MEQ: 7.45 INJECTION INTRAVENOUS at 18:11

## 2023-08-05 RX ADMIN — ISOSORBIDE MONONITRATE 30 MG: 30 TABLET, EXTENDED RELEASE ORAL at 09:44

## 2023-08-05 RX ADMIN — SODIUM CHLORIDE 500 ML: 9 INJECTION, SOLUTION INTRAVENOUS at 14:29

## 2023-08-05 RX ADMIN — POTASSIUM CHLORIDE 10 MEQ: 7.45 INJECTION INTRAVENOUS at 17:05

## 2023-08-05 RX ADMIN — CARVEDILOL 12.5 MG: 6.25 TABLET, FILM COATED ORAL at 15:52

## 2023-08-05 RX ADMIN — SODIUM CHLORIDE 500 ML: 9 INJECTION, SOLUTION INTRAVENOUS at 15:56

## 2023-08-05 RX ADMIN — SODIUM CHLORIDE, PRESERVATIVE FREE 10 ML: 5 INJECTION INTRAVENOUS at 09:48

## 2023-08-05 RX ADMIN — PRAVASTATIN SODIUM 80 MG: 40 TABLET ORAL at 21:54

## 2023-08-05 RX ADMIN — LABETALOL HYDROCHLORIDE 2.5 MG: 5 INJECTION, SOLUTION INTRAVENOUS at 02:33

## 2023-08-05 RX ADMIN — SODIUM CHLORIDE, PRESERVATIVE FREE 10 ML: 5 INJECTION INTRAVENOUS at 09:45

## 2023-08-05 RX ADMIN — POTASSIUM CHLORIDE 40 MEQ: 1500 TABLET, EXTENDED RELEASE ORAL at 14:31

## 2023-08-05 RX ADMIN — CEFEPIME 2000 MG: 2 INJECTION, POWDER, FOR SOLUTION INTRAVENOUS at 06:28

## 2023-08-05 RX ADMIN — POTASSIUM CHLORIDE 10 MEQ: 7.45 INJECTION INTRAVENOUS at 15:57

## 2023-08-05 RX ADMIN — POTASSIUM CHLORIDE 10 MEQ: 7.45 INJECTION INTRAVENOUS at 14:31

## 2023-08-05 NOTE — PROGRESS NOTES
V2.0  [facility] [place] Hospitalist Progress Note      Name:  Akbar Jimenez /Age/Sex:   (80 y.o. male)   MRN & CSN:  [id number] & [id number] Encounter Date/Time: 2023 12:43 PM EDT    Location:  -A PCP: Jose Subramanian MD       Hospital Day: 2    Assessment and Plan:   Akbar Jimenez is a 80 y.o. male with pmh of   fibrillation, CHF, hyperlipidemia, hypertension, COPD  who presents with Sepsis ([address])      Plan:  1. Sepsis present on admission: Of unknown etiology, suspected left knee joint infection, x-ray with no clear signs of infection, nontender on physical exam, continue broad-spectrum antibiotics, await cultures, CT chest negative for any infection, UA negative for any infection, elevated Pro-Angelo     2. A-fib with RVR  -Was initially started on Cardizem drip in the ED but became hypotensive  -We will resume home BB  -Hold Xarelto in the setting of anemia  -Cardiology consulted     3. Hypomagnesemia  -Replete and monitor with repeat labs     4. Hypokalemia  -Replete and monitor with repeat labs     5. CKD  -Creatinine stable  -Renally dose medications     6. Acute on chronic CHF  -2D echo 2023 show EF of 55%  -Continue diuretics  -Cardiology  on board     7. History of COPD  -Continue home inhalers    Diet ADULT DIET; Regular   DVT Prophylaxis [] Lovenox, []  Heparin, [x] SCDs, [] Ambulation,  [] Eliquis, [] Xarelto  [] Coumadin   Code Status Full Code   Disposition From: Home  Expected Disposition: SNF  Estimated Date of Discharge: 3 days  Patient requires continued admission due to sepsis of unknown etiology   Surrogate Decision Maker/ POA      Subjective:     Chief Complaint: No chief complaint on file. Akbar Jimenez is a 80 y.o. male who presents with generalized weakness, appears drowsy this morning, complains of generalized pain denies any significant pain at the left knee as well as right knee, did not have anything to eat this morning because he was not hungry. trauma TECHNOLOGIST PROVIDED HISTORY: Reason for exam:->trauma Decision Support Exception - unselect if not a suspected or confirmed emergency medical condition->Emergency Medical Condition (MA) Reason for Exam: fall FINDINGS: BONES/ALIGNMENT: No acute fracture or listhesis. Mild multilevel chronic anterior vertebral body wedging. Chronic T11 superior endplate compression deformity is similar to the prior study. Normal thoracic kyphosis. Suggestion of osteopenia. No destructive osseous lesion. DEGENERATIVE CHANGES: Multilevel degenerative changes in the thoracic spine. No definite severe thoracic spinal canal stenosis. SOFT TISSUES: No paraspinal mass is seen. No acute traumatic injury in the thoracic spine.        Electronically signed by Kemal Salamanca MD on 8/5/2023 at 12:43 PM

## 2023-08-05 NOTE — PROGRESS NOTES
RN have discussed with the patient the rationale for blood component transfusion; its benefits in treating or preventing fatigue, organ damage, or death; and its risk which includes mild transfusion reactions, rare risk of blood borne infection, or more serious but rare reactions. RN discussed the alternatives to transfusion, including the risk and consequences of not receiving transfusion. The patient had an opportunity to ask questions  to tele NP and had agreed to proceed with transfusion of blood components.

## 2023-08-05 NOTE — PROGRESS NOTES
X-rays were reviewed. A stemmed revision type knee arthroplasty is in place with no evidence of fracture or severe complication. No obvious joint effusion identified. Mild subcutaneous edema present. On exam today the patient does not have any signs of a obvious effusion and no erythema, induration, warmth or other infectious symptoms of the knee. Stable appearing healthy scab from anterior knee abrasion. No severe tenderness palpation through the joint capsule of the knee. Patient denies any pain at rest.    No obvious signs of septic joint at this stage. No large joint effusion present. Would recommend more advanced imaging if concern for infectious process at the knee.   No joint aspiration indicated at this time    Okay to proceed with physical therapy and activity as tolerated

## 2023-08-05 NOTE — PROGRESS NOTES
Patient resting in the bed. He has been making reasonable progress with therapy. No setbacks or problems    Right lower extremity:  Surgical site is healing well with Dermabond tape over the healing surgical site. No erythema, drainage, or induration. Normal alignment of the leg. Calf soft and nontender. Sensation and motor functions intact    Postoperative day #3 right hip and knee arthroplasty  -Okay to discharge to acute rehab unit from orthopedic perspective. Patient can follow-up in my office in 3 to 4 weeks for x-rays.    -Okay to perform daily dressing changes with gauze and tape as needed.   Incision site can be left open to air if there is no drainage or irritation of the skin.    -Continue DVT prophylaxis for minimum 4 weeks after surgery

## 2023-08-05 NOTE — CONSULTS
Chart reviewed full note to follow  80year-old history of A-fib CHF hyperlipidemia hypertension admitted with sepsis was found to be in A-fib with RVR hence the consult has history of A-fib                        Name:  Dahiana Moss /Age/Sex:   (80 y.o. male)   MRN & CSN:  [id number] & [id number] Admission Date/Time: 2023  3:25 PM   Location:  -A PCP: Chip Richmond, [address] [place] [address] Day: 2          Referring physician:  No admitting provider for patient encounter. Reason for consultation: Atrial fibrillation        Thanks for referral.    Information source: Patient    CC; sepsis      HPI:   Thank you for involving me in taking  care of Daihana Moss who  is a 80 y. o.year  Old male  Presents with history of atrial fibrillation, hypertension hyperlipidemia COPD admitted with possible sepsis has also been having frequent falls was in A-fib with RVR hence the consult patient is complaining of generalized body ache denies any chest pain shortness of breath                     Past medical history:    has a past medical history of AR (aortic regurgitation), Atrial fibrillation, new onset ([address]), CAD (coronary artery disease), Chronic midline low back pain without sciatica, COPD (chronic obstructive pulmonary disease) ([address]), Family history of coronary artery disease, Fatigue, H/O cardiovascular stress test, H/O chest x-ray, H/O Doppler ultrasound, H/O echocardiogram, History of cardiac cath, History of PTCA, Hyperlipidemia, Hypertension, Obesity, Obesity, Class II, 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 Past surgical history:   has a past surgical history that includes joint replacement (); Coronary angioplasty with stent; Upper gastrointestinal endoscopy (N/A, 2022); and Colonoscopy (N/A, 2022). Social History:   reports that he quit smoking about 32 years ago. His smoking use included cigarettes.  He has a 37.50 pack-year

## 2023-08-05 NOTE — CONSULTS
Orthopaedic Consult    Patient Name: Courtney Watson   ([birth year])  MRN   [id number]   Today's date:  8/5/2023     CHIEF COMPLAINT: Bilateral left worse than right knee pain    HISTORY OF PRESENT ILLNESS:      The patient is a 80 y.o. male  who presents with left knee pain in the context of overall illness and decompensation. Patient does not have acute history of recent events. He is a poor historian. He does not complain of any significant or severe pain at rest.  Upon questioning he does complain of more pain in the left knee than the right knee but does complain of pain at both knees. He has a history of previous left knee replacement performed by Dr. Deni Winston in town about 20 years ago per his report. 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 This revision surgery was done at some point in [place]. He does not recall any recent trauma. He was brought to the hospital for medical issues and possible concern about left knee problem. Past Medical History         Diagnosis Date    AR (aortic regurgitation)     mild to mod    Atrial fibrillation, new onset (HCC)     CAD (coronary artery disease)     History of angioplasty    Chronic midline low back pain without sciatica 5/1/2017    Has seen Dr. Antoni Navarrete and had shots    COPD (chronic obstructive pulmonary disease) ([address] )     Family history of coronary artery disease     Fatigue 10/2016    H/O cardiovascular stress test 2/21/2012    mild ischemia in the basal inferior and mid inferior regions ef is 47    H/O chest x-ray 10/20/2016    Right pleural effusion resolved. H/O Doppler ultrasound 10/2016    CAROTID-Kolby. arteries patent w/less than 50% stenosis in the internal carotid arteries.     H/O echocardiogram 2/17/15    Aortic sclerosis without stenosis, normal LV size and wall motion w/low normal systolic function, LA dilatation, RV dilatation, mild

## 2023-08-05 NOTE — PROGRESS NOTES
PT is currently on bag 3/4 of potassium replacement. Magnesium is being replaced as well. PT is tolerating well. This said nurse was speaking with PT and his daughter in law at bedside and hey both mentioned the PT having an abscess tooth in the back on the left side. PT stated partials also as ill fitting. Juan Paul MD notified. Will continue to monitor.

## 2023-08-05 NOTE — PROGRESS NOTES
Patient received 1 unit of blood products NS was stopped, has known hx of CHF ordered po lasix home dose from admission. May need an additional dose, being mindful of soft BP. Cardio has also been cons.  For afib and diuresis

## 2023-08-06 LAB
ANION GAP SERPL CALCULATED.3IONS-SCNC: 9 MMOL/L (ref 4–16)
BASOPHILS ABSOLUTE: 0 K/CU MM
BASOPHILS RELATIVE PERCENT: 0.1 % (ref 0–1)
BUN SERPL-MCNC: 20 MG/DL (ref 6–23)
CALCIUM SERPL-MCNC: 7.7 MG/DL (ref 8.3–10.6)
CHLORIDE BLD-SCNC: 98 MMOL/L (ref 99–110)
CO2: 27 MMOL/L (ref 21–32)
CREAT SERPL-MCNC: 1.1 MG/DL (ref 0.9–1.3)
CULTURE: NORMAL
DIFFERENTIAL TYPE: ABNORMAL
EOSINOPHILS ABSOLUTE: 0 K/CU MM
EOSINOPHILS RELATIVE PERCENT: 0.2 % (ref 0–3)
GFR SERPL CREATININE-BSD FRML MDRD: >60 ML/MIN/1.73M2
GLUCOSE SERPL-MCNC: 96 MG/DL (ref 70–99)
HCT VFR BLD CALC: 24.8 % (ref 42–52)
HCT VFR BLD CALC: 25.2 % (ref 42–52)
HEMOGLOBIN: 7.5 GM/DL (ref 13.5–18)
HEMOGLOBIN: 7.7 GM/DL (ref 13.5–18)
IMMATURE NEUTROPHIL %: 1.7 % (ref 0–0.43)
LYMPHOCYTES ABSOLUTE: 0.6 K/CU MM
LYMPHOCYTES RELATIVE PERCENT: 2.8 % (ref 24–44)
Lab: NORMAL
Lab: NORMAL
MAGNESIUM: 2.2 MG/DL (ref 1.8–2.4)
MCH RBC QN AUTO: 28.6 PG (ref 27–31)
MCHC RBC AUTO-ENTMCNC: 31 % (ref 32–36)
MCV RBC AUTO: 92.2 FL (ref 78–100)
MONOCYTES ABSOLUTE: 0.8 K/CU MM
MONOCYTES RELATIVE PERCENT: 4 % (ref 0–4)
NUCLEATED RBC %: 0.1 %
PDW BLD-RTO: 16.6 % (ref 11.7–14.9)
PLATELET # BLD: 247 K/CU MM (ref 140–440)
PMV BLD AUTO: 11.5 FL (ref 7.5–11.1)
POTASSIUM SERPL-SCNC: 3.3 MMOL/L (ref 3.5–5.1)
POTASSIUM SERPL-SCNC: 3.3 MMOL/L (ref 3.5–5.1)
RBC # BLD: 2.69 M/CU MM (ref 4.6–6.2)
SEGMENTED NEUTROPHILS ABSOLUTE COUNT: 19.4 K/CU MM
SEGMENTED NEUTROPHILS RELATIVE PERCENT: 91.2 % (ref 36–66)
SODIUM BLD-SCNC: 134 MMOL/L (ref 135–145)
SPECIMEN: NORMAL
SPECIMEN: NORMAL
TOTAL IMMATURE NEUTOROPHIL: 0.37 K/CU MM
TOTAL NUCLEATED RBC: 0 K/CU MM
WBC # BLD: 21.2 K/CU MM (ref 4–10.5)

## 2023-08-06 PROCEDURE — 85025 COMPLETE CBC W/AUTO DIFF WBC: CPT

## 2023-08-06 PROCEDURE — 6360000002 HC RX W HCPCS: Performed by: STUDENT IN AN ORGANIZED HEALTH CARE EDUCATION/TRAINING PROGRAM

## 2023-08-06 PROCEDURE — 6370000000 HC RX 637 (ALT 250 FOR IP): Performed by: INTERNAL MEDICINE

## 2023-08-06 PROCEDURE — 6370000000 HC RX 637 (ALT 250 FOR IP): Performed by: NURSE PRACTITIONER

## 2023-08-06 PROCEDURE — 83735 ASSAY OF MAGNESIUM: CPT

## 2023-08-06 PROCEDURE — 85014 HEMATOCRIT: CPT

## 2023-08-06 PROCEDURE — 94640 AIRWAY INHALATION TREATMENT: CPT

## 2023-08-06 PROCEDURE — 6370000000 HC RX 637 (ALT 250 FOR IP): Performed by: STUDENT IN AN ORGANIZED HEALTH CARE EDUCATION/TRAINING PROGRAM

## 2023-08-06 PROCEDURE — 80048 BASIC METABOLIC PNL TOTAL CA: CPT

## 2023-08-06 PROCEDURE — 99232 SBSQ HOSP IP/OBS MODERATE 35: CPT | Performed by: INTERNAL MEDICINE

## 2023-08-06 PROCEDURE — 2580000003 HC RX 258: Performed by: STUDENT IN AN ORGANIZED HEALTH CARE EDUCATION/TRAINING PROGRAM

## 2023-08-06 PROCEDURE — APPNB60 APP NON BILLABLE TIME 46-60 MINS: Performed by: NURSE PRACTITIONER

## 2023-08-06 PROCEDURE — 85018 HEMOGLOBIN: CPT

## 2023-08-06 PROCEDURE — 84132 ASSAY OF SERUM POTASSIUM: CPT

## 2023-08-06 PROCEDURE — 94761 N-INVAS EAR/PLS OXIMETRY MLT: CPT

## 2023-08-06 PROCEDURE — 36415 COLL VENOUS BLD VENIPUNCTURE: CPT

## 2023-08-06 PROCEDURE — 2060000000 HC ICU INTERMEDIATE R&B

## 2023-08-06 RX ORDER — POTASSIUM CHLORIDE 20 MEQ/1
40 TABLET, EXTENDED RELEASE ORAL 2 TIMES DAILY WITH MEALS
Status: DISCONTINUED | OUTPATIENT
Start: 2023-08-06 | End: 2023-08-08

## 2023-08-06 RX ADMIN — SODIUM CHLORIDE, PRESERVATIVE FREE 10 ML: 5 INJECTION INTRAVENOUS at 09:05

## 2023-08-06 RX ADMIN — ISOSORBIDE MONONITRATE 30 MG: 30 TABLET, EXTENDED RELEASE ORAL at 09:05

## 2023-08-06 RX ADMIN — FUROSEMIDE 20 MG: 20 TABLET ORAL at 09:05

## 2023-08-06 RX ADMIN — PRAVASTATIN SODIUM 80 MG: 40 TABLET ORAL at 21:53

## 2023-08-06 RX ADMIN — POTASSIUM CHLORIDE 40 MEQ: 1500 TABLET, EXTENDED RELEASE ORAL at 17:07

## 2023-08-06 RX ADMIN — POTASSIUM BICARBONATE 40 MEQ: 782 TABLET, EFFERVESCENT ORAL at 03:02

## 2023-08-06 RX ADMIN — ASPIRIN 81 MG: 81 TABLET, CHEWABLE ORAL at 09:05

## 2023-08-06 RX ADMIN — TIOTROPIUM BROMIDE INHALATION SPRAY 2 PUFF: 3.12 SPRAY, METERED RESPIRATORY (INHALATION) at 09:19

## 2023-08-06 RX ADMIN — FUROSEMIDE 20 MG: 20 TABLET ORAL at 17:07

## 2023-08-06 RX ADMIN — VANCOMYCIN HYDROCHLORIDE 1500 MG: 1.5 INJECTION, POWDER, LYOPHILIZED, FOR SOLUTION INTRAVENOUS at 22:07

## 2023-08-06 RX ADMIN — Medication 2000 UNITS: at 09:05

## 2023-08-06 RX ADMIN — CEFEPIME 2000 MG: 2 INJECTION, POWDER, FOR SOLUTION INTRAVENOUS at 05:44

## 2023-08-06 RX ADMIN — POTASSIUM CHLORIDE 40 MEQ: 1500 TABLET, EXTENDED RELEASE ORAL at 12:19

## 2023-08-06 RX ADMIN — CARVEDILOL 12.5 MG: 6.25 TABLET, FILM COATED ORAL at 17:07

## 2023-08-06 RX ADMIN — CARVEDILOL 12.5 MG: 6.25 TABLET, FILM COATED ORAL at 09:05

## 2023-08-06 RX ADMIN — SODIUM CHLORIDE, PRESERVATIVE FREE 10 ML: 5 INJECTION INTRAVENOUS at 21:53

## 2023-08-06 NOTE — PROGRESS NOTES
loose body in the suprapatellar recess. Suspicion for at least small suprapatellar effusion. Diffuse soft tissue swelling and subcutaneous stranding. Extensive arterial calcifications     1. No evident acute bony findings in the left knee. 2. New changes of left knee total arthroplasty with no obvious complication. 3. Possible new intra-articular loose body in the left knee suprapatellar recess with suspicion for at least small suprapatellar effusion. 4. Diffuse soft tissue swelling suggestive of subcutaneous edema. CT HEAD WO CONTRAST    Result Date: 8/4/2023  EXAMINATION: CT OF THE HEAD WITHOUT CONTRAST  8/3/2023 11:43 pm TECHNIQUE: CT of the head was performed without the administration of intravenous contrast. Automated exposure control, iterative reconstruction, and/or weight based adjustment of the mA/kV was utilized to reduce the radiation dose to as low as reasonably achievable. COMPARISON: December 24, 2022. HISTORY: ORDERING SYSTEM PROVIDED HISTORY: Trauma TECHNOLOGIST PROVIDED HISTORY: Has a \"code stroke\" or \"stroke alert\" been called? ->No Reason for exam:->Trauma Decision Support Exception - unselect if not a suspected or confirmed emergency medical condition->Emergency Medical Condition (MA) Reason for Exam: fall FINDINGS: BRAIN/VENTRICLES: There is no acute intracranial hemorrhage, mass effect or midline shift. No abnormal extra-axial fluid collection. The gray-white differentiation is maintained without evidence of an acute infarct. There is prominence of the ventricles and sulci due to global parenchymal volume loss. There are nonspecific areas of hypoattenuation within the periventricular and subcortical white matter, which likely represent chronic microvascular ischemic change. Atherosclerosis. ORBITS: The visualized portion of the orbits demonstrate no acute abnormality. SINUSES: The visualized paranasal sinuses and mastoid air cells demonstrate no acute abnormality.  SOFT TISSUES/SKULL: No acute abnormality of the visualized skull or soft tissues. No acute intracranial abnormality. CT CHEST WO CONTRAST    Result Date: 8/4/2023  EXAMINATION: CT OF THE CHEST WITHOUT CONTRAST 8/4/2023 12:11 am TECHNIQUE: CT of the chest was performed without the administration of intravenous contrast. Multiplanar reformatted images are provided for review. Automated exposure control, iterative reconstruction, and/or weight based adjustment of the mA/kV was utilized to reduce the radiation dose to as low as reasonably achievable. COMPARISON: CT chest done July 4, 2015 and January 23, 2015. Octavio Elliott HISTORY: ORDERING SYSTEM PROVIDED HISTORY: trauma TECHNOLOGIST PROVIDED HISTORY: Reason for exam:->trauma Decision Support Exception - unselect if not a suspected or confirmed emergency medical condition->Emergency Medical Condition (MA) Reason for Exam: fall FINDINGS: Mediastinum: Cardiomegaly. Trace pericardial fluid. Moderate to severe atherosclerosis in the thoracic aorta. No thoracic aortic aneurysm. Normal caliber main pulmonary artery. Coronary artery calcifications. No pneumomediastinum or mediastinal hemorrhage. Normal thyroid gland. Normal esophagus. No intrathoracic lymphadenopathy. Lungs/pleura: Respiratory motion. Bilateral dependent and basilar subsegmental atelectasis. No pulmonary mass or consolidation. Emphysema. No pleural effusion or pneumothorax. No endoluminal masslike lesion. Posterior left upper lobe 5 mm nodular opacity on series 4, image 26 was not seen on the prior study. Two small nodular opacities in the right lower lobe are stable dating back to chest CT done January 23, 2015 consistent with benign remote granulomas. Upper Abdomen: Please refer to concurrent CT abdomen pelvis report. Soft Tissues/Bones: No acute fracture. No destructive osseous lesion.  Multiple old bilateral rib fracture deformities including old nonunited and mildly displaced fractures of the right lateral 8th, 9th

## 2023-08-07 PROBLEM — B95.62 MRSA BACTEREMIA: Status: ACTIVE | Noted: 2023-08-07

## 2023-08-07 PROBLEM — R41.0 DELIRIUM: Status: ACTIVE | Noted: 2023-08-07

## 2023-08-07 PROBLEM — R78.81 MRSA BACTEREMIA: Status: ACTIVE | Noted: 2023-08-07

## 2023-08-07 LAB
ANION GAP SERPL CALCULATED.3IONS-SCNC: 9 MMOL/L (ref 4–16)
BASE EXCESS MIXED: 0.1 (ref 0–1.2)
BUN SERPL-MCNC: 19 MG/DL (ref 6–23)
CALCIUM SERPL-MCNC: 8 MG/DL (ref 8.3–10.6)
CHLORIDE BLD-SCNC: 101 MMOL/L (ref 99–110)
CO2: 27 MMOL/L (ref 21–32)
CREAT SERPL-MCNC: 1.1 MG/DL (ref 0.9–1.3)
CRP SERPL HS-MCNC: 93.7 MG/L
DOSE AMOUNT: NORMAL
DOSE TIME: NORMAL
GFR SERPL CREATININE-BSD FRML MDRD: >60 ML/MIN/1.73M2
GLUCOSE SERPL-MCNC: 88 MG/DL (ref 70–99)
HCO3 VENOUS: 26 MMOL/L (ref 19–25)
MAGNESIUM: 2.1 MG/DL (ref 1.8–2.4)
O2 SAT, VEN: 82.8 % (ref 50–70)
PCO2, VEN: 46 MMHG (ref 38–52)
PH VENOUS: 7.36 (ref 7.32–7.42)
PO2, VEN: 57 MMHG (ref 28–48)
POTASSIUM SERPL-SCNC: 3.9 MMOL/L (ref 3.5–5.1)
PROCALCITONIN SERPL-MCNC: 0.47 NG/ML
SODIUM BLD-SCNC: 137 MMOL/L (ref 135–145)
VANCOMYCIN RANDOM: 28.7 UG/ML

## 2023-08-07 PROCEDURE — 6360000002 HC RX W HCPCS: Performed by: STUDENT IN AN ORGANIZED HEALTH CARE EDUCATION/TRAINING PROGRAM

## 2023-08-07 PROCEDURE — 6370000000 HC RX 637 (ALT 250 FOR IP): Performed by: INTERNAL MEDICINE

## 2023-08-07 PROCEDURE — 99223 1ST HOSP IP/OBS HIGH 75: CPT | Performed by: INTERNAL MEDICINE

## 2023-08-07 PROCEDURE — 6370000000 HC RX 637 (ALT 250 FOR IP): Performed by: STUDENT IN AN ORGANIZED HEALTH CARE EDUCATION/TRAINING PROGRAM

## 2023-08-07 PROCEDURE — 99232 SBSQ HOSP IP/OBS MODERATE 35: CPT | Performed by: INTERNAL MEDICINE

## 2023-08-07 PROCEDURE — 84145 PROCALCITONIN (PCT): CPT

## 2023-08-07 PROCEDURE — 93005 ELECTROCARDIOGRAM TRACING: CPT | Performed by: STUDENT IN AN ORGANIZED HEALTH CARE EDUCATION/TRAINING PROGRAM

## 2023-08-07 PROCEDURE — 87040 BLOOD CULTURE FOR BACTERIA: CPT

## 2023-08-07 PROCEDURE — 6370000000 HC RX 637 (ALT 250 FOR IP)

## 2023-08-07 PROCEDURE — 80202 ASSAY OF VANCOMYCIN: CPT

## 2023-08-07 PROCEDURE — 36415 COLL VENOUS BLD VENIPUNCTURE: CPT

## 2023-08-07 PROCEDURE — 94761 N-INVAS EAR/PLS OXIMETRY MLT: CPT

## 2023-08-07 PROCEDURE — 6370000000 HC RX 637 (ALT 250 FOR IP): Performed by: FAMILY MEDICINE

## 2023-08-07 PROCEDURE — 80048 BASIC METABOLIC PNL TOTAL CA: CPT

## 2023-08-07 PROCEDURE — 83735 ASSAY OF MAGNESIUM: CPT

## 2023-08-07 PROCEDURE — 2060000000 HC ICU INTERMEDIATE R&B

## 2023-08-07 PROCEDURE — 86140 C-REACTIVE PROTEIN: CPT

## 2023-08-07 PROCEDURE — 82805 BLOOD GASES W/O2 SATURATION: CPT

## 2023-08-07 PROCEDURE — APPNB60 APP NON BILLABLE TIME 46-60 MINS: Performed by: NURSE PRACTITIONER

## 2023-08-07 PROCEDURE — 94640 AIRWAY INHALATION TREATMENT: CPT

## 2023-08-07 PROCEDURE — APPSS60 APP SPLIT SHARED TIME 46-60 MINUTES: Performed by: NURSE PRACTITIONER

## 2023-08-07 PROCEDURE — 2580000003 HC RX 258: Performed by: STUDENT IN AN ORGANIZED HEALTH CARE EDUCATION/TRAINING PROGRAM

## 2023-08-07 RX ORDER — POTASSIUM CHLORIDE 20 MEQ/1
40 TABLET, EXTENDED RELEASE ORAL PRN
Status: DISCONTINUED | OUTPATIENT
Start: 2023-08-07 | End: 2023-08-17

## 2023-08-07 RX ORDER — CARVEDILOL 25 MG/1
25 TABLET ORAL 2 TIMES DAILY WITH MEALS
Status: DISCONTINUED | OUTPATIENT
Start: 2023-08-07 | End: 2023-08-16

## 2023-08-07 RX ORDER — CARVEDILOL 6.25 MG/1
12.5 TABLET ORAL ONCE
Status: DISCONTINUED | OUTPATIENT
Start: 2023-08-07 | End: 2023-08-15

## 2023-08-07 RX ORDER — OLANZAPINE 5 MG/1
5 TABLET ORAL
Status: DISCONTINUED | OUTPATIENT
Start: 2023-08-07 | End: 2023-08-07

## 2023-08-07 RX ORDER — ZIPRASIDONE MESYLATE 20 MG/ML
10 INJECTION, POWDER, LYOPHILIZED, FOR SOLUTION INTRAMUSCULAR ONCE
Status: COMPLETED | OUTPATIENT
Start: 2023-08-07 | End: 2023-08-07

## 2023-08-07 RX ORDER — HALOPERIDOL 5 MG/ML
5 INJECTION INTRAMUSCULAR ONCE
Status: COMPLETED | OUTPATIENT
Start: 2023-08-07 | End: 2023-08-07

## 2023-08-07 RX ORDER — CHOLECALCIFEROL (VITAMIN D3) 125 MCG
5 CAPSULE ORAL NIGHTLY PRN
Status: DISCONTINUED | OUTPATIENT
Start: 2023-08-07 | End: 2023-08-07

## 2023-08-07 RX ORDER — POTASSIUM CHLORIDE 7.45 MG/ML
10 INJECTION INTRAVENOUS PRN
Status: DISCONTINUED | OUTPATIENT
Start: 2023-08-07 | End: 2023-08-17

## 2023-08-07 RX ORDER — MAGNESIUM SULFATE IN WATER 40 MG/ML
2000 INJECTION, SOLUTION INTRAVENOUS PRN
Status: DISCONTINUED | OUTPATIENT
Start: 2023-08-07 | End: 2023-08-17

## 2023-08-07 RX ORDER — CHOLECALCIFEROL (VITAMIN D3) 125 MCG
5 CAPSULE ORAL NIGHTLY PRN
Status: DISCONTINUED | OUTPATIENT
Start: 2023-08-07 | End: 2023-08-18 | Stop reason: HOSPADM

## 2023-08-07 RX ADMIN — DILTIAZEM HYDROCHLORIDE 30 MG: 30 TABLET, FILM COATED ORAL at 18:29

## 2023-08-07 RX ADMIN — Medication 2000 UNITS: at 09:11

## 2023-08-07 RX ADMIN — VANCOMYCIN HYDROCHLORIDE 1500 MG: 1.5 INJECTION, POWDER, LYOPHILIZED, FOR SOLUTION INTRAVENOUS at 21:45

## 2023-08-07 RX ADMIN — PRAVASTATIN SODIUM 80 MG: 40 TABLET ORAL at 21:29

## 2023-08-07 RX ADMIN — Medication 5 MG: at 02:26

## 2023-08-07 RX ADMIN — ASPIRIN 81 MG: 81 TABLET, CHEWABLE ORAL at 09:11

## 2023-08-07 RX ADMIN — CARVEDILOL 25 MG: 25 TABLET, FILM COATED ORAL at 18:29

## 2023-08-07 RX ADMIN — SODIUM CHLORIDE, PRESERVATIVE FREE 10 ML: 5 INJECTION INTRAVENOUS at 08:53

## 2023-08-07 RX ADMIN — ZIPRASIDONE MESYLATE 10 MG: 20 INJECTION, POWDER, LYOPHILIZED, FOR SOLUTION INTRAMUSCULAR at 15:08

## 2023-08-07 RX ADMIN — ISOSORBIDE MONONITRATE 30 MG: 30 TABLET, EXTENDED RELEASE ORAL at 09:11

## 2023-08-07 RX ADMIN — HALOPERIDOL LACTATE 5 MG: 5 INJECTION, SOLUTION INTRAMUSCULAR at 09:57

## 2023-08-07 RX ADMIN — POTASSIUM CHLORIDE 40 MEQ: 1500 TABLET, EXTENDED RELEASE ORAL at 09:11

## 2023-08-07 RX ADMIN — POTASSIUM CHLORIDE 40 MEQ: 1500 TABLET, EXTENDED RELEASE ORAL at 18:28

## 2023-08-07 RX ADMIN — TIOTROPIUM BROMIDE INHALATION SPRAY 2 PUFF: 3.12 SPRAY, METERED RESPIRATORY (INHALATION) at 08:12

## 2023-08-07 RX ADMIN — SODIUM CHLORIDE, PRESERVATIVE FREE 10 ML: 5 INJECTION INTRAVENOUS at 21:45

## 2023-08-07 RX ADMIN — CARVEDILOL 12.5 MG: 6.25 TABLET, FILM COATED ORAL at 09:11

## 2023-08-07 RX ADMIN — FUROSEMIDE 20 MG: 20 TABLET ORAL at 09:14

## 2023-08-07 ASSESSMENT — PAIN SCALES - WONG BAKER
WONGBAKER_NUMERICALRESPONSE: 0
WONGBAKER_NUMERICALRESPONSE: 0

## 2023-08-07 NOTE — CONSULTS
Electrophysiology Consult Note      Reason for consultation:  atrial fibrillation    Chief complaint : Sepsis    Referring physician: Julius Love      Primary care physician: Ayaan Tirado MD      History of Present Illness:     Nancy Jackson is an 80year old male with a history of aortic regurgitation, atrial fibrillation, CAD, COPD, hyperlipidemia, hypertension, RBBB, and high fall risk presents with complaints of falls, lower leg swelling and weakness. Patient was admitted to [facility] for overnight observation. He was found on telemetry to have atrial fibrillation with RVR. He was started on cardizem drip however then became hypotensive. His coreg was increased by Cardiology. Patient also on Xarelto at home however it was held due to anemia. Additionally he was noted to have left knee swelling with elevated infectious markers. He was then transferred to [facility].   During this admission patient has become agitated and confused. Patient has a history of persistent atrial fibrillation. He was seen by Dr Genie Jeter in Jan for discussion about watchman device. On admission , K 3.1, creatinine 1.5, Mag 1.7, proBNP 3985, troponin 0.014 and <0.010  TSH 0.412, Hgb 7.6  ECHO is pending  [facility] 3/29/2023 Mid LAD 40-50% stenosis.  Circ and RCA widely patent  Blood cultures 1 out of 4 positive for  MRSA      Pastmedical history:   Past Medical History:   Diagnosis Date    AR (aortic regurgitation)     mild to mod    Atrial fibrillation, new onset (HCC)     CAD (coronary artery disease)     History of angioplasty    Chronic midline low back pain without sciatica 5/1/2017    Has seen Dr. Chrissy Kapadia and had shots    COPD (chronic obstructive pulmonary disease) ([address])     Family history of coronary artery disease     Fatigue 10/2016    H/O cardiovascular stress test 2/21/2012    mild ischemia in the basal inferior and mid inferior regions ef is 47    H/O

## 2023-08-07 NOTE — PROGRESS NOTES
called this said nurse and reported PT pulled out IV this said nurse went to room and PT was receptive to letting this said nurse place an IV this said nurse placed a 20 G in RAC and placed Kerlix. PT in bed call light in reach bed alarm on  in room. Will continue to monitor.

## 2023-08-07 NOTE — PROGRESS NOTES
PT has become combative swinging at this said nurse and Nurse extern refused all oral meds IM Geodon administered will monitor effects.

## 2023-08-07 NOTE — PROGRESS NOTES
PT extremely agitated and pulling lines and teley oxygen monitor placed on R foot and teley moved to patients back to try to relieve agitation. PT grabbing at the air and stating he sees horses walking around. Will continue to monitor.

## 2023-08-07 NOTE — CARE COORDINATION
Chart reviewed. Assessment completed by K Claudio RN CM . CM will follow for discharge needs.  Jason Bull RN

## 2023-08-07 NOTE — CONSULTS
Electrophysiology Consult Note      Reason for consultation: atrial fibrillation    Chief complaint: sepsis    Referring physician:  Dr Vidhya Aguero      Primary care physician: Jimenez Santiago MD      History of Present Illness: All information obtained from chart as patient is confused    Clifton Villareal is an 80year old male with a history of aortic regurgitation, atrial fibrillation, CAD, COPD, hyperlipidemia, hypertension, RBBB, and high fall risk. He present with complaints of falls, lower leg swelling and weakness. Patient was admitted to [facility] for overnight observation. He was found on telemetry to have atrial fibrillation with RVR. He was started on cardizem drip however then became hypotensive. His coreg was increased by Cardiology. Patient also on Xarelto at home however it was held due to anemia. Additionally he was noted to have left knee swelling with elevated infectious markers. He was then transferred to [facility].   During this admission patient has become agitated and confused. Patient has a history of persistent atrial fibrillation. He was seen by Dr Elieser Sanchez in Jan for discussion about watchman device. On admission , K 3.1, creatinine 1.5, Mag 1.7, proBNP 3985, troponin 0.014 and <0.010  TSH 0.412, Hgb 7.6  ECHO is pending  [facility] 3/29/2023 Mid LAD 40-50% stenosis.  Circ and RCA widely patent  Blood cultures 1 out of 4 positive for MRSA  Past medical history:   Past Medical History:   Diagnosis Date    AR (aortic regurgitation)     mild to mod    Atrial fibrillation, new onset (HCC)     CAD (coronary artery disease)     History of angioplasty    Chronic midline low back pain without sciatica 5/1/2017    Has seen Dr. Drew Vasquez and had shots    COPD (chronic obstructive pulmonary disease) ([address])     Family history of coronary artery disease     Fatigue 10/2016    H/O cardiovascular stress test 2/21/2012    mild ischemia in the basal inferior and mid inferior

## 2023-08-07 NOTE — PROGRESS NOTES
[facility] [employer] RN called to bedside for pt c/o SOB. Patient is alert on R/A with Spo2 stable but complains of SOB. Noted tele to show Afib with Hr ranging 110-130's. Applied 2L Nc and boosted patient up in bed. Patient confused and thinking he is on a boat. Re-direction effective. Bedside Rn notified NP new order, Venous blood gas obtained by lab. Patient denied SOB and this RN turned on movie for patient. Personal alarm in place.

## 2023-08-07 NOTE — PROGRESS NOTES
Glands: Normal. Pancreas: Normal. Genitourinary: No hydronephrosis. Multiple small bilateral renal medullary calcifications appear to represent vascular calcifications. Small nonobstructing kidney stone is difficult to exclude. Both ureters are normal in course and caliber. Urinary bladder is unremarkable. Bowel: No acute abnormality. Normal appendix. No bowel obstruction. Colonic diverticulosis without evidence of acute diverticulitis. Vasculature: Moderate to severe atherosclerosis. No abdominal aortic aneurysm. Bones and Soft Tissues: No acute abnormality. Retroperitoneum/Mesentery: No intraperitoneal free air, ascites or fluid collection. No lymphadenopathy in the abdomen or pelvis. No acute abnormality in the abdomen or pelvis. No evidence of acute traumatic injury. XR KNEE LEFT (1-2 VIEWS)    Result Date: 8/5/2023  EXAMINATION: TWO XRAY VIEWS OF THE LEFT KNEE 8/5/2023 7:44 am COMPARISON: Left knee radiograph 03/03/2011 HISTORY: ORDERING SYSTEM PROVIDED HISTORY: evaluate pain TECHNOLOGIST PROVIDED HISTORY: Reason for exam:->evaluate pain Reason for Exam: evaluate pain Additional signs and symptoms: evaluate pain Relevant Medical/Surgical History: evaluate pain FINDINGS: Removal of the previously seen medial compartment hemiarthroplasty hardware with new changes of total knee arthroplasty demonstrating no obvious complication. No obvious acute fracture. New healed fracture involving the proximal fibular diaphysis. Anatomic alignment of the native proximal tibiofibular joint. Possible new intra-articular loose body in the suprapatellar recess. Suspicion for at least small suprapatellar effusion. Diffuse soft tissue swelling and subcutaneous stranding. Extensive arterial calcifications     1. No evident acute bony findings in the left knee. 2. New changes of left knee total arthroplasty with no obvious complication.  3. Possible new intra-articular loose body in the left knee suprapatellar injury in the thoracic spine.        Electronically signed by Radha Clarke MD on 8/7/2023 at 1:19 PM

## 2023-08-07 NOTE — PROGRESS NOTES
Perfect serve sent to Dr. Maryam Garcia to evaluate patient at bedside for his agitation and confusion.

## 2023-08-07 NOTE — PROGRESS NOTES
[address], [address] [place] [address]  Phone: [phone]    Fax [phone]                  Pedro Barros MD, Lyudmila Pascual MD, Mrailee Willis MD, MD Francesca Houston MD Delpha Nanny, MD Fuller Nixon, MD Dr. Tilford Camps MD  [facility], APRN      Renetta Thomas, APRN  Naomi Padilla, APRN    Yariel Dominga, APRN  MANUEL [facility]aid[facility]    CARDIOLOGY  NOTE      Name:  Rosita Chan /Age/Sex: [id number]  (80 y.o. male)   MRN & CSN:  [id number] & [id number] Admission Date/Time: 2023  3:25 PM   Location:  -A PCP: Radha Orozco MD       Hospital Day: 4    - Cardiology consult is for:  Atrial fibrillation with rapid ventricular response      ASSESSMENT/ PLAN:  Atrial fibrillation with rapid ventricular response  -Rate still uncontrolled currently resting in the 100s to 140s  -Goal potassium 4.0-4.5, magnesium 2.0-2.2. Replete per protocol  -RVR may be secondary to sepsis and irritation  -Increase Coreg 25 mg twice daily  -Resume xarelto, Monitor very closely for worsening anemia  Acute on chronic heart failure with preserved ejection fraction  -Strict I's and O's and daily weights  -Echo pending  -Continue Lasix 20 mg twice daily  Coronary artery disease s/p PCI of LAD  -Most recent heart cath  showing nonobstructive disease  -Continue aspirin, Coreg, Imdur, Pravachol 80 mg daily. Sepsis with MRSA bacteremia  -Infectious disease following  -May benefit from EMILIANO upon stabilization of altered mental status  Acute on chronic anemia  -hemoglobin had dropped to 6.8 at one point during admission, currently 7.7  Altered mental status  -Patient about his morning refusing medications            Subjective:  Omer Gonzales is a 80 y. o.year old     Patient only oriented to self.   Expressing owls flying in the hallways    Right poorly controlled    Objective: Temperature: friction rub. No gallop. Pulmonary:      Effort: Pulmonary effort is normal.      Breath sounds: Normal breath sounds. No rales. Abdominal:      Palpations: Abdomen is soft. Tenderness: There is no abdominal tenderness. Musculoskeletal:      Right lower leg: No edema. Left lower leg: No edema. Skin:     General: Skin is warm and dry. Capillary Refill: Capillary refill takes less than 2 seconds. Findings: No rash. Comments: Skin turgor brisk   Neurological:      Comments: Oriented to self only   Psychiatric:         Behavior: Behavior is cooperative. Comments: Expressing visual hallucinations        Medications:    carvedilol  25 mg Oral BID WC    carvedilol  12.5 mg Oral Once    vancomycin  1,500 mg IntraVENous Q24H    potassium chloride  40 mEq Oral BID WC    sodium chloride flush  5-40 mL IntraVENous 2 times per day    aspirin  81 mg Oral Daily    Vitamin D  2,000 Units Oral Daily    furosemide  20 mg Oral BID    isosorbide mononitrate  30 mg Oral Daily    [Held by provider] rivaroxaban  20 mg Oral Daily with breakfast    pravastatin  80 mg Oral Nightly    tiotropium  2 puff Inhalation Daily RT      sodium chloride 500 mL (08/05/23 1556)    sodium chloride       melatonin, potassium chloride **OR** potassium alternative oral replacement **OR** potassium chloride, magnesium sulfate, sodium chloride flush, sodium chloride, ondansetron **OR** ondansetron, polyethylene glycol, acetaminophen **OR** acetaminophen, sodium chloride    Lab Data:  CBC:   Recent Labs     08/04/23  2158 08/05/23  0419 08/05/23  1554 08/05/23  1822 08/05/23  2121 08/06/23  0621 08/06/23  1053   WBC 31.8*  --  30.3*  --   --   --  21.2*   HGB 6.8*   < > 7.2*   < > 7.5* 7.5* 7.7*   HCT 23.3*   < > 23.5*   < > 25.6* 25.2* 24.8*   MCV 94.7  --  91.1  --   --   --  92.2     --  252  --   --   --  247    < > = values in this interval not displayed.      BMP:   Recent Labs     08/05/23  1554 08/06/23  0033

## 2023-08-07 NOTE — PROGRESS NOTES
Physician Progress Note      Parvin Siegel  CSN #:                  [id number]  :                       1940  ADMIT DATE:       2023 3:25 PM  [address] [facility] DATE:  RESPONDING  PROVIDER #:        Corazon Garcia MD          QUERY TEXT:    Pt admitted with Sepsis and has Acute on chronic CHF documented.  If possible,   please document in progress notes and discharge summary further specificity   regarding the type and acuity of CHF:    The medical record reflects the following:  Risk Factors: CHF  Clinical Indicators:   \"Acute on chronic CHF-2D echo 2023 show EF of   55%-Continue diuretics\"' Documented 2023 PN  Treatment: CARDS consult, Lasix IV, ECHO    Thank you, Ke Cobian RN, CDS ([phone])  Options provided:  -- Acute on Chronic Systolic CHF/HFrEF  -- Acute on Chronic Diastolic CHF/HFpEF  -- Acute on Chronic Systolic and Diastolic CHF  -- Other - I will add my own diagnosis  -- Disagree - Not applicable / Not valid  -- Disagree - Clinically unable to determine / Unknown  -- Refer to Clinical Documentation Reviewer    PROVIDER RESPONSE TEXT:    Chronic diastolic HFpEF    Query created by: Ke Cobian on 2023 8:28 AM      Electronically signed by:  Corazon Garcia MD 2023 1:43 PM

## 2023-08-07 NOTE — SIGNIFICANT EVENT
Author of note saw patient for agitation. Pt delirious as per charts, Qtc >500's, pt s/p Haldol and Geodon with limited ijmprovement. Sitter at bedside, family member also present at bedside which will hopefully help to reorient. Pt may need to be started on precedex gtt if still having worsening mentation.  Plan d/w Primary, Charge Nurse and ICU team

## 2023-08-08 ENCOUNTER — APPOINTMENT (OUTPATIENT)
Dept: CT IMAGING | Age: 83
End: 2023-08-08
Attending: INTERNAL MEDICINE
Payer: MEDICARE

## 2023-08-08 ENCOUNTER — APPOINTMENT (OUTPATIENT)
Dept: GENERAL RADIOLOGY | Age: 83
End: 2023-08-08
Attending: FAMILY MEDICINE
Payer: MEDICARE

## 2023-08-08 PROBLEM — T84.7XXA HARDWARE COMPLICATING WOUND INFECTION (HCC): Status: ACTIVE | Noted: 2023-08-08

## 2023-08-08 LAB
AMMONIA: 23 UMOL/L (ref 16–60)
ANION GAP SERPL CALCULATED.3IONS-SCNC: 14 MMOL/L (ref 4–16)
BASE EXCESS: 1 (ref 0–3.3)
BASOPHILS ABSOLUTE: 0 K/CU MM
BASOPHILS RELATIVE PERCENT: 0.1 % (ref 0–1)
BUN SERPL-MCNC: 25 MG/DL (ref 6–23)
CALCIUM SERPL-MCNC: 8.5 MG/DL (ref 8.3–10.6)
CHLORIDE BLD-SCNC: 101 MMOL/L (ref 99–110)
CO2: 24 MMOL/L (ref 21–32)
COMMENT: ABNORMAL
CREAT SERPL-MCNC: 1.2 MG/DL (ref 0.9–1.3)
CRP SERPL HS-MCNC: 75 MG/L
CULTURE: ABNORMAL
CULTURE: ABNORMAL
DIFFERENTIAL TYPE: ABNORMAL
EKG DIAGNOSIS: NORMAL
EKG DIAGNOSIS: NORMAL
EKG Q-T INTERVAL: 380 MS
EKG Q-T INTERVAL: 434 MS
EKG QRS DURATION: 142 MS
EKG QRS DURATION: 146 MS
EKG QTC CALCULATION (BAZETT): 507 MS
EKG QTC CALCULATION (BAZETT): 523 MS
EKG R AXIS: -50 DEGREES
EKG R AXIS: 230 DEGREES
EKG T AXIS: 13 DEGREES
EKG T AXIS: 173 DEGREES
EKG VENTRICULAR RATE: 114 BPM
EKG VENTRICULAR RATE: 82 BPM
EOSINOPHILS ABSOLUTE: 0 K/CU MM
EOSINOPHILS RELATIVE PERCENT: 0.1 % (ref 0–3)
GFR SERPL CREATININE-BSD FRML MDRD: >60 ML/MIN/1.73M2
GLUCOSE SERPL-MCNC: 67 MG/DL (ref 70–99)
HCO3 VENOUS: 26.2 MMOL/L (ref 19–25)
HCT VFR BLD CALC: 26.1 % (ref 42–52)
HEMOGLOBIN: 7.5 GM/DL (ref 13.5–18)
IMMATURE NEUTROPHIL %: 2.3 % (ref 0–0.43)
LYMPHOCYTES ABSOLUTE: 0.8 K/CU MM
LYMPHOCYTES RELATIVE PERCENT: 6.2 % (ref 24–44)
Lab: ABNORMAL
MAGNESIUM: 2.2 MG/DL (ref 1.8–2.4)
MCH RBC QN AUTO: 27.9 PG (ref 27–31)
MCHC RBC AUTO-ENTMCNC: 28.7 % (ref 32–36)
MCV RBC AUTO: 97 FL (ref 78–100)
MONOCYTES ABSOLUTE: 0.6 K/CU MM
MONOCYTES RELATIVE PERCENT: 5 % (ref 0–4)
NUCLEATED RBC %: 0.2 %
O2 SAT, VEN: 90.3 % (ref 50–70)
PCO2, VEN: 52 MMHG (ref 38–52)
PDW BLD-RTO: 16.7 % (ref 11.7–14.9)
PH VENOUS: 7.31 (ref 7.32–7.42)
PLATELET # BLD: 267 K/CU MM (ref 140–440)
PMV BLD AUTO: 11.6 FL (ref 7.5–11.1)
PO2, VEN: 76 MMHG (ref 28–48)
POTASSIUM SERPL-SCNC: 5.4 MMOL/L (ref 3.5–5.1)
RBC # BLD: 2.69 M/CU MM (ref 4.6–6.2)
SEGMENTED NEUTROPHILS ABSOLUTE COUNT: 11 K/CU MM
SEGMENTED NEUTROPHILS RELATIVE PERCENT: 86.3 % (ref 36–66)
SODIUM BLD-SCNC: 139 MMOL/L (ref 135–145)
SPECIMEN: ABNORMAL
TOTAL CK: 142 IU/L (ref 38–174)
TOTAL IMMATURE NEUTOROPHIL: 0.29 K/CU MM
TOTAL NUCLEATED RBC: 0 K/CU MM
WBC # BLD: 12.7 K/CU MM (ref 4–10.5)

## 2023-08-08 PROCEDURE — 99232 SBSQ HOSP IP/OBS MODERATE 35: CPT | Performed by: INTERNAL MEDICINE

## 2023-08-08 PROCEDURE — 6370000000 HC RX 637 (ALT 250 FOR IP): Performed by: STUDENT IN AN ORGANIZED HEALTH CARE EDUCATION/TRAINING PROGRAM

## 2023-08-08 PROCEDURE — 6360000004 HC RX CONTRAST MEDICATION: Performed by: INTERNAL MEDICINE

## 2023-08-08 PROCEDURE — 93010 ELECTROCARDIOGRAM REPORT: CPT | Performed by: INTERNAL MEDICINE

## 2023-08-08 PROCEDURE — 82550 ASSAY OF CK (CPK): CPT

## 2023-08-08 PROCEDURE — 6370000000 HC RX 637 (ALT 250 FOR IP)

## 2023-08-08 PROCEDURE — 99232 SBSQ HOSP IP/OBS MODERATE 35: CPT | Performed by: NURSE PRACTITIONER

## 2023-08-08 PROCEDURE — 85025 COMPLETE CBC W/AUTO DIFF WBC: CPT

## 2023-08-08 PROCEDURE — 6370000000 HC RX 637 (ALT 250 FOR IP): Performed by: INTERNAL MEDICINE

## 2023-08-08 PROCEDURE — 71045 X-RAY EXAM CHEST 1 VIEW: CPT

## 2023-08-08 PROCEDURE — 86140 C-REACTIVE PROTEIN: CPT

## 2023-08-08 PROCEDURE — 94761 N-INVAS EAR/PLS OXIMETRY MLT: CPT

## 2023-08-08 PROCEDURE — 73701 CT LOWER EXTREMITY W/DYE: CPT

## 2023-08-08 PROCEDURE — 93005 ELECTROCARDIOGRAM TRACING: CPT | Performed by: INTERNAL MEDICINE

## 2023-08-08 PROCEDURE — APPSS30 APP SPLIT SHARED TIME 16-30 MINUTES

## 2023-08-08 PROCEDURE — 82805 BLOOD GASES W/O2 SATURATION: CPT

## 2023-08-08 PROCEDURE — 82140 ASSAY OF AMMONIA: CPT

## 2023-08-08 PROCEDURE — 2580000003 HC RX 258: Performed by: INTERNAL MEDICINE

## 2023-08-08 PROCEDURE — 2700000000 HC OXYGEN THERAPY PER DAY

## 2023-08-08 PROCEDURE — 2580000003 HC RX 258: Performed by: NURSE PRACTITIONER

## 2023-08-08 PROCEDURE — 99222 1ST HOSP IP/OBS MODERATE 55: CPT | Performed by: SURGERY

## 2023-08-08 PROCEDURE — 99233 SBSQ HOSP IP/OBS HIGH 50: CPT | Performed by: NURSE PRACTITIONER

## 2023-08-08 PROCEDURE — 36415 COLL VENOUS BLD VENIPUNCTURE: CPT

## 2023-08-08 PROCEDURE — 2580000003 HC RX 258: Performed by: STUDENT IN AN ORGANIZED HEALTH CARE EDUCATION/TRAINING PROGRAM

## 2023-08-08 PROCEDURE — 83735 ASSAY OF MAGNESIUM: CPT

## 2023-08-08 PROCEDURE — 6370000000 HC RX 637 (ALT 250 FOR IP): Performed by: NURSE PRACTITIONER

## 2023-08-08 PROCEDURE — 80048 BASIC METABOLIC PNL TOTAL CA: CPT

## 2023-08-08 PROCEDURE — 6360000002 HC RX W HCPCS: Performed by: NURSE PRACTITIONER

## 2023-08-08 PROCEDURE — 2060000000 HC ICU INTERMEDIATE R&B

## 2023-08-08 RX ORDER — ZOLPIDEM TARTRATE 5 MG/1
5 TABLET ORAL NIGHTLY PRN
Status: DISCONTINUED | OUTPATIENT
Start: 2023-08-08 | End: 2023-08-16

## 2023-08-08 RX ORDER — DEXTROSE AND SODIUM CHLORIDE 5; .9 G/100ML; G/100ML
INJECTION, SOLUTION INTRAVENOUS CONTINUOUS
Status: DISCONTINUED | OUTPATIENT
Start: 2023-08-08 | End: 2023-08-10

## 2023-08-08 RX ORDER — DILTIAZEM HYDROCHLORIDE 120 MG/1
120 CAPSULE, COATED, EXTENDED RELEASE ORAL DAILY
Status: DISCONTINUED | OUTPATIENT
Start: 2023-08-09 | End: 2023-08-18 | Stop reason: HOSPADM

## 2023-08-08 RX ADMIN — FUROSEMIDE 20 MG: 20 TABLET ORAL at 15:49

## 2023-08-08 RX ADMIN — SODIUM CHLORIDE, PRESERVATIVE FREE 10 ML: 5 INJECTION INTRAVENOUS at 11:46

## 2023-08-08 RX ADMIN — DILTIAZEM HYDROCHLORIDE 30 MG: 30 TABLET, FILM COATED ORAL at 15:50

## 2023-08-08 RX ADMIN — DAPTOMYCIN 750 MG: 500 INJECTION, POWDER, LYOPHILIZED, FOR SOLUTION INTRAVENOUS at 15:55

## 2023-08-08 RX ADMIN — DILTIAZEM HYDROCHLORIDE 30 MG: 30 TABLET, FILM COATED ORAL at 00:02

## 2023-08-08 RX ADMIN — PRAVASTATIN SODIUM 80 MG: 40 TABLET ORAL at 21:21

## 2023-08-08 RX ADMIN — CARVEDILOL 25 MG: 25 TABLET, FILM COATED ORAL at 11:44

## 2023-08-08 RX ADMIN — DEXTROSE AND SODIUM CHLORIDE: 5; 900 INJECTION, SOLUTION INTRAVENOUS at 12:01

## 2023-08-08 RX ADMIN — Medication 2000 UNITS: at 11:44

## 2023-08-08 RX ADMIN — FUROSEMIDE 20 MG: 20 TABLET ORAL at 11:44

## 2023-08-08 RX ADMIN — IOPAMIDOL 75 ML: 755 INJECTION, SOLUTION INTRAVENOUS at 13:55

## 2023-08-08 RX ADMIN — ISOSORBIDE MONONITRATE 30 MG: 30 TABLET, EXTENDED RELEASE ORAL at 11:45

## 2023-08-08 RX ADMIN — DILTIAZEM HYDROCHLORIDE 30 MG: 30 TABLET, FILM COATED ORAL at 06:28

## 2023-08-08 RX ADMIN — CARVEDILOL 25 MG: 25 TABLET, FILM COATED ORAL at 15:49

## 2023-08-08 RX ADMIN — DILTIAZEM HYDROCHLORIDE 30 MG: 30 TABLET, FILM COATED ORAL at 11:44

## 2023-08-08 RX ADMIN — ASPIRIN 81 MG: 81 TABLET, CHEWABLE ORAL at 11:45

## 2023-08-08 RX ADMIN — POTASSIUM CHLORIDE 40 MEQ: 1500 TABLET, EXTENDED RELEASE ORAL at 11:45

## 2023-08-08 ASSESSMENT — PAIN SCALES - WONG BAKER
WONGBAKER_NUMERICALRESPONSE: 0

## 2023-08-08 NOTE — PROGRESS NOTES
V2.0  [facility] Hospitalist Progress Note      Name:  Luis Fernando Winslow /Age/Sex:   (80 y.o. male)   MRN & CSN:  [id number] & [id number] Encounter Date/Time: 2023 12:43 PM EDT    Location:  -A PCP: Connor Tay MD       Hospital Day: 5    Assessment and Plan:   Luis Fernando Winslow is a 80 y.o. male with pmh of   fibrillation, CHF, hyperlipidemia, hypertension, COPD  who presents with Sepsis ([address])      Plan:  1. Sepsis present on admission: Of unknown etiology, left knee joint swollen, x-ray with no clear signs of infection, nontender on physical exam, blood cultures from 2023 1 out of 4 bottles positive for MRSA. Initially on vancomycin. Discontinued due to VIANCA 2. Started on IV daptomycin on 2023. Repeat blood cultures have been negative for first 24 hours. Echocardiogram completed. No mention of endocarditis on echocardiogram.  Patient's mentation limiting from getting EMILIANO. Cardiology considering it if his mentation improves. Underwent CT knee left with contrast this afternoon which showed large fluid collection posterior to the distal femur which may represent extra-articular joint fluid or resolving hematoma of about~13 cm. Discussed with orthopedic surgery regarding aspiration versus surgical intervention. Dr. Jah Samayoa suggested tertiary care transfer as the fluid collection was not intra-articular and appeared more like thigh abscess. He suggested getting general surgery opinion. Spoke to Dr. Austin Mendoza as well. For now no aspiration has been done from fluid collection and unsure whether the fluid collection is abscess or not, though given MRSA bacteremia and leukocytosis, it is highly suspicious for abscess. For now we will consult IR for diagnostic aspiration. Will await for IR recommendation on 2023. Spoke to daughter-in-law multiple times. She would like to wait until seen by IR on 2023 before considering tertiary level transfer.   2.  Acute THE LUMBAR SPINE WITHOUT CONTRAST  8/4/2023 TECHNIQUE: CT of the lumbar spine was performed without the administration of intravenous contrast. Multiplanar reformatted images are provided for review. Adjustment of mA and/or kV according to patient size was utilized. Automated exposure control, iterative reconstruction, and/or weight based adjustment of the mA/kV was utilized to reduce the radiation dose to as low as reasonably achievable. COMPARISON: CT abdomen pelvis done July 4, 2015. HISTORY: ORDERING SYSTEM PROVIDED HISTORY: trauma TECHNOLOGIST PROVIDED HISTORY: Reason for exam:->trauma Decision Support Exception - unselect if not a suspected or confirmed emergency medical condition->Emergency Medical Condition (MA) Reason for Exam: fall FINDINGS: BONES/ALIGNMENT: No acute fracture. Mild leftward convex curvature of the lower lumbar spine. No significant listhesis on sagittal images. Suggestion of osteopenia. No destructive osseous lesion. DEGENERATIVE CHANGES: Multilevel degenerative disc disease is most pronounced and severe at L4-L5 and L5-S1. Multilevel facet joint disease is most pronounced in the lower lumbar spine. Moderate to severe L3-L4 spinal canal stenosis secondary to disc bulge, facet hypertrophy and ligamentum flavum thickening. Multilevel moderate to severe neural foraminal narrowing. SOFT TISSUES/RETROPERITONEUM: No paraspinal mass is seen. 1.  No acute traumatic injury in the lumbar spine. 2.  Multilevel moderate to severe degenerative changes in the lumbar spine. CT THORACIC RECONSTRUCTION WO POST PROCESS    Result Date: 8/4/2023  EXAMINATION: CT OF THE THORACIC SPINE WITHOUT CONTRAST  8/3/2023 11:44 pm: TECHNIQUE: CT of the thoracic spine was performed without the administration of intravenous contrast. Multiplanar reformatted images are provided for review.  Automated exposure control, iterative reconstruction, and/or weight based adjustment of the mA/kV was utilized to reduce

## 2023-08-08 NOTE — PLAN OF CARE
Problem: Safety - Adult  Goal: Free from fall injury  Outcome: Progressing     Problem: Discharge Planning  Goal: Discharge to home or other facility with appropriate resources  Outcome: Progressing     Problem: Skin/Tissue Integrity  Goal: Absence of new skin breakdown  Description: 1. Monitor for areas of redness and/or skin breakdown  2. Assess vascular access sites hourly  3. Every 4-6 hours minimum:  Change oxygen saturation probe site  4. Every 4-6 hours:  If on nasal continuous positive airway pressure, respiratory therapy assess nares and determine need for appliance change or resting period. Outcome: Progressing     Problem: ABCDS Injury Assessment  Goal: Absence of physical injury  Outcome: Progressing  Flowsheets (Taken 8/7/2023 1149 by Serenity Garcia LPN)  Absence of Physical Injury: Implement safety measures based on patient assessment     Problem: Confusion  Goal: Confusion, delirium, dementia, or psychosis is improved or at baseline  Description: INTERVENTIONS:  1. Assess for possible contributors to thought disturbance, including medications, impaired vision or hearing, underlying metabolic abnormalities, dehydration, psychiatric diagnoses, and notify attending LIP  2. [place] high risk fall precautions, as indicated  3. Provide frequent short contacts to provide reality reorientation, refocusing and direction  4. Decrease environmental stimuli, including noise as appropriate  5. Monitor and intervene to maintain adequate nutrition, hydration, elimination, sleep and activity  6. If unable to ensure safety without constant attention obtain sitter and review sitter guidelines with assigned personnel  7.  Initiate Psychosocial CNS and Spiritual Care consult, as indicated  Outcome: Progressing  Flowsheets (Taken 8/8/2023 0002)  Effect of thought disturbance (confusion, delirium, dementia, or psychosis) are managed with adequate functional status:   Decrease environmental stimuli, including noise as appropriate   Monitor and intervene to maintain adequate nutrition, hydration, elimination, sleep and activity   Assess for contributors to thought disturbance, including medications, impaired vision or hearing, underlying metabolic abnormalities, dehydration, psychiatric diagnoses, notify [address] high risk fall precautions, as indicated     Problem: Pain  Goal: Verbalizes/displays adequate comfort level or baseline comfort level  Outcome: Progressing

## 2023-08-08 NOTE — PROGRESS NOTES
08/08/23 1542   Encounter Summary   Encounter Overview/Reason  Initial Encounter   Service Provided For: Patient   Referral/Consult From: [address] Children;Family members   Last Encounter  08/08/23  (Patient is Advent: He is supported by daughter-in-law & grand daughter. He attend Sunday Mass in a parish in [place]. He was not able to communicate and I administered the Sacrament of the Sick on him.  He could not receive Holy Communion)   Complexity of Encounter Low   Begin Time 1530   End Time  1547   Total Time Calculated 17 min   Encounter    Type Initial Screen/Assessment   Spiritual/Emotional needs   Type Spiritual Support   Rituals, Rites and Sacraments   Type Sacrament of Sick   Assessment/Intervention/Outcome   Assessment Unable to assess   Intervention Prayer (assurance of)/[place]   Outcome Did not respond   Plan and Referrals   Plan/Referrals Continue to visit, (comment)

## 2023-08-08 NOTE — PROGRESS NOTES
Spoke with patients daughter-in-law Aaron March. Gave her an update on patient and his progress throughout the night.

## 2023-08-08 NOTE — PROGRESS NOTES
PT received first dose of Daptomycin and has tolerated well. PT has continuous fluids dextrose 5% and ns 0.9% @ 75mL/hr and is tolerating well. Sitter at bedside.

## 2023-08-08 NOTE — CARE COORDINATION
Notes reviewed. Sitter due to agitation. PS Dr Carri Ordaz for PT/OT order when appropriate.  Brittany Kramer RN

## 2023-08-08 NOTE — PROGRESS NOTES
PT daughter in law at bedside this said nurse spoke with daughter in law Bright Vázquez and grandson about changes made to plan of care and how PT was through night. PT came back from CT scan sleeping and PT daughter in law stated she will let him rest and try to stop back by later I let her know I would call her with updates from CT. PT resting in bed eyes closed  sitter at side will continue to monitor.

## 2023-08-08 NOTE — CONSULTS
Department of General Surgery   Surgical Service Dr. Bhavani Mota   Consult Note    Date of Consult: 8/8/23    Reason for Consult:  left knee fluid collection  Requesting Physician:  Dr. Swapnil Chiang: bacteremia, altered mental status    History Obtained From:  family member - daughter-in-law, electronic medical record, consulting physician    HISTORY OF PRESENT ILLNESS:    The patient is a 80 y.o. male who presented on 8/4 with sepsis, left knee swelling and Afib with RVR. He was found to have MRSA bacteremia. Family reports he is typically ambulatory and of sound mind. He was on Xarelto prior to admission. Family reports a recent fall and he was complaining of some knee pain after this. Currently he is non-verbal.  He has been confused per nursing. These findings are new over the past 48 hours. CT today shows 12cm fluid collection behind the right knee. 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 No problems with the knee since that time. Past Medical History:    Past Medical History:   Diagnosis Date    AR (aortic regurgitation)     mild to mod    Atrial fibrillation, new onset (HCC)     CAD (coronary artery disease)     History of angioplasty    Chronic midline low back pain without sciatica 5/1/2017    Has seen Dr. Sam Chou and had shots    COPD (chronic obstructive pulmonary disease) ([address])     Family history of coronary artery disease     Fatigue 10/2016    H/O cardiovascular stress test 2/21/2012    mild ischemia in the basal inferior and mid inferior regions ef is 47    H/O chest x-ray 10/20/2016    Right pleural effusion resolved. H/O Doppler ultrasound 10/2016    CAROTID-[place]. arteries patent w/less than 50% stenosis in the internal carotid arteries.     H/O echocardiogram 2/17/15    Aortic sclerosis without stenosis, normal LV size and wall motion w/low normal systolic function, LA

## 2023-08-08 NOTE — PROGRESS NOTES
Bilateral pulmonary vascular congestion with small bilateral pleural effusions is noted on the chest x-ray results. Dr. Miller Masters notified of results.

## 2023-08-09 ENCOUNTER — APPOINTMENT (OUTPATIENT)
Dept: INTERVENTIONAL RADIOLOGY/VASCULAR | Age: 83
End: 2023-08-09
Attending: FAMILY MEDICINE
Payer: MEDICARE

## 2023-08-09 ENCOUNTER — PARAMEDICINE (OUTPATIENT)
Dept: OTHER | Age: 83
End: 2023-08-09

## 2023-08-09 LAB
ANION GAP SERPL CALCULATED.3IONS-SCNC: 12 MMOL/L (ref 4–16)
APTT: 37.6 SECONDS (ref 25.1–37.1)
BASE EXCESS MIXED: 0.6 (ref 0–1.2)
BASOPHILS ABSOLUTE: 0 K/CU MM
BASOPHILS RELATIVE PERCENT: 0.1 % (ref 0–1)
BUN SERPL-MCNC: 28 MG/DL (ref 6–23)
CALCIUM SERPL-MCNC: 8.7 MG/DL (ref 8.3–10.6)
CHLORIDE BLD-SCNC: 105 MMOL/L (ref 99–110)
CO2: 24 MMOL/L (ref 21–32)
COMMENT: ABNORMAL
CREAT SERPL-MCNC: 1.4 MG/DL (ref 0.9–1.3)
CRP SERPL HS-MCNC: 52.9 MG/L
CULTURE: NORMAL
DIFFERENTIAL TYPE: ABNORMAL
EOSINOPHILS ABSOLUTE: 0 K/CU MM
EOSINOPHILS RELATIVE PERCENT: 0.1 % (ref 0–3)
GFR SERPL CREATININE-BSD FRML MDRD: 50 ML/MIN/1.73M2
GLUCOSE SERPL-MCNC: 110 MG/DL (ref 70–99)
HCO3 VENOUS: 26.6 MMOL/L (ref 19–25)
HCT VFR BLD CALC: 24.7 % (ref 42–52)
HEMOGLOBIN: 7.1 GM/DL (ref 13.5–18)
IMMATURE NEUTROPHIL %: 1.7 % (ref 0–0.43)
INR BLD: 1.3 INDEX
LYMPHOCYTES ABSOLUTE: 0.8 K/CU MM
LYMPHOCYTES RELATIVE PERCENT: 7.4 % (ref 24–44)
Lab: NORMAL
MAGNESIUM: 2.3 MG/DL (ref 1.8–2.4)
MCH RBC QN AUTO: 27.8 PG (ref 27–31)
MCHC RBC AUTO-ENTMCNC: 28.7 % (ref 32–36)
MCV RBC AUTO: 96.9 FL (ref 78–100)
MONOCYTES ABSOLUTE: 0.6 K/CU MM
MONOCYTES RELATIVE PERCENT: 5.9 % (ref 0–4)
NUCLEATED RBC %: 0.2 %
O2 SAT, VEN: 92.2 % (ref 50–70)
PCO2, VEN: 47 MMHG (ref 38–52)
PDW BLD-RTO: 16.9 % (ref 11.7–14.9)
PH VENOUS: 7.36 (ref 7.32–7.42)
PLATELET # BLD: 239 K/CU MM (ref 140–440)
PMV BLD AUTO: 11.6 FL (ref 7.5–11.1)
PO2, VEN: 86 MMHG (ref 28–48)
POTASSIUM SERPL-SCNC: 4.9 MMOL/L (ref 3.5–5.1)
PROTHROMBIN TIME: 16.5 SECONDS (ref 11.7–14.5)
RBC # BLD: 2.55 M/CU MM (ref 4.6–6.2)
SEGMENTED NEUTROPHILS ABSOLUTE COUNT: 8.8 K/CU MM
SEGMENTED NEUTROPHILS RELATIVE PERCENT: 84.8 % (ref 36–66)
SODIUM BLD-SCNC: 141 MMOL/L (ref 135–145)
SPECIMEN: NORMAL
TOTAL IMMATURE NEUTOROPHIL: 0.18 K/CU MM
TOTAL NUCLEATED RBC: 0 K/CU MM
WBC # BLD: 10.4 K/CU MM (ref 4–10.5)

## 2023-08-09 PROCEDURE — 2580000003 HC RX 258: Performed by: INTERNAL MEDICINE

## 2023-08-09 PROCEDURE — 87186 SC STD MICRODIL/AGAR DIL: CPT

## 2023-08-09 PROCEDURE — C1729 CATH, DRAINAGE: HCPCS

## 2023-08-09 PROCEDURE — 99232 SBSQ HOSP IP/OBS MODERATE 35: CPT | Performed by: NURSE PRACTITIONER

## 2023-08-09 PROCEDURE — 94640 AIRWAY INHALATION TREATMENT: CPT

## 2023-08-09 PROCEDURE — 2700000000 HC OXYGEN THERAPY PER DAY

## 2023-08-09 PROCEDURE — 87205 SMEAR GRAM STAIN: CPT

## 2023-08-09 PROCEDURE — 94761 N-INVAS EAR/PLS OXIMETRY MLT: CPT

## 2023-08-09 PROCEDURE — 0Y9D3ZZ DRAINAGE OF LEFT UPPER LEG, PERCUTANEOUS APPROACH: ICD-10-PCS | Performed by: INTERNAL MEDICINE

## 2023-08-09 PROCEDURE — 87147 CULTURE TYPE IMMUNOLOGIC: CPT

## 2023-08-09 PROCEDURE — 6370000000 HC RX 637 (ALT 250 FOR IP): Performed by: STUDENT IN AN ORGANIZED HEALTH CARE EDUCATION/TRAINING PROGRAM

## 2023-08-09 PROCEDURE — 2580000003 HC RX 258: Performed by: STUDENT IN AN ORGANIZED HEALTH CARE EDUCATION/TRAINING PROGRAM

## 2023-08-09 PROCEDURE — 87077 CULTURE AEROBIC IDENTIFY: CPT

## 2023-08-09 PROCEDURE — 2580000003 HC RX 258: Performed by: NURSE PRACTITIONER

## 2023-08-09 PROCEDURE — 87070 CULTURE OTHR SPECIMN AEROBIC: CPT

## 2023-08-09 PROCEDURE — 6360000002 HC RX W HCPCS: Performed by: NURSE PRACTITIONER

## 2023-08-09 PROCEDURE — 87075 CULTR BACTERIA EXCEPT BLOOD: CPT

## 2023-08-09 PROCEDURE — 6370000000 HC RX 637 (ALT 250 FOR IP): Performed by: NURSE PRACTITIONER

## 2023-08-09 PROCEDURE — 85730 THROMBOPLASTIN TIME PARTIAL: CPT

## 2023-08-09 PROCEDURE — 85025 COMPLETE CBC W/AUTO DIFF WBC: CPT

## 2023-08-09 PROCEDURE — 36415 COLL VENOUS BLD VENIPUNCTURE: CPT

## 2023-08-09 PROCEDURE — 2060000000 HC ICU INTERMEDIATE R&B

## 2023-08-09 PROCEDURE — 99233 SBSQ HOSP IP/OBS HIGH 50: CPT | Performed by: NURSE PRACTITIONER

## 2023-08-09 PROCEDURE — 85610 PROTHROMBIN TIME: CPT

## 2023-08-09 PROCEDURE — 10030 IMG GID FLU COLL DRG SFT TIS: CPT

## 2023-08-09 PROCEDURE — 89051 BODY FLUID CELL COUNT: CPT

## 2023-08-09 PROCEDURE — 89060 EXAM SYNOVIAL FLUID CRYSTALS: CPT

## 2023-08-09 PROCEDURE — 83735 ASSAY OF MAGNESIUM: CPT

## 2023-08-09 PROCEDURE — 82805 BLOOD GASES W/O2 SATURATION: CPT

## 2023-08-09 PROCEDURE — 2500000003 HC RX 250 WO HCPCS: Performed by: RADIOLOGY

## 2023-08-09 PROCEDURE — 86140 C-REACTIVE PROTEIN: CPT

## 2023-08-09 PROCEDURE — 6370000000 HC RX 637 (ALT 250 FOR IP)

## 2023-08-09 PROCEDURE — 80048 BASIC METABOLIC PNL TOTAL CA: CPT

## 2023-08-09 RX ORDER — SODIUM CHLORIDE 0.9 % (FLUSH) 0.9 %
5-40 SYRINGE (ML) INJECTION EVERY 12 HOURS SCHEDULED
Status: DISCONTINUED | OUTPATIENT
Start: 2023-08-09 | End: 2023-08-18 | Stop reason: HOSPADM

## 2023-08-09 RX ORDER — SODIUM CHLORIDE 0.9 % (FLUSH) 0.9 %
5-40 SYRINGE (ML) INJECTION PRN
Status: DISCONTINUED | OUTPATIENT
Start: 2023-08-09 | End: 2023-08-18 | Stop reason: HOSPADM

## 2023-08-09 RX ORDER — LIDOCAINE HYDROCHLORIDE 10 MG/ML
5 INJECTION, SOLUTION EPIDURAL; INFILTRATION; INTRACAUDAL; PERINEURAL ONCE
Status: DISCONTINUED | OUTPATIENT
Start: 2023-08-09 | End: 2023-08-18 | Stop reason: HOSPADM

## 2023-08-09 RX ORDER — SODIUM CHLORIDE 9 MG/ML
INJECTION, SOLUTION INTRAVENOUS PRN
Status: DISCONTINUED | OUTPATIENT
Start: 2023-08-09 | End: 2023-08-09 | Stop reason: SDUPTHER

## 2023-08-09 RX ORDER — LIDOCAINE HYDROCHLORIDE 10 MG/ML
INJECTION, SOLUTION EPIDURAL; INFILTRATION; INTRACAUDAL; PERINEURAL PRN
Status: COMPLETED | OUTPATIENT
Start: 2023-08-09 | End: 2023-08-09

## 2023-08-09 RX ADMIN — DEXTROSE AND SODIUM CHLORIDE: 5; 900 INJECTION, SOLUTION INTRAVENOUS at 01:52

## 2023-08-09 RX ADMIN — CARVEDILOL 25 MG: 25 TABLET, FILM COATED ORAL at 11:03

## 2023-08-09 RX ADMIN — ISOSORBIDE MONONITRATE 30 MG: 30 TABLET, EXTENDED RELEASE ORAL at 11:02

## 2023-08-09 RX ADMIN — SODIUM CHLORIDE, PRESERVATIVE FREE 10 ML: 5 INJECTION INTRAVENOUS at 21:13

## 2023-08-09 RX ADMIN — PRAVASTATIN SODIUM 80 MG: 40 TABLET ORAL at 21:13

## 2023-08-09 RX ADMIN — TIOTROPIUM BROMIDE INHALATION SPRAY 2 PUFF: 3.12 SPRAY, METERED RESPIRATORY (INHALATION) at 07:26

## 2023-08-09 RX ADMIN — DAPTOMYCIN 750 MG: 500 INJECTION, POWDER, LYOPHILIZED, FOR SOLUTION INTRAVENOUS at 17:14

## 2023-08-09 RX ADMIN — LIDOCAINE HYDROCHLORIDE 10 ML: 10 INJECTION, SOLUTION EPIDURAL; INFILTRATION; INTRACAUDAL; PERINEURAL at 09:55

## 2023-08-09 RX ADMIN — DILTIAZEM HYDROCHLORIDE 120 MG: 120 CAPSULE, COATED, EXTENDED RELEASE ORAL at 11:05

## 2023-08-09 RX ADMIN — Medication 2000 UNITS: at 11:03

## 2023-08-09 RX ADMIN — SODIUM CHLORIDE, PRESERVATIVE FREE 10 ML: 5 INJECTION INTRAVENOUS at 21:14

## 2023-08-09 RX ADMIN — SODIUM CHLORIDE, PRESERVATIVE FREE 10 ML: 5 INJECTION INTRAVENOUS at 11:04

## 2023-08-09 RX ADMIN — CARVEDILOL 25 MG: 25 TABLET, FILM COATED ORAL at 17:16

## 2023-08-09 RX ADMIN — ASPIRIN 81 MG: 81 TABLET, CHEWABLE ORAL at 11:03

## 2023-08-09 ASSESSMENT — PAIN SCALES - WONG BAKER
WONGBAKER_NUMERICALRESPONSE: 0

## 2023-08-09 NOTE — PROGRESS NOTES
TRANSFER - OUT REPORT:    Verbal report given to Mart[facility]OP[facility](name[facility] for routine post-op       Report consisted of patient's Situation, Background, Assessment and   Recommendations(SBAR). Information from the following report(s) Nurse Handoff Report was reviewed with the receiving nurse. Opportunity for questions and clarification was provided.        Registered Nurse

## 2023-08-09 NOTE — PROGRESS NOTES
I was called by the hospitalist service yesterday with the results of a CT scan of the left knee. Appears to be a large fluid collection in the posterior aspect of the distal thigh in the popliteal region. This is adjacent to the distal femur and abutting the femoral vein. There does not appear to be any large fluid collections within the joint or any evidence of intra-articular infection    My medical opinion is that there is no benefit to irrigation debridement of the joint. No role at this time for orthopedic surgical debridement of the anterior aspect of the knee or knee joint. He likely would benefit from irrigation and debridement at the posterior aspect of his thigh and popliteal fossa    I have recommended that the hospitalist consider transfer to a tertiary care facility for his complex problem    Due to the nature of this abscess and the location of it, this is a difficult problem and not entirely explained with respect to his history of previous knee arthroplasty. I recommended to consult general surgery to see if they felt comfortable taking care of this. Due to the location I believe that he will need a general or vascular surgeon to perform any debridement in this region. He may benefit from a transfer to tertiary care facility. His revision knee replacement was performed somewhere in [facility]. XR CHEST PORTABLE    Result Date: 8/8/2023  EXAMINATION: ONE XRAY VIEW OF THE CHEST 8/8/2023 11:45 am COMPARISON: 12/24/2022 HISTORY: ORDERING SYSTEM PROVIDED HISTORY: distress TECHNOLOGIST PROVIDED HISTORY: Reason for exam:->distress Reason for Exam: distress FINDINGS: Cardiomediastinal silhouette is stable. Bilateral pulmonary vascular congestion with small pleural effusions. No pneumothorax. No gross bony abnormality. Bilateral pulmonary vascular congestion with small bilateral pleural effusions.      CT KNEE LEFT W CONTRAST    Result Date: 8/8/2023  EXAMINATION: CT OF THE LEFT KNEE WITH CONTRAST 8/8/2023 1:56 pm TECHNIQUE: CT of the left knee was performed with the administration of intravenous contrast.  Multiplanar reformatted images are provided for review. Automated exposure control, iterative reconstruction, and/or weight based adjustment of the mA/kV was utilized to reduce the radiation dose to as low as reasonably achievable. COMPARISON: August 5, 2023 HISTORY ORDERING SYSTEM PROVIDED HISTORY: knee swelling TECHNOLOGIST PROVIDED HISTORY: Reason for exam:->knee swelling Reason for Exam: knee swelling FINDINGS: No definite suprapatellar joint effusion identified. Patient's post knee arthroplasty placement. No perihardware osteolysis. Normal alignment of the surgical hardware. No acute fracture or dislocation identified. Remote fracture of the proximal fibula. Large collection posterosuperior to the knee and posterior, medial, and lateral to the distal femur which measures approximately 8.1 x 12.8 cm. No air is seen within this collection. Collection appears to exert mass effect on the femoral vein. Possible small hematoma anterior to the semitendinosis muscle (series 3, image 13) which measures 2.3 x 1.9 cm. Adjacent soft tissue edema. Mild diffuse fatty atrophy of the thigh and proximal leg musculature. Reticulated appearance of the subcutaneous soft tissues of the thigh and knee related to soft tissue edema or cellulitis. Large 12 cm collection prominently posterior to the distal femur which may represent extra-articular joint fluid or resolving hematoma. The collection appears to exert mass effect on femoral vein and a put the patient at risk for a DVT. Probable small hematoma anterior to the mid to distal semitendinosis muscle with adjacent soft tissue edema. Postsurgical change. No complication. No acute fracture. Subcutaneous soft tissue edema or cellulitis.

## 2023-08-09 NOTE — PROGRESS NOTES
PRN  sodium chloride flush, 5-40 mL, PRN  sodium chloride, 500 mL, PRN  ondansetron, 4 mg, Q8H PRN   Or  ondansetron, 4 mg, Q6H PRN  polyethylene glycol, 17 g, Daily PRN  acetaminophen, 650 mg, Q6H PRN   Or  acetaminophen, 650 mg, Q6H PRN  sodium chloride, , PRN        Labs           Imaging/Diagnostics Last 24 Hours   CT ABDOMEN PELVIS WO CONTRAST Additional Contrast? None    Result Date: 8/4/2023  EXAMINATION: CT OF THE ABDOMEN AND PELVIS WITHOUT CONTRAST 8/4/2023 12:11 am TECHNIQUE: CT of the abdomen and pelvis was performed without the administration of intravenous contrast. Multiplanar reformatted images are provided for review. Automated exposure control, iterative reconstruction, and/or weight based adjustment of the mA/kV was utilized to reduce the radiation dose to as low as reasonably achievable. COMPARISON: CT abdomen pelvis done July 4, 2015. HISTORY: ORDERING SYSTEM PROVIDED HISTORY: trauma TECHNOLOGIST PROVIDED HISTORY: Reason for exam:->trauma Additional Contrast?->None Decision Support Exception - unselect if not a suspected or confirmed emergency medical condition->Emergency Medical Condition (MA) Reason for Exam: fall FINDINGS: Lower Chest: Please refer to concurrent chest CT report Liver: Normal. Gallbladder and Bile Ducts: Prior cholecystectomy. Spleen: Normal. Adrenal Glands: Normal. Pancreas: Normal. Genitourinary: No hydronephrosis. Multiple small bilateral renal medullary calcifications appear to represent vascular calcifications. Small nonobstructing kidney stone is difficult to exclude. Both ureters are normal in course and caliber. Urinary bladder is unremarkable. Bowel: No acute abnormality. Normal appendix. No bowel obstruction. Colonic diverticulosis without evidence of acute diverticulitis. Vasculature: Moderate to severe atherosclerosis. No abdominal aortic aneurysm. Bones and Soft Tissues: No acute abnormality.  Retroperitoneum/Mesentery: No intraperitoneal free air, ascites or or confirmed emergency medical condition->Emergency Medical Condition (MA) Reason for Exam: fall FINDINGS: BONES/ALIGNMENT: There is no acute fracture or traumatic malalignment. DEGENERATIVE CHANGES: Multilevel moderate to severe degenerative changes in the cervical spine. SOFT TISSUES: There is no prevertebral soft tissue swelling. No acute abnormality of the cervical spine. CT LUMBAR RECONSTRUCTION WO POST PROCESS    Result Date: 8/4/2023  EXAMINATION: CT OF THE LUMBAR SPINE WITHOUT CONTRAST  8/4/2023 TECHNIQUE: CT of the lumbar spine was performed without the administration of intravenous contrast. Multiplanar reformatted images are provided for review. Adjustment of mA and/or kV according to patient size was utilized. Automated exposure control, iterative reconstruction, and/or weight based adjustment of the mA/kV was utilized to reduce the radiation dose to as low as reasonably achievable. COMPARISON: CT abdomen pelvis done July 4, 2015. HISTORY: ORDERING SYSTEM PROVIDED HISTORY: trauma TECHNOLOGIST PROVIDED HISTORY: Reason for exam:->trauma Decision Support Exception - unselect if not a suspected or confirmed emergency medical condition->Emergency Medical Condition (MA) Reason for Exam: fall FINDINGS: BONES/ALIGNMENT: No acute fracture. Mild leftward convex curvature of the lower lumbar spine. No significant listhesis on sagittal images. Suggestion of osteopenia. No destructive osseous lesion. DEGENERATIVE CHANGES: Multilevel degenerative disc disease is most pronounced and severe at L4-L5 and L5-S1. Multilevel facet joint disease is most pronounced in the lower lumbar spine. Moderate to severe L3-L4 spinal canal stenosis secondary to disc bulge, facet hypertrophy and ligamentum flavum thickening. Multilevel moderate to severe neural foraminal narrowing. SOFT TISSUES/RETROPERITONEUM: No paraspinal mass is seen. 1.  No acute traumatic injury in the lumbar spine.  2.  Multilevel moderate to

## 2023-08-09 NOTE — PROGRESS NOTES
PT L knee drain flushed and emptied with 90 mL of serosanguinous fluid noted with pus noted as well. PT tolerated well. This said nurse attempted blood draw for venous blood gas with multiple failed attempts. Will attempt again. PT resting in bed sitter at side will continue to monitor.

## 2023-08-09 NOTE — PROGRESS NOTES
This said nurse went into PT room to check on PT he was awake looking out the window. I called his name and he responded. When asked where is was he said \" [employer]". And PT was able to state what year it is and that he recalls trying to hit staff and PT kept apologizing to this said nurse. PT is resting comfortable in bed IV fluids infusing. Will continue to monitor.

## 2023-08-09 NOTE — PLAN OF CARE
abnormalities, dehydration, psychiatric diagnoses, and notify attending LIP  2. [place] high risk fall precautions, as indicated  3. Provide frequent short contacts to provide reality reorientation, refocusing and direction  4. Decrease environmental stimuli, including noise as appropriate  5. Monitor and intervene to maintain adequate nutrition, hydration, elimination, sleep and activity  6. If unable to ensure safety without constant attention obtain sitter and review sitter guidelines with assigned personnel  7.  Initiate Psychosocial CNS and Spiritual Care consult, as indicated  Outcome: Progressing  Flowsheets (Taken 8/9/2023 5666)  Effect of thought disturbance (confusion, delirium, dementia, or psychosis) are managed with adequate functional status:   Assess for contributors to thought disturbance, including medications, impaired vision or hearing, underlying metabolic abnormalities, dehydration, psychiatric diagnoses, notify [address] high risk fall precautions, as indicated   Decrease environmental stimuli, including noise as appropriate   Provide frequent short contacts to provide reality reorientation, refocusing and direction

## 2023-08-09 NOTE — PROGRESS NOTES
PT KENNETH drain to L knee flushed and 95 mL of drainage noted PT tolerated well bulb compressed. Will continue to monitor.

## 2023-08-09 NOTE — PROGRESS NOTES
Patient had successful IR drain placement for his posterior thigh abscess with evacuation of 190 mL of purulent material.    Based on my interpretation of his exam and CT scan, I do not expect any benefit for irrigation and debridement of the knee joint or any orthopedic procedure to the anterior aspect of the knee. If there is concern for infection of the knee joint and hardware, he would benefit from referral to tertiary care facility where he had his initial revision total knee replacement performed. Currently his picture is consistent with a abscess of the soft tissues of the posterior thigh. This is an area I do not perform surgery on. Irrigation and debridement in this region is a complicated issue and would require referral to a tertiary care facility or evaluation by general surgery. My understanding of the patient is that he is not a good candidate for stage reconstructive type surgery at the knee. Additionally I would not recommend any reconstructive surgeries in the context of abscess in the thigh. If he requires urgent surgical intervention to stabilize medically, then he would likely benefit from above-the-knee amputation. My recommendation would be to continue with the drain and treat this issue medically with ongoing suppressive antibiotics as determined by the infectious disease team or referral to tertiary care facility.

## 2023-08-09 NOTE — BRIEF OP NOTE
Brief Postoperative Note      Patient: Alberta Sue  YOB: 1940  MRN: [id number]    Date of Procedure: 8/9/2023    * No Diagnosis Codes entered *left thigh fluid collection    Post-Op Diagnosis: Same       * No procedures listed * 10 fr drainage catheter placement    * No surgeons listed *lalito stewart do    Assistant:  * No surgical staff found *    Anesthesia: * No anesthesia type entered *local    Estimated Blood Loss (mL): Minimal    Complications: None    Specimens:   * No specimens in log *purulent fluid to lab for c/s    Implants:  * No implants in log *      Drains:   Closed/Suction Drain Distal;Left Thigh Bulb (Active)   Site Description Clean, dry & intact 08/09/23 1121   Dressing Status Clean, dry & intact 08/09/23 1121   Drainage Appearance Bloody;Purulent;Serosanguinous; Thick 08/09/23 1121   Drain Status Compressed; To bulb suction 08/09/23 1121   Output (ml) 90 ml 08/09/23 1121       External Urinary Catheter (Active)   Site Assessment Clean,dry & intact 08/09/23 1123   Placement Initiated 08/09/23 1123   Catheter Care Catheter/Wick replaced 08/09/23 1123   Perineal Care Yes 08/09/23 1123   Suction 40 mmgHg continuous 08/09/23 1123   Urine Color Salud 08/09/23 1123   Urine Appearance Clear 08/09/23 1123   Output (mL) 400 mL 08/09/23 1123       Findings: 190 ml purulent, sanguineous fluid removed following catheter placement. Abscess cavity irrigated/aspirated with sterile NS.  KENNETH bulb applied.       Electronically signed by Lucita Willis DO on 8/9/2023 at 11:35 AM

## 2023-08-09 NOTE — CARE COORDINATION
Discussed wwvani Gutiérrez in [facility]. Patient remains confused but less agitated. CM will follow - discussed need for PT/OT for poss SNF placement.  Encouraged OOB to chair - might help clear confusion, etc. Andrade Rasmussen RN

## 2023-08-09 NOTE — PROGRESS NOTES
findings in the left knee. 2. New changes of left knee total arthroplasty with no obvious complication. 3. Possible new intra-articular loose body in the left knee suprapatellar  recess with suspicion for at least small suprapatellar effusion. 4. Diffuse soft tissue swelling suggestive of subcutaneous edema. 8/8/2023 CT Knee Left W Contrast:  IMPRESSION:  Large 12 cm collection prominently posterior to the distal femur which may  represent extra-articular joint fluid or resolving hematoma. The collection  appears to exert mass effect on femoral vein and a put the patient at risk  for a DVT. Probable small hematoma anterior to the mid to distal semitendinosis muscle  with adjacent soft tissue edema. Postsurgical change. No complication. No acute fracture. Subcutaneous soft tissue edema or cellulitis. 8/9/2023 IR Abscess Drainage PERC:      Labs:    CULTURE results: Invalid input(s): BLOOD CULTURE,  URINE CULTURE, SURGICAL CULTURE    Diagnosis:  Patient Active Problem List   Diagnosis    CAD s/p prox LAD bare metal stent 2011    RBBB    COPD (chronic obstructive pulmonary disease) (HCC)    DJD (degenerative joint disease)    Persistent atrial fibrillation (HCC)    Hypertension    Hyperlipidemia    Obesity    Risk for falls    Fatigue    Family history of coronary artery disease    Chronic midline low back pain without sciatica    ASCVD (arteriosclerotic cardiovascular disease)    Upper GI bleed    Sepsis ([address])    Atrial fibrillation with rapid ventricular response ([address])    MRSA bacteremia    Delirium    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  *    Electronically signed by: Electronically

## 2023-08-10 LAB
ANION GAP SERPL CALCULATED.3IONS-SCNC: 10 MMOL/L (ref 4–16)
ANISOCYTOSIS: ABNORMAL
BANDED NEUTROPHILS ABSOLUTE COUNT: 0.2 K/CU MM
BANDED NEUTROPHILS RELATIVE PERCENT: 2 % (ref 5–11)
BASOPHILIC STIPPLING: PRESENT
BUN SERPL-MCNC: 22 MG/DL (ref 6–23)
CALCIUM SERPL-MCNC: 8.5 MG/DL (ref 8.3–10.6)
CHLORIDE BLD-SCNC: 110 MMOL/L (ref 99–110)
CO2: 25 MMOL/L (ref 21–32)
CREAT SERPL-MCNC: 1.2 MG/DL (ref 0.9–1.3)
DIFFERENTIAL TYPE: ABNORMAL
GFR SERPL CREATININE-BSD FRML MDRD: >60 ML/MIN/1.73M2
GLUCOSE BLD-MCNC: 119 MG/DL (ref 70–99)
GLUCOSE SERPL-MCNC: 117 MG/DL (ref 70–99)
HCT VFR BLD CALC: 25 % (ref 42–52)
HEMOGLOBIN: 7.2 GM/DL (ref 13.5–18)
HYPOCHROMIA: ABNORMAL
LYMPHOCYTES ABSOLUTE: 0.6 K/CU MM
LYMPHOCYTES RELATIVE PERCENT: 6 % (ref 24–44)
MCH RBC QN AUTO: 28.5 PG (ref 27–31)
MCHC RBC AUTO-ENTMCNC: 28.8 % (ref 32–36)
MCV RBC AUTO: 98.8 FL (ref 78–100)
MONOCYTES ABSOLUTE: 0.2 K/CU MM
MONOCYTES RELATIVE PERCENT: 2 % (ref 0–4)
PDW BLD-RTO: 17.2 % (ref 11.7–14.9)
PLATELET # BLD: 209 K/CU MM (ref 140–440)
PLT MORPHOLOGY: ABNORMAL
PMV BLD AUTO: 11.3 FL (ref 7.5–11.1)
POLYCHROMASIA: ABNORMAL
POTASSIUM SERPL-SCNC: 3.8 MMOL/L (ref 3.5–5.1)
RBC # BLD: 2.53 M/CU MM (ref 4.6–6.2)
SEGMENTED NEUTROPHILS ABSOLUTE COUNT: 9.2 K/CU MM
SEGMENTED NEUTROPHILS RELATIVE PERCENT: 90 % (ref 36–66)
SODIUM BLD-SCNC: 145 MMOL/L (ref 135–145)
WBC # BLD: 10.2 K/CU MM (ref 4–10.5)

## 2023-08-10 PROCEDURE — 36415 COLL VENOUS BLD VENIPUNCTURE: CPT

## 2023-08-10 PROCEDURE — 97530 THERAPEUTIC ACTIVITIES: CPT

## 2023-08-10 PROCEDURE — 97112 NEUROMUSCULAR REEDUCATION: CPT

## 2023-08-10 PROCEDURE — 97162 PT EVAL MOD COMPLEX 30 MIN: CPT

## 2023-08-10 PROCEDURE — 99233 SBSQ HOSP IP/OBS HIGH 50: CPT | Performed by: NURSE PRACTITIONER

## 2023-08-10 PROCEDURE — 82962 GLUCOSE BLOOD TEST: CPT

## 2023-08-10 PROCEDURE — 6370000000 HC RX 637 (ALT 250 FOR IP): Performed by: NURSE PRACTITIONER

## 2023-08-10 PROCEDURE — 6360000002 HC RX W HCPCS: Performed by: NURSE PRACTITIONER

## 2023-08-10 PROCEDURE — 94664 DEMO&/EVAL PT USE INHALER: CPT

## 2023-08-10 PROCEDURE — 94640 AIRWAY INHALATION TREATMENT: CPT

## 2023-08-10 PROCEDURE — 6370000000 HC RX 637 (ALT 250 FOR IP)

## 2023-08-10 PROCEDURE — 85027 COMPLETE CBC AUTOMATED: CPT

## 2023-08-10 PROCEDURE — 6370000000 HC RX 637 (ALT 250 FOR IP): Performed by: STUDENT IN AN ORGANIZED HEALTH CARE EDUCATION/TRAINING PROGRAM

## 2023-08-10 PROCEDURE — 6370000000 HC RX 637 (ALT 250 FOR IP): Performed by: INTERNAL MEDICINE

## 2023-08-10 PROCEDURE — 2580000003 HC RX 258: Performed by: NURSE PRACTITIONER

## 2023-08-10 PROCEDURE — 2060000000 HC ICU INTERMEDIATE R&B

## 2023-08-10 PROCEDURE — 94761 N-INVAS EAR/PLS OXIMETRY MLT: CPT

## 2023-08-10 PROCEDURE — 2580000003 HC RX 258: Performed by: STUDENT IN AN ORGANIZED HEALTH CARE EDUCATION/TRAINING PROGRAM

## 2023-08-10 PROCEDURE — 2580000003 HC RX 258: Performed by: INTERNAL MEDICINE

## 2023-08-10 PROCEDURE — 97166 OT EVAL MOD COMPLEX 45 MIN: CPT

## 2023-08-10 PROCEDURE — 85007 BL SMEAR W/DIFF WBC COUNT: CPT

## 2023-08-10 PROCEDURE — 80048 BASIC METABOLIC PNL TOTAL CA: CPT

## 2023-08-10 PROCEDURE — 2700000000 HC OXYGEN THERAPY PER DAY

## 2023-08-10 RX ADMIN — SODIUM CHLORIDE, PRESERVATIVE FREE 10 ML: 5 INJECTION INTRAVENOUS at 09:02

## 2023-08-10 RX ADMIN — PRAVASTATIN SODIUM 80 MG: 40 TABLET ORAL at 21:08

## 2023-08-10 RX ADMIN — DILTIAZEM HYDROCHLORIDE 120 MG: 120 CAPSULE, COATED, EXTENDED RELEASE ORAL at 09:02

## 2023-08-10 RX ADMIN — RIVAROXABAN 20 MG: 20 TABLET, FILM COATED ORAL at 09:02

## 2023-08-10 RX ADMIN — Medication 2000 UNITS: at 09:02

## 2023-08-10 RX ADMIN — DAPTOMYCIN 750 MG: 500 INJECTION, POWDER, LYOPHILIZED, FOR SOLUTION INTRAVENOUS at 16:47

## 2023-08-10 RX ADMIN — ISOSORBIDE MONONITRATE 30 MG: 30 TABLET, EXTENDED RELEASE ORAL at 09:00

## 2023-08-10 RX ADMIN — ASPIRIN 81 MG: 81 TABLET, CHEWABLE ORAL at 09:02

## 2023-08-10 RX ADMIN — SODIUM CHLORIDE, PRESERVATIVE FREE 10 ML: 5 INJECTION INTRAVENOUS at 09:05

## 2023-08-10 RX ADMIN — TIOTROPIUM BROMIDE INHALATION SPRAY 2 PUFF: 3.12 SPRAY, METERED RESPIRATORY (INHALATION) at 07:56

## 2023-08-10 RX ADMIN — DEXTROSE AND SODIUM CHLORIDE: 5; 900 INJECTION, SOLUTION INTRAVENOUS at 05:08

## 2023-08-10 RX ADMIN — CARVEDILOL 25 MG: 25 TABLET, FILM COATED ORAL at 09:02

## 2023-08-10 RX ADMIN — SODIUM CHLORIDE, PRESERVATIVE FREE 10 ML: 5 INJECTION INTRAVENOUS at 21:08

## 2023-08-10 RX ADMIN — CARVEDILOL 25 MG: 25 TABLET, FILM COATED ORAL at 16:45

## 2023-08-10 ASSESSMENT — PAIN SCALES - WONG BAKER: WONGBAKER_NUMERICALRESPONSE: 0

## 2023-08-10 NOTE — PROGRESS NOTES
2200: Patient was seen pulling off his gown. When this nurse ask him to to relax and put on the gown, he insisted, and ask writer not to touch him. I called the aid to help, but this patient insisted not to put on his gown. Writer secured IV access and tried to reorient patient. This nurse requested a  for the safety of patient. Charge RN was notified. 2220:  at bedside at this time. Will maintain patient safety while observing safety protocols.

## 2023-08-10 NOTE — PROGRESS NOTES
approximated left knee. HEMT: AT/NC Oropharynx pink, moist, and without lesions or exudates; Edentulous. Chest: no distress and CTA. Good air movement. Oxygen per NC. Heart:  Atrial fib and no MRG. Abd: soft, non-distended, no tenderness, no hepatomegaly. Normoactive bowel sounds. Ext: no clubbing, cyanosis, or edema  Neuro: CN 2-12 intact and no focal sensory or motor deficits     Radiologic / Imaging / TESTING  8/4/2023 CT Chest WO Contrast:  IMPRESSION:  1. No acute traumatic injury in the chest.  2.  Indeterminate 5 mm left upper lobe pulmonary nodule. Per Fleischner  society guidelines, a noncontrast chest CT at 12 months is optional.  If performed and the nodule is stable at 12 months, no further follow-up is  recommended. 8/3/2023 CT Abdomen Pelvis WO Contrast:  IMPRESSION:  No acute abnormality in the abdomen or pelvis. No evidence of acute  traumatic injury. 8/3/2023 CT Head WO Contrast:  IMPRESSION:  No acute intracranial abnormality. 8/4/2023  CT Cervical Spine WO Contrast:  IMPRESSION:  No acute abnormality of the cervical spine. 8/4/2023 CT Lumbar Reconstruction WO Post Process:  IMPRESSION:  1. No acute traumatic injury in the lumbar spine. 2.  Multilevel moderate to severe degenerative changes in the lumbar spine. 8/4/2023 CT Thoracic Reconstruction WO Post Process:  IMPRESSION:  No acute traumatic injury in the thoracic spine. 8/4/2023 Complete TTE:   Summary   Left ventricular systolic function is normal.   Ejection fraction is visually estimated at 55-60%. Mild aortic stenosis is present; Mean PG 11 mmHg. Mitral annular calcification is present. Mild to moderate tricuspid regurgitation; RVSP: 43 mmHg. No evidence of any pericardial effusion. Pericardial fat pad present. 8/5/2023 XR Knee Left:  IMPRESSION:  1. No evident acute bony findings in the left knee. 2. New changes of left knee total arthroplasty with no obvious complication.   3. Possible Atrial fibrillation with rapid ventricular response (HCC)    MRSA bacteremia    Delirium    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 *    Electronically signed by: Electronically signed by Danis Avalos.  GISSELLE Eng CNP on 8/10/2023 at 2:18 PM

## 2023-08-10 NOTE — PROGRESS NOTES
Or  acetaminophen, 650 mg, Q6H PRN  sodium chloride, , PRN        Labs           Imaging/Diagnostics Last 24 Hours   CT ABDOMEN PELVIS WO CONTRAST Additional Contrast? None    Result Date: 8/4/2023  EXAMINATION: CT OF THE ABDOMEN AND PELVIS WITHOUT CONTRAST 8/4/2023 12:11 am TECHNIQUE: CT of the abdomen and pelvis was performed without the administration of intravenous contrast. Multiplanar reformatted images are provided for review. Automated exposure control, iterative reconstruction, and/or weight based adjustment of the mA/kV was utilized to reduce the radiation dose to as low as reasonably achievable. COMPARISON: CT abdomen pelvis done July 4, 2015. HISTORY: ORDERING SYSTEM PROVIDED HISTORY: trauma TECHNOLOGIST PROVIDED HISTORY: Reason for exam:->trauma Additional Contrast?->None Decision Support Exception - unselect if not a suspected or confirmed emergency medical condition->Emergency Medical Condition (MA) Reason for Exam: fall FINDINGS: Lower Chest: Please refer to concurrent chest CT report Liver: Normal. Gallbladder and Bile Ducts: Prior cholecystectomy. Spleen: Normal. Adrenal Glands: Normal. Pancreas: Normal. Genitourinary: No hydronephrosis. Multiple small bilateral renal medullary calcifications appear to represent vascular calcifications. Small nonobstructing kidney stone is difficult to exclude. Both ureters are normal in course and caliber. Urinary bladder is unremarkable. Bowel: No acute abnormality. Normal appendix. No bowel obstruction. Colonic diverticulosis without evidence of acute diverticulitis. Vasculature: Moderate to severe atherosclerosis. No abdominal aortic aneurysm. Bones and Soft Tissues: No acute abnormality. Retroperitoneum/Mesentery: No intraperitoneal free air, ascites or fluid collection. No lymphadenopathy in the abdomen or pelvis. No acute abnormality in the abdomen or pelvis. No evidence of acute traumatic injury.      XR KNEE LEFT (1-2 VIEWS)    Result Date: Reason for Exam: fall FINDINGS: BRAIN/VENTRICLES: There is no acute intracranial hemorrhage, mass effect or midline shift. No abnormal extra-axial fluid collection. The gray-white differentiation is maintained without evidence of an acute infarct. There is prominence of the ventricles and sulci due to global parenchymal volume loss. There are nonspecific areas of hypoattenuation within the periventricular and subcortical white matter, which likely represent chronic microvascular ischemic change. Atherosclerosis. ORBITS: The visualized portion of the orbits demonstrate no acute abnormality. SINUSES: The visualized paranasal sinuses and mastoid air cells demonstrate no acute abnormality. SOFT TISSUES/SKULL: No acute abnormality of the visualized skull or soft tissues. No acute intracranial abnormality. CT CHEST WO CONTRAST    Result Date: 8/4/2023  EXAMINATION: CT OF THE CHEST WITHOUT CONTRAST 8/4/2023 12:11 am TECHNIQUE: CT of the chest was performed without the administration of intravenous contrast. Multiplanar reformatted images are provided for review. Automated exposure control, iterative reconstruction, and/or weight based adjustment of the mA/kV was utilized to reduce the radiation dose to as low as reasonably achievable. COMPARISON: CT chest done July 4, 2015 and January 23, 2015. [place] Number HISTORY: ORDERING SYSTEM PROVIDED HISTORY: trauma TECHNOLOGIST PROVIDED HISTORY: Reason for exam:->trauma Decision Support Exception - unselect if not a suspected or confirmed emergency medical condition->Emergency Medical Condition (MA) Reason for Exam: fall FINDINGS: Mediastinum: Cardiomegaly. Trace pericardial fluid. Moderate to severe atherosclerosis in the thoracic aorta. No thoracic aortic aneurysm. Normal caliber main pulmonary artery. Coronary artery calcifications. No pneumomediastinum or mediastinal hemorrhage. Normal thyroid gland. Normal esophagus. No intrathoracic lymphadenopathy.  Lungs/pleura:

## 2023-08-10 NOTE — CARE COORDINATION
Attempted visit- patient remains confused. No family present. Chart reviewed. Dr Manisha Woodard recommending transfer to tertiary care center where knee surgery took place. Mandi Perez RN

## 2023-08-10 NOTE — CONSULTS
Unable to attain consent for PICC placement. Family called twice and a message left to call us back.

## 2023-08-10 NOTE — PROGRESS NOTES
PT has all side rails up on bed for lateral rotation for prevention of skin breakdown. PT is tolerating well.

## 2023-08-10 NOTE — PROGRESS NOTES
This said nurse spoke with PT daughter in law and updated her on PT status she thanked this said nurse for the update.

## 2023-08-10 NOTE — CONSULTS
on one level  Home Access: Stairs to enter with rails  Entrance Stairs - Number of Steps: 2-3  Entrance Stairs - Rails: Left (pt has stairs to front or back door)  Bathroom Shower/Tub: Walk-in shower  Bathroom Toilet: Handicap height  Bathroom Equipment: Grab bars in shower, Built-in shower seat, Grab bars around toilet  Home Equipment: Cane  Has the patient had two or more falls in the past year or any fall with injury in the past year?: Yes (2-3 pt is unsure of #)  ADL Assistance: [id number] Independent  Homemaking Responsibilities: Yes  Ambulation Assistance: Independent  Transfer Assistance: Independent  Active : Yes  Occupation: Retired  Type of Occupation: tool+  Taken from 12/26/23 eval. Would require confirmation from family d/t pt poor historian. Examination:  Observation: Pt was in bed upon arrival, agreeable to session  Vision: WFL  Hearing: [place]  Objective Measures: % SpO2 on O2 supp     Body Systems and functions:  ROM: WFL in BUE  Strength: 4/5 in BUE  Sensation: WFL  Tone: normotonic in BUE  Coordination: movements fluid and coordinated  Posture: normal posture  Activity Tolerance: fair     Activities of Daily Living (ADLs):  Feeding: SetupA   Grooming: SetupA plus cues (semifowlers for denture care and seated EOB for facial hygiene)   Toileting:   UB dressing: MaxA  LB dressing: DEP  UB bathing: MaxA   LB bathing: MaxA       *pt ADL function inferred from gross functional assessment of mobility, balance, posture, safety awareness, activity tolerance (unless otherwise indicated)    Cognitive and Psychosocial Functioning:  Overall cognitive status:    08/10/23 1617   Orientation   Orientation Level Oriented to person   Cognition   Overall Cognitive Status Exceptions   Arousal/Alertness Appropriate responses to stimuli   Following Commands Follows one step commands with repetition   Attention Span Attends with cues to redirect; Difficulty dividing

## 2023-08-10 NOTE — CONSULTS
[address], [birth year], 2012/2012-A, 8/10/2023    History  [place]:  There were no encounter diagnoses. Patient  has a past medical history of AR (aortic regurgitation), Atrial fibrillation, new onset ([address]), CAD (coronary artery disease), Chronic midline low back pain without sciatica, COPD (chronic obstructive pulmonary disease) ([address]), Family history of coronary artery disease, Fatigue, H/O cardiovascular stress test, H/O chest x-ray, H/O Doppler ultrasound, H/O echocardiogram, History of cardiac cath, History of PTCA, Hyperlipidemia, Hypertension, Obesity, Obesity, Class II, 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 Patient  has a past surgical history that includes joint replacement (2004); Coronary angioplasty with stent; Upper gastrointestinal endoscopy (N/A, 12/25/2022); and Colonoscopy (N/A, 12/27/2022). Subjective:    Patient states: \"If I ever get out of senior care. Foye Vernon Foye Vernon \"      Pain:  denies pain. Communication with other providers:  Handoff to RN, OT    Restrictions: general precautions, fall risk, KENNETH drain L LE, sitter, contact isolation    Home Setup/Prior level of function   PLOF unsure as pt poor historian this date    Examination of body systems (includes body structures/functions, activity/participation limitations):  Observation:  pt supine in bed with sitter present upon arrival and agreeable to therapy  Vision:  WFL  Hearing:  [place]  Cardiopulmonary:  2L O2  Cognition: impaired, see OT/SLP note for further evaluation. Musculoskeletal  ROM R/L:  WFL. Strength R/L:  3+/5, moderate impairment in function and endurance. Neuro:  pt reports diminished sensation in bilateral LEs       Mobility:  Rolling L/R:  mod A with cues for sequencing  Supine <-> sit:  mod A x2 with assist at bilateral LEs, hips, and trunk. Cues provided for sequencing.   Transfers: deferred due to safety  Sitting balance:  fair-, pt sat EOB CGA with two LOB backward requiring mod A to recover. Cues provided for hand placement and posture throughout. Standing balance:  deferred due to safety concerns. Gait: deferred    [facility] 6 Clicks Inpatient Mobility:  AM-PAC Inpatient Mobility Raw Score : 10    Safety: patient left supine in bed with alarm on, sitter present, call light within reach, RN notified, gait belt used. Assessment:  Pt is an 80 y.o. male admitted to the hospital for sepsis. Pt also with posterior thigh abscess with drain placement. Pt's PLOF is unsure as pt poor historian this date. Pt is currently performing bed mobility mod A x2, sitting balance mod A, and unable to stand or transfer this date. Pt is presenting with decreased endurance, impaired transfers, impaired gait, impaired balance, impaired cognition, impaired strength. Pt would benefit from continued acute care PT as well as SNF placement upon discharge to continue to address impairments. Complexity: moderate    Prognosis: Good, no significant barriers to participation at this time.      General Plan: 3-5 times per week       Equipment: TBD at next level of care    Goals:  Short Term Goals  Time Frame for Short Term Goals: 1 week  Short Term Goal 1: pt to complete all bed mobility mod A x 1  Short Term Goal 2: pt to sit EOB 10 minutes SBA  Short Term Goal 3: pt to complete STS transfer mod A x2  Short Term Goal 4: pt to complete stand pivot transfer with LRAD mod A x2  Short Term Goal 5: pt to propel manual WC 25' with min A       Treatment plan:  Bed mobility, transfers, balance, gait, TA, TX    Recommendations for NURSING mobility: JAMIN    Time:   Time in: 1143  Time out: 1207  Timed treatment minutes: 14  Total time: 24    Electronically signed by:    Ki Jonas PT  8/10/2023, 1:44 PM

## 2023-08-11 LAB
ANION GAP SERPL CALCULATED.3IONS-SCNC: 8 MMOL/L (ref 4–16)
BASOPHILS ABSOLUTE: 0 K/CU MM
BASOPHILS RELATIVE PERCENT: 0.2 % (ref 0–1)
BUN SERPL-MCNC: 20 MG/DL (ref 6–23)
CALCIUM SERPL-MCNC: 8.8 MG/DL (ref 8.3–10.6)
CHLORIDE BLD-SCNC: 113 MMOL/L (ref 99–110)
CO2: 27 MMOL/L (ref 21–32)
CREAT SERPL-MCNC: 1.1 MG/DL (ref 0.9–1.3)
DIFFERENTIAL TYPE: ABNORMAL
EOSINOPHILS ABSOLUTE: 0 K/CU MM
EOSINOPHILS RELATIVE PERCENT: 0.1 % (ref 0–3)
GFR SERPL CREATININE-BSD FRML MDRD: >60 ML/MIN/1.73M2
GLUCOSE SERPL-MCNC: 89 MG/DL (ref 70–99)
HCT VFR BLD CALC: 25.4 % (ref 42–52)
HEMOGLOBIN: 7.4 GM/DL (ref 13.5–18)
HIGH SENSITIVE C-REACTIVE PROTEIN: 29.6 MG/L (ref 0–5)
IMMATURE NEUTROPHIL %: 2.3 % (ref 0–0.43)
LYMPHOCYTES ABSOLUTE: 0.8 K/CU MM
LYMPHOCYTES RELATIVE PERCENT: 7.1 % (ref 24–44)
MCH RBC QN AUTO: 28.6 PG (ref 27–31)
MCHC RBC AUTO-ENTMCNC: 29.1 % (ref 32–36)
MCV RBC AUTO: 98.1 FL (ref 78–100)
MONOCYTES ABSOLUTE: 0.5 K/CU MM
MONOCYTES RELATIVE PERCENT: 4.1 % (ref 0–4)
NUCLEATED RBC %: 0.2 %
PDW BLD-RTO: 17.9 % (ref 11.7–14.9)
PLATELET # BLD: 183 K/CU MM (ref 140–440)
PMV BLD AUTO: 11.8 FL (ref 7.5–11.1)
POTASSIUM SERPL-SCNC: 3.9 MMOL/L (ref 3.5–5.1)
RBC # BLD: 2.59 M/CU MM (ref 4.6–6.2)
SEGMENTED NEUTROPHILS ABSOLUTE COUNT: 9.6 K/CU MM
SEGMENTED NEUTROPHILS RELATIVE PERCENT: 86.2 % (ref 36–66)
SODIUM BLD-SCNC: 148 MMOL/L (ref 135–145)
TOTAL IMMATURE NEUTOROPHIL: 0.26 K/CU MM
TOTAL NUCLEATED RBC: 0 K/CU MM
WBC # BLD: 11.2 K/CU MM (ref 4–10.5)

## 2023-08-11 PROCEDURE — 6360000002 HC RX W HCPCS: Performed by: NURSE PRACTITIONER

## 2023-08-11 PROCEDURE — 6370000000 HC RX 637 (ALT 250 FOR IP): Performed by: STUDENT IN AN ORGANIZED HEALTH CARE EDUCATION/TRAINING PROGRAM

## 2023-08-11 PROCEDURE — 2580000003 HC RX 258: Performed by: NURSE PRACTITIONER

## 2023-08-11 PROCEDURE — 99233 SBSQ HOSP IP/OBS HIGH 50: CPT | Performed by: NURSE PRACTITIONER

## 2023-08-11 PROCEDURE — 80048 BASIC METABOLIC PNL TOTAL CA: CPT

## 2023-08-11 PROCEDURE — 86141 C-REACTIVE PROTEIN HS: CPT

## 2023-08-11 PROCEDURE — 2700000000 HC OXYGEN THERAPY PER DAY

## 2023-08-11 PROCEDURE — 94640 AIRWAY INHALATION TREATMENT: CPT

## 2023-08-11 PROCEDURE — 1200000000 HC SEMI PRIVATE

## 2023-08-11 PROCEDURE — 94761 N-INVAS EAR/PLS OXIMETRY MLT: CPT

## 2023-08-11 PROCEDURE — 2580000003 HC RX 258: Performed by: STUDENT IN AN ORGANIZED HEALTH CARE EDUCATION/TRAINING PROGRAM

## 2023-08-11 PROCEDURE — 92610 EVALUATE SWALLOWING FUNCTION: CPT

## 2023-08-11 PROCEDURE — 99232 SBSQ HOSP IP/OBS MODERATE 35: CPT | Performed by: INTERNAL MEDICINE

## 2023-08-11 PROCEDURE — 85025 COMPLETE CBC W/AUTO DIFF WBC: CPT

## 2023-08-11 PROCEDURE — 6370000000 HC RX 637 (ALT 250 FOR IP): Performed by: INTERNAL MEDICINE

## 2023-08-11 PROCEDURE — 94664 DEMO&/EVAL PT USE INHALER: CPT

## 2023-08-11 PROCEDURE — 36415 COLL VENOUS BLD VENIPUNCTURE: CPT

## 2023-08-11 PROCEDURE — 6370000000 HC RX 637 (ALT 250 FOR IP)

## 2023-08-11 RX ADMIN — SODIUM CHLORIDE, PRESERVATIVE FREE 10 ML: 5 INJECTION INTRAVENOUS at 11:49

## 2023-08-11 RX ADMIN — CARVEDILOL 25 MG: 25 TABLET, FILM COATED ORAL at 16:59

## 2023-08-11 RX ADMIN — PRAVASTATIN SODIUM 80 MG: 40 TABLET ORAL at 21:14

## 2023-08-11 RX ADMIN — SODIUM CHLORIDE, PRESERVATIVE FREE 10 ML: 5 INJECTION INTRAVENOUS at 21:14

## 2023-08-11 RX ADMIN — ZOLPIDEM TARTRATE 5 MG: 5 TABLET ORAL at 21:14

## 2023-08-11 RX ADMIN — DAPTOMYCIN 750 MG: 500 INJECTION, POWDER, LYOPHILIZED, FOR SOLUTION INTRAVENOUS at 16:56

## 2023-08-11 RX ADMIN — TIOTROPIUM BROMIDE INHALATION SPRAY 2 PUFF: 3.12 SPRAY, METERED RESPIRATORY (INHALATION) at 07:12

## 2023-08-11 ASSESSMENT — PAIN SCALES - WONG BAKER
WONGBAKER_NUMERICALRESPONSE: 0

## 2023-08-11 NOTE — PROGRESS NOTES
Comprehensive Nutrition Assessment    Type and Reason for Visit:  Initial, RD Nutrition Re-Screen/LOS    Nutrition Recommendations/Plan:   Restart diet as medically able   Begin Vanilla Low calorie, high protein oral nutrition supplement TID   Please document all PO intakes in I/O   Encourage proper hydration/fluids intake  Assist with meal set up and assist feed prn      Malnutrition Assessment:  Malnutrition Status: At risk for malnutrition (Comment) (08/11/23 1231)    Context:  Acute Illness     Findings of the 6 clinical characteristics of malnutrition:  Energy Intake:  Mild decrease in energy intake (Comment)  Weight Loss:  No significant weight loss (5% in 3 mo)     Body Fat Loss:  No significant body fat loss (limited visual) Buccal region, Orbital   Muscle Mass Loss:  Unable to assess    Fluid Accumulation:  Unable to assess     Strength:  Not Performed    Nutrition Assessment:    Admitted with sepsis. H/O  TKA, AFib, UGIB, CAD s/p prox LAD, HTN, HLD, ASCVD. Currently on NPO for L knee, possible infection. Pt was on easy to chew with poor intakes s/s delirium per MD notes. Pt reports feeling hungry at visit, would like oral nutrition supplement started, prefers vanilla. Denies any wt loss/poor intake pta. Noted 5% wt loss x 3 months, not clinically significant yet concerning. Denies chewing/swallowing issues. Awaiting for diet advancement. High nutrition risk. Nutrition Related Findings:    Na 148, No D5 IV running at bedside, hgb 7.2 Wound Type: Surgical Incision       Current Nutrition Intake & Therapies:    Average Meal Intake: Unable to assess (possible less than 75% within stay)  Average Supplements Intake: None Ordered  ADULT DIET; Easy to Chew    Anthropometric Measures:  Height: 5' 11\" (180.3 cm)  Ideal Body Weight (IBW): 172 lbs (78 kg)    Admission Body Weight: 261 lb (118.4 kg)  Current Body Weight: 260 lb 2.3 oz (118 kg), 151.2 % IBW.  Weight Source: Bed Scale  Current BMI (kg/m2): 36.3  Usual Body Weight: 274 lb (124.3 kg) (May 2023)  % Weight Change (Calculated): -5.1  Weight Adjustment For: No Adjustment                 BMI Categories: Obese Class 2 (BMI 35.0 -39.9)    Estimated Daily Nutrient Needs:  Energy Requirements Based On: Formula  Weight Used for Energy Requirements: Current  Energy (kcal/day): 9599-4707 (MSJ)  Weight Used for Protein Requirements: Ideal  Protein (g/day):  (1.2-1.5 g/kg)  Method Used for Fluid Requirements: 1 ml/kcal  Fluid (ml/day): 9378-2661    Nutrition Diagnosis:   Inadequate oral intake related to acute injury/trauma, cognitive or neurological impairment, increase demand for energy/nutrients as evidenced by poor intake prior to admission (5% weight loss prior to stay, recovery and healing from possible knee infection)    Nutrition Interventions:   Food and/or Nutrient Delivery: Continue Current Diet, Start Oral Nutrition Supplement  Nutrition Education/Counseling: No recommendation at this time  Coordination of Nutrition Care: Continue to monitor while inpatient, Coordination of Care       Goals:     Goals: Meet at least 75% of estimated needs       Nutrition Monitoring and Evaluation:   Behavioral-Environmental Outcomes: None Identified  Food/Nutrient Intake Outcomes: Food and Nutrient Intake, Diet Advancement/Tolerance, Supplement Intake  Physical Signs/Symptoms Outcomes: Biochemical Data, GI Status, Meal Time Behavior, Skin, Weight, Nutrition Focused Physical Findings    Discharge Planning:     Too soon to determine     Royal Mao RD, LD  Contact: [phone]

## 2023-08-11 NOTE — CARE COORDINATION
Discussed in IDR- PT/OT recs (8/10 @ 3:16pm) rec SNF. Attempted to contact son Sade [place] at [phone] for input re:SNF needs. Ada Briggs RN     [phone] Contacted patient's on Sade [place] at [phone]; left 2nd VM for return call.  Ada Briggs RN

## 2023-08-11 NOTE — PROGRESS NOTES
[facility] RN to bedside to eval pt for DI of 57. Patient is resting in bed with eyes closed, easily arouses, no s/sx of distress noted. Pt does have fine maki and c/o being cold. Changed electrode placement d/t artifact noted from maki. Warm blanket provided. Patient's tele is now more appropriate and he expressed gratitude for warm blanket. VSS on 2L NC, sitter remained at bedside.

## 2023-08-11 NOTE — CONSULTS
PICC line order noted. Patient remains confused, not easily re-directable. No discharge plan in place. Will follow along for appropriate time to place PICC unless patient unable to maintain PIV access.

## 2023-08-11 NOTE — PROGRESS NOTES
Speech-Language Pathology Department   Facility/Department: [facility] ICU STEPDOWN   CLINICAL BEDSIDE SWALLOW EVALUATION    NAME: Jolene Bills  :   MRN: [id number]    ADMISSION DATE: 2023  ADMITTING DIAGNOSIS: has CAD s/p prox LAD bare metal stent ; RBBB; COPD (chronic obstructive pulmonary disease) ([address]); DJD (degenerative joint disease); Persistent atrial fibrillation ([address]); Hypertension; Hyperlipidemia; Obesity; Risk for falls; Fatigue; Family history of coronary artery disease; Chronic midline low back pain without sciatica; ASCVD (arteriosclerotic cardiovascular disease); Upper GI bleed; Sepsis ([address]); Atrial fibrillation with rapid ventricular response ([address]); MRSA bacteremia; Delirium; and Hardware complicating wound infection ([address]) on their problem list.      Impressions: Jolene Bills was seen for a bedside swallowing evaluation after being admitted to [facility] with sepsis. Pt was lethargic and cooperative throughout assessment. Relevant medical hx includes COPD, CAD and hypertension. Pt denies hx of dysphagia PTA. Pt was positioned upright in bed and presented with a clear vocal quality and weak volitional cough. Pt required moderate prompting to participate in oral mechanism examination. Adequate labial/lingual strength and coordination was observed. Pt refused placement of partial dentures at this time, stating \"not right now, thank you\". PO trials of puree, soft/regular solids and thin liquids by cup/straw sips were given. Mild-moderate oral dysphagia was observed characterized by prolonged mastication and slow oral A-P transit with trials of regular solids. Pharyngeal swallow appeared intermittently delayed with reduced laryngeal elevation. Pt demonstrated an immediate strong cough with initial trial of thin liquids by cup sips. Clear vocal quality and 0 overt s/s of aspiration were observed with ongoing trials of thin liquids by small straw sips, puree and solids.     Recommend soft Pt/caregivers will demonstrate comprehension of recommendations/POC    General  Chart Reviewed: Yes  Behavior/Cognition: Alert; Cooperative;Lethargic  Respiratory Status: Room air  O2 Device: None (Room air)  Communication Observation: Functional  Follows Directions: Simple  Dentition: Some missing teeth;Dentures bottom; Dentures top  Patient Positioning: Upright in bed  Baseline Vocal Quality: Normal  Volitional Cough: Weak  Volitional Swallow: Delayed  Prior Dysphagia History: Pt denies hx of dysphagia  Consistencies Administered: Regular;Pureed;Minced and Moist;Thin - straw; Ice Chips; Thin - cup    Vision/Hearing       Oral Motor Deficits  Labial: No impairment  Lingual: No impairment  Velum: No Impairment  Consistencies Administered: Regular;Pureed;Minced and Moist;Thin - straw; Ice Chips; Thin - cup    Oral Phase Dysfunction  Oral Phase  Oral Phase: Exceptions     Indicators of Pharyngeal Phase Dysfunction   Pharyngeal Phase  Pharyngeal Phase: Exceptions  Pharyngeal Phase   Pharyngeal Phase: Exceptions    Prognosis  Prognosis: Good  Consulted and agree with results and recommendations: RN    Education  Patient Education: recommendations/POC  Patient Education Response: Verbalizes understanding  Safety Devices in place: Yes  Type of devices:  All fall risk precautions in place       Therapy Time  SLP Individual Minutes  Time In: 1330  Time Out: 1400  Minutes: [address], [address] [facility]-SLP, 8/11/2023

## 2023-08-11 NOTE — PROGRESS NOTES
TTE:   Summary   Left ventricular systolic function is normal.   Ejection fraction is visually estimated at 55-60%. Mild aortic stenosis is present; Mean PG 11 mmHg. Mitral annular calcification is present. Mild to moderate tricuspid regurgitation; RVSP: 43 mmHg. No evidence of any pericardial effusion. Pericardial fat pad present. 8/5/2023 XR Knee Left:  IMPRESSION:  1. No evident acute bony findings in the left knee. 2. New changes of left knee total arthroplasty with no obvious complication. 3. Possible new intra-articular loose body in the left knee suprapatellar  recess with suspicion for at least small suprapatellar effusion. 4. Diffuse soft tissue swelling suggestive of subcutaneous edema. 8/8/2023 CT Knee Left W Contrast:  IMPRESSION:  Large 12 cm collection prominently posterior to the distal femur which may  represent extra-articular joint fluid or resolving hematoma. The collection  appears to exert mass effect on femoral vein and a put the patient at risk  for a DVT. Probable small hematoma anterior to the mid to distal semitendinosis muscle  with adjacent soft tissue edema. Postsurgical change. No complication. No acute fracture. Subcutaneous soft tissue edema or cellulitis. 8/9/2023 IR Abscess Drainage PERC:  IMPRESSION:  Ultrasound-guided placement 10 Indonesian external drainage catheter. 190 mL  purulent, sanguinous fluid removed from posterior-medial distal left  thigh/knee region corresponding to site of previously reported complex fluid  collection. Handy-Garcia drainage bulb applied. Specimen sent for  laboratory evaluation. No complication suggested.     Labs:    CULTURE results: Invalid input(s): BLOOD CULTURE,  URINE CULTURE, SURGICAL CULTURE    Diagnosis:  Patient Active Problem List   Diagnosis    CAD s/p prox LAD bare metal stent 2011    RBBB    COPD (chronic obstructive pulmonary disease) (HCC)    DJD (degenerative joint disease)    Persistent atrial fibrillation ([address])    Hypertension    Hyperlipidemia    Obesity    Risk for falls    Fatigue    Family history of coronary artery disease    Chronic midline low back pain without sciatica    ASCVD (arteriosclerotic cardiovascular disease)    Upper GI bleed    Sepsis ([address])    Atrial fibrillation with rapid ventricular response (HCC)    MRSA bacteremia    Delirium    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 *    Electronically signed by: Electronically signed by Michelle Rojas.  GISSELLE Eng CNP on 8/11/2023 at 8:57 AM

## 2023-08-11 NOTE — PROGRESS NOTES
1015-Pt to heart [place], procedure explained to patient. Pt reluctant to have EMILIANO, this nurse spoke with Daughter-in-law Mary Veras and explained procedure to her as well. Mary Veras voiced some questions and concerns regarding the need for a EMILIANO. I spoke with Dr. Marissa Cooks regarding Claudia's questions. It was decided that EMILIANO to be cancelled after Dr. Marissa Cooks and Mally Page spoke regarding the procedure. This nurse updated Mary Rito on the decision to cancel, she was very thankful. Pt returned to inpatient room. Mayte Rico RN updated on patient.        Electronically signed by Kyung Kang RN on 8/11/2023 at 10:55 AM

## 2023-08-11 NOTE — PROGRESS NOTES
4 Eyes Skin Assessment     NAME:  Lucy Gutierrez  YOB: 1940  MEDICAL RECORD NUMBER:  [id number]    The patient is being assessed for  {Reason for Assessment:12443}    I agree that at least one RN has performed a thorough Head to Toe Skin Assessment on the patient. ALL assessment sites listed below have been assessed. Areas assessed by both nurses:    Head, Face, Ears, Shoulders, Back, Chest, Arms, Elbows, Hands, Sacrum. Buttock, Coccyx, Ischium, Legs. Feet and Heels, and Under Medical Devices         Does the Patient have a Wound?  No noted wound(s)       [place] Prevention initiated by RN: No  Wound Care Orders initiated by RN: No    Pressure Injury (Stage 3,4, Unstageable, DTI, NWPT, and Complex wounds) if present, place Wound referral order by RN under : No    New Ostomies, if present place, Ostomy referral order under : No     Nurse 1 eSignature: Electronically signed by Jayleen Ruano RN on 8/11/23 at 6:23 PM EDT    **SHARE this note so that the co-signing nurse can place an eSignature**    Nurse 2 eSignature: {Esignature:[id number]}

## 2023-08-11 NOTE — PROGRESS NOTES
[address]  Phone: [phone]    Fax [phone]                  Homero Palacios MD, Dudley Muñoz MD, Pina Short MD, MD Irineo Gross MD Dayle Gilbert, MD Ary Goldman, MD Dr. Christobal Pi MD Bennie Bushman, GISSELLE Urban, GISSELLE Young, GISSELLE Sewell, MANUEL RobertsC    CARDIOLOGY  NOTE      Name:  Kristen Van /Age/Sex: [id number]  (80 y.o. male)   MRN & CSN:  [id number] & [id number] Admission Date/Time: 2023  3:25 PM   Location:  -A PCP: Yogi Heller MD       Hospital Day: 8    - Cardiology consult is for:  Atrial fibrillation with rapid ventricular response      ASSESSMENT/ PLAN:  Atrial fibrillation with rapid ventricular response  -Rate improved, currently resting in the 90s  -Goal potassium 4.0-4.5, magnesium 2.0-2.2. Replete per protocol  -RVR may be secondary to sepsis and altered mental status  -Being evaluated by Dr. Elizabeth Delong for [facility] as outpatient  -Continue Coreg 25 mg twice daily and Cardizem 100 mg daily  -Xarelto remains on hold due to significant anemia. Acute on chronic heart failure with preserved ejection fraction  -Strict I's and O's and daily weights  -Bilateral improved  -Echo Showing preserved EF with only mild aortic stenosis  -Lasix on hold  Coronary artery disease s/p PCI of LAD  -Most recent heart cath  showing nonobstructive disease  -Continue aspirin, Coreg, Imdur, Pravachol 80 mg daily. Sepsis with MRSA bacteremia  -Infectious disease following  -Patient oriented now however declining EMILIANO. Acute on chronic anemia  -Hemoglobin remains low. Anticoagulant still on hold  Altered mental status  -Still not oriented to anything except himself, patient sitter in room          Cardiology will sign off, please call with any questions.   Patient to Restless   HENT:      Mouth/Throat:      Mouth: Mucous membranes are dry. Eyes:      Extraocular Movements: Extraocular movements intact. Comments: Pupils equal and round   Cardiovascular:      Rate and Rhythm: Normal rate. Rhythm irregular. Pulses: Normal pulses. Heart sounds: S1 normal and S2 normal. No murmur heard. No friction rub. No gallop. Pulmonary:      Effort: Pulmonary effort is normal.      Breath sounds: Normal breath sounds. No rales. Abdominal:      Palpations: Abdomen is soft. Tenderness: There is no abdominal tenderness. Musculoskeletal:      Right lower leg: No edema. Left lower leg: No edema. Skin:     General: Skin is warm and dry. Capillary Refill: Capillary refill takes less than 2 seconds. Findings: No rash. Comments: Skin turgor brisk   Neurological:      Comments: Oriented to self only   Psychiatric:         Behavior: Behavior is cooperative.       Comments: Expressing visual hallucinations        Medications:    [Held by provider] rivaroxaban  20 mg Oral Daily with breakfast    lidocaine PF  5 mL IntraDERmal Once    sodium chloride flush  5-40 mL IntraVENous 2 times per day    DAPTOmycin (CUBICIN) IVPB  8 mg/kg (Adjusted) IntraVENous Q24H    dilTIAZem  120 mg Oral Daily    carvedilol  25 mg Oral BID WC    carvedilol  12.5 mg Oral Once    sodium chloride flush  5-40 mL IntraVENous 2 times per day    aspirin  81 mg Oral Daily    Vitamin D  2,000 Units Oral Daily    [Held by provider] furosemide  20 mg Oral BID    isosorbide mononitrate  30 mg Oral Daily    pravastatin  80 mg Oral Nightly    tiotropium  2 puff Inhalation Daily RT      sodium chloride 25 mL/hr at 08/10/23 1644    sodium chloride       sodium chloride flush, zolpidem, melatonin, potassium chloride **OR** potassium alternative oral replacement **OR** potassium chloride, magnesium sulfate, sodium chloride flush, sodium chloride, ondansetron **OR** ondansetron, polyethylene

## 2023-08-11 NOTE — ADT AUTH CERT
Utilization Reviews       8/10  by Sylvia Moreland RN       Review Status Review Entered   In Primary 8/10/2023 1620       Created By   Sylvia Moreland RN      Criteria Review   DATE: 8/10         RELEVANT BASELINES: RA        PERTINENT UPDATES:  The knee drain in place. patient output continues to be around 90 cc an hour when flushed every 4 hours. DC sitter today. Started on diet. PT OT ordered. Continue Cubicin q24h IV, continue IV fluids  Fluid analysis and cultures are still pending     VITALS:  97.9, , RR 26, /53, 98% on NC 2LPM  , 105, 101     ABNL/PERTINENT LABS/RADIOLOGY/DIAGNOSTIC STUDIES:  Glucose 117  WBC: 10.2  RBC: 2.53 (L)  Hemoglobin Quant: 7.2 (L)  Hematocrit: 25.0 (L)  MCHC: 28.8 (L)  MPV: 11.3 (H)  RDW: 17.2 (H)  Platelet Count: 109  Lymphocyte %: 6.0 (L)     PHYSICAL EXAM:  Respiratory: b/l clear   Gastrointestinal: Soft, non tender  Genitourinary: no suprapubic tenderness  Musculoskeletal: left swelling has improved, drain in place, with thick bloody secretion   Skin: warm, dry  Neuro: Alert and oriented      MD CONSULTS/ASSESSMENT AND PLAN:  IM note:   Was delirious from 8/7 to 8/9 . Also likely contributed by knee/thigh abscess. Mentation has improved as of 8/9/2023 evening. Patient alert and oriented today   Discussed with daughter-in-law after discussion with ID and orthopedics. .  She is more keen to treat this conservatively for now. Dramatic clinical improvement today. Patient is alert and oriented and respiratory status has improved. Fluid analysis and cultures are still pending. Order diet for today. If blood sugars improved after diet, will DC D5 NS.       MEDICATIONS:  ASA 81mg PO daily  Coreg 25mg PO BID  Cubicin 750mg IV q24h  Cardizem 120mg PO daily  Imdur 30mg PO daily  Xarelto 20mg PO daily  Spiriva daily  D5 NS 75ml/hr     ORDERS:  Easy chew diet, drain care q4h, up as tolerated        8/8  by Sylvia Moreland RN       Review Status Review Entered   In Primary 8/10/2023 1602       Created By   Berta Gomez RN      Criteria Review   DATE: 8/8         RELEVANT BASELINES: RA        PERTINENT UPDATES:  Still not oriented to anything except himself, patient sitter in room - Is fidgety, restless and agitated, pulling his sheets. Opens his eyes but is very lethargic. Able to answer few questions but is confused. Also has abdominal breathing   Poor intake  -Due to delirium, patient not eating and drinking as much.  -This a.m. blood glucose 67 mg/DL. Start maintenance D5 NS. For now no aspiration has been done from fluid collection and unsure whether the fluid collection is abscess or not, though given MRSA bacteremia and leukocytosis, it is highly suspicious for abscess. For now we will consult IR for diagnostic aspiration. Will await for IR recommendation on 8/9/2023. Spoke to daughter-in-law multiple times. She would like to wait until seen by IR on 8/9/2023 before considering tertiary level transfer. VITALS:  97.6, , RR 29, /90, 84% on NC 3LPM     ABNL/PERTINENT LABS/RADIOLOGY/DIAGNOSTIC STUDIES:  Potassium 5.4  BUN 25  Creatinine 1.2  Glucose 67  Ammonia 23  CRP 75     WBC: 12.7 (H)  RBC: 2.69 (L)  Hemoglobin Quant: 7.5 (L)  Hematocrit: 26.1 (L)  MCHC: 28.7 (L)  MPV: 11.6 (H)  RDW: 16.7 (H)  Platelet Count: 271  Lymphocyte %: 6.2 (L)  Monocytes %: 5.0 (H)     pH, Flavio: 7.31 (L)  pCO2, Flavio: 52  pO2, Flavio: 76 (H)  HCO3, Venous: 26.2 (H)  O2 Sat, Flavio: 90.3 (H)  Base Excess: 1     CT left knee  Large 12 cm collection prominently posterior to the distal femur which may represent extra-articular joint fluid or resolving hematoma. The collection appears to exert mass effect on femoral vein and a put the patient at risk for a DVT. Probable small hematoma anterior to the mid to distal semitendinosis muscle with adjacent soft tissue edema. Postsurgical change. No complication. No acute fracture.     Subcutaneous soft

## 2023-08-12 LAB
ALBUMIN SERPL-MCNC: 2.3 GM/DL (ref 3.4–5)
ALP BLD-CCNC: 95 IU/L (ref 40–129)
ALT SERPL-CCNC: 18 U/L (ref 10–40)
ANION GAP SERPL CALCULATED.3IONS-SCNC: 7 MMOL/L (ref 4–16)
ANISOCYTOSIS: ABNORMAL
AST SERPL-CCNC: 40 IU/L (ref 15–37)
BASOPHILIC STIPPLING: ABNORMAL
BILIRUB SERPL-MCNC: 0.3 MG/DL (ref 0–1)
BUN SERPL-MCNC: 22 MG/DL (ref 6–23)
CALCIUM SERPL-MCNC: 9.1 MG/DL (ref 8.3–10.6)
CHLORIDE BLD-SCNC: 110 MMOL/L (ref 99–110)
CO2: 26 MMOL/L (ref 21–32)
CREAT SERPL-MCNC: 1.1 MG/DL (ref 0.9–1.3)
CULTURE: NORMAL
CULTURE: NORMAL
DIFFERENTIAL TYPE: ABNORMAL
GFR SERPL CREATININE-BSD FRML MDRD: >60 ML/MIN/1.73M2
GLUCOSE BLD-MCNC: 107 MG/DL (ref 70–99)
GLUCOSE SERPL-MCNC: 108 MG/DL (ref 70–99)
HCT VFR BLD CALC: 24.8 % (ref 42–52)
HEMOGLOBIN: 7 GM/DL (ref 13.5–18)
LYMPHOCYTES ABSOLUTE: 0.9 K/CU MM
LYMPHOCYTES RELATIVE PERCENT: 10 % (ref 24–44)
Lab: NORMAL
Lab: NORMAL
MACROCYTES: ABNORMAL
MCH RBC QN AUTO: 27.6 PG (ref 27–31)
MCHC RBC AUTO-ENTMCNC: 28.2 % (ref 32–36)
MCV RBC AUTO: 97.6 FL (ref 78–100)
MONOCYTES ABSOLUTE: 0.1 K/CU MM
MONOCYTES RELATIVE PERCENT: 1 % (ref 0–4)
PDW BLD-RTO: 18.3 % (ref 11.7–14.9)
PLATELET # BLD: 148 K/CU MM (ref 140–440)
PMV BLD AUTO: 11.4 FL (ref 7.5–11.1)
POLYCHROMASIA: ABNORMAL
POTASSIUM SERPL-SCNC: 3.7 MMOL/L (ref 3.5–5.1)
RBC # BLD: 2.54 M/CU MM (ref 4.6–6.2)
SEGMENTED NEUTROPHILS ABSOLUTE COUNT: 7.5 K/CU MM
SEGMENTED NEUTROPHILS RELATIVE PERCENT: 89 % (ref 36–66)
SODIUM BLD-SCNC: 143 MMOL/L (ref 135–145)
SPECIMEN: NORMAL
SPECIMEN: NORMAL
STOMATOCYTES: ABNORMAL
TOTAL PROTEIN: 5.3 GM/DL (ref 6.4–8.2)
WBC # BLD: 8.5 K/CU MM (ref 4–10.5)

## 2023-08-12 PROCEDURE — 86140 C-REACTIVE PROTEIN: CPT

## 2023-08-12 PROCEDURE — 6360000002 HC RX W HCPCS: Performed by: NURSE PRACTITIONER

## 2023-08-12 PROCEDURE — 94640 AIRWAY INHALATION TREATMENT: CPT

## 2023-08-12 PROCEDURE — 85027 COMPLETE CBC AUTOMATED: CPT

## 2023-08-12 PROCEDURE — 80053 COMPREHEN METABOLIC PANEL: CPT

## 2023-08-12 PROCEDURE — 1200000000 HC SEMI PRIVATE

## 2023-08-12 PROCEDURE — 82962 GLUCOSE BLOOD TEST: CPT

## 2023-08-12 PROCEDURE — 80048 BASIC METABOLIC PNL TOTAL CA: CPT

## 2023-08-12 PROCEDURE — 36415 COLL VENOUS BLD VENIPUNCTURE: CPT

## 2023-08-12 PROCEDURE — 6370000000 HC RX 637 (ALT 250 FOR IP): Performed by: STUDENT IN AN ORGANIZED HEALTH CARE EDUCATION/TRAINING PROGRAM

## 2023-08-12 PROCEDURE — 85007 BL SMEAR W/DIFF WBC COUNT: CPT

## 2023-08-12 PROCEDURE — 6370000000 HC RX 637 (ALT 250 FOR IP)

## 2023-08-12 PROCEDURE — 86141 C-REACTIVE PROTEIN HS: CPT

## 2023-08-12 PROCEDURE — 2580000003 HC RX 258: Performed by: STUDENT IN AN ORGANIZED HEALTH CARE EDUCATION/TRAINING PROGRAM

## 2023-08-12 PROCEDURE — 94761 N-INVAS EAR/PLS OXIMETRY MLT: CPT

## 2023-08-12 PROCEDURE — 85025 COMPLETE CBC W/AUTO DIFF WBC: CPT

## 2023-08-12 PROCEDURE — 2580000003 HC RX 258: Performed by: NURSE PRACTITIONER

## 2023-08-12 PROCEDURE — 6370000000 HC RX 637 (ALT 250 FOR IP): Performed by: INTERNAL MEDICINE

## 2023-08-12 PROCEDURE — 6370000000 HC RX 637 (ALT 250 FOR IP): Performed by: NURSE PRACTITIONER

## 2023-08-12 RX ADMIN — SODIUM CHLORIDE, PRESERVATIVE FREE 10 ML: 5 INJECTION INTRAVENOUS at 09:19

## 2023-08-12 RX ADMIN — DAPTOMYCIN 750 MG: 500 INJECTION, POWDER, LYOPHILIZED, FOR SOLUTION INTRAVENOUS at 16:55

## 2023-08-12 RX ADMIN — CARVEDILOL 25 MG: 25 TABLET, FILM COATED ORAL at 09:16

## 2023-08-12 RX ADMIN — Medication 2000 UNITS: at 09:16

## 2023-08-12 RX ADMIN — DILTIAZEM HYDROCHLORIDE 120 MG: 120 CAPSULE, COATED, EXTENDED RELEASE ORAL at 09:16

## 2023-08-12 RX ADMIN — TIOTROPIUM BROMIDE INHALATION SPRAY 2 PUFF: 3.12 SPRAY, METERED RESPIRATORY (INHALATION) at 07:57

## 2023-08-12 RX ADMIN — ACETAMINOPHEN 650 MG: 325 TABLET ORAL at 20:40

## 2023-08-12 RX ADMIN — RIVAROXABAN 20 MG: 20 TABLET, FILM COATED ORAL at 09:16

## 2023-08-12 RX ADMIN — ASPIRIN 81 MG: 81 TABLET, CHEWABLE ORAL at 09:15

## 2023-08-12 RX ADMIN — CARVEDILOL 25 MG: 25 TABLET, FILM COATED ORAL at 18:17

## 2023-08-12 RX ADMIN — PRAVASTATIN SODIUM 80 MG: 40 TABLET ORAL at 20:40

## 2023-08-12 RX ADMIN — ISOSORBIDE MONONITRATE 30 MG: 30 TABLET, EXTENDED RELEASE ORAL at 09:15

## 2023-08-12 ASSESSMENT — PAIN DESCRIPTION - LOCATION: LOCATION: SHOULDER

## 2023-08-12 ASSESSMENT — PAIN DESCRIPTION - ORIENTATION: ORIENTATION: RIGHT;LEFT

## 2023-08-12 ASSESSMENT — PAIN SCALES - WONG BAKER
WONGBAKER_NUMERICALRESPONSE: 0
WONGBAKER_NUMERICALRESPONSE: 0

## 2023-08-12 ASSESSMENT — PAIN SCALES - GENERAL
PAINLEVEL_OUTOF10: 1
PAINLEVEL_OUTOF10: 3

## 2023-08-12 NOTE — PLAN OF CARE
Problem: Safety - Adult  Goal: Free from fall injury  Outcome: Progressing     Problem: Discharge Planning  Goal: Discharge to home or other facility with appropriate resources  Outcome: Progressing     Problem: Skin/Tissue Integrity  Goal: Absence of new skin breakdown  Description: 1. Monitor for areas of redness and/or skin breakdown  2. Assess vascular access sites hourly  3. Every 4-6 hours minimum:  Change oxygen saturation probe site  4. Every 4-6 hours:  If on nasal continuous positive airway pressure, respiratory therapy assess nares and determine need for appliance change or resting period. Outcome: Progressing     Problem: ABCDS Injury Assessment  Goal: Absence of physical injury  Outcome: Progressing     Problem: Confusion  Goal: Confusion, delirium, dementia, or psychosis is improved or at baseline  Description: INTERVENTIONS:  1. Assess for possible contributors to thought disturbance, including medications, impaired vision or hearing, underlying metabolic abnormalities, dehydration, psychiatric diagnoses, and notify attending LIP  2. [place] high risk fall precautions, as indicated  3. Provide frequent short contacts to provide reality reorientation, refocusing and direction  4. Decrease environmental stimuli, including noise as appropriate  5. Monitor and intervene to maintain adequate nutrition, hydration, elimination, sleep and activity  6. If unable to ensure safety without constant attention obtain sitter and review sitter guidelines with assigned personnel  7.  Initiate Psychosocial CNS and Spiritual Care consult, as indicated  Outcome: Progressing     Problem: Pain  Goal: Verbalizes/displays adequate comfort level or baseline comfort level  Outcome: Progressing  Flowsheets (Taken 8/11/2023 1200 by No Camargo RN)  Verbalizes/displays adequate comfort level or baseline comfort level:   Encourage patient to monitor pain and request assistance   Assess pain using appropriate pain

## 2023-08-12 NOTE — PROGRESS NOTES
lobe are stable dating back to chest CT done January 23, 2015 consistent with benign remote granulomas. Upper Abdomen: Please refer to concurrent CT abdomen pelvis report. Soft Tissues/Bones: No acute fracture. No destructive osseous lesion. Multiple old bilateral rib fracture deformities including old nonunited and mildly displaced fractures of the right lateral 8th, 9th and 10th ribs. Multilevel degenerative disc disease. Degenerative changes in both shoulders. 1.  No acute traumatic injury in the chest. 2.  Indeterminate 5 mm left upper lobe pulmonary nodule. Per Fleischner society guidelines, a noncontrast chest CT at 12 months is optional.  If performed and the nodule is stable at 12 months, no further follow-up is recommended. CT CERVICAL SPINE WO CONTRAST    Result Date: 8/4/2023  EXAMINATION: CT OF THE CERVICAL SPINE WITHOUT CONTRAST 8/3/2023 11:44 pm TECHNIQUE: CT of the cervical spine was performed without the administration of intravenous contrast. Multiplanar reformatted images are provided for review. Automated exposure control, iterative reconstruction, and/or weight based adjustment of the mA/kV was utilized to reduce the radiation dose to as low as reasonably achievable. COMPARISON: July 4, 2015. HISTORY: ORDERING SYSTEM PROVIDED HISTORY: Trauma TECHNOLOGIST PROVIDED HISTORY: Reason for exam:->Trauma Decision Support Exception - unselect if not a suspected or confirmed emergency medical condition->Emergency Medical Condition (MA) Reason for Exam: fall FINDINGS: BONES/ALIGNMENT: There is no acute fracture or traumatic malalignment. DEGENERATIVE CHANGES: Multilevel moderate to severe degenerative changes in the cervical spine. SOFT TISSUES: There is no prevertebral soft tissue swelling. No acute abnormality of the cervical spine.      CT LUMBAR RECONSTRUCTION WO POST PROCESS    Result Date: 8/4/2023  EXAMINATION: CT OF THE LUMBAR SPINE WITHOUT CONTRAST  8/4/2023 TECHNIQUE: CT of the COMPARISON: CT chest done July 4, 2015. HISTORY: ORDERING SYSTEM PROVIDED HISTORY: trauma TECHNOLOGIST PROVIDED HISTORY: Reason for exam:->trauma Decision Support Exception - unselect if not a suspected or confirmed emergency medical condition->Emergency Medical Condition (MA) Reason for Exam: fall FINDINGS: BONES/ALIGNMENT: No acute fracture or listhesis. Mild multilevel chronic anterior vertebral body wedging. Chronic T11 superior endplate compression deformity is similar to the prior study. Normal thoracic kyphosis. Suggestion of osteopenia. No destructive osseous lesion. DEGENERATIVE CHANGES: Multilevel degenerative changes in the thoracic spine. No definite severe thoracic spinal canal stenosis. SOFT TISSUES: No paraspinal mass is seen. No acute traumatic injury in the thoracic spine.        Electronically signed by Brenda Phillips MD on 8/12/2023 at 1:28 PM

## 2023-08-13 LAB
AMMONIA: 20 UMOL/L (ref 16–60)
ANION GAP SERPL CALCULATED.3IONS-SCNC: 9 MMOL/L (ref 4–16)
BASE EXCESS MIXED: 2.8 (ref 0–1.2)
BASOPHILS ABSOLUTE: 0 K/CU MM
BASOPHILS RELATIVE PERCENT: 0.1 % (ref 0–1)
BUN SERPL-MCNC: 19 MG/DL (ref 6–23)
CALCIUM SERPL-MCNC: 9.2 MG/DL (ref 8.3–10.6)
CHLORIDE BLD-SCNC: 109 MMOL/L (ref 99–110)
CO2: 24 MMOL/L (ref 21–32)
COMMENT: ABNORMAL
CREAT SERPL-MCNC: 1 MG/DL (ref 0.9–1.3)
CRP SERPL HS-MCNC: 24.7 MG/L
DIFFERENTIAL TYPE: ABNORMAL
EOSINOPHILS ABSOLUTE: 0 K/CU MM
EOSINOPHILS RELATIVE PERCENT: 0.2 % (ref 0–3)
GFR SERPL CREATININE-BSD FRML MDRD: >60 ML/MIN/1.73M2
GLUCOSE SERPL-MCNC: 104 MG/DL (ref 70–99)
HCO3 VENOUS: 27.2 MMOL/L (ref 19–25)
HCT VFR BLD CALC: 25.1 % (ref 42–52)
HCT VFR BLD CALC: 27.7 % (ref 42–52)
HEMOGLOBIN: 7.2 GM/DL (ref 13.5–18)
HEMOGLOBIN: 7.8 GM/DL (ref 13.5–18)
IMMATURE NEUTROPHIL %: 1.4 % (ref 0–0.43)
LYMPHOCYTES ABSOLUTE: 0.8 K/CU MM
LYMPHOCYTES RELATIVE PERCENT: 8.8 % (ref 24–44)
MCH RBC QN AUTO: 27.7 PG (ref 27–31)
MCHC RBC AUTO-ENTMCNC: 28.2 % (ref 32–36)
MCV RBC AUTO: 98.2 FL (ref 78–100)
MONOCYTES ABSOLUTE: 0.9 K/CU MM
MONOCYTES RELATIVE PERCENT: 9.9 % (ref 0–4)
NUCLEATED RBC %: 0 %
O2 SAT, VEN: 90.4 % (ref 50–70)
PCO2, VEN: 40 MMHG (ref 38–52)
PDW BLD-RTO: 18.4 % (ref 11.7–14.9)
PH VENOUS: 7.44 (ref 7.32–7.42)
PLATELET # BLD: 152 K/CU MM (ref 140–440)
PMV BLD AUTO: 12 FL (ref 7.5–11.1)
PO2, VEN: 73 MMHG (ref 28–48)
POTASSIUM SERPL-SCNC: 3.7 MMOL/L (ref 3.5–5.1)
RBC # BLD: 2.82 M/CU MM (ref 4.6–6.2)
SEGMENTED NEUTROPHILS ABSOLUTE COUNT: 7.5 K/CU MM
SEGMENTED NEUTROPHILS RELATIVE PERCENT: 79.6 % (ref 36–66)
SODIUM BLD-SCNC: 142 MMOL/L (ref 135–145)
TOTAL IMMATURE NEUTOROPHIL: 0.13 K/CU MM
TOTAL NUCLEATED RBC: 0 K/CU MM
WBC # BLD: 9.4 K/CU MM (ref 4–10.5)

## 2023-08-13 PROCEDURE — 94664 DEMO&/EVAL PT USE INHALER: CPT

## 2023-08-13 PROCEDURE — 6370000000 HC RX 637 (ALT 250 FOR IP): Performed by: STUDENT IN AN ORGANIZED HEALTH CARE EDUCATION/TRAINING PROGRAM

## 2023-08-13 PROCEDURE — 94640 AIRWAY INHALATION TREATMENT: CPT

## 2023-08-13 PROCEDURE — 6370000000 HC RX 637 (ALT 250 FOR IP): Performed by: INTERNAL MEDICINE

## 2023-08-13 PROCEDURE — 97530 THERAPEUTIC ACTIVITIES: CPT

## 2023-08-13 PROCEDURE — 85018 HEMOGLOBIN: CPT

## 2023-08-13 PROCEDURE — 80048 BASIC METABOLIC PNL TOTAL CA: CPT

## 2023-08-13 PROCEDURE — 6370000000 HC RX 637 (ALT 250 FOR IP): Performed by: FAMILY MEDICINE

## 2023-08-13 PROCEDURE — 82805 BLOOD GASES W/O2 SATURATION: CPT

## 2023-08-13 PROCEDURE — 85025 COMPLETE CBC W/AUTO DIFF WBC: CPT

## 2023-08-13 PROCEDURE — 82803 BLOOD GASES ANY COMBINATION: CPT

## 2023-08-13 PROCEDURE — 2580000003 HC RX 258: Performed by: STUDENT IN AN ORGANIZED HEALTH CARE EDUCATION/TRAINING PROGRAM

## 2023-08-13 PROCEDURE — 6360000002 HC RX W HCPCS: Performed by: NURSE PRACTITIONER

## 2023-08-13 PROCEDURE — 6370000000 HC RX 637 (ALT 250 FOR IP): Performed by: NURSE PRACTITIONER

## 2023-08-13 PROCEDURE — 82140 ASSAY OF AMMONIA: CPT

## 2023-08-13 PROCEDURE — 86140 C-REACTIVE PROTEIN: CPT

## 2023-08-13 PROCEDURE — 94761 N-INVAS EAR/PLS OXIMETRY MLT: CPT

## 2023-08-13 PROCEDURE — 36415 COLL VENOUS BLD VENIPUNCTURE: CPT

## 2023-08-13 PROCEDURE — 1200000000 HC SEMI PRIVATE

## 2023-08-13 PROCEDURE — 2580000003 HC RX 258: Performed by: NURSE PRACTITIONER

## 2023-08-13 PROCEDURE — 85014 HEMATOCRIT: CPT

## 2023-08-13 PROCEDURE — 86141 C-REACTIVE PROTEIN HS: CPT

## 2023-08-13 PROCEDURE — 6370000000 HC RX 637 (ALT 250 FOR IP)

## 2023-08-13 RX ADMIN — RIVAROXABAN 20 MG: 20 TABLET, FILM COATED ORAL at 10:10

## 2023-08-13 RX ADMIN — SODIUM CHLORIDE, PRESERVATIVE FREE 10 ML: 5 INJECTION INTRAVENOUS at 10:46

## 2023-08-13 RX ADMIN — ISOSORBIDE MONONITRATE 30 MG: 30 TABLET, EXTENDED RELEASE ORAL at 10:09

## 2023-08-13 RX ADMIN — TIOTROPIUM BROMIDE INHALATION SPRAY 2 PUFF: 3.12 SPRAY, METERED RESPIRATORY (INHALATION) at 12:50

## 2023-08-13 RX ADMIN — DAPTOMYCIN 750 MG: 500 INJECTION, POWDER, LYOPHILIZED, FOR SOLUTION INTRAVENOUS at 15:57

## 2023-08-13 RX ADMIN — Medication 2000 UNITS: at 10:10

## 2023-08-13 RX ADMIN — CARVEDILOL 25 MG: 25 TABLET, FILM COATED ORAL at 18:54

## 2023-08-13 RX ADMIN — Medication 5 MG: at 00:22

## 2023-08-13 RX ADMIN — ASPIRIN 81 MG: 81 TABLET, CHEWABLE ORAL at 10:10

## 2023-08-13 RX ADMIN — DILTIAZEM HYDROCHLORIDE 120 MG: 120 CAPSULE, COATED, EXTENDED RELEASE ORAL at 10:10

## 2023-08-13 RX ADMIN — PRAVASTATIN SODIUM 80 MG: 40 TABLET ORAL at 20:10

## 2023-08-13 RX ADMIN — SODIUM CHLORIDE, PRESERVATIVE FREE 10 ML: 5 INJECTION INTRAVENOUS at 10:45

## 2023-08-13 RX ADMIN — CARVEDILOL 25 MG: 25 TABLET, FILM COATED ORAL at 10:09

## 2023-08-13 RX ADMIN — SODIUM CHLORIDE, PRESERVATIVE FREE 10 ML: 5 INJECTION INTRAVENOUS at 20:12

## 2023-08-13 NOTE — PROGRESS NOTES
V2.0  [facility] [place] Hospitalist Progress Note      Name:  Leida Pizarro /Age/Sex:   (80 y.o. male)   MRN & CSN:  [id number] & [id number] Encounter Date/Time: 2023 12:43 PM EDT    Location:  [facility]/4713-A PCP: Ian Eagle MD       Hospital Day: 10    Assessment and Plan:   Leida Pizarro is a 80 y.o. male with pmh of   fibrillation, CHF, hyperlipidemia, hypertension, COPD  who presents with Sepsis ([address])      Plan:  1. Sepsis present on admission   Likely due to infected hematoma of posterior thigh; concern of infected knee hardware  Blood cultures from 2023 1 out of 4 bottles positive for MRSA. Initially on vancomycin. Discontinued due to VIANCA 2. Started on IV daptomycin on 2023. Repeat blood cultures have been negative for first 48 hours. Echocardiogram completed. No mention of endocarditis on TTE. Refused EMILIANO.  : Underwent CT knee left with contrast this afternoon which showed large fluid collection posterior to the distal femur which may represent extra-articular joint fluid or resolving hematoma of about~13 cm. Discussed with orthopedic surgery regarding aspiration versus surgical intervention. Dr. Julian Harrell suggested tertiary care transfer as the fluid collection is not intra-articular and appeared more like thigh abscess. He suggested getting general surgery opinion. Spoke to Dr. Lv Camarillo as well. Consulted IR. Patient underwent aspiration of left thigh abscess on 2023. The fluid was bloody mixed with pus.  190 cc aspirated sent for analysis. Surgical culture growing MRSA. Continue drain care. Will need 6 weeks of IV antibiotics. PT OT    2. Acute encephalopathy. Was delirious from  to  . likely contributed by knee/thigh abscess. Mentation has improved as of 2023 evening. Patient continues to be alert and oriented.       2.  A-fib with RVR  -Was initially started on Cardizem drip in the ED but became hypotensive  -Dose of Coreg has been according to patient size was utilized. Automated exposure control, iterative reconstruction, and/or weight based adjustment of the mA/kV was utilized to reduce the radiation dose to as low as reasonably achievable. COMPARISON: CT abdomen pelvis done July 4, 2015. HISTORY: ORDERING SYSTEM PROVIDED HISTORY: trauma TECHNOLOGIST PROVIDED HISTORY: Reason for exam:->trauma Decision Support Exception - unselect if not a suspected or confirmed emergency medical condition->Emergency Medical Condition (MA) Reason for Exam: fall FINDINGS: BONES/ALIGNMENT: No acute fracture. Mild leftward convex curvature of the lower lumbar spine. No significant listhesis on sagittal images. Suggestion of osteopenia. No destructive osseous lesion. DEGENERATIVE CHANGES: Multilevel degenerative disc disease is most pronounced and severe at L4-L5 and L5-S1. Multilevel facet joint disease is most pronounced in the lower lumbar spine. Moderate to severe L3-L4 spinal canal stenosis secondary to disc bulge, facet hypertrophy and ligamentum flavum thickening. Multilevel moderate to severe neural foraminal narrowing. SOFT TISSUES/RETROPERITONEUM: No paraspinal mass is seen. 1.  No acute traumatic injury in the lumbar spine. 2.  Multilevel moderate to severe degenerative changes in the lumbar spine. CT THORACIC RECONSTRUCTION WO POST PROCESS    Result Date: 8/4/2023  EXAMINATION: CT OF THE THORACIC SPINE WITHOUT CONTRAST  8/3/2023 11:44 pm: TECHNIQUE: CT of the thoracic spine was performed without the administration of intravenous contrast. Multiplanar reformatted images are provided for review. Automated exposure control, iterative reconstruction, and/or weight based adjustment of the mA/kV was utilized to reduce the radiation dose to as low as reasonably achievable. COMPARISON: CT chest done July 4, 2015.  HISTORY: ORDERING SYSTEM PROVIDED HISTORY: trauma TECHNOLOGIST PROVIDED HISTORY: Reason for exam:->trauma Decision Support

## 2023-08-13 NOTE — PROGRESS NOTES
Physical Therapy  Name: Celi Jones MRN: [id number] :   1940   Date:  2023   Admission Date: 2023 Room:  [address] [place] [address]   Restrictions/Precautions:        General, Fall Risk, KENNETH drain on Lt, contact isolation, GET sitter    Communication with other providers:  Kunal Dior RN states pt is ok to see for therapy and would like to have patient transfer to recliner. RN assists for safety during session    Subjective:  Patient states:  \"I don't know where I'm at\"  Pain:   Location, Type, Intensity (0/10 to 10/10):  Demos Lt LE pain with standing    Objective:    Observation:  Patient is supine in bed, lethargic. Easily wakes but also easily falls asleep    Treatment, including education/measures:    Transfers with line management of TeleMonitor, KENNETH drain    Supine to sit :Mod A x 2 for trunk and BLE to EOB  Seated balance: initially Max A x 1, improves to CGA-Min a x 1 with patient varying in posterior lean and Lateral lean   Scooting :Seated scooting Mod A for hip translation to EOB  Sit to stand :with use of supa plus for safety, Patient stands at Max A x 1 into supa plus  Stand to sit : Max A x 1 with supa plus  SPT:Depended SPT from EOB to recliner with Abimael Dusky plus with Mod A 1-2 for steadying and to avoid posterior lean    Safety  Patient left safely in the recliner, jayy under patient, with call light/phone in reach with alarm applied. Gait belt and mask were used for transfers and gait.     Assessment / Impression:     Patient's tolerance of treatment:  Fair   Adverse Reaction: lethargy  Significant change in status and impact:  patient tolerates OOB today  Barriers to improvement:  cognition, strength, medical status    Plan for Next Session:    Will cont to work towards pt's goals per patient tolerance  Time in:  1115  Time out:  1219  Timed treatment minutes:  34  Total treatment time:  34  Previously filed items:     Short Term Goals  Time Frame for Short Term Goals: 1 week  Short Term Goal 1: pt to

## 2023-08-13 NOTE — PLAN OF CARE
Problem: Safety - Adult  Goal: Free from fall injury  Outcome: Progressing     Problem: Discharge Planning  Goal: Discharge to home or other facility with appropriate resources  Outcome: Progressing     Problem: Skin/Tissue Integrity  Goal: Absence of new skin breakdown  Description: 1. Monitor for areas of redness and/or skin breakdown  2. Assess vascular access sites hourly  3. Every 4-6 hours minimum:  Change oxygen saturation probe site  4. Every 4-6 hours:  If on nasal continuous positive airway pressure, respiratory therapy assess nares and determine need for appliance change or resting period. Outcome: Progressing     Problem: ABCDS Injury Assessment  Goal: Absence of physical injury  Outcome: Progressing     Problem: Confusion  Goal: Confusion, delirium, dementia, or psychosis is improved or at baseline  Description: INTERVENTIONS:  1. Assess for possible contributors to thought disturbance, including medications, impaired vision or hearing, underlying metabolic abnormalities, dehydration, psychiatric diagnoses, and notify attending LIP  2. [place] high risk fall precautions, as indicated  3. Provide frequent short contacts to provide reality reorientation, refocusing and direction  4. Decrease environmental stimuli, including noise as appropriate  5. Monitor and intervene to maintain adequate nutrition, hydration, elimination, sleep and activity  6. If unable to ensure safety without constant attention obtain sitter and review sitter guidelines with assigned personnel  7.  Initiate Psychosocial CNS and Spiritual Care consult, as indicated  Outcome: Progressing     Problem: Pain  Goal: Verbalizes/displays adequate comfort level or baseline comfort level  Outcome: Progressing     Problem: Nutrition Deficit:  Goal: Optimize nutritional status  Outcome: Progressing

## 2023-08-14 LAB
ANION GAP SERPL CALCULATED.3IONS-SCNC: 7 MMOL/L (ref 4–16)
BASOPHILS ABSOLUTE: 0 K/CU MM
BASOPHILS RELATIVE PERCENT: 0 % (ref 0–1)
BUN SERPL-MCNC: 21 MG/DL (ref 6–23)
CALCIUM SERPL-MCNC: 9.2 MG/DL (ref 8.3–10.6)
CHLORIDE BLD-SCNC: 109 MMOL/L (ref 99–110)
CO2: 25 MMOL/L (ref 21–32)
CREAT SERPL-MCNC: 1 MG/DL (ref 0.9–1.3)
CRP SERPL HS-MCNC: 20.9 MG/L
CULTURE: ABNORMAL
CULTURE: ABNORMAL
DIFFERENTIAL TYPE: ABNORMAL
EOSINOPHILS ABSOLUTE: 0 K/CU MM
EOSINOPHILS RELATIVE PERCENT: 0.2 % (ref 0–3)
GFR SERPL CREATININE-BSD FRML MDRD: >60 ML/MIN/1.73M2
GLUCOSE SERPL-MCNC: 94 MG/DL (ref 70–99)
GRAM SMEAR: ABNORMAL
HCT VFR BLD CALC: 24 % (ref 42–52)
HEMOGLOBIN: 7 GM/DL (ref 13.5–18)
HIGH SENSITIVE C-REACTIVE PROTEIN: 18.7 MG/L (ref 0–5)
IMMATURE NEUTROPHIL %: 1 % (ref 0–0.43)
LYMPHOCYTES ABSOLUTE: 0.9 K/CU MM
LYMPHOCYTES RELATIVE PERCENT: 10.8 % (ref 24–44)
Lab: ABNORMAL
MCH RBC QN AUTO: 28.6 PG (ref 27–31)
MCHC RBC AUTO-ENTMCNC: 29.2 % (ref 32–36)
MCV RBC AUTO: 98 FL (ref 78–100)
MONOCYTES ABSOLUTE: 0.9 K/CU MM
MONOCYTES RELATIVE PERCENT: 10.6 % (ref 0–4)
NUCLEATED RBC %: 0 %
PDW BLD-RTO: 18.5 % (ref 11.7–14.9)
PLATELET # BLD: 122 K/CU MM (ref 140–440)
PMV BLD AUTO: 12.6 FL (ref 7.5–11.1)
POTASSIUM SERPL-SCNC: 4 MMOL/L (ref 3.5–5.1)
RBC # BLD: 2.45 M/CU MM (ref 4.6–6.2)
SEGMENTED NEUTROPHILS ABSOLUTE COUNT: 6.3 K/CU MM
SEGMENTED NEUTROPHILS RELATIVE PERCENT: 77.4 % (ref 36–66)
SODIUM BLD-SCNC: 141 MMOL/L (ref 135–145)
SPECIMEN: ABNORMAL
TOTAL CK: 31 IU/L (ref 38–174)
TOTAL IMMATURE NEUTOROPHIL: 0.08 K/CU MM
TOTAL NUCLEATED RBC: 0 K/CU MM
WBC # BLD: 8.1 K/CU MM (ref 4–10.5)

## 2023-08-14 PROCEDURE — 86140 C-REACTIVE PROTEIN: CPT

## 2023-08-14 PROCEDURE — 6370000000 HC RX 637 (ALT 250 FOR IP): Performed by: NURSE PRACTITIONER

## 2023-08-14 PROCEDURE — 94640 AIRWAY INHALATION TREATMENT: CPT

## 2023-08-14 PROCEDURE — 82550 ASSAY OF CK (CPK): CPT

## 2023-08-14 PROCEDURE — 6360000002 HC RX W HCPCS: Performed by: NURSE PRACTITIONER

## 2023-08-14 PROCEDURE — 80048 BASIC METABOLIC PNL TOTAL CA: CPT

## 2023-08-14 PROCEDURE — 6370000000 HC RX 637 (ALT 250 FOR IP): Performed by: INTERNAL MEDICINE

## 2023-08-14 PROCEDURE — 2580000003 HC RX 258: Performed by: INTERNAL MEDICINE

## 2023-08-14 PROCEDURE — 99233 SBSQ HOSP IP/OBS HIGH 50: CPT | Performed by: NURSE PRACTITIONER

## 2023-08-14 PROCEDURE — 97530 THERAPEUTIC ACTIVITIES: CPT

## 2023-08-14 PROCEDURE — 36415 COLL VENOUS BLD VENIPUNCTURE: CPT

## 2023-08-14 PROCEDURE — 86141 C-REACTIVE PROTEIN HS: CPT

## 2023-08-14 PROCEDURE — 6370000000 HC RX 637 (ALT 250 FOR IP)

## 2023-08-14 PROCEDURE — 94761 N-INVAS EAR/PLS OXIMETRY MLT: CPT

## 2023-08-14 PROCEDURE — 97535 SELF CARE MNGMENT TRAINING: CPT

## 2023-08-14 PROCEDURE — 1200000000 HC SEMI PRIVATE

## 2023-08-14 PROCEDURE — 2580000003 HC RX 258: Performed by: NURSE PRACTITIONER

## 2023-08-14 PROCEDURE — 2580000003 HC RX 258: Performed by: STUDENT IN AN ORGANIZED HEALTH CARE EDUCATION/TRAINING PROGRAM

## 2023-08-14 PROCEDURE — 6370000000 HC RX 637 (ALT 250 FOR IP): Performed by: STUDENT IN AN ORGANIZED HEALTH CARE EDUCATION/TRAINING PROGRAM

## 2023-08-14 PROCEDURE — 85025 COMPLETE CBC W/AUTO DIFF WBC: CPT

## 2023-08-14 RX ORDER — 0.9 % SODIUM CHLORIDE 0.9 %
500 INTRAVENOUS SOLUTION INTRAVENOUS ONCE
Status: COMPLETED | OUTPATIENT
Start: 2023-08-14 | End: 2023-08-14

## 2023-08-14 RX ADMIN — RIVAROXABAN 20 MG: 20 TABLET, FILM COATED ORAL at 08:37

## 2023-08-14 RX ADMIN — SODIUM CHLORIDE, PRESERVATIVE FREE 10 ML: 5 INJECTION INTRAVENOUS at 09:19

## 2023-08-14 RX ADMIN — SODIUM CHLORIDE, PRESERVATIVE FREE 10 ML: 5 INJECTION INTRAVENOUS at 21:12

## 2023-08-14 RX ADMIN — PRAVASTATIN SODIUM 80 MG: 40 TABLET ORAL at 21:12

## 2023-08-14 RX ADMIN — Medication 2000 UNITS: at 08:37

## 2023-08-14 RX ADMIN — DAPTOMYCIN 750 MG: 500 INJECTION, POWDER, LYOPHILIZED, FOR SOLUTION INTRAVENOUS at 15:44

## 2023-08-14 RX ADMIN — ASPIRIN 81 MG: 81 TABLET, CHEWABLE ORAL at 08:37

## 2023-08-14 RX ADMIN — TIOTROPIUM BROMIDE INHALATION SPRAY 2 PUFF: 3.12 SPRAY, METERED RESPIRATORY (INHALATION) at 10:06

## 2023-08-14 RX ADMIN — ISOSORBIDE MONONITRATE 30 MG: 30 TABLET, EXTENDED RELEASE ORAL at 08:37

## 2023-08-14 RX ADMIN — DILTIAZEM HYDROCHLORIDE 120 MG: 120 CAPSULE, COATED, EXTENDED RELEASE ORAL at 08:37

## 2023-08-14 RX ADMIN — CARVEDILOL 25 MG: 25 TABLET, FILM COATED ORAL at 08:37

## 2023-08-14 RX ADMIN — SODIUM CHLORIDE 500 ML: 9 INJECTION, SOLUTION INTRAVENOUS at 11:53

## 2023-08-14 NOTE — PROGRESS NOTES
Infectious Disease Progress Note  2023   Patient Name: Jolene Bills : 1940   Impression  Sepsis:   MRSA Bacteremia: Secondary to  MRSA Left Knee Fluid Collection:  S/p Left Total Knee Arthroplasty () with Subsequent Revision for Infection ():  Concern for Endocarditis with VHD:   No reported allergies to ABX  CrCl 76 on CKD3  Afebrile, now no leukocytosis, CRP on DWT, and now patient's mentation has greatly improved, A&O x 4  -Strep pna and Legionella ag negative  -BC 1 MRSA (VIANCA to vanco is 2, may lead to treatment failure)  -BC 0/2 NGTD   -CT Chest WO Contrast: No acute traumatic injury in the chest.  Indeterminate 5 mm VANDA pulmonary nodule. -CT A&P WO Contrast: Non-acute  -CT Head WO Contrast: Non-acute  -CT Cervical, Thoracic, Lumbar Reconstruction WO Post Process: Non-acute cervical, no acute trauma in cervical and thoracic and lumbar with multilevel mod to severe deg changes in the lumbar spine. -XR Knee Left: 1. No evident acute bony findings in the left knee. 2. New changes of left knee total arthroplasty with no obvious complication. 3. Possible new intra-articular loose body in the left knee suprapatellar   recess with suspicion for at least small suprapatellar effusion. 4. Diffuse soft tissue swelling suggestive of subcutaneous edema. -CT Knee Left W Contrast: Large 12 cm collection prominently posterior to the distal femur which may   represent extra-articular joint fluid or resolving hematoma. The collection   appears to exert mass effect on femoral vein and a put the patient at risk for a DVT. Probable small hematoma anterior to the mid to distal semitendinosis muscle   with adjacent soft tissue edema. Postsurgical change. No complication. No acute fracture. Subcutaneous soft tissue edema or cellulitis. -s/p per IR: left knee abscess drainage 190 cc purulent fluid.  Cultures: MRSA  Dr. Pau Carrasco, ortho, rec CT of left knee, possible

## 2023-08-14 NOTE — PROGRESS NOTES
the ED but became hypotensive  -On Coreg and Cardizem. -On Xarelto, resume. -Appreciate cardiology consult.  -Currently HR controlled. -Was hypotensive on 8/14/2020 3 AM.  We will hold Cardizem for now. Continue Coreg with holding parameters. 3.  Hypomagnesemia  -Resolved     4. Hypokalemia  -Resolved. Stop scheduled potassium. 5.  MC versus CKD  -Creatinine improving, 1 mg/DL today. 6.  Acute on chronic CHF  -2D echo 02/23/2023 show EF of 55%  -Cardiology  on board  -Diuretics on hold due to poor intake. 7.  History of COPD  -Continue home inhalers    8. Poor intake  -Due to delirium, patient not eating and drinking as much.  - a.m. blood glucose on 8/8/2023 67 mg/DL started on maintenance D5 NS. Stopped on 8/10/2023 after diet resumed. -SLP evaluation-on soft and bite-size diet. 9.  Hypertension  -On Coreg, Cardizem and Imdur.  -Had hypotension on 8/14/2020 3 AM.  Hold Imdur and Cardizem. Continue Coreg with holding parameter. -Gave 500 cc bolus. Blood pressure improved. Diet ADULT ORAL NUTRITION SUPPLEMENT; Breakfast, Dinner; Low Calorie/High Protein Oral Supplement  ADULT DIET; Dysphagia - Soft and Bite Sized   DVT Prophylaxis [] Lovenox, []  Heparin, [x] SCDs, [] Ambulation,  [] Eliquis, [] Xarelto  [] Coumadin   Code Status DNR-CCA   Disposition From: Home  Expected Disposition: SNF  Estimated Date of Discharge: 3 days  Patient requires continued admission due to MRSA bacteremia   Surrogate Decision Maker/ POA      Subjective:     Chief Complaint: No chief complaint on file. Cande Hurtado is a 80 y.o. male who presents with generalized weakness, headache. Seen and examined in the morning. Is alert and oriented . Is not eating as much. The knee drain had 140 cc out last 24 hours. We will plan to work with PT OT today. Review of Systems:    Review of Systems  10 point ROS negative except as noted above. Objective:      Intake/Output Summary (Last 24 hours) pleural effusion or pneumothorax. No endoluminal masslike lesion. Posterior left upper lobe 5 mm nodular opacity on series 4, image 26 was not seen on the prior study. Two small nodular opacities in the right lower lobe are stable dating back to chest CT done January 23, 2015 consistent with benign remote granulomas. Upper Abdomen: Please refer to concurrent CT abdomen pelvis report. Soft Tissues/Bones: No acute fracture. No destructive osseous lesion. Multiple old bilateral rib fracture deformities including old nonunited and mildly displaced fractures of the right lateral 8th, 9th and 10th ribs. Multilevel degenerative disc disease. Degenerative changes in both shoulders. 1.  No acute traumatic injury in the chest. 2.  Indeterminate 5 mm left upper lobe pulmonary nodule. Per Fleischner society guidelines, a noncontrast chest CT at 12 months is optional.  If performed and the nodule is stable at 12 months, no further follow-up is recommended. CT CERVICAL SPINE WO CONTRAST    Result Date: 8/4/2023  EXAMINATION: CT OF THE CERVICAL SPINE WITHOUT CONTRAST 8/3/2023 11:44 pm TECHNIQUE: CT of the cervical spine was performed without the administration of intravenous contrast. Multiplanar reformatted images are provided for review. Automated exposure control, iterative reconstruction, and/or weight based adjustment of the mA/kV was utilized to reduce the radiation dose to as low as reasonably achievable. COMPARISON: July 4, 2015. HISTORY: ORDERING SYSTEM PROVIDED HISTORY: Trauma TECHNOLOGIST PROVIDED HISTORY: Reason for exam:->Trauma Decision Support Exception - unselect if not a suspected or confirmed emergency medical condition->Emergency Medical Condition (MA) Reason for Exam: fall FINDINGS: BONES/ALIGNMENT: There is no acute fracture or traumatic malalignment. DEGENERATIVE CHANGES: Multilevel moderate to severe degenerative changes in the cervical spine.  SOFT TISSUES: There is no prevertebral soft tissue

## 2023-08-14 NOTE — PROGRESS NOTES
Occupational Therapy Treatment Note  Name: Rosy Ahumada MRN: [id number] :   1940   Date:  2023   Admission Date: 2023 Room:  [address] [place] [address]-A     Primary Problem: There were no encounter diagnoses. Past Medical History:   Diagnosis Date    AR (aortic regurgitation)     mild to mod    Atrial fibrillation, new onset (HCC)     CAD (coronary artery disease)     History of angioplasty    Chronic midline low back pain without sciatica 2017    Has seen Dr. Isabell Schulte and had shots    COPD (chronic obstructive pulmonary disease) ([address])     Family history of coronary artery disease     Fatigue 10/2016    H/O cardiovascular stress test 2012    mild ischemia in the basal inferior and mid inferior regions ef is 47    H/O chest x-ray 10/20/2016    Right pleural effusion resolved. H/O Doppler ultrasound 10/2016    CAROTID-[place]. arteries patent w/less than 50% stenosis in the internal carotid arteries. H/O echocardiogram 2/17/15    Aortic sclerosis without stenosis, normal LV size and wall motion w/low normal systolic function, LA dilatation, RV dilatation, mild pulmonary HTN, EF 50-55%.     History of cardiac cath 1996    LV uniform LV contractility RCa dominatn 50-60 prox stenosis  LM patent  LAD mild mid plaquing diag has 70 ostial narrowing ramus minimal plaquing cx normal    History of PTCA 08/15/2011    prox lad bare metal stent 2011    Hyperlipidemia     Hypertension     Obesity     Obesity, Class II, BMI 35-39.9, with comorbidity 2014    RBBB     Risk for falls 10/26/2015    Tachycardia          Communication with other providers:  RN notified of BP, CoTx with AMANDA St for pt safety and tolerance, handoff to RN     Subjective:  Patient states:  \"Could pass out at any minute, I don't know\"  Pain: denied   Restrictions: general, fall, contact ISO   RN at bedside    Objective:    Observation:  pt was in bed upon arrival, agreeable to session  Objective Measures:  BP measures in seated after

## 2023-08-14 NOTE — CARE COORDINATION
Pt was transferred form ICU. CM has attempted to talk with pt son. LSW found out today that pt's son is  and his DIL is the primary decision maker. LSW changed this in the chart . LSW called pt ALDEN Taylor and LSW spoke with her about PT/OT recs for SNF. ALDEN asked if pt could go to ARU. LSW explained that since PT/OT did not recommend ARU therefore pt's insurance will not approve for ARU. Pk Morris stated understanding. LSW was informed that pt may need 6 weeks of IV anti-bio. LSW does not see ID recs or note at this time ALDEN requested [facility]. LSW called admissions with [facility] and gave referral. Pt will need precert.

## 2023-08-14 NOTE — PLAN OF CARE
Problem: Safety - Adult  Goal: Free from fall injury  Outcome: Progressing     Problem: Discharge Planning  Goal: Discharge to home or other facility with appropriate resources  Outcome: Progressing     Problem: Skin/Tissue Integrity  Goal: Absence of new skin breakdown  Description: 1. Monitor for areas of redness and/or skin breakdown  2. Assess vascular access sites hourly  3. Every 4-6 hours minimum:  Change oxygen saturation probe site  4. Every 4-6 hours:  If on nasal continuous positive airway pressure, respiratory therapy assess nares and determine need for appliance change or resting period. Outcome: Progressing     Problem: ABCDS Injury Assessment  Goal: Absence of physical injury  Outcome: Progressing     Problem: Confusion  Goal: Confusion, delirium, dementia, or psychosis is improved or at baseline  Description: INTERVENTIONS:  1. Assess for possible contributors to thought disturbance, including medications, impaired vision or hearing, underlying metabolic abnormalities, dehydration, psychiatric diagnoses, and notify attending LIP  2. [place] high risk fall precautions, as indicated  3. Provide frequent short contacts to provide reality reorientation, refocusing and direction  4. Decrease environmental stimuli, including noise as appropriate  5. Monitor and intervene to maintain adequate nutrition, hydration, elimination, sleep and activity  6. If unable to ensure safety without constant attention obtain sitter and review sitter guidelines with assigned personnel  7.  Initiate Psychosocial CNS and Spiritual Care consult, as indicated  Outcome: Progressing     Problem: Pain  Goal: Verbalizes/displays adequate comfort level or baseline comfort level  Outcome: Progressing     Problem: Nutrition Deficit:  Goal: Optimize nutritional status  Outcome: Progressing

## 2023-08-14 NOTE — DISCHARGE INSTR - COC
Continuity of Care Form    Patient Name: Steven Trinidad   :    MRN:  [id number]    Admit date:  2023  Discharge date:  ***    Code Status Order: DNR-CCA   Advance Directives:     Admitting Physician:  Leisa Ceballos MD  PCP: Man Gutierrez MD    Discharging Nurse: [facility] Unit/Room#: 3003/3003-A  Discharging Unit Phone Number: ***    Emergency Contact:   Extended Emergency Contact Information  Primary Emergency Contact: grabHalo  Mobile Phone: [phone]  Relation: Daughter-in-Law  Preferred language: English   needed?  No    Past Surgical History:  Past Surgical History:   Procedure Laterality Date    COLONOSCOPY N/A 2022    COLONOSCOPY POLYPECTOMY SNARE/COLD BIOPSY performed by Donald Hurd MD at [address] [place] [address] Ln REPLACEMENT  2004    partial left knee    UPPER GASTROINTESTINAL ENDOSCOPY N/A 2022    EGD DIAGNOSTIC ONLY performed by Donald Hurd MD at [facility] ENDOSCOPY       Immunization History:   Immunization History   Administered Date(s) Administered    Pneumococcal, PPSV23, PNEUMOVAX 21, (age 2y+), SC/IM, 0.5mL 2015       Active Problems:  Patient Active Problem List   Diagnosis Code    CAD s/p prox LAD bare metal stent  Z98.61    RBBB I45.10    COPD (chronic obstructive pulmonary disease) ([address]) J44.9    DJD (degenerative joint disease) M19.90    Persistent atrial fibrillation (HCC) I48.19    Hypertension I10    Hyperlipidemia E78.5    Obesity E66.9    Risk for falls Z91.81    Fatigue R53.83    Family history of coronary artery disease Z82.49    Chronic midline low back pain without sciatica M54.50, G89.29    ASCVD (arteriosclerotic cardiovascular disease) I25.10    Upper GI bleed K92.2    Sepsis ([address]) A41.9    Atrial fibrillation with rapid ventricular response ([address]) I48.91    MRSA bacteremia R78.81, B95.62    Delirium R41.0    Hardware complicating wound infection ([address])

## 2023-08-14 NOTE — PROGRESS NOTES
Physical Therapy    Physical Therapy Treatment Note  Name: Dot Moore MRN: [id number] :   1940   Date:  2023   Admission Date: 2023 Room:  [address] [place], [place] [address]A   Restrictions/Precautions:           General, Fall Risk, KENNETH drain on Lt, contact isolation,  sitter  Communication with other providers:  co tx /c OT  Subjective:  Patient states:  agreeable to tx, has to use bathroom  Pain:   Location, Type, Intensity (0/10 to 10/10):  does not rate    Objective:    Observation: semi wood in bed upon entry  Objective Measures:  BP71/53, 77/60, 66/52 taken in sitting position. . notified RN  Treatment, including education/measures:  Transfers   Rolling: modAx2  Supine to sit :modA/maxAx2  Sit to supine : mod/maxAx2  Scooting : Angel  Sit to stand :mod-maxAx1/2  Stand to sit :mod-maxAx1/2  SPT:mod-maxAx1/2 using FWW'  Standing balance training during self care tasks x~1-2', F-/P+ grade once KOURTNEY established. Seated rest breaks between sets. Sitting balance EOB x~5.5 F- balance grade and vc for ant wt shift  Pt BP low, taken while sitting, see above. Safety  Patient left safely in the chair, with call light/phone in reach with alarm applied. Gait belt was used for transfers and gait.       Assessment / Impression:    Pt low BP and low tolerance, but progresses on SPT on this date and standing balance on this date  Patient's tolerance of treatment:  Good   Adverse Reaction: na  Significant change in status and impact:  na  Barriers to improvement:  weakness and endurance  Plan for Next Session:    Cont transfer and balance progression  Time in:  1037  Time out:  1113  Timed treatment minutes:  33  Total treatment time:  36    Previously filed items:           Short Term Goals  Time Frame for Short Term Goals: 1 week  Short Term Goal 1: pt to complete all bed mobility mod A x 1  Short Term Goal 2: pt to sit EOB 10 minutes SBA  Short Term Goal 3: pt to complete STS transfer mod A x2  Short Term Goal 4: pt to complete stand pivot transfer with LRAD mod A x2  Short Term Goal 5: pt to propel manual WC 25' with min A    Electronically signed by:    Fred Toro PTA  8/14/2023, 12:12 PM

## 2023-08-15 LAB
ANION GAP SERPL CALCULATED.3IONS-SCNC: 12 MMOL/L (ref 4–16)
ANISOCYTOSIS: ABNORMAL
BANDED NEUTROPHILS ABSOLUTE COUNT: 0.31 K/CU MM
BANDED NEUTROPHILS RELATIVE PERCENT: 3 % (ref 5–11)
BUN SERPL-MCNC: 24 MG/DL (ref 6–23)
BURR CELLS: ABNORMAL
CALCIUM SERPL-MCNC: 9.5 MG/DL (ref 8.3–10.6)
CHLORIDE BLD-SCNC: 108 MMOL/L (ref 99–110)
CO2: 22 MMOL/L (ref 21–32)
CREAT SERPL-MCNC: 1.1 MG/DL (ref 0.9–1.3)
DIFFERENTIAL TYPE: ABNORMAL
GFR SERPL CREATININE-BSD FRML MDRD: >60 ML/MIN/1.73M2
GLUCOSE SERPL-MCNC: 101 MG/DL (ref 70–99)
HCT VFR BLD CALC: 25.6 % (ref 42–52)
HEMOGLOBIN: 7 GM/DL (ref 13.5–18)
HIGH SENSITIVE C-REACTIVE PROTEIN: 31 MG/L (ref 0–5)
LYMPHOCYTES ABSOLUTE: 1.2 K/CU MM
LYMPHOCYTES RELATIVE PERCENT: 12 % (ref 24–44)
MCH RBC QN AUTO: 27.9 PG (ref 27–31)
MCHC RBC AUTO-ENTMCNC: 27.3 % (ref 32–36)
MCV RBC AUTO: 102 FL (ref 78–100)
MONOCYTES ABSOLUTE: 1 K/CU MM
MONOCYTES RELATIVE PERCENT: 10 % (ref 0–4)
PDW BLD-RTO: 18.6 % (ref 11.7–14.9)
PLATELET # BLD: 133 K/CU MM (ref 140–440)
PMV BLD AUTO: 13.3 FL (ref 7.5–11.1)
POTASSIUM SERPL-SCNC: 4.2 MMOL/L (ref 3.5–5.1)
RBC # BLD: 2.51 M/CU MM (ref 4.6–6.2)
SEGMENTED NEUTROPHILS ABSOLUTE COUNT: 7.8 K/CU MM
SEGMENTED NEUTROPHILS RELATIVE PERCENT: 75 % (ref 36–66)
SODIUM BLD-SCNC: 142 MMOL/L (ref 135–145)
WBC # BLD: 10.3 K/CU MM (ref 4–10.5)

## 2023-08-15 PROCEDURE — 86141 C-REACTIVE PROTEIN HS: CPT

## 2023-08-15 PROCEDURE — 36415 COLL VENOUS BLD VENIPUNCTURE: CPT

## 2023-08-15 PROCEDURE — 85027 COMPLETE CBC AUTOMATED: CPT

## 2023-08-15 PROCEDURE — 2580000003 HC RX 258: Performed by: NURSE PRACTITIONER

## 2023-08-15 PROCEDURE — 6370000000 HC RX 637 (ALT 250 FOR IP): Performed by: STUDENT IN AN ORGANIZED HEALTH CARE EDUCATION/TRAINING PROGRAM

## 2023-08-15 PROCEDURE — 02HV33Z INSERTION OF INFUSION DEVICE INTO SUPERIOR VENA CAVA, PERCUTANEOUS APPROACH: ICD-10-PCS | Performed by: STUDENT IN AN ORGANIZED HEALTH CARE EDUCATION/TRAINING PROGRAM

## 2023-08-15 PROCEDURE — 6370000000 HC RX 637 (ALT 250 FOR IP): Performed by: INTERNAL MEDICINE

## 2023-08-15 PROCEDURE — 92526 ORAL FUNCTION THERAPY: CPT | Performed by: SPEECH-LANGUAGE PATHOLOGIST

## 2023-08-15 PROCEDURE — 36410 VNPNXR 3YR/> PHY/QHP DX/THER: CPT

## 2023-08-15 PROCEDURE — 80048 BASIC METABOLIC PNL TOTAL CA: CPT

## 2023-08-15 PROCEDURE — C1751 CATH, INF, PER/CENT/MIDLINE: HCPCS

## 2023-08-15 PROCEDURE — 94640 AIRWAY INHALATION TREATMENT: CPT

## 2023-08-15 PROCEDURE — 2580000003 HC RX 258: Performed by: STUDENT IN AN ORGANIZED HEALTH CARE EDUCATION/TRAINING PROGRAM

## 2023-08-15 PROCEDURE — 6370000000 HC RX 637 (ALT 250 FOR IP): Performed by: FAMILY MEDICINE

## 2023-08-15 PROCEDURE — 6360000002 HC RX W HCPCS: Performed by: NURSE PRACTITIONER

## 2023-08-15 PROCEDURE — 1200000000 HC SEMI PRIVATE

## 2023-08-15 PROCEDURE — 85007 BL SMEAR W/DIFF WBC COUNT: CPT

## 2023-08-15 PROCEDURE — 94664 DEMO&/EVAL PT USE INHALER: CPT

## 2023-08-15 PROCEDURE — 99232 SBSQ HOSP IP/OBS MODERATE 35: CPT | Performed by: NURSE PRACTITIONER

## 2023-08-15 PROCEDURE — 94761 N-INVAS EAR/PLS OXIMETRY MLT: CPT

## 2023-08-15 PROCEDURE — 76937 US GUIDE VASCULAR ACCESS: CPT

## 2023-08-15 RX ADMIN — CARVEDILOL 25 MG: 25 TABLET, FILM COATED ORAL at 10:39

## 2023-08-15 RX ADMIN — SODIUM CHLORIDE, PRESERVATIVE FREE 10 ML: 5 INJECTION INTRAVENOUS at 08:15

## 2023-08-15 RX ADMIN — TIOTROPIUM BROMIDE INHALATION SPRAY 2 PUFF: 3.12 SPRAY, METERED RESPIRATORY (INHALATION) at 07:44

## 2023-08-15 RX ADMIN — PRAVASTATIN SODIUM 80 MG: 40 TABLET ORAL at 21:44

## 2023-08-15 RX ADMIN — SODIUM CHLORIDE, PRESERVATIVE FREE 10 ML: 5 INJECTION INTRAVENOUS at 21:44

## 2023-08-15 RX ADMIN — SODIUM CHLORIDE, PRESERVATIVE FREE 10 ML: 5 INJECTION INTRAVENOUS at 08:14

## 2023-08-15 RX ADMIN — Medication 5 MG: at 21:44

## 2023-08-15 RX ADMIN — Medication 2000 UNITS: at 08:14

## 2023-08-15 RX ADMIN — ACETAMINOPHEN 650 MG: 325 TABLET ORAL at 21:44

## 2023-08-15 RX ADMIN — DAPTOMYCIN 750 MG: 500 INJECTION, POWDER, LYOPHILIZED, FOR SOLUTION INTRAVENOUS at 15:52

## 2023-08-15 RX ADMIN — RIVAROXABAN 20 MG: 20 TABLET, FILM COATED ORAL at 08:14

## 2023-08-15 RX ADMIN — ASPIRIN 81 MG: 81 TABLET, CHEWABLE ORAL at 08:14

## 2023-08-15 ASSESSMENT — PAIN - FUNCTIONAL ASSESSMENT: PAIN_FUNCTIONAL_ASSESSMENT: ACTIVITIES ARE NOT PREVENTED

## 2023-08-15 ASSESSMENT — PAIN DESCRIPTION - DESCRIPTORS: DESCRIPTORS: ACHING

## 2023-08-15 ASSESSMENT — PAIN DESCRIPTION - LOCATION: LOCATION: GENERALIZED

## 2023-08-15 ASSESSMENT — PAIN SCALES - GENERAL: PAINLEVEL_OUTOF10: 3

## 2023-08-15 ASSESSMENT — PAIN SCALES - WONG BAKER: WONGBAKER_NUMERICALRESPONSE: 0

## 2023-08-15 NOTE — PROGRESS NOTES
Physician Progress Note      [place]  CSN #:                  [id number]  :                       1940  ADMIT DATE:       2023 3:25 PM  [facility] DATE:  RESPONDING  PROVIDER #:        Luis Schumacher MD        QUERY TEXT:    Type of Encephalopathy: Please provide further specificity, if known. Clinical indicators include:  Options provided:  -- Anoxic/hypoxic encephalopathy  -- Metabolic encephalopathy  -- Toxic encephalopathy  -- Hepatic encephalopathy  -- Hypertensive encephalopathy  -- Other - I will add my own diagnosis  -- Disagree - Not applicable / Not valid  -- Disagree - Clinically Unable to determine / Unknown        PROVIDER RESPONSE TEXT:    The patient has metabolic encephalopathy. QUERY TEXT:    Pt admitted with Sepsis due to MRSA. Pt noted to have left knee total   arthroplasty. If possible, please document in the progress notes and discharge   summary if you are evaluating and / or treating any of the following: The medical record reflects the following:  Risk Factors:  MRSA Sepsis, left knee total arthroplasty hardware, knee/thigh   abscess  Clinical Indicators: Likely due to infected hematoma of posterior thigh;   concern of infected knee hardware  Blood cultures from 2023 1 out of 4 bottles positive for MRSA. Initially   on vancomycin. Discontinued due to VIANCA 2. Started on IV daptomycin on   2023. Repeat blood cultures have been negative for first 48 hours. XR:   New changes of left knee total arthroplasty with no obvious complication. Possible new intra-articular loose body in the left knee suprapatellarrecess   with suspicion for at least small suprapatellar effusion. Diffuse soft tissue   swelling suggestive of subcutaneous edema. Surgical culture growing MRSA.   Treatment: ID consult, Cardiology consult, Ortho consult, IV Vanco,  IV   daptomycin , -s/p per IR: left knee abscess drainage 190 cc purulent fluid,   -Pt and his

## 2023-08-15 NOTE — PROGRESS NOTES
Infectious Disease Progress Note  8/15/2023   Patient Name: Kassie Leo : 1940   Impression  Sepsis:   MRSA Bacteremia: Secondary to  MRSA Left Knee Fluid Collection:  S/p Left Total Knee Arthroplasty () with Subsequent Revision for Infection ():  Concern for Endocarditis with VHD:   No reported allergies to ABX  CrCl 68 on CKD3  Afebrile, now no leukocytosis, CRP on DWT, and now patient's mentation has greatly improved, A&O x 4  -Strep pna and Legionella ag negative  -BC 1/ MRSA (VIANCA to vanco is 2, may lead to treatment failure)  -BC 0/2 NGTD   -CT Chest WO Contrast: No acute traumatic injury in the chest.  Indeterminate 5 mm VANDA pulmonary nodule. -CT A&P WO Contrast: Non-acute  -CT Head WO Contrast: Non-acute  -CT Cervical, Thoracic, Lumbar Reconstruction WO Post Process: Non-acute cervical, no acute trauma in cervical and thoracic and lumbar with multilevel mod to severe deg changes in the lumbar spine. -XR Knee Left: 1. No evident acute bony findings in the left knee. 2. New changes of left knee total arthroplasty with no obvious complication. 3. Possible new intra-articular loose body in the left knee suprapatellar   recess with suspicion for at least small suprapatellar effusion. 4. Diffuse soft tissue swelling suggestive of subcutaneous edema. -CT Knee Left W Contrast: Large 12 cm collection prominently posterior to the distal femur which may   represent extra-articular joint fluid or resolving hematoma. The collection   appears to exert mass effect on femoral vein and a put the patient at risk for a DVT. Probable small hematoma anterior to the mid to distal semitendinosis muscle   with adjacent soft tissue edema. Postsurgical change. No complication. No acute fracture. Subcutaneous soft tissue edema or cellulitis. -s/p per IR: left knee abscess drainage 190 cc purulent fluid.  Cultures: MRSA  Dr. Lennox Moh, ortho, rec CT of left knee, possible TTE:   Summary   Left ventricular systolic function is normal.   Ejection fraction is visually estimated at 55-60%. Mild aortic stenosis is present; Mean PG 11 mmHg. Mitral annular calcification is present. Mild to moderate tricuspid regurgitation; RVSP: 43 mmHg. No evidence of any pericardial effusion. Pericardial fat pad present. 8/5/2023 XR Knee Left:  IMPRESSION:  1. No evident acute bony findings in the left knee. 2. New changes of left knee total arthroplasty with no obvious complication. 3. Possible new intra-articular loose body in the left knee suprapatellar  recess with suspicion for at least small suprapatellar effusion. 4. Diffuse soft tissue swelling suggestive of subcutaneous edema. 8/8/2023 CT Knee Left W Contrast:  IMPRESSION:  Large 12 cm collection prominently posterior to the distal femur which may  represent extra-articular joint fluid or resolving hematoma. The collection  appears to exert mass effect on femoral vein and a put the patient at risk  for a DVT. Probable small hematoma anterior to the mid to distal semitendinosis muscle  with adjacent soft tissue edema. Postsurgical change. No complication. No acute fracture. Subcutaneous soft tissue edema or cellulitis. 8/9/2023 IR Abscess Drainage PERC:  IMPRESSION:  Ultrasound-guided placement 10 Luxembourgish external drainage catheter. 190 mL  purulent, sanguinous fluid removed from posterior-medial distal left  thigh/knee region corresponding to site of previously reported complex fluid  collection. Handy-Garcia drainage bulb applied. Specimen sent for  laboratory evaluation. No complication suggested.     Labs:    CULTURE results: Invalid input(s): BLOOD CULTURE,  URINE CULTURE, SURGICAL CULTURE    Diagnosis:  Patient Active Problem List   Diagnosis    CAD s/p prox LAD bare metal stent 2011    RBBB    COPD (chronic obstructive pulmonary disease) (HCC)    DJD (degenerative joint disease)    Persistent atrial

## 2023-08-15 NOTE — CONSULTS
Consult completed. Indication for line: Daptomycin infusions x4 additional weeks (end-date: 9/18/23); MST MidLine will meet pt's therapeutic needs while minimizing risks. Procedure/rationale explained to pt & consent obtained. 4.5Fr ArrowG+maxx Blue Advance MST MidLine Catheter initiated to RUE Basilic Vein using sterile, UltraSound-guided technique without difficulty/complications. Positioning verified via UltraSound visualization of catheter within vessel lumen; site returns blood briskly and flushes without resistance/abnormalities. Sterile dressing with SecurePortIV, SkinPrep, StatLock Securing Device, CHG gel DSSG, SwabCap, and Limb Precautions band applied. Pt tolerated well & no other c/o or needs noted or reported. Aaron Lora, Unit Charge RN notified.

## 2023-08-15 NOTE — CARE COORDINATION
KATE received a call from [address] [place] [address] with [facility]. She informed this LSW that the 6 weeks of daptomycin is too expensive. She asked if the anti-bio can be changed. They can not take Vanco either. KATE PS Lizzie with ID and asked if anti-bio can be changed. Waiting on response. MITCHELLW was informed by [facility] that the IV anti-bio can nto be changed. MITCHELLW called everardo with [facility] and asked if they can cost out the daptomycin when they get the precert. [address] [place] [address] stated she will check. LSW received a call form Evearrdo with [facility] and they will take pt and start the precert sine they have to cost out the anti-bio. Precert pending.

## 2023-08-15 NOTE — PROGRESS NOTES
SLP ALL NOTES  [facility]  DEPARTMENT OF SPEECH/LANGUAGE PATHOLOGY  DAILY PROGRESS NOTE  Katya Arredondo  8/15/2023  [id number]  Sepsis ([address]) [A41.9]  Atrial fibrillation ([address]) [I48.91]  Allergies   Allergen Reactions    Lovenox [Enoxaparin Sodium]     Morphine Rash         Pt was seen this date for dysphagia treatment. IMPRESSION AND RECOMMENDATIONS:    Pt seen this date for diet tolerance monitoring. Pt awakened to his name this afternoon, was confused on and off throughout the session. He was oriented to person and place and denies any difficulty swallowing PTA. KELLIE Nicole reports no concerns. CXR report indicates \"Bilateral pulmonary vascular congestion with small bilateral pleural effusions. \"  Pt was amenable to PO trials of thins by cup and straw and regular solids. Mild oral dysphagia due to dentition missing or in poor condition resulting in use of tongue mashing/softening bolus prior to swallow. Pt reports he has not been able to chew the Soft and bite-size solids on his tray stating \"I really just swallow it. \"  Noted partial plate on counter and suggested use however, pt stated \"I've tried it and it doesn't work. \"  A-p transit and clearance was WNL for all textures. Pt demonstrated immediate and delayed cough following 2/4 trials of thins by straw and 0 s/s aspiration for thins by cup, however, requires assistance for self feeding cup sips. Cough for 1/3 trials of nectar thick liquids by straw.  0 s/s aspiration for regular solid trial x 1. Recommend downgrade diet to Minced and moist, pt agreed. Continue thins with aspiration precautions. Will monitor for possible MBSS. Spoke with RN regarding same.        GOALS (current status in bold):  Short-term Goals  Timeframe for Short-term Goals: LOS or until goals are met  Goal 1: Pt will tolerate soft and bite sized solids/thin liquids by small straw sips without clinical evidence of aspiration 100% Not Met,

## 2023-08-15 NOTE — PROGRESS NOTES
V2.0  [facility] Hospitalist Progress Note      Name:  Mack Walters /Age/Sex:   (80 y.o. male)   MRN & CSN:  [id number] & [id number] Encounter Date/Time: 8/15/2023 12:43 PM EDT    Location:  [facility]/7363-A PCP: David Escobar MD       Hospital Day: 12    Assessment and Plan:   Mack Walters is a 80 y.o. male with pmh of   fibrillation, CHF, hyperlipidemia, hypertension, COPD  who presents with Sepsis ([address])      Plan:  Sepsis present on admission   Likely due to infected hematoma of posterior thigh; concern of infected knee hardware  Blood cultures from 2023 1 out of 4 bottles positive for MRSA. Initially on vancomycin. Discontinued due to VIANCA 2. Started on IV daptomycin on 2023. Repeat blood cultures have been negative for first 48 hours. Echocardiogram completed. No mention of endocarditis on TTE. Refused EMILIANO.  : Underwent CT knee left with contrast large fluid collection posterior to the distal femur which may represent extra-articular joint fluid or resolving hematoma of about~13 cm. Discussed with orthopedic surgery regarding aspiration versus surgical intervention. Dr. Ascencion Greco suggested tertiary care transfer as the fluid collection is not intra-articular and appeared more like thigh abscess. He suggested getting general surgery opinion. Spoke to Dr. Addie Lozoya as well. Consulted IR. Patient underwent aspiration of left thigh abscess on 2023. The fluid was bloody mixed with pus.  190 cc aspirated sent for analysis. Surgical culture growing MRSA. Continue drain care. Will need 6 weeks of IV antibiotics. Will likely need CT of the knee prior to drain removal .   PT OT    acute metabolic encephalopathy. Was delirious from  to  . likely contributed by knee/thigh abscess. Mentation has improved as of 2023 evening. Patient continues to be alert and oriented.       A-fib with RVR  -Was initially started on Cardizem drip in the ED but became

## 2023-08-15 NOTE — PROGRESS NOTES
Comprehensive Nutrition Assessment    Type and Reason for Visit:  Reassess    Nutrition Recommendations/Plan:   Continue current diet, modification as per SLP  Continue current oral nutrition supplement, vanilla     Malnutrition Assessment:  Malnutrition Status: At risk for malnutrition  (08/11/23 1231)    Context:  Acute Illness       Nutrition Assessment:    Pt feeding self about half of dysphagia diet, followed by SLP. Will continue current oral supplement and follow as high nutrition risk. Nutrition Related Findings:    Wound Type: Surgical Incision       Current Nutrition Intake & Therapies:    Average Meal Intake: 26-50%  Average Supplements Intake: Unable to assess  ADULT ORAL NUTRITION SUPPLEMENT; Breakfast, Dinner; Low Calorie/High Protein Oral Supplement  ADULT DIET; Dysphagia - Soft and Bite Sized    Anthropometric Measures:  Height: 5' 11\" (180.3 cm)  Ideal Body Weight (IBW): 172 lbs (78 kg)    Admission Body Weight: 261 lb (118.4 kg)  Current Body Weight: 265 lb 8 oz (120.4 kg), 151.2 % IBW.  Weight Source: Bed Scale  Current BMI (kg/m2): 37  Usual Body Weight: 274 lb (124.3 kg) (May 2023)  % Weight Change (Calculated): -5.1  Weight Adjustment For: No Adjustment                 BMI Categories: Obese Class 2 (BMI 35.0 -39.9)    Estimated Daily Nutrient Needs:  Energy Requirements Based On: Formula  Weight Used for Energy Requirements: Current  Energy (kcal/day): 2359-5590 (MS)  Weight Used for Protein Requirements: Ideal  Protein (g/day):  (1.2-1.5 g/kg)  Method Used for Fluid Requirements: 1 ml/kcal  Fluid (ml/day): 6075-9006    Nutrition Diagnosis:   Inadequate oral intake related to acute injury/trauma, cognitive or neurological impairment, increase demand for energy/nutrients as evidenced by intake 26-50%, poor intake prior to admission    Nutrition Interventions:   Food and/or Nutrient Delivery: Continue Current Diet, Continue Oral Nutrition Supplement  Nutrition Education/Counseling: No

## 2023-08-15 NOTE — PLAN OF CARE
Problem: Safety - Adult  Goal: Free from fall injury  Outcome: Progressing     Problem: Discharge Planning  Goal: Discharge to home or other facility with appropriate resources  Outcome: Progressing     Problem: Skin/Tissue Integrity  Goal: Absence of new skin breakdown  Description: 1. Monitor for areas of redness and/or skin breakdown  2. Assess vascular access sites hourly  3. Every 4-6 hours minimum:  Change oxygen saturation probe site  4. Every 4-6 hours:  If on nasal continuous positive airway pressure, respiratory therapy assess nares and determine need for appliance change or resting period. Outcome: Progressing     Problem: ABCDS Injury Assessment  Goal: Absence of physical injury  Outcome: Progressing     Problem: Confusion  Goal: Confusion, delirium, dementia, or psychosis is improved or at baseline  Description: INTERVENTIONS:  1. Assess for possible contributors to thought disturbance, including medications, impaired vision or hearing, underlying metabolic abnormalities, dehydration, psychiatric diagnoses, and notify attending LIP  2. [place] high risk fall precautions, as indicated  3. Provide frequent short contacts to provide reality reorientation, refocusing and direction  4. Decrease environmental stimuli, including noise as appropriate  5. Monitor and intervene to maintain adequate nutrition, hydration, elimination, sleep and activity  6. If unable to ensure safety without constant attention obtain sitter and review sitter guidelines with assigned personnel  7.  Initiate Psychosocial CNS and Spiritual Care consult, as indicated  Outcome: Progressing     Problem: Pain  Goal: Verbalizes/displays adequate comfort level or baseline comfort level  Outcome: Progressing     Problem: Nutrition Deficit:  Goal: Optimize nutritional status  Outcome: Progressing

## 2023-08-16 ENCOUNTER — APPOINTMENT (OUTPATIENT)
Dept: CT IMAGING | Age: 83
End: 2023-08-16
Attending: FAMILY MEDICINE
Payer: MEDICARE

## 2023-08-16 ENCOUNTER — APPOINTMENT (OUTPATIENT)
Dept: GENERAL RADIOLOGY | Age: 83
End: 2023-08-16
Attending: FAMILY MEDICINE
Payer: MEDICARE

## 2023-08-16 LAB
ALBUMIN SERPL-MCNC: 2.4 GM/DL (ref 3.4–5)
ALP BLD-CCNC: 86 IU/L (ref 40–129)
ALT SERPL-CCNC: 17 U/L (ref 10–40)
ANION GAP SERPL CALCULATED.3IONS-SCNC: 11 MMOL/L (ref 4–16)
ANISOCYTOSIS: ABNORMAL
AST SERPL-CCNC: 29 IU/L (ref 15–37)
BASE EXCESS MIXED: 2.2 (ref 0–1.2)
BILIRUB SERPL-MCNC: 0.3 MG/DL (ref 0–1)
BILIRUBIN DIRECT: 0.2 MG/DL (ref 0–0.3)
BILIRUBIN, INDIRECT: 0.1 MG/DL (ref 0–0.7)
BUN SERPL-MCNC: 27 MG/DL (ref 6–23)
CALCIUM SERPL-MCNC: 9.1 MG/DL (ref 8.3–10.6)
CARBON MONOXIDE, BLOOD: 2.6 % (ref 0–5)
CHLORIDE BLD-SCNC: 112 MMOL/L (ref 99–110)
CO2 CONTENT: 26.1 MMOL/L (ref 19–24)
CO2: 22 MMOL/L (ref 21–32)
COMMENT: ABNORMAL
CREAT SERPL-MCNC: 1.1 MG/DL (ref 0.9–1.3)
DIFFERENTIAL TYPE: ABNORMAL
EOSINOPHILS ABSOLUTE: 0.1 K/CU MM
EOSINOPHILS RELATIVE PERCENT: 1 % (ref 0–3)
GFR SERPL CREATININE-BSD FRML MDRD: >60 ML/MIN/1.73M2
GLUCOSE SERPL-MCNC: 121 MG/DL (ref 70–99)
HCO3 ARTERIAL: 25.1 MMOL/L (ref 18–23)
HCT VFR BLD CALC: 19.7 % (ref 42–52)
HCT VFR BLD CALC: 21.6 % (ref 42–52)
HCT VFR BLD CALC: 22.3 % (ref 42–52)
HEMOGLOBIN: 5.5 GM/DL (ref 13.5–18)
HEMOGLOBIN: 6.3 GM/DL (ref 13.5–18)
HEMOGLOBIN: 6.3 GM/DL (ref 13.5–18)
HIGH SENSITIVE C-REACTIVE PROTEIN: 60.4 MG/L (ref 0–5)
LYMPHOCYTES ABSOLUTE: 0.8 K/CU MM
LYMPHOCYTES RELATIVE PERCENT: 8 % (ref 24–44)
MCH RBC QN AUTO: 27.4 PG (ref 27–31)
MCHC RBC AUTO-ENTMCNC: 27.9 % (ref 32–36)
MCV RBC AUTO: 98 FL (ref 78–100)
METHEMOGLOBIN ARTERIAL: 0.7 %
MONOCYTES ABSOLUTE: 0.7 K/CU MM
MONOCYTES RELATIVE PERCENT: 7 % (ref 0–4)
O2 SATURATION: 95.9 % (ref 96–97)
PCO2 ARTERIAL: 33 MMHG (ref 32–45)
PDW BLD-RTO: 19 % (ref 11.7–14.9)
PH BLOOD: 7.49 (ref 7.34–7.45)
PLATELET # BLD: 117 K/CU MM (ref 140–440)
PLT MORPHOLOGY: ABNORMAL
PMV BLD AUTO: 12.7 FL (ref 7.5–11.1)
PO2 ARTERIAL: 106 MMHG (ref 75–100)
POTASSIUM SERPL-SCNC: 3.6 MMOL/L (ref 3.5–5.1)
PRO-BNP: 3900 PG/ML
PROCALCITONIN SERPL-MCNC: 0.12 NG/ML
RBC # BLD: 2.01 M/CU MM (ref 4.6–6.2)
SEGMENTED NEUTROPHILS ABSOLUTE COUNT: 8.7 K/CU MM
SEGMENTED NEUTROPHILS RELATIVE PERCENT: 84 % (ref 36–66)
SODIUM BLD-SCNC: 145 MMOL/L (ref 135–145)
TOTAL PROTEIN: 5.1 GM/DL (ref 6.4–8.2)
WBC # BLD: 10.3 K/CU MM (ref 4–10.5)

## 2023-08-16 PROCEDURE — 70450 CT HEAD/BRAIN W/O DYE: CPT

## 2023-08-16 PROCEDURE — 2580000003 HC RX 258: Performed by: STUDENT IN AN ORGANIZED HEALTH CARE EDUCATION/TRAINING PROGRAM

## 2023-08-16 PROCEDURE — 6370000000 HC RX 637 (ALT 250 FOR IP): Performed by: STUDENT IN AN ORGANIZED HEALTH CARE EDUCATION/TRAINING PROGRAM

## 2023-08-16 PROCEDURE — 80076 HEPATIC FUNCTION PANEL: CPT

## 2023-08-16 PROCEDURE — 36430 TRANSFUSION BLD/BLD COMPNT: CPT

## 2023-08-16 PROCEDURE — 87040 BLOOD CULTURE FOR BACTERIA: CPT

## 2023-08-16 PROCEDURE — 6360000002 HC RX W HCPCS: Performed by: NURSE PRACTITIONER

## 2023-08-16 PROCEDURE — 6370000000 HC RX 637 (ALT 250 FOR IP): Performed by: INTERNAL MEDICINE

## 2023-08-16 PROCEDURE — 85027 COMPLETE CBC AUTOMATED: CPT

## 2023-08-16 PROCEDURE — 83880 ASSAY OF NATRIURETIC PEPTIDE: CPT

## 2023-08-16 PROCEDURE — 94761 N-INVAS EAR/PLS OXIMETRY MLT: CPT

## 2023-08-16 PROCEDURE — 2140000000 HC CCU INTERMEDIATE R&B

## 2023-08-16 PROCEDURE — 86850 RBC ANTIBODY SCREEN: CPT

## 2023-08-16 PROCEDURE — 86141 C-REACTIVE PROTEIN HS: CPT

## 2023-08-16 PROCEDURE — 73701 CT LOWER EXTREMITY W/DYE: CPT

## 2023-08-16 PROCEDURE — 36415 COLL VENOUS BLD VENIPUNCTURE: CPT

## 2023-08-16 PROCEDURE — 85007 BL SMEAR W/DIFF WBC COUNT: CPT

## 2023-08-16 PROCEDURE — 86900 BLOOD TYPING SEROLOGIC ABO: CPT

## 2023-08-16 PROCEDURE — 6360000002 HC RX W HCPCS: Performed by: STUDENT IN AN ORGANIZED HEALTH CARE EDUCATION/TRAINING PROGRAM

## 2023-08-16 PROCEDURE — 71045 X-RAY EXAM CHEST 1 VIEW: CPT

## 2023-08-16 PROCEDURE — P9016 RBC LEUKOCYTES REDUCED: HCPCS

## 2023-08-16 PROCEDURE — 85014 HEMATOCRIT: CPT

## 2023-08-16 PROCEDURE — 80048 BASIC METABOLIC PNL TOTAL CA: CPT

## 2023-08-16 PROCEDURE — 71260 CT THORAX DX C+: CPT

## 2023-08-16 PROCEDURE — 82803 BLOOD GASES ANY COMBINATION: CPT

## 2023-08-16 PROCEDURE — 82140 ASSAY OF AMMONIA: CPT

## 2023-08-16 PROCEDURE — 2580000003 HC RX 258: Performed by: NURSE PRACTITIONER

## 2023-08-16 PROCEDURE — 86901 BLOOD TYPING SEROLOGIC RH(D): CPT

## 2023-08-16 PROCEDURE — 6360000004 HC RX CONTRAST MEDICATION: Performed by: STUDENT IN AN ORGANIZED HEALTH CARE EDUCATION/TRAINING PROGRAM

## 2023-08-16 PROCEDURE — 84145 PROCALCITONIN (PCT): CPT

## 2023-08-16 PROCEDURE — 94640 AIRWAY INHALATION TREATMENT: CPT

## 2023-08-16 PROCEDURE — 86922 COMPATIBILITY TEST ANTIGLOB: CPT

## 2023-08-16 PROCEDURE — 85018 HEMOGLOBIN: CPT

## 2023-08-16 PROCEDURE — 99232 SBSQ HOSP IP/OBS MODERATE 35: CPT | Performed by: NURSE PRACTITIONER

## 2023-08-16 RX ORDER — FUROSEMIDE 10 MG/ML
20 INJECTION INTRAMUSCULAR; INTRAVENOUS ONCE
Status: DISCONTINUED | OUTPATIENT
Start: 2023-08-16 | End: 2023-08-16

## 2023-08-16 RX ORDER — SODIUM CHLORIDE 9 MG/ML
INJECTION, SOLUTION INTRAVENOUS PRN
Status: DISCONTINUED | OUTPATIENT
Start: 2023-08-16 | End: 2023-08-18 | Stop reason: HOSPADM

## 2023-08-16 RX ORDER — IPRATROPIUM BROMIDE AND ALBUTEROL SULFATE 2.5; .5 MG/3ML; MG/3ML
1 SOLUTION RESPIRATORY (INHALATION)
Status: DISCONTINUED | OUTPATIENT
Start: 2023-08-16 | End: 2023-08-18 | Stop reason: HOSPADM

## 2023-08-16 RX ORDER — CARVEDILOL 6.25 MG/1
12.5 TABLET ORAL 2 TIMES DAILY WITH MEALS
Status: DISCONTINUED | OUTPATIENT
Start: 2023-08-16 | End: 2023-08-17

## 2023-08-16 RX ORDER — FUROSEMIDE 10 MG/ML
20 INJECTION INTRAMUSCULAR; INTRAVENOUS ONCE
Status: COMPLETED | OUTPATIENT
Start: 2023-08-16 | End: 2023-08-16

## 2023-08-16 RX ADMIN — SODIUM CHLORIDE, PRESERVATIVE FREE 10 ML: 5 INJECTION INTRAVENOUS at 21:09

## 2023-08-16 RX ADMIN — IPRATROPIUM BROMIDE AND ALBUTEROL SULFATE 1 DOSE: 2.5; .5 SOLUTION RESPIRATORY (INHALATION) at 20:18

## 2023-08-16 RX ADMIN — IOPAMIDOL 75 ML: 755 INJECTION, SOLUTION INTRAVENOUS at 14:59

## 2023-08-16 RX ADMIN — FUROSEMIDE 20 MG: 10 INJECTION, SOLUTION INTRAMUSCULAR; INTRAVENOUS at 17:30

## 2023-08-16 RX ADMIN — SODIUM CHLORIDE, PRESERVATIVE FREE 10 ML: 5 INJECTION INTRAVENOUS at 09:08

## 2023-08-16 RX ADMIN — IRON SUCROSE 200 MG: 20 INJECTION, SOLUTION INTRAVENOUS at 16:58

## 2023-08-16 RX ADMIN — DAPTOMYCIN 750 MG: 500 INJECTION, POWDER, LYOPHILIZED, FOR SOLUTION INTRAVENOUS at 19:32

## 2023-08-16 RX ADMIN — IPRATROPIUM BROMIDE AND ALBUTEROL SULFATE 1 DOSE: 2.5; .5 SOLUTION RESPIRATORY (INHALATION) at 11:18

## 2023-08-16 RX ADMIN — TIOTROPIUM BROMIDE INHALATION SPRAY 2 PUFF: 3.12 SPRAY, METERED RESPIRATORY (INHALATION) at 08:23

## 2023-08-16 RX ADMIN — PRAVASTATIN SODIUM 80 MG: 40 TABLET ORAL at 21:09

## 2023-08-16 ASSESSMENT — PAIN SCALES - GENERAL: PAINLEVEL_OUTOF10: 2

## 2023-08-16 ASSESSMENT — PAIN DESCRIPTION - LOCATION: LOCATION: BUTTOCKS

## 2023-08-16 NOTE — PROGRESS NOTES
Resource Rn rounding on pt d/t DI score of 67. Pt is known to this RN from previous rounding and he appears to be improving since last interaction. Bp was 93/53 map 66 @ 1930 assessment. Bp is now 111/88, per chart review , pts 2nd dose of Coreg was held today. He is in Afib with stable HR. No signs or symptoms of distress noted, no complaints per pt. Repositioned pt without issue, call light within reach.

## 2023-08-16 NOTE — PROGRESS NOTES
V2.0  [facility] Hospitalist Progress Note      Name:  Genet Marvin /Age/Sex: [id number]  (80 y.o. male)   MRN & CSN:  [id number] & [id number] Encounter Date/Time: 2023 12:43 PM EDT    Location:  [facility]/5333-A PCP: Lizzie Vigil MD       Hospital Day: 13    Assessment and Plan:   Genet Marvin is a 80 y.o. male with pmh of   fibrillation, CHF, hyperlipidemia, hypertension, COPD  who presents with Sepsis ([address])      Plan:  Sepsis present on admission   Likely due to infected hematoma of posterior thigh; concern of infected knee hardware  Blood cultures from 2023 1 out of 4 bottles positive for MRSA. Initially on vancomycin. Discontinued due to VIANCA 2. Started on IV daptomycin on 2023. Repeat blood cultures have been negative for first 48 hours. Echocardiogram completed. No mention of endocarditis on TTE. Refused EMILIANO.  : Underwent CT knee left with contrast large fluid collection posterior to the distal femur which may represent extra-articular joint fluid or resolving hematoma of about~13 cm. Discussed with orthopedic surgery regarding aspiration versus surgical intervention. Dr. Chloe Valle suggested tertiary care transfer as the fluid collection is not intra-articular and appeared more like thigh abscess. He suggested getting general surgery opinion. Spoke to Dr. Inga Duane as well. Consulted IR. Patient underwent aspiration of left thigh abscess on 2023. The fluid was bloody mixed with pus.  190 cc aspirated sent for analysis. Surgical culture growing MRSA. Continue drain care. Will need 6 weeks of IV antibiotics. Acute on chronic iron deficiency anemia. Hgb 7-8 since 2022 but today dropped to 5.5. Fe 16, ferritin 121 2023. Some bloody drain today and pt states has more pain today. Will get CT left knee STAT. Monitor H&H q8h, transfuse if < 7. Will do venofver 200 mg x3 doses. Start iron supplements. Acute hypoxic respiratory failure.  Found desating 80% significant listhesis on sagittal images. Suggestion of osteopenia. No destructive osseous lesion. DEGENERATIVE CHANGES: Multilevel degenerative disc disease is most pronounced and severe at L4-L5 and L5-S1. Multilevel facet joint disease is most pronounced in the lower lumbar spine. Moderate to severe L3-L4 spinal canal stenosis secondary to disc bulge, facet hypertrophy and ligamentum flavum thickening. Multilevel moderate to severe neural foraminal narrowing. SOFT TISSUES/RETROPERITONEUM: No paraspinal mass is seen. 1.  No acute traumatic injury in the lumbar spine. 2.  Multilevel moderate to severe degenerative changes in the lumbar spine. CT THORACIC RECONSTRUCTION WO POST PROCESS    Result Date: 8/4/2023  EXAMINATION: CT OF THE THORACIC SPINE WITHOUT CONTRAST  8/3/2023 11:44 pm: TECHNIQUE: CT of the thoracic spine was performed without the administration of intravenous contrast. Multiplanar reformatted images are provided for review. Automated exposure control, iterative reconstruction, and/or weight based adjustment of the mA/kV was utilized to reduce the radiation dose to as low as reasonably achievable. COMPARISON: CT chest done July 4, 2015. HISTORY: ORDERING SYSTEM PROVIDED HISTORY: trauma TECHNOLOGIST PROVIDED HISTORY: Reason for exam:->trauma Decision Support Exception - unselect if not a suspected or confirmed emergency medical condition->Emergency Medical Condition (MA) Reason for Exam: fall FINDINGS: BONES/ALIGNMENT: No acute fracture or listhesis. Mild multilevel chronic anterior vertebral body wedging. Chronic T11 superior endplate compression deformity is similar to the prior study. Normal thoracic kyphosis. Suggestion of osteopenia. No destructive osseous lesion. DEGENERATIVE CHANGES: Multilevel degenerative changes in the thoracic spine. No definite severe thoracic spinal canal stenosis. SOFT TISSUES: No paraspinal mass is seen.      No acute traumatic injury in the thoracic

## 2023-08-16 NOTE — PLAN OF CARE
Problem: Safety - Adult  Goal: Free from fall injury  Outcome: Progressing     Problem: Discharge Planning  Goal: Discharge to home or other facility with appropriate resources  Outcome: Progressing     Problem: Skin/Tissue Integrity  Goal: Absence of new skin breakdown  Description: 1. Monitor for areas of redness and/or skin breakdown  2. Assess vascular access sites hourly  3. Every 4-6 hours minimum:  Change oxygen saturation probe site  4. Every 4-6 hours:  If on nasal continuous positive airway pressure, respiratory therapy assess nares and determine need for appliance change or resting period. Outcome: Progressing     Problem: ABCDS Injury Assessment  Goal: Absence of physical injury  Outcome: Progressing     Problem: Confusion  Goal: Confusion, delirium, dementia, or psychosis is improved or at baseline  Description: INTERVENTIONS:  1. Assess for possible contributors to thought disturbance, including medications, impaired vision or hearing, underlying metabolic abnormalities, dehydration, psychiatric diagnoses, and notify attending LIP  2. [place] high risk fall precautions, as indicated  3. Provide frequent short contacts to provide reality reorientation, refocusing and direction  4. Decrease environmental stimuli, including noise as appropriate  5. Monitor and intervene to maintain adequate nutrition, hydration, elimination, sleep and activity  6. If unable to ensure safety without constant attention obtain sitter and review sitter guidelines with assigned personnel  7.  Initiate Psychosocial CNS and Spiritual Care consult, as indicated  Outcome: Progressing     Problem: Pain  Goal: Verbalizes/displays adequate comfort level or baseline comfort level  Outcome: Progressing     Problem: Nutrition Deficit:  Goal: Optimize nutritional status  Outcome: Progressing

## 2023-08-16 NOTE — PROGRESS NOTES
4 Eyes Skin Assessment     NAME:  Sharron Gutierrez  YOB: 1940  MEDICAL RECORD NUMBER:  [id number]    The patient is being assessed for  Transfer to New Unit    I agree that at least one RN has performed a thorough Head to Toe Skin Assessment on the patient. ALL assessment sites listed below have been assessed. Areas assessed by both nurses:    Head, Face, Ears, Shoulders, Back, Chest, Arms, Elbows, Hands, Sacrum. Buttock, Coccyx, Ischium, Legs. Feet and Heels, and Under Medical Devices         Does the Patient have a Wound? Yes wound(s) were present on assessment.  LDA wound assessment was Initiated and completed by RN; Left hand skin tear; Left thigh KENNETH drain and abscess; abrasion on left forehead        [place] Prevention initiated by RN: Yes  Wound Care Orders initiated by RN: No    Pressure Injury (Stage 3,4, Unstageable, DTI, NWPT, and Complex wounds) if present, place Wound referral order by RN under : No    New Ostomies, if present place, Ostomy referral order under : No     Nurse 1 eSignature: Electronically signed by Grey Murphy RN on 8/16/23 at 4:33 PM EDT    **SHARE this note so that the co-signing nurse can place an eSignature**    Nurse 2 eSignature: {Esignature:[id number]}

## 2023-08-16 NOTE — CONSULTS
24 hour rounding completed on MST. Sterile dressing change completed per protocol. Please consult IV/PICC team for questions, concerns, or patient's needs change.

## 2023-08-16 NOTE — PROGRESS NOTES
SLP ALL NOTES        Deferred swallowing therapy this date due to pt's transfer to 3N for blood transfusion. Pt appears pale, very SOB, RR at 31 bpm, blood pressure is soft per/chart review. Spoke with RN who is in agreement. We discussed holding PO until pt's respiratory rate improves. She verbalized agreement. ST will follow up with pt 8/17 for reassessment.     Carline Coe MA [facility] SLP  Speech Language Pathologist

## 2023-08-16 NOTE — PROGRESS NOTES
Patient very drowsy and unable to take PO medications. This nurse spoke to Speech Therapy and was advised not to give anything PO until patient is more alert. Provider was notified and is aware.

## 2023-08-16 NOTE — PROGRESS NOTES
Physical Therapy    Physical Therapy Treatment Note  Name: Leida Pizarro MRN: [id number] :   1940   Date:  2023   Admission Date: 2023 Room:  [address] [place] [address]   Restrictions/Precautions:           General, Fall Risk, KENNETH drain on Lt, contact isolation, GET sitter  Communication with other providers:  nurse states pt ok for tx. Per nurse, pt has been lethargic today, has CT planned also. Subjective:  Patient states:  agreeable to tx, has to use bathroom  Pain:   Location, Type, Intensity (0/10 to 10/10):  does not rate    Objective:    Observation: semi wood in bed upon entry  Objective Measures:  BP sittin/78  Treatment, including education/measures:  Transfers   Rolling: maxAx1  Supine to sit :maxAx1  Sit to supine :   Scooting : maxAx1 cancer pt assisting /c BUE/LE x multiple sets to [facility]  Balance/Proprioception  Sitting balance EOB x~5'  /c F- balance grade vc for upright posture. Pt states he does not feel up to trying STS to stand balance on this date. Safety  Patient left safely in the chair, with call light/phone in reach with alarm applied. Gait belt was used for transfers and gait.       Assessment / Impression:    Patient's tolerance of treatment:  fair   Adverse Reaction: na  Significant change in status and impact:  na  Barriers to improvement:  weakness and endurance  Plan for Next Session:    Cont transfer and balance progression  Time in:  1353  Time out:  1409  Timed treatment minutes: 16  Total treatment time:  16    Previously filed items:           Short Term Goals  Time Frame for Short Term Goals: 1 week  Short Term Goal 1: pt to complete all bed mobility mod A x 1  Short Term Goal 2: pt to sit EOB 10 minutes SBA  Short Term Goal 3: pt to complete STS transfer mod A x2  Short Term Goal 4: pt to complete stand pivot transfer with LRAD mod A x2  Short Term Goal 5: pt to propel manual WC 25' with min A    Electronically signed by:    Nury Ge PTA  2023, 2:12 PM

## 2023-08-16 NOTE — PROGRESS NOTES
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  Indeterminate 5 mm VANDA pulmonary nodule. -CT A&P WO Contrast: Non-acute  -CT Head WO Contrast: Non-acute  -CT Cervical, Thoracic, Lumbar Reconstruction WO Post Process: Non-acute cervical, no acute trauma in cervical and thoracic and lumbar with multilevel mod to severe deg changes in the lumbar spine. -XR Knee Left: 1. No evident acute bony findings in the left knee. 2. New changes of left knee total arthroplasty with no obvious complication. 3. Possible new intra-articular loose body in the left knee suprapatellar   recess with suspicion for at least small suprapatellar effusion. 4. Diffuse soft tissue swelling suggestive of subcutaneous edema. -CT Knee Left W Contrast: Large 12 cm collection prominently posterior to the distal femur which may   represent extra-articular joint fluid or resolving hematoma. The collection   appears to exert mass effect on femoral vein and a put the patient at risk for a DVT. Probable small hematoma anterior to the mid to distal semitendinosis muscle   with adjacent soft tissue edema. Postsurgical change. No complication. No acute fracture. Subcutaneous soft tissue edema or cellulitis.    -DW patient and POA, still decline the EMILIANO and Left knee surgery with washout, ID would suggest waiting to allow patient to Complete TTE:   Summary   Left ventricular systolic function is normal.   Ejection fraction is visually estimated at 55-60%. Mild aortic stenosis is present; Mean PG 11 mmHg. Mitral annular calcification is present. Mild to moderate tricuspid regurgitation; RVSP: 43 mmHg. No evidence of any pericardial effusion. Pericardial fat pad present. 8/5/2023 XR Knee Left:  IMPRESSION:  1. No evident acute bony findings in the left knee. 2. New changes of left knee total arthroplasty with no obvious complication. 3. Possible new intra-articular loose body in the left knee suprapatellar  recess with suspicion for at least small suprapatellar effusion. 4. Diffuse soft tissue swelling suggestive of subcutaneous edema. 8/8/2023 CT Knee Left W Contrast:  IMPRESSION:  Large 12 cm collection prominently posterior to the distal femur which may  represent extra-articular joint fluid or resolving hematoma. The collection  appears to exert mass effect on femoral vein and a put the patient at risk  for a DVT. Probable small hematoma anterior to the mid to distal semitendinosis muscle  with adjacent soft tissue edema. Postsurgical change. No complication. No acute fracture. Subcutaneous soft tissue edema or cellulitis. 8/9/2023 IR Abscess Drainage PERC:  IMPRESSION:  Ultrasound-guided placement 10 [place] Island[place] external drainage catheter. 190 mL  purulent, sanguinous fluid removed from posterior-medial distal left  thigh/knee region corresponding to site of previously reported complex fluid  collection. Handy-Garcia drainage bulb applied. Specimen sent for  laboratory evaluation. No complication suggested.     8/16/2023 XR Chest Portable:      Labs:    CULTURE results: Invalid input(s): BLOOD CULTURE,  URINE CULTURE, SURGICAL CULTURE    Diagnosis:  Patient Active Problem List   Diagnosis    CAD s/p prox LAD bare metal stent 2011    RBBB    COPD (chronic obstructive pulmonary disease) (HCC)    DJD (degenerative

## 2023-08-17 VITALS
TEMPERATURE: 97.8 F | RESPIRATION RATE: 27 BRPM | HEIGHT: 71 IN | DIASTOLIC BLOOD PRESSURE: 75 MMHG | HEART RATE: 89 BPM | WEIGHT: 258.2 LBS | OXYGEN SATURATION: 96 % | SYSTOLIC BLOOD PRESSURE: 126 MMHG | BODY MASS INDEX: 36.15 KG/M2

## 2023-08-17 LAB
ALBUMIN SERPL-MCNC: 2.1 GM/DL (ref 3.4–5)
ANION GAP SERPL CALCULATED.3IONS-SCNC: 12 MMOL/L (ref 4–16)
BASOPHILS ABSOLUTE: 0 K/CU MM
BASOPHILS RELATIVE PERCENT: 0.1 % (ref 0–1)
BUN SERPL-MCNC: 25 MG/DL (ref 6–23)
CALCIUM SERPL-MCNC: 8.6 MG/DL (ref 8.3–10.6)
CHLORIDE BLD-SCNC: 108 MMOL/L (ref 99–110)
CO2: 20 MMOL/L (ref 21–32)
CREAT SERPL-MCNC: 1.1 MG/DL (ref 0.9–1.3)
DIFFERENTIAL TYPE: ABNORMAL
EOSINOPHILS ABSOLUTE: 0 K/CU MM
EOSINOPHILS RELATIVE PERCENT: 0.3 % (ref 0–3)
GFR SERPL CREATININE-BSD FRML MDRD: >60 ML/MIN/1.73M2
GLUCOSE SERPL-MCNC: 87 MG/DL (ref 70–99)
HCT VFR BLD CALC: 22.6 % (ref 42–52)
HCT VFR BLD CALC: 25.9 % (ref 42–52)
HEMOGLOBIN: 6.8 GM/DL (ref 13.5–18)
HEMOGLOBIN: 7.2 GM/DL (ref 13.5–18)
HIGH SENSITIVE C-REACTIVE PROTEIN: 72.1 MG/L (ref 0–5)
IMMATURE NEUTROPHIL %: 1.1 % (ref 0–0.43)
LYMPHOCYTES ABSOLUTE: 0.8 K/CU MM
LYMPHOCYTES RELATIVE PERCENT: 8.2 % (ref 24–44)
MCH RBC QN AUTO: 29.3 PG (ref 27–31)
MCHC RBC AUTO-ENTMCNC: 27.8 % (ref 32–36)
MCV RBC AUTO: 105.3 FL (ref 78–100)
MONOCYTES ABSOLUTE: 1 K/CU MM
MONOCYTES RELATIVE PERCENT: 10.3 % (ref 0–4)
NUCLEATED RBC %: 0 %
PDW BLD-RTO: 19.3 % (ref 11.7–14.9)
PHOSPHORUS: 4 MG/DL (ref 2.5–4.9)
PLATELET # BLD: 108 K/CU MM (ref 140–440)
PMV BLD AUTO: 13.7 FL (ref 7.5–11.1)
POTASSIUM SERPL-SCNC: 3.3 MMOL/L (ref 3.5–5.1)
PROCALCITONIN SERPL-MCNC: 0.18 NG/ML
RBC # BLD: 2.46 M/CU MM (ref 4.6–6.2)
SEGMENTED NEUTROPHILS ABSOLUTE COUNT: 7.6 K/CU MM
SEGMENTED NEUTROPHILS RELATIVE PERCENT: 80 % (ref 36–66)
SODIUM BLD-SCNC: 140 MMOL/L (ref 135–145)
TOTAL IMMATURE NEUTOROPHIL: 0.1 K/CU MM
TOTAL NUCLEATED RBC: 0 K/CU MM
WBC # BLD: 9.5 K/CU MM (ref 4–10.5)

## 2023-08-17 PROCEDURE — 6370000000 HC RX 637 (ALT 250 FOR IP): Performed by: STUDENT IN AN ORGANIZED HEALTH CARE EDUCATION/TRAINING PROGRAM

## 2023-08-17 PROCEDURE — 2700000000 HC OXYGEN THERAPY PER DAY

## 2023-08-17 PROCEDURE — 36430 TRANSFUSION BLD/BLD COMPNT: CPT

## 2023-08-17 PROCEDURE — 2580000003 HC RX 258: Performed by: STUDENT IN AN ORGANIZED HEALTH CARE EDUCATION/TRAINING PROGRAM

## 2023-08-17 PROCEDURE — 85014 HEMATOCRIT: CPT

## 2023-08-17 PROCEDURE — 94664 DEMO&/EVAL PT USE INHALER: CPT

## 2023-08-17 PROCEDURE — P9016 RBC LEUKOCYTES REDUCED: HCPCS

## 2023-08-17 PROCEDURE — 94761 N-INVAS EAR/PLS OXIMETRY MLT: CPT

## 2023-08-17 PROCEDURE — 84145 PROCALCITONIN (PCT): CPT

## 2023-08-17 PROCEDURE — 2580000003 HC RX 258: Performed by: NURSE PRACTITIONER

## 2023-08-17 PROCEDURE — 85018 HEMOGLOBIN: CPT

## 2023-08-17 PROCEDURE — 94640 AIRWAY INHALATION TREATMENT: CPT

## 2023-08-17 PROCEDURE — 85025 COMPLETE CBC W/AUTO DIFF WBC: CPT

## 2023-08-17 PROCEDURE — 99233 SBSQ HOSP IP/OBS HIGH 50: CPT | Performed by: NURSE PRACTITIONER

## 2023-08-17 PROCEDURE — 6360000002 HC RX W HCPCS: Performed by: STUDENT IN AN ORGANIZED HEALTH CARE EDUCATION/TRAINING PROGRAM

## 2023-08-17 PROCEDURE — 80069 RENAL FUNCTION PANEL: CPT

## 2023-08-17 PROCEDURE — 36415 COLL VENOUS BLD VENIPUNCTURE: CPT

## 2023-08-17 PROCEDURE — 86141 C-REACTIVE PROTEIN HS: CPT

## 2023-08-17 PROCEDURE — 92526 ORAL FUNCTION THERAPY: CPT

## 2023-08-17 RX ORDER — SODIUM CHLORIDE 9 MG/ML
INJECTION, SOLUTION INTRAVENOUS PRN
Status: DISCONTINUED | OUTPATIENT
Start: 2023-08-17 | End: 2023-08-18 | Stop reason: HOSPADM

## 2023-08-17 RX ORDER — METOPROLOL TARTRATE 50 MG/1
50 TABLET, FILM COATED ORAL 2 TIMES DAILY
Status: DISCONTINUED | OUTPATIENT
Start: 2023-08-17 | End: 2023-08-18 | Stop reason: HOSPADM

## 2023-08-17 RX ADMIN — FUROSEMIDE 20 MG: 20 TABLET ORAL at 15:46

## 2023-08-17 RX ADMIN — IRON SUCROSE 200 MG: 20 INJECTION, SOLUTION INTRAVENOUS at 11:22

## 2023-08-17 RX ADMIN — SODIUM CHLORIDE, PRESERVATIVE FREE 10 ML: 5 INJECTION INTRAVENOUS at 11:13

## 2023-08-17 RX ADMIN — IPRATROPIUM BROMIDE AND ALBUTEROL SULFATE 1 DOSE: 2.5; .5 SOLUTION RESPIRATORY (INHALATION) at 11:54

## 2023-08-17 RX ADMIN — IPRATROPIUM BROMIDE AND ALBUTEROL SULFATE 1 DOSE: 2.5; .5 SOLUTION RESPIRATORY (INHALATION) at 16:10

## 2023-08-17 RX ADMIN — ASPIRIN 81 MG: 81 TABLET, CHEWABLE ORAL at 11:14

## 2023-08-17 RX ADMIN — POTASSIUM BICARBONATE 40 MEQ: 782 TABLET, EFFERVESCENT ORAL at 11:14

## 2023-08-17 RX ADMIN — METOPROLOL TARTRATE 50 MG: 50 TABLET, FILM COATED ORAL at 11:14

## 2023-08-17 RX ADMIN — Medication 2000 UNITS: at 11:14

## 2023-08-17 NOTE — CONSENT
Informed Consent for Blood Component Transfusion Note    I have discussed with the patient the rationale for blood component transfusion; its benefits in treating or preventing fatigue, organ damage, or death; and its risk which includes mild transfusion reactions, rare risk of blood borne infection, or more serious but rare reactions. I have discussed the alternatives to transfusion, including the risk and consequences of not receiving transfusion. The patient had an opportunity to ask questions and had agreed to proceed with transfusion of blood components.     Electronically signed by Aguila Leon MD on 8/17/23 at 3:22 PM EDT

## 2023-08-17 NOTE — PROGRESS NOTES
SLP ALL NOTES  [address] [facility]  DEPARTMENT OF SPEECH/LANGUAGE PATHOLOGY  DAILY PROGRESS NOTE  Dalia Hernandez  8/17/2023  [id number]  Sepsis ([address]) [A41.9]  Atrial fibrillation ([address]) [I48.91]  Allergies   Allergen Reactions    Lovenox [Enoxaparin Sodium]     Morphine Rash         Pt was seen this date for dysphagia treatment. IMPRESSION AND RECOMMENDATIONS:    Dalia Hernandez was seen for a bedside swallowing treatment and diet tolerance monitoring. Pt was alert and cooperative throughout assessment. He was positioned upright in bed and accepted PO trials of puree and thin liquids by small straw sips. Adequate oral manipulation and oral clearance was observed with trials of puree. Pharyngeal swallow appeared intermittently delayed with reduced laryngeal elevation. Clear vocal quality and 0 overt s/s of aspiration were observed with all PO trials given. Pt refused advanced trials of soft solids at this time d/t reported reduced appetite. Recommend continue minced and moist solids/thin liquids by small straw sips with strict aspiration precautions. SLP will continue to follow Dalia Hernandez for diet tolerance monitoring and possible upgrade.       GOALS (current status in bold):  Short-term Goals  Timeframe for Short-term Goals: LOS or until goals are met  Goal 1: Pt will tolerate minced and moist solids/thin liquids by small straw sips without clinical evidence of aspiration 100% goal being met, continue   Goal 2: Pt/caregivers will demonstrate comprehension of recommendations/POC Meeting, Continue, spoke with KELLIE Griffin      EDUCATION:  recommendations/ POC    PAIN RATING (0-10 Scale):  denies   Time in/Time out: SLP Individual Minutes  Time In: 1440  Time Out: 1500  Minutes: 20    Visit number: [id number] GonzalezGISELL Arias  8/17/2023  3:07 PM

## 2023-08-17 NOTE — CARE COORDINATION
CM updated by cristiana Duarte approved and they can accept today if medically ready.     PS to Dr. Deepak Ventura to update    11:25 AM   Per Dr. Deepak Ventura transfer has been initiated

## 2023-08-17 NOTE — PROGRESS NOTES
Resource RN rounding on pt d/t DI score of 66. Pt is known to this RN from previous shifts. He is asleep but easily arouses. Denies pain, SOB, VSS on 2LNC. This RN shaved pts face with his permission. No signs or symptoms of distress noted during care.

## 2023-08-17 NOTE — PROGRESS NOTES
Report called to Zofia Smith at [facility] room 5107. Transport ETA 1800.

## 2023-08-17 NOTE — PROGRESS NOTES
[place]2.0  [facility] [place]      Name:  Kassie Leo /Age/Sex:   (80 y.o. male)   MRN & CSN:  [id number] & [id number] Encounter Date/Time: 2023 12:43 PM EDT    Location:  [facility] PCP: Ijeoma Acevedo MD       Hospital Day: 14      Subjective:     Chief Complaint: sepsis     CTPA negative for PE but showed signs pulmonary edema, possible PNA vs atelectasis. CT head WO contrast  unremarkable. CT left knee showed decreased fluid collection size where the drain is but larger abscesses posterior thigh. Reached out to orthopedic surgery who recommended having pt transferred to tertiary center. Called daughter yesterday and discussed the plan. She was concerned for having pt transferred to hospital in [facility] due to distance. Offered other alternatives and she suggested [facility].    [facility] Transfer center and intiated transfer. Spoke to Dr. Romario Leung who accepted pt to be transferred to [facility].  Reached out to daughter and discussed update and she was in agreement. Pt required another 1u pRBC; today Hgb 6.2. Sating 90-92% on room air, tachypnea slightly improved 20-25. He is more awake and alert. Has no new complaints or concerns. Denies lightheadedness, dizziness, fever, night sweats, chills, chest pain, cough, dyspnea, palpitations, abd pain, nausea, vomiting, diarrhea, dysuria. Assessment and Plan:   Kassie Leo is a 80 y.o. male with pmh of   fibrillation, CHF, hyperlipidemia, hypertension, COPD  who presents with Sepsis ([address])      Plan:  Sepsis present on admission   Likely due to infected hematoma of posterior thigh; concern of infected knee hardware  Blood cultures from 2023 1 out of 4 bottles positive for MRSA. Initially on vancomycin. Discontinued due to VIANCA 2. Started on IV daptomycin on 2023. Repeat blood cultures have been negative for first 48 hours. Echocardiogram completed.   No mention of endocarditis on mildly displaced fractures of the right lateral 8th, 9th and 10th ribs. Multilevel degenerative disc disease. Degenerative changes in both shoulders. 1.  No acute traumatic injury in the chest. 2.  Indeterminate 5 mm left upper lobe pulmonary nodule. Per Fleischner society guidelines, a noncontrast chest CT at 12 months is optional.  If performed and the nodule is stable at 12 months, no further follow-up is recommended. CT CERVICAL SPINE WO CONTRAST    Result Date: 8/4/2023  EXAMINATION: CT OF THE CERVICAL SPINE WITHOUT CONTRAST 8/3/2023 11:44 pm TECHNIQUE: CT of the cervical spine was performed without the administration of intravenous contrast. Multiplanar reformatted images are provided for review. Automated exposure control, iterative reconstruction, and/or weight based adjustment of the mA/kV was utilized to reduce the radiation dose to as low as reasonably achievable. COMPARISON: July 4, 2015. HISTORY: ORDERING SYSTEM PROVIDED HISTORY: Trauma TECHNOLOGIST PROVIDED HISTORY: Reason for exam:->Trauma Decision Support Exception - unselect if not a suspected or confirmed emergency medical condition->Emergency Medical Condition (MA) Reason for Exam: fall FINDINGS: BONES/ALIGNMENT: There is no acute fracture or traumatic malalignment. DEGENERATIVE CHANGES: Multilevel moderate to severe degenerative changes in the cervical spine. SOFT TISSUES: There is no prevertebral soft tissue swelling. No acute abnormality of the cervical spine. CT LUMBAR RECONSTRUCTION WO POST PROCESS    Result Date: 8/4/2023  EXAMINATION: CT OF THE LUMBAR SPINE WITHOUT CONTRAST  8/4/2023 TECHNIQUE: CT of the lumbar spine was performed without the administration of intravenous contrast. Multiplanar reformatted images are provided for review. Adjustment of mA and/or kV according to patient size was utilized.   Automated exposure control, iterative reconstruction, and/or weight based adjustment of the mA/kV was utilized to reduce

## 2023-08-17 NOTE — PROGRESS NOTES
intracranial abnormality. 8/4/2023  CT Cervical Spine WO Contrast:  IMPRESSION:  No acute abnormality of the cervical spine. 8/4/2023 CT Lumbar Reconstruction WO Post Process:  IMPRESSION:  1. No acute traumatic injury in the lumbar spine. 2.  Multilevel moderate to severe degenerative changes in the lumbar spine. 8/4/2023 CT Thoracic Reconstruction WO Post Process:  IMPRESSION:  No acute traumatic injury in the thoracic spine. 8/4/2023 Complete TTE:   Summary   Left ventricular systolic function is normal.   Ejection fraction is visually estimated at 55-60%. Mild aortic stenosis is present; Mean PG 11 mmHg. Mitral annular calcification is present. Mild to moderate tricuspid regurgitation; RVSP: 43 mmHg. No evidence of any pericardial effusion. Pericardial fat pad present. 8/5/2023 XR Knee Left:  IMPRESSION:  1. No evident acute bony findings in the left knee. 2. New changes of left knee total arthroplasty with no obvious complication. 3. Possible new intra-articular loose body in the left knee suprapatellar  recess with suspicion for at least small suprapatellar effusion. 4. Diffuse soft tissue swelling suggestive of subcutaneous edema. 8/8/2023 CT Knee Left W Contrast:  IMPRESSION:  Large 12 cm collection prominently posterior to the distal femur which may  represent extra-articular joint fluid or resolving hematoma. The collection  appears to exert mass effect on femoral vein and a put the patient at risk  for a DVT. Probable small hematoma anterior to the mid to distal semitendinosis muscle  with adjacent soft tissue edema. Postsurgical change. No complication. No acute fracture. Subcutaneous soft tissue edema or cellulitis. 8/9/2023 IR Abscess Drainage PERC:  IMPRESSION:  Ultrasound-guided placement 10 [place] external drainage catheter.   190 mL  purulent, sanguinous fluid removed from posterior-medial distal left  thigh/knee region corresponding to site of

## 2023-08-18 ENCOUNTER — PARAMEDICINE (OUTPATIENT)
Dept: OTHER | Age: 83
End: 2023-08-18

## 2023-08-18 LAB
ABO/RH: NORMAL
ANTIBODY SCREEN: NEGATIVE
COMPONENT: NORMAL
CROSSMATCH RESULT: NORMAL
STATUS: NORMAL
TRANSFUSION STATUS: NORMAL
UNIT DIVISION: 0
UNIT NUMBER: NORMAL

## 2023-08-18 NOTE — DISCHARGE SUMMARY
Discharge Summary    Name:  Edison Claros /Age/Sex:   (80 y.o. male)   MRN & CSN:  [id number] & [id number] Admission Date/Time: 2023  3:25 PM   Attending:  Aubree Pavon MD Discharging Physician: Aubree Pavon MD     Hospital Course:   Edison Claros is a 80 y.o.  male  who presents with Sepsis ([address])    HPI  \"Ray Russ is a 80 y.o. male with pmh of fibrillation, CHF, hyperlipidemia, hypertension, COPD who presents as a transfer from [facility] due to sepsis secondary to possible septic arthritis. Patient was originally admitted overnight in [facility] due to multiple falls, weakness and lower leg swelling. While on admission the patient went into [facility] with RVR and was also found to have significant swelling of the left knee with elevated infectious markers and therefore was recommended for transfer to [facility] for further evaluation by cardiology, orthopedic surgery and ID. At bedside patient denies any chest pain, shortness of breath, nausea vomiting, fever or chills\"       The following problems have been addressed during this hospitalization. Sepsis present on admission   Likely due to infected hematoma of posterior thigh; concern of infected knee hardware  Blood cultures from 2023 1 out of 4 bottles positive for MRSA. Initially on vancomycin. Discontinued due to VIANCA 2. Started on IV daptomycin on 2023. Repeat blood cultures  have been negative  Echocardiogram completed. No mention of endocarditis on TTE. Pt refused EMILIANO.  : Underwent CT knee left with contrast large fluid collection posterior to the distal femur which may represent extra-articular joint fluid or resolving hematoma of about~13 cm. Discussed with orthopedic surgery regarding aspiration versus surgical intervention. Dr. Leona Gill suggested tertiary care transfer as the fluid collection is not intra-articular and appeared more like thigh abscess. He suggested getting general surgery opinion.   General surgery

## 2023-08-18 NOTE — PROGRESS NOTES
According to the notes patient was discharged to [facility]. At this time I will remove him from the program. Should the patient return home and another referral made, another file will be opened.

## 2023-08-20 LAB
CULTURE: NORMAL
CULTURE: NORMAL
Lab: NORMAL
Lab: NORMAL
SPECIMEN: NORMAL
SPECIMEN: NORMAL

## 2023-08-22 LAB
CULTURE: NORMAL
CULTURE: NORMAL
Lab: NORMAL
Lab: NORMAL
SPECIMEN: NORMAL
SPECIMEN: NORMAL

## 2023-09-15 ENCOUNTER — HOSPITAL ENCOUNTER (INPATIENT)
Age: 83
LOS: 11 days | Discharge: OTHER INSTITUTION | DRG: 871 | End: 2023-09-27
Attending: EMERGENCY MEDICINE | Admitting: STUDENT IN AN ORGANIZED HEALTH CARE EDUCATION/TRAINING PROGRAM
Payer: MEDICARE

## 2023-09-15 ENCOUNTER — HOSPITAL ENCOUNTER (OUTPATIENT)
Age: 83
Setting detail: SPECIMEN
Discharge: HOME | End: 2023-09-15
Payer: MEDICARE

## 2023-09-15 ENCOUNTER — APPOINTMENT (OUTPATIENT)
Dept: GENERAL RADIOLOGY | Age: 83
DRG: 871 | End: 2023-09-15
Payer: MEDICARE

## 2023-09-15 DIAGNOSIS — A41.9 SEPTICEMIA (HCC): ICD-10-CM

## 2023-09-15 DIAGNOSIS — K92.2 UPPER GI BLEED: ICD-10-CM

## 2023-09-15 DIAGNOSIS — R78.81 MRSA BACTEREMIA: ICD-10-CM

## 2023-09-15 DIAGNOSIS — I48.91 ATRIAL FIBRILLATION WITH RAPID VENTRICULAR RESPONSE (HCC): ICD-10-CM

## 2023-09-15 DIAGNOSIS — J18.9 PNEUMONIA DUE TO INFECTIOUS ORGANISM, UNSPECIFIED LATERALITY, UNSPECIFIED PART OF LUNG: ICD-10-CM

## 2023-09-15 DIAGNOSIS — J18.9 PNEUMONIA OF BOTH LOWER LOBES DUE TO INFECTIOUS ORGANISM: ICD-10-CM

## 2023-09-15 DIAGNOSIS — B95.62 MRSA BACTEREMIA: ICD-10-CM

## 2023-09-15 DIAGNOSIS — N17.9 AKI (ACUTE KIDNEY INJURY) (HCC): Primary | ICD-10-CM

## 2023-09-15 LAB
ALBUMIN SERPL-MCNC: 3.6 GM/DL (ref 3.4–5)
ALP BLD-CCNC: 87 IU/L (ref 40–129)
ALT SERPL-CCNC: 9 U/L (ref 10–40)
ANION GAP SERPL CALCULATED.3IONS-SCNC: 10 MMOL/L (ref 4–16)
AST SERPL-CCNC: 22 IU/L (ref 15–37)
BASOPHILS ABSOLUTE: 0 K/CU MM
BASOPHILS RELATIVE PERCENT: 0 % (ref 0–1)
BILIRUB SERPL-MCNC: 0.4 MG/DL (ref 0–1)
BUN SERPL-MCNC: 47 MG/DL (ref 6–23)
CALCIUM SERPL-MCNC: 9.3 MG/DL (ref 8.3–10.6)
CHLORIDE BLD-SCNC: 103 MMOL/L (ref 99–110)
CO2: 23 MMOL/L (ref 21–32)
CREAT SERPL-MCNC: 1.8 MG/DL (ref 0.9–1.3)
DIFFERENTIAL TYPE: ABNORMAL
EOSINOPHILS ABSOLUTE: 0 K/CU MM
EOSINOPHILS RELATIVE PERCENT: 0.1 % (ref 0–3)
GFR SERPL CREATININE-BSD FRML MDRD: 37 ML/MIN/1.73M2
GLUCOSE SERPL-MCNC: 99 MG/DL (ref 70–99)
HCT VFR BLD CALC: 24.7 % (ref 42–52)
HEMOGLOBIN: 7.7 GM/DL (ref 13.5–18)
IMMATURE NEUTROPHIL %: 0.4 % (ref 0–0.43)
LACTIC ACID, SEPSIS: 2.1 MMOL/L (ref 0.5–1.9)
LYMPHOCYTES ABSOLUTE: 1 K/CU MM
LYMPHOCYTES RELATIVE PERCENT: 14.8 % (ref 24–44)
MCH RBC QN AUTO: 31.4 PG (ref 27–31)
MCHC RBC AUTO-ENTMCNC: 31.2 % (ref 32–36)
MCV RBC AUTO: 100.8 FL (ref 78–100)
MONOCYTES ABSOLUTE: 0.5 K/CU MM
MONOCYTES RELATIVE PERCENT: 7.3 % (ref 0–4)
NUCLEATED RBC %: 0 %
PDW BLD-RTO: 19.3 % (ref 11.7–14.9)
PLATELET # BLD: 85 K/CU MM (ref 140–440)
POTASSIUM SERPL-SCNC: 4.5 MMOL/L (ref 3.5–5.1)
RBC # BLD: 2.45 M/CU MM (ref 4.6–6.2)
SEGMENTED NEUTROPHILS ABSOLUTE COUNT: 5.4 K/CU MM
SEGMENTED NEUTROPHILS RELATIVE PERCENT: 77.4 % (ref 36–66)
SODIUM BLD-SCNC: 136 MMOL/L (ref 135–145)
TOTAL IMMATURE NEUTOROPHIL: 0.03 K/CU MM
TOTAL NUCLEATED RBC: 0 K/CU MM
TOTAL PROTEIN: 6.2 GM/DL (ref 6.4–8.2)
TROPONIN T: 0.01 NG/ML
TSH SERPL DL<=0.005 MIU/L-ACNC: 2.98 UIU/ML (ref 0.27–4.2)
WBC # BLD: 7 K/CU MM (ref 4–10.5)

## 2023-09-15 PROCEDURE — 84484 ASSAY OF TROPONIN QUANT: CPT

## 2023-09-15 PROCEDURE — 87040 BLOOD CULTURE FOR BACTERIA: CPT

## 2023-09-15 PROCEDURE — 87086 URINE CULTURE/COLONY COUNT: CPT

## 2023-09-15 PROCEDURE — 71045 X-RAY EXAM CHEST 1 VIEW: CPT

## 2023-09-15 PROCEDURE — 85025 COMPLETE CBC W/AUTO DIFF WBC: CPT

## 2023-09-15 PROCEDURE — 99285 EMERGENCY DEPT VISIT HI MDM: CPT

## 2023-09-15 PROCEDURE — 80053 COMPREHEN METABOLIC PANEL: CPT

## 2023-09-15 PROCEDURE — 93005 ELECTROCARDIOGRAM TRACING: CPT | Performed by: EMERGENCY MEDICINE

## 2023-09-15 PROCEDURE — 81003 URINALYSIS AUTO W/O SCOPE: CPT

## 2023-09-15 PROCEDURE — 83880 ASSAY OF NATRIURETIC PEPTIDE: CPT

## 2023-09-15 PROCEDURE — 83605 ASSAY OF LACTIC ACID: CPT

## 2023-09-15 PROCEDURE — 84443 ASSAY THYROID STIM HORMONE: CPT

## 2023-09-15 RX ORDER — 0.9 % SODIUM CHLORIDE 0.9 %
1000 INTRAVENOUS SOLUTION INTRAVENOUS ONCE
Status: COMPLETED | OUTPATIENT
Start: 2023-09-15 | End: 2023-09-16

## 2023-09-15 ASSESSMENT — LIFESTYLE VARIABLES
HOW MANY STANDARD DRINKS CONTAINING ALCOHOL DO YOU HAVE ON A TYPICAL DAY: PATIENT DOES NOT DRINK
HOW OFTEN DO YOU HAVE A DRINK CONTAINING ALCOHOL: NEVER

## 2023-09-15 ASSESSMENT — PAIN DESCRIPTION - LOCATION: LOCATION: CHEST

## 2023-09-15 ASSESSMENT — PAIN - FUNCTIONAL ASSESSMENT: PAIN_FUNCTIONAL_ASSESSMENT: 0-10

## 2023-09-15 ASSESSMENT — PAIN SCALES - GENERAL: PAINLEVEL_OUTOF10: 2

## 2023-09-16 ENCOUNTER — APPOINTMENT (OUTPATIENT)
Dept: CT IMAGING | Age: 83
DRG: 871 | End: 2023-09-16
Payer: MEDICARE

## 2023-09-16 PROBLEM — R65.20 SEVERE SEPSIS (HCC): Status: ACTIVE | Noted: 2023-09-16

## 2023-09-16 PROBLEM — A41.9 SEVERE SEPSIS (HCC): Status: ACTIVE | Noted: 2023-09-16

## 2023-09-16 LAB
ALBUMIN SERPL-MCNC: 3.1 GM/DL (ref 3.4–5)
ALBUMIN SERPL-MCNC: 3.3 GM/DL (ref 3.4–5)
ALP BLD-CCNC: 80 IU/L (ref 40–128)
ALP BLD-CCNC: 89 IU/L (ref 40–128)
ALT SERPL-CCNC: 10 U/L (ref 10–40)
ALT SERPL-CCNC: 8 U/L (ref 10–40)
ANION GAP SERPL CALCULATED.3IONS-SCNC: 12 MMOL/L (ref 4–16)
ANION GAP SERPL CALCULATED.3IONS-SCNC: 13 MMOL/L (ref 4–16)
AST SERPL-CCNC: 23 IU/L (ref 15–37)
AST SERPL-CCNC: 25 IU/L (ref 15–37)
BASOPHILS ABSOLUTE: 0 K/CU MM
BASOPHILS RELATIVE PERCENT: 0 % (ref 0–1)
BILIRUB SERPL-MCNC: 0.4 MG/DL (ref 0–1)
BILIRUB SERPL-MCNC: 0.4 MG/DL (ref 0–1)
BUN SERPL-MCNC: 50 MG/DL (ref 6–23)
BUN SERPL-MCNC: 51 MG/DL (ref 6–23)
CALCIUM SERPL-MCNC: 8.9 MG/DL (ref 8.3–10.6)
CALCIUM SERPL-MCNC: 9.4 MG/DL (ref 8.3–10.6)
CHLORIDE BLD-SCNC: 106 MMOL/L (ref 99–110)
CHLORIDE BLD-SCNC: 107 MMOL/L (ref 99–110)
CO2: 19 MMOL/L (ref 21–32)
CO2: 21 MMOL/L (ref 21–32)
CREAT SERPL-MCNC: 1.8 MG/DL (ref 0.9–1.3)
CREAT SERPL-MCNC: 1.9 MG/DL (ref 0.9–1.3)
DIFFERENTIAL TYPE: ABNORMAL
EOSINOPHILS ABSOLUTE: 0 K/CU MM
EOSINOPHILS RELATIVE PERCENT: 0.4 % (ref 0–3)
GFR SERPL CREATININE-BSD FRML MDRD: 35 ML/MIN/1.73M2
GFR SERPL CREATININE-BSD FRML MDRD: 37 ML/MIN/1.73M2
GLUCOSE SERPL-MCNC: 103 MG/DL (ref 70–99)
GLUCOSE SERPL-MCNC: 112 MG/DL (ref 70–99)
HCT VFR BLD CALC: 26.4 % (ref 42–52)
HEMOGLOBIN: 8.1 GM/DL (ref 13.5–18)
IMMATURE NEUTROPHIL %: 0.3 % (ref 0–0.43)
LACTIC ACID, SEPSIS: 1.8 MMOL/L (ref 0.5–1.9)
LYMPHOCYTES ABSOLUTE: 1 K/CU MM
LYMPHOCYTES RELATIVE PERCENT: 9.8 % (ref 24–44)
MCH RBC QN AUTO: 31.6 PG (ref 27–31)
MCHC RBC AUTO-ENTMCNC: 30.7 % (ref 32–36)
MCV RBC AUTO: 103.1 FL (ref 78–100)
MONOCYTES ABSOLUTE: 0.6 K/CU MM
MONOCYTES RELATIVE PERCENT: 6.4 % (ref 0–4)
NUCLEATED RBC %: 0.3 %
PDW BLD-RTO: 19.4 % (ref 11.7–14.9)
PLATELET # BLD: 78 K/CU MM (ref 140–440)
POTASSIUM SERPL-SCNC: 4.3 MMOL/L (ref 3.5–5.1)
POTASSIUM SERPL-SCNC: 4.6 MMOL/L (ref 3.5–5.1)
PRO-BNP: 1657 PG/ML
RBC # BLD: 2.56 M/CU MM (ref 4.6–6.2)
SEGMENTED NEUTROPHILS ABSOLUTE COUNT: 8.1 K/CU MM
SEGMENTED NEUTROPHILS RELATIVE PERCENT: 83.1 % (ref 36–66)
SODIUM BLD-SCNC: 139 MMOL/L (ref 135–145)
SODIUM BLD-SCNC: 139 MMOL/L (ref 135–145)
TOTAL IMMATURE NEUTOROPHIL: 0.03 K/CU MM
TOTAL NUCLEATED RBC: 0 K/CU MM
TOTAL PROTEIN: 5.9 GM/DL (ref 6.4–8.2)
TOTAL PROTEIN: 6.4 GM/DL (ref 6.4–8.2)
TROPONIN T: 0.02 NG/ML
TROPONIN T: <0.01 NG/ML
WBC # BLD: 9.7 K/CU MM (ref 4–10.5)

## 2023-09-16 PROCEDURE — 6360000002 HC RX W HCPCS: Performed by: STUDENT IN AN ORGANIZED HEALTH CARE EDUCATION/TRAINING PROGRAM

## 2023-09-16 PROCEDURE — 83880 ASSAY OF NATRIURETIC PEPTIDE: CPT

## 2023-09-16 PROCEDURE — 6370000000 HC RX 637 (ALT 250 FOR IP): Performed by: STUDENT IN AN ORGANIZED HEALTH CARE EDUCATION/TRAINING PROGRAM

## 2023-09-16 PROCEDURE — 71250 CT THORAX DX C-: CPT

## 2023-09-16 PROCEDURE — 76937 US GUIDE VASCULAR ACCESS: CPT

## 2023-09-16 PROCEDURE — 96372 THER/PROPH/DIAG INJ SC/IM: CPT

## 2023-09-16 PROCEDURE — 2060000000 HC ICU INTERMEDIATE R&B

## 2023-09-16 PROCEDURE — 2580000003 HC RX 258: Performed by: STUDENT IN AN ORGANIZED HEALTH CARE EDUCATION/TRAINING PROGRAM

## 2023-09-16 PROCEDURE — 83605 ASSAY OF LACTIC ACID: CPT

## 2023-09-16 PROCEDURE — 6360000002 HC RX W HCPCS: Performed by: EMERGENCY MEDICINE

## 2023-09-16 PROCEDURE — 87040 BLOOD CULTURE FOR BACTERIA: CPT

## 2023-09-16 PROCEDURE — 2580000003 HC RX 258: Performed by: EMERGENCY MEDICINE

## 2023-09-16 PROCEDURE — 36415 COLL VENOUS BLD VENIPUNCTURE: CPT

## 2023-09-16 PROCEDURE — 96365 THER/PROPH/DIAG IV INF INIT: CPT

## 2023-09-16 PROCEDURE — 84484 ASSAY OF TROPONIN QUANT: CPT

## 2023-09-16 PROCEDURE — 1200000000 HC SEMI PRIVATE

## 2023-09-16 PROCEDURE — 80053 COMPREHEN METABOLIC PANEL: CPT

## 2023-09-16 PROCEDURE — 96375 TX/PRO/DX INJ NEW DRUG ADDON: CPT

## 2023-09-16 PROCEDURE — 94761 N-INVAS EAR/PLS OXIMETRY MLT: CPT

## 2023-09-16 RX ORDER — ASPIRIN 81 MG/1
81 TABLET, CHEWABLE ORAL DAILY
Status: DISCONTINUED | OUTPATIENT
Start: 2023-09-16 | End: 2023-09-27 | Stop reason: HOSPADM

## 2023-09-16 RX ORDER — SODIUM CHLORIDE 0.9 % (FLUSH) 0.9 %
5-40 SYRINGE (ML) INJECTION EVERY 12 HOURS SCHEDULED
Status: DISCONTINUED | OUTPATIENT
Start: 2023-09-16 | End: 2023-09-27 | Stop reason: HOSPADM

## 2023-09-16 RX ORDER — MAGNESIUM SULFATE IN WATER 40 MG/ML
2000 INJECTION, SOLUTION INTRAVENOUS PRN
Status: DISCONTINUED | OUTPATIENT
Start: 2023-09-16 | End: 2023-09-27 | Stop reason: HOSPADM

## 2023-09-16 RX ORDER — POLYETHYLENE GLYCOL 3350 17 G/17G
17 POWDER, FOR SOLUTION ORAL DAILY PRN
Status: DISCONTINUED | OUTPATIENT
Start: 2023-09-16 | End: 2023-09-27 | Stop reason: HOSPADM

## 2023-09-16 RX ORDER — ACETAMINOPHEN 325 MG/1
650 TABLET ORAL EVERY 6 HOURS PRN
Status: DISCONTINUED | OUTPATIENT
Start: 2023-09-16 | End: 2023-09-27 | Stop reason: HOSPADM

## 2023-09-16 RX ORDER — SODIUM CHLORIDE 9 MG/ML
INJECTION, SOLUTION INTRAVENOUS CONTINUOUS
Status: DISPENSED | OUTPATIENT
Start: 2023-09-16 | End: 2023-09-16

## 2023-09-16 RX ORDER — FERROUS GLUCONATE 324(37.5)
325 TABLET ORAL 2 TIMES DAILY
Status: DISCONTINUED | OUTPATIENT
Start: 2023-09-16 | End: 2023-09-27 | Stop reason: HOSPADM

## 2023-09-16 RX ORDER — POTASSIUM CHLORIDE 1.5 G/1.58G
40 POWDER, FOR SOLUTION ORAL PRN
Status: DISCONTINUED | OUTPATIENT
Start: 2023-09-16 | End: 2023-09-27 | Stop reason: HOSPADM

## 2023-09-16 RX ORDER — PANTOPRAZOLE SODIUM 20 MG/1
20 TABLET, DELAYED RELEASE ORAL
Status: DISCONTINUED | OUTPATIENT
Start: 2023-09-16 | End: 2023-09-17

## 2023-09-16 RX ORDER — HALOPERIDOL 5 MG/ML
5 INJECTION INTRAMUSCULAR ONCE
Status: COMPLETED | OUTPATIENT
Start: 2023-09-16 | End: 2023-09-16

## 2023-09-16 RX ORDER — ALBUTEROL SULFATE 90 UG/1
2 AEROSOL, METERED RESPIRATORY (INHALATION) EVERY 6 HOURS PRN
Status: DISCONTINUED | OUTPATIENT
Start: 2023-09-16 | End: 2023-09-27 | Stop reason: HOSPADM

## 2023-09-16 RX ORDER — ACETAMINOPHEN 650 MG/1
650 SUPPOSITORY RECTAL EVERY 6 HOURS PRN
Status: DISCONTINUED | OUTPATIENT
Start: 2023-09-16 | End: 2023-09-27 | Stop reason: HOSPADM

## 2023-09-16 RX ORDER — PRAVASTATIN SODIUM 40 MG
80 TABLET ORAL NIGHTLY
Status: DISCONTINUED | OUTPATIENT
Start: 2023-09-16 | End: 2023-09-27 | Stop reason: HOSPADM

## 2023-09-16 RX ORDER — POTASSIUM CHLORIDE 7.45 MG/ML
10 INJECTION INTRAVENOUS PRN
Status: DISCONTINUED | OUTPATIENT
Start: 2023-09-16 | End: 2023-09-27 | Stop reason: HOSPADM

## 2023-09-16 RX ORDER — LINEZOLID 2 MG/ML
600 INJECTION, SOLUTION INTRAVENOUS ONCE
Status: COMPLETED | OUTPATIENT
Start: 2023-09-16 | End: 2023-09-16

## 2023-09-16 RX ORDER — POTASSIUM CHLORIDE 20 MEQ/1
40 TABLET, EXTENDED RELEASE ORAL PRN
Status: DISCONTINUED | OUTPATIENT
Start: 2023-09-16 | End: 2023-09-27 | Stop reason: HOSPADM

## 2023-09-16 RX ORDER — LORAZEPAM 2 MG/ML
0.5 INJECTION INTRAMUSCULAR ONCE
Status: COMPLETED | OUTPATIENT
Start: 2023-09-16 | End: 2023-09-16

## 2023-09-16 RX ORDER — ONDANSETRON 2 MG/ML
4 INJECTION INTRAMUSCULAR; INTRAVENOUS EVERY 6 HOURS PRN
Status: DISCONTINUED | OUTPATIENT
Start: 2023-09-16 | End: 2023-09-27 | Stop reason: HOSPADM

## 2023-09-16 RX ORDER — ONDANSETRON 4 MG/1
4 TABLET, ORALLY DISINTEGRATING ORAL EVERY 8 HOURS PRN
Status: DISCONTINUED | OUTPATIENT
Start: 2023-09-16 | End: 2023-09-27 | Stop reason: HOSPADM

## 2023-09-16 RX ORDER — LINEZOLID 2 MG/ML
600 INJECTION, SOLUTION INTRAVENOUS EVERY 12 HOURS
Status: DISCONTINUED | OUTPATIENT
Start: 2023-09-16 | End: 2023-09-19

## 2023-09-16 RX ORDER — CARVEDILOL 6.25 MG/1
3.12 TABLET ORAL 2 TIMES DAILY WITH MEALS
Status: DISCONTINUED | OUTPATIENT
Start: 2023-09-16 | End: 2023-09-17

## 2023-09-16 RX ORDER — SODIUM CHLORIDE 9 MG/ML
INJECTION, SOLUTION INTRAVENOUS PRN
Status: DISCONTINUED | OUTPATIENT
Start: 2023-09-16 | End: 2023-09-27 | Stop reason: HOSPADM

## 2023-09-16 RX ORDER — SODIUM CHLORIDE 9 MG/ML
INJECTION, SOLUTION INTRAVENOUS CONTINUOUS
Status: DISCONTINUED | OUTPATIENT
Start: 2023-09-16 | End: 2023-09-16

## 2023-09-16 RX ORDER — SODIUM CHLORIDE 0.9 % (FLUSH) 0.9 %
5-40 SYRINGE (ML) INJECTION PRN
Status: DISCONTINUED | OUTPATIENT
Start: 2023-09-16 | End: 2023-09-27 | Stop reason: HOSPADM

## 2023-09-16 RX ADMIN — CEFEPIME 2000 MG: 2 INJECTION, POWDER, FOR SOLUTION INTRAVENOUS at 17:17

## 2023-09-16 RX ADMIN — SODIUM CHLORIDE: 9 INJECTION, SOLUTION INTRAVENOUS at 07:04

## 2023-09-16 RX ADMIN — VANCOMYCIN HYDROCHLORIDE 1250 MG: 1.25 INJECTION, POWDER, LYOPHILIZED, FOR SOLUTION INTRAVENOUS at 11:37

## 2023-09-16 RX ADMIN — CEFEPIME 2000 MG: 2 INJECTION, POWDER, FOR SOLUTION INTRAVENOUS at 04:55

## 2023-09-16 RX ADMIN — SODIUM CHLORIDE 1000 ML: 9 INJECTION, SOLUTION INTRAVENOUS at 03:28

## 2023-09-16 RX ADMIN — ASPIRIN 81 MG CHEWABLE TABLET 81 MG: 81 TABLET CHEWABLE at 10:24

## 2023-09-16 RX ADMIN — LINEZOLID 600 MG: 600 INJECTION, SOLUTION INTRAVENOUS at 17:15

## 2023-09-16 RX ADMIN — PANTOPRAZOLE SODIUM 20 MG: 20 TABLET, DELAYED RELEASE ORAL at 10:10

## 2023-09-16 RX ADMIN — LINEZOLID 600 MG: 600 INJECTION, SOLUTION INTRAVENOUS at 05:32

## 2023-09-16 RX ADMIN — FERROUS GLUCONATE TAB 324 MG (37.5 MG ELEMENTAL IRON) 325 MG: 324 (37.5 FE) TAB at 21:06

## 2023-09-16 RX ADMIN — AZITHROMYCIN MONOHYDRATE 500 MG: 500 INJECTION, POWDER, LYOPHILIZED, FOR SOLUTION INTRAVENOUS at 10:09

## 2023-09-16 RX ADMIN — HALOPERIDOL LACTATE 5 MG: 5 INJECTION, SOLUTION INTRAMUSCULAR at 02:30

## 2023-09-16 RX ADMIN — LORAZEPAM 0.5 MG: 2 INJECTION INTRAMUSCULAR; INTRAVENOUS at 02:30

## 2023-09-16 RX ADMIN — CARVEDILOL 3.12 MG: 6.25 TABLET, FILM COATED ORAL at 10:10

## 2023-09-16 RX ADMIN — RIVAROXABAN 15 MG: 15 TABLET, FILM COATED ORAL at 10:10

## 2023-09-16 RX ADMIN — PRAVASTATIN SODIUM 80 MG: 40 TABLET ORAL at 21:06

## 2023-09-16 ASSESSMENT — PAIN SCALES - GENERAL: PAINLEVEL_OUTOF10: 0

## 2023-09-16 NOTE — H&P
History and Physical      Name:  Shay Chaudhary /Age/Sex:   (80 y.o. male)   MRN & CSN:  [id number] & [id number] Encounter Date/Time: 2023 5:44 AM EDT   Location:  ED30/ED-30 PCP: Phoebe Miller MD       Hospital Day: 2    Assessment and Plan:     Patient is a 80 y.o. male  who presented with abnormal labs      Severe Sepsis with organ dysfunction secondary to community-acquired pneumonia   - Presented from NH due to weakness. Recently admitted on  due to left leg abscess.   -On admit, hemodynamically stable, tachycardic, afebrile, no leukocytosis. LA 2.1.   - CT chest with BLT opacities and LLL consolidation   -Received linezolid, cefepime and gentle fluids in the ED  - Vanc and cefepime pending cultures; cefepime known to cause encephalopathy in MC/CKD patients, will monitor mental status   - Follow-up cultures and inflammatory markers. Lactic acidosis   AGMA  -LA 2.1 on arrival, trended down to 1.8 with IVF; HCO3 19 AG 13  -2/2 sepsis    -Continue to monitor     Non-MI troponin elevation  - Denied any typical CP.  - Initial Tn mildly elevated 0.021. ECG without acute ischemic changes. - Likely secondary to sepsis and MC. -Follow-up repeat Tn.  -Tele      MC  -Cr 1.8 on admit, baseline (baseline ~1.1)  -Likely pre-renal 2/2 decreased PO intake, and sepsis      -IVF   -Avoid nephrotoxin   -Monitor I/O   -Bladder scan     CAD  -Continue, ASA, statin, BB    Delirium  -Likely 2/2 to sepsis    Continue to monitor     Anemia  - Hemodynamically stable, Hg 8 on admit, baseline of ~ 8   - Anemia panel consistent with SAHRA (Fe 14 and ferritin 15)  - No Hx of recent bleeding (hemoptysis, hematemesis, hematuria, melana or BRBPR).   - Not on anticoagulation       -Type and screen              - Continue to monitor          A-fib with RVR  -Continue Coreg and Xarelto        Acute on chronic CHF  -TTE show EF of 55-60%, RVSP 43   - BNP on admit elevated however age appropriate

## 2023-09-16 NOTE — ED PROVIDER NOTES
Reviewed   CBC WITH AUTO DIFFERENTIAL - Abnormal; Notable for the following components:       Result Value    RBC 2.56 (*)     Hemoglobin 8.1 (*)     Hematocrit 26.4 (*)     .1 (*)     MCH 31.6 (*)     MCHC 30.7 (*)     RDW 19.4 (*)     Platelets 78 (*)     Segs Relative 83.1 (*)     Lymphocytes % 9.8 (*)     Monocytes % 6.4 (*)     All other components within normal limits   LACTATE, SEPSIS - Abnormal; Notable for the following components:    Lactic Acid, Sepsis 2.1 (*)     All other components within normal limits   COMPREHENSIVE METABOLIC PANEL - Abnormal; Notable for the following components:    CO2 19 (*)     BUN 50 (*)     Creatinine 1.8 (*)     Est, Glom Filt Rate 37 (*)     Glucose 103 (*)     Albumin 3.1 (*)     Total Protein 5.9 (*)     ALT 8 (*)     All other components within normal limits   COMPREHENSIVE METABOLIC PANEL - Abnormal; Notable for the following components:    BUN 51 (*)     Creatinine 1.9 (*)     Est, Glom Filt Rate 35 (*)     Glucose 112 (*)     Albumin 3.3 (*)     All other components within normal limits   BRAIN NATRIURETIC PEPTIDE - Abnormal; Notable for the following components:    Pro-BNP 1,657 (*)     All other components within normal limits   TROPONIN - Abnormal; Notable for the following components:    Troponin T 0.021 (*)     All other components within normal limits   CULTURE, BLOOD 1   CULTURE, BLOOD 1   CULTURE, URINE   LACTATE, SEPSIS   URINALYSIS       All other labs were within normal range or not returned as of this dictation.     EMERGENCY DEPARTMENT COURSE and DIFFERENTIAL DIAGNOSIS/MDM:   Vitals:    Vitals:    09/15/23 2148 09/15/23 2232 09/15/23 2302 09/16/23 0150   BP:  (!) 103/59 (!) 102/59 94/71   Pulse:  (!) 101 99 (!) 107   Resp:  20 25 17   Temp:       TempSrc:       SpO2:       Weight: 260 lb (117.9 kg)      Height: 5' 11\" (1.803 m)              MDM  Number of Diagnoses or Management Options  MC (acute kidney injury) ([address])  Pneumonia due to infectious
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
## 2023-09-17 LAB
ALBUMIN SERPL-MCNC: 3.1 GM/DL (ref 3.4–5)
ALP BLD-CCNC: 82 IU/L (ref 40–128)
ALT SERPL-CCNC: 9 U/L (ref 10–40)
ANION GAP SERPL CALCULATED.3IONS-SCNC: 13 MMOL/L (ref 4–16)
ANISOCYTOSIS: ABNORMAL
AST SERPL-CCNC: 24 IU/L (ref 15–37)
B PARAP IS1001 DNA NPH QL NAA+NON-PROBE: NOT DETECTED
B PERT.PT PRMT NPH QL NAA+NON-PROBE: NOT DETECTED
BANDED NEUTROPHILS ABSOLUTE COUNT: 0.05 K/CU MM
BANDED NEUTROPHILS RELATIVE PERCENT: 1 % (ref 5–11)
BILIRUB SERPL-MCNC: 0.4 MG/DL (ref 0–1)
BUN SERPL-MCNC: 39 MG/DL (ref 6–23)
C PNEUM DNA NPH QL NAA+NON-PROBE: NOT DETECTED
CALCIUM SERPL-MCNC: 8.6 MG/DL (ref 8.3–10.6)
CHLORIDE BLD-SCNC: 108 MMOL/L (ref 99–110)
CO2: 15 MMOL/L (ref 21–32)
CREAT SERPL-MCNC: 1.4 MG/DL (ref 0.9–1.3)
DIFFERENTIAL TYPE: ABNORMAL
EOSINOPHILS ABSOLUTE: 0.1 K/CU MM
EOSINOPHILS RELATIVE PERCENT: 1 % (ref 0–3)
FLUAV H1 2009 PAN RNA NPH NAA+NON-PROBE: NOT DETECTED
FLUAV H1 RNA NPH QL NAA+NON-PROBE: NOT DETECTED
FLUAV H3 RNA NPH QL NAA+NON-PROBE: NOT DETECTED
FLUAV RNA NPH QL NAA+NON-PROBE: NOT DETECTED
FLUBV RNA NPH QL NAA+NON-PROBE: NOT DETECTED
GFR SERPL CREATININE-BSD FRML MDRD: 50 ML/MIN/1.73M2
GLUCOSE SERPL-MCNC: 106 MG/DL (ref 70–99)
HADV DNA NPH QL NAA+NON-PROBE: NOT DETECTED
HCOV 229E RNA NPH QL NAA+NON-PROBE: NOT DETECTED
HCOV HKU1 RNA NPH QL NAA+NON-PROBE: NOT DETECTED
HCOV NL63 RNA NPH QL NAA+NON-PROBE: NOT DETECTED
HCOV OC43 RNA NPH QL NAA+NON-PROBE: NOT DETECTED
HCT VFR BLD CALC: 21 % (ref 42–52)
HCT VFR BLD CALC: 22.3 % (ref 42–52)
HCT VFR BLD CALC: 24.2 % (ref 42–52)
HEMOGLOBIN: 6.3 GM/DL (ref 13.5–18)
HEMOGLOBIN: 6.8 GM/DL (ref 13.5–18)
HEMOGLOBIN: 7.4 GM/DL (ref 13.5–18)
HMPV RNA NPH QL NAA+NON-PROBE: NOT DETECTED
HPIV1 RNA NPH QL NAA+NON-PROBE: NOT DETECTED
HPIV2 RNA NPH QL NAA+NON-PROBE: NOT DETECTED
HPIV3 RNA NPH QL NAA+NON-PROBE: NOT DETECTED
HPIV4 RNA NPH QL NAA+NON-PROBE: NOT DETECTED
HYPOCHROMIA: ABNORMAL
INR BLD: 1.4 INDEX
LYMPHOCYTES ABSOLUTE: 0.8 K/CU MM
LYMPHOCYTES RELATIVE PERCENT: 16 % (ref 24–44)
M PNEUMO DNA NPH QL NAA+NON-PROBE: NOT DETECTED
MCH RBC QN AUTO: 31.5 PG (ref 27–31)
MCHC RBC AUTO-ENTMCNC: 30 % (ref 32–36)
MCV RBC AUTO: 105 FL (ref 78–100)
MONOCYTES ABSOLUTE: 0.2 K/CU MM
MONOCYTES RELATIVE PERCENT: 4 % (ref 0–4)
NUCLEATED RED BLOOD CELLS: 2
PDW BLD-RTO: 18.9 % (ref 11.7–14.9)
PLATELET # BLD: 52 K/CU MM (ref 140–440)
POTASSIUM SERPL-SCNC: 4.2 MMOL/L (ref 3.5–5.1)
PROTHROMBIN TIME: 17.7 SECONDS (ref 11.7–14.5)
RBC # BLD: 2 M/CU MM (ref 4.6–6.2)
REASON FOR REJECTION: NORMAL
REJECTED TEST: NORMAL
RSV RNA NPH QL NAA+NON-PROBE: NOT DETECTED
RV+EV RNA NPH QL NAA+NON-PROBE: NOT DETECTED
SARS-COV-2 RNA NPH QL NAA+NON-PROBE: NOT DETECTED
SEGMENTED NEUTROPHILS ABSOLUTE COUNT: 3.8 K/CU MM
SEGMENTED NEUTROPHILS RELATIVE PERCENT: 78 % (ref 36–66)
SODIUM BLD-SCNC: 136 MMOL/L (ref 135–145)
TOTAL PROTEIN: 5.3 GM/DL (ref 6.4–8.2)
TROPONIN T: <0.01 NG/ML
WBC # BLD: 5 K/CU MM (ref 4–10.5)

## 2023-09-17 PROCEDURE — 83540 ASSAY OF IRON: CPT

## 2023-09-17 PROCEDURE — 86850 RBC ANTIBODY SCREEN: CPT

## 2023-09-17 PROCEDURE — 85027 COMPLETE CBC AUTOMATED: CPT

## 2023-09-17 PROCEDURE — 87070 CULTURE OTHR SPECIMN AEROBIC: CPT

## 2023-09-17 PROCEDURE — 85007 BL SMEAR W/DIFF WBC COUNT: CPT

## 2023-09-17 PROCEDURE — 85018 HEMOGLOBIN: CPT

## 2023-09-17 PROCEDURE — 36415 COLL VENOUS BLD VENIPUNCTURE: CPT

## 2023-09-17 PROCEDURE — 86900 BLOOD TYPING SEROLOGIC ABO: CPT

## 2023-09-17 PROCEDURE — 82607 VITAMIN B-12: CPT

## 2023-09-17 PROCEDURE — 30233N1 TRANSFUSION OF NONAUTOLOGOUS RED BLOOD CELLS INTO PERIPHERAL VEIN, PERCUTANEOUS APPROACH: ICD-10-PCS | Performed by: STUDENT IN AN ORGANIZED HEALTH CARE EDUCATION/TRAINING PROGRAM

## 2023-09-17 PROCEDURE — 2580000003 HC RX 258: Performed by: STUDENT IN AN ORGANIZED HEALTH CARE EDUCATION/TRAINING PROGRAM

## 2023-09-17 PROCEDURE — 6360000002 HC RX W HCPCS: Performed by: STUDENT IN AN ORGANIZED HEALTH CARE EDUCATION/TRAINING PROGRAM

## 2023-09-17 PROCEDURE — 84484 ASSAY OF TROPONIN QUANT: CPT

## 2023-09-17 PROCEDURE — 85025 COMPLETE CBC W/AUTO DIFF WBC: CPT

## 2023-09-17 PROCEDURE — 0202U NFCT DS 22 TRGT SARS-COV-2: CPT

## 2023-09-17 PROCEDURE — P9016 RBC LEUKOCYTES REDUCED: HCPCS

## 2023-09-17 PROCEDURE — 94640 AIRWAY INHALATION TREATMENT: CPT

## 2023-09-17 PROCEDURE — 86922 COMPATIBILITY TEST ANTIGLOB: CPT

## 2023-09-17 PROCEDURE — 82746 ASSAY OF FOLIC ACID SERUM: CPT

## 2023-09-17 PROCEDURE — 36430 TRANSFUSION BLD/BLD COMPNT: CPT

## 2023-09-17 PROCEDURE — 87205 SMEAR GRAM STAIN: CPT

## 2023-09-17 PROCEDURE — 87081 CULTURE SCREEN ONLY: CPT

## 2023-09-17 PROCEDURE — 6370000000 HC RX 637 (ALT 250 FOR IP): Performed by: STUDENT IN AN ORGANIZED HEALTH CARE EDUCATION/TRAINING PROGRAM

## 2023-09-17 PROCEDURE — 80053 COMPREHEN METABOLIC PANEL: CPT

## 2023-09-17 PROCEDURE — 87899 AGENT NOS ASSAY W/OPTIC: CPT

## 2023-09-17 PROCEDURE — 86901 BLOOD TYPING SEROLOGIC RH(D): CPT

## 2023-09-17 PROCEDURE — 94761 N-INVAS EAR/PLS OXIMETRY MLT: CPT

## 2023-09-17 PROCEDURE — 85014 HEMATOCRIT: CPT

## 2023-09-17 PROCEDURE — 2060000000 HC ICU INTERMEDIATE R&B

## 2023-09-17 PROCEDURE — 87449 NOS EACH ORGANISM AG IA: CPT

## 2023-09-17 PROCEDURE — 83550 IRON BINDING TEST: CPT

## 2023-09-17 PROCEDURE — 85610 PROTHROMBIN TIME: CPT

## 2023-09-17 PROCEDURE — 82728 ASSAY OF FERRITIN: CPT

## 2023-09-17 PROCEDURE — 85045 AUTOMATED RETICULOCYTE COUNT: CPT

## 2023-09-17 PROCEDURE — C9113 INJ PANTOPRAZOLE SODIUM, VIA: HCPCS | Performed by: STUDENT IN AN ORGANIZED HEALTH CARE EDUCATION/TRAINING PROGRAM

## 2023-09-17 PROCEDURE — 87641 MR-STAPH DNA AMP PROBE: CPT

## 2023-09-17 RX ORDER — SODIUM CHLORIDE 9 MG/ML
INJECTION, SOLUTION INTRAVENOUS PRN
Status: DISCONTINUED | OUTPATIENT
Start: 2023-09-17 | End: 2023-09-27 | Stop reason: HOSPADM

## 2023-09-17 RX ORDER — PANTOPRAZOLE SODIUM 40 MG/10ML
40 INJECTION, POWDER, LYOPHILIZED, FOR SOLUTION INTRAVENOUS 2 TIMES DAILY
Status: DISCONTINUED | OUTPATIENT
Start: 2023-09-17 | End: 2023-09-27 | Stop reason: HOSPADM

## 2023-09-17 RX ADMIN — LINEZOLID 600 MG: 600 INJECTION, SOLUTION INTRAVENOUS at 15:43

## 2023-09-17 RX ADMIN — SODIUM CHLORIDE, PRESERVATIVE FREE 10 ML: 5 INJECTION INTRAVENOUS at 20:07

## 2023-09-17 RX ADMIN — PANTOPRAZOLE SODIUM 40 MG: 40 INJECTION, POWDER, FOR SOLUTION INTRAVENOUS at 20:42

## 2023-09-17 RX ADMIN — AZITHROMYCIN MONOHYDRATE 500 MG: 500 INJECTION, POWDER, LYOPHILIZED, FOR SOLUTION INTRAVENOUS at 08:34

## 2023-09-17 RX ADMIN — ASPIRIN 81 MG CHEWABLE TABLET 81 MG: 81 TABLET CHEWABLE at 08:43

## 2023-09-17 RX ADMIN — LINEZOLID 600 MG: 600 INJECTION, SOLUTION INTRAVENOUS at 04:28

## 2023-09-17 RX ADMIN — CEFEPIME 2000 MG: 2 INJECTION, POWDER, FOR SOLUTION INTRAVENOUS at 18:12

## 2023-09-17 RX ADMIN — CEFEPIME 2000 MG: 2 INJECTION, POWDER, FOR SOLUTION INTRAVENOUS at 04:30

## 2023-09-17 RX ADMIN — METOPROLOL TARTRATE 12.5 MG: 25 TABLET, FILM COATED ORAL at 20:42

## 2023-09-17 RX ADMIN — CARVEDILOL 3.12 MG: 6.25 TABLET, FILM COATED ORAL at 08:43

## 2023-09-17 RX ADMIN — FERROUS GLUCONATE TAB 324 MG (37.5 MG ELEMENTAL IRON) 325 MG: 324 (37.5 FE) TAB at 08:42

## 2023-09-17 RX ADMIN — TIOTROPIUM BROMIDE INHALATION SPRAY 2 PUFF: 3.12 SPRAY, METERED RESPIRATORY (INHALATION) at 08:42

## 2023-09-17 RX ADMIN — FERROUS GLUCONATE TAB 324 MG (37.5 MG ELEMENTAL IRON) 325 MG: 324 (37.5 FE) TAB at 20:04

## 2023-09-17 RX ADMIN — RIVAROXABAN 15 MG: 15 TABLET, FILM COATED ORAL at 08:42

## 2023-09-17 RX ADMIN — PANTOPRAZOLE SODIUM 20 MG: 20 TABLET, DELAYED RELEASE ORAL at 06:32

## 2023-09-17 RX ADMIN — PRAVASTATIN SODIUM 80 MG: 40 TABLET ORAL at 20:04

## 2023-09-17 ASSESSMENT — PAIN SCALES - GENERAL
PAINLEVEL_OUTOF10: 0

## 2023-09-18 LAB
ALBUMIN SERPL-MCNC: 3 GM/DL (ref 3.4–5)
ALP BLD-CCNC: 76 IU/L (ref 40–129)
ALT SERPL-CCNC: 9 U/L (ref 10–40)
ANION GAP SERPL CALCULATED.3IONS-SCNC: 7 MMOL/L (ref 4–16)
ANISOCYTOSIS: ABNORMAL
AST SERPL-CCNC: 21 IU/L (ref 15–37)
BANDED NEUTROPHILS ABSOLUTE COUNT: 0.16 K/CU MM
BANDED NEUTROPHILS RELATIVE PERCENT: 3 % (ref 5–11)
BASOPHILIC STIPPLING: PRESENT
BILIRUB SERPL-MCNC: 0.9 MG/DL (ref 0–1)
BUN SERPL-MCNC: 31 MG/DL (ref 6–23)
CALCIUM SERPL-MCNC: 8.7 MG/DL (ref 8.3–10.6)
CHLORIDE BLD-SCNC: 107 MMOL/L (ref 99–110)
CO2: 20 MMOL/L (ref 21–32)
CREAT SERPL-MCNC: 1.2 MG/DL (ref 0.9–1.3)
DIFFERENTIAL TYPE: ABNORMAL
DIFFERENTIAL TYPE: ABNORMAL
DOSE AMOUNT: NORMAL
DOSE TIME: NORMAL
EKG ATRIAL RATE: 79 BPM
EKG DIAGNOSIS: NORMAL
EKG Q-T INTERVAL: 388 MS
EKG QRS DURATION: 144 MS
EKG QTC CALCULATION (BAZETT): 508 MS
EKG R AXIS: -62 DEGREES
EKG T AXIS: -13 DEGREES
EKG VENTRICULAR RATE: 103 BPM
EOSINOPHILS ABSOLUTE: 0.1 K/CU MM
EOSINOPHILS RELATIVE PERCENT: 2 % (ref 0–3)
FERRITIN: 257 NG/ML (ref 30–400)
FOLATE SERPL-MCNC: 9.2 NG/ML (ref 3.1–17.5)
GFR SERPL CREATININE-BSD FRML MDRD: >60 ML/MIN/1.73M2
GLUCOSE SERPL-MCNC: 105 MG/DL (ref 70–99)
HCT VFR BLD CALC: 21.3 % (ref 42–52)
HCT VFR BLD CALC: 25 % (ref 42–52)
HCT VFR BLD CALC: 26.1 % (ref 42–52)
HEMOGLOBIN: 6.4 GM/DL (ref 13.5–18)
HEMOGLOBIN: 8.1 GM/DL (ref 13.5–18)
HEMOGLOBIN: 8.2 GM/DL (ref 13.5–18)
HYPOCHROMIA: ABNORMAL
IRON: 275 UG/DL (ref 59–158)
L PNEUMO AG UR QL IA: NEGATIVE
LYMPHOCYTES ABSOLUTE: 0.7 K/CU MM
LYMPHOCYTES ABSOLUTE: 1 K/CU MM
LYMPHOCYTES RELATIVE PERCENT: 12 % (ref 24–44)
LYMPHOCYTES RELATIVE PERCENT: 18 % (ref 24–44)
MCH RBC QN AUTO: 31.2 PG (ref 27–31)
MCH RBC QN AUTO: 31.4 PG (ref 27–31)
MCHC RBC AUTO-ENTMCNC: 30 % (ref 32–36)
MCHC RBC AUTO-ENTMCNC: 31.4 % (ref 32–36)
MCV RBC AUTO: 100 FL (ref 78–100)
MCV RBC AUTO: 103.9 FL (ref 78–100)
METAMYELOCYTES ABSOLUTE COUNT: 0.05 K/CU MM
METAMYELOCYTES PERCENT: 1 %
MONOCYTES ABSOLUTE: 0.3 K/CU MM
MONOCYTES ABSOLUTE: 0.4 K/CU MM
MONOCYTES RELATIVE PERCENT: 5 % (ref 0–4)
MONOCYTES RELATIVE PERCENT: 7 % (ref 0–4)
MRSA, DNA, NASAL: NEGATIVE
NUCLEATED RED BLOOD CELLS: 2
PCT TRANSFERRIN: ABNORMAL % (ref 10–44)
PDW BLD-RTO: 18.4 % (ref 11.7–14.9)
PDW BLD-RTO: 18.6 % (ref 11.7–14.9)
PLATELET # BLD: 51 K/CU MM (ref 140–440)
PLATELET # BLD: 57 K/CU MM (ref 140–440)
PLT MORPHOLOGY: ABNORMAL
PMV BLD AUTO: 14.3 FL (ref 7.5–11.1)
POTASSIUM SERPL-SCNC: 4.1 MMOL/L (ref 3.5–5.1)
RBC # BLD: 2.05 M/CU MM (ref 4.6–6.2)
RBC # BLD: 2.61 M/CU MM (ref 4.6–6.2)
RBC # BLD: ABNORMAL 10*6/UL
RBC # BLD: ABNORMAL 10*6/UL
RETICULOCYTE COUNT PCT: 2.2 % (ref 0.2–2.2)
S PNEUM AG CSF QL: NORMAL
SEGMENTED NEUTROPHILS ABSOLUTE COUNT: 3.8 K/CU MM
SEGMENTED NEUTROPHILS ABSOLUTE COUNT: 4.9 K/CU MM
SEGMENTED NEUTROPHILS RELATIVE PERCENT: 73 % (ref 36–66)
SEGMENTED NEUTROPHILS RELATIVE PERCENT: 79 % (ref 36–66)
SODIUM BLD-SCNC: 134 MMOL/L (ref 135–145)
SPECIMEN DESCRIPTION: NORMAL
TOTAL IRON BINDING CAPACITY: ABNORMAL UG/DL (ref 250–450)
TOTAL PROTEIN: 5.2 GM/DL (ref 6.4–8.2)
UNSATURATED IRON BINDING CAPACITY: <17 UG/DL (ref 110–370)
VANCOMYCIN RANDOM: 8.3 UG/ML
VITAMIN B-12: 697.2 PG/ML (ref 211–911)
WBC # BLD: 5.3 K/CU MM (ref 4–10.5)
WBC # BLD: 6.1 K/CU MM (ref 4–10.5)

## 2023-09-18 PROCEDURE — 6370000000 HC RX 637 (ALT 250 FOR IP): Performed by: STUDENT IN AN ORGANIZED HEALTH CARE EDUCATION/TRAINING PROGRAM

## 2023-09-18 PROCEDURE — C9113 INJ PANTOPRAZOLE SODIUM, VIA: HCPCS | Performed by: STUDENT IN AN ORGANIZED HEALTH CARE EDUCATION/TRAINING PROGRAM

## 2023-09-18 PROCEDURE — 87075 CULTR BACTERIA EXCEPT BLOOD: CPT

## 2023-09-18 PROCEDURE — 85007 BL SMEAR W/DIFF WBC COUNT: CPT

## 2023-09-18 PROCEDURE — 2060000000 HC ICU INTERMEDIATE R&B

## 2023-09-18 PROCEDURE — 87449 NOS EACH ORGANISM AG IA: CPT

## 2023-09-18 PROCEDURE — 94640 AIRWAY INHALATION TREATMENT: CPT

## 2023-09-18 PROCEDURE — 85014 HEMATOCRIT: CPT

## 2023-09-18 PROCEDURE — 2580000003 HC RX 258: Performed by: STUDENT IN AN ORGANIZED HEALTH CARE EDUCATION/TRAINING PROGRAM

## 2023-09-18 PROCEDURE — 85027 COMPLETE CBC AUTOMATED: CPT

## 2023-09-18 PROCEDURE — 36415 COLL VENOUS BLD VENIPUNCTURE: CPT

## 2023-09-18 PROCEDURE — 93010 ELECTROCARDIOGRAM REPORT: CPT | Performed by: INTERNAL MEDICINE

## 2023-09-18 PROCEDURE — 87899 AGENT NOS ASSAY W/OPTIC: CPT

## 2023-09-18 PROCEDURE — 85018 HEMOGLOBIN: CPT

## 2023-09-18 PROCEDURE — 80053 COMPREHEN METABOLIC PANEL: CPT

## 2023-09-18 PROCEDURE — 80202 ASSAY OF VANCOMYCIN: CPT

## 2023-09-18 PROCEDURE — 6360000002 HC RX W HCPCS: Performed by: STUDENT IN AN ORGANIZED HEALTH CARE EDUCATION/TRAINING PROGRAM

## 2023-09-18 PROCEDURE — 36430 TRANSFUSION BLD/BLD COMPNT: CPT

## 2023-09-18 PROCEDURE — 87070 CULTURE OTHR SPECIMN AEROBIC: CPT

## 2023-09-18 PROCEDURE — 99223 1ST HOSP IP/OBS HIGH 75: CPT | Performed by: INTERNAL MEDICINE

## 2023-09-18 PROCEDURE — 94761 N-INVAS EAR/PLS OXIMETRY MLT: CPT

## 2023-09-18 RX ADMIN — METOPROLOL TARTRATE 12.5 MG: 25 TABLET, FILM COATED ORAL at 08:14

## 2023-09-18 RX ADMIN — PRAVASTATIN SODIUM 80 MG: 40 TABLET ORAL at 21:42

## 2023-09-18 RX ADMIN — FERROUS GLUCONATE TAB 324 MG (37.5 MG ELEMENTAL IRON) 325 MG: 324 (37.5 FE) TAB at 21:42

## 2023-09-18 RX ADMIN — LINEZOLID 600 MG: 600 INJECTION, SOLUTION INTRAVENOUS at 17:19

## 2023-09-18 RX ADMIN — PANTOPRAZOLE SODIUM 40 MG: 40 INJECTION, POWDER, FOR SOLUTION INTRAVENOUS at 08:13

## 2023-09-18 RX ADMIN — PANTOPRAZOLE SODIUM 40 MG: 40 INJECTION, POWDER, FOR SOLUTION INTRAVENOUS at 21:42

## 2023-09-18 RX ADMIN — CEFEPIME 2000 MG: 2 INJECTION, POWDER, FOR SOLUTION INTRAVENOUS at 17:19

## 2023-09-18 RX ADMIN — LINEZOLID 600 MG: 600 INJECTION, SOLUTION INTRAVENOUS at 03:41

## 2023-09-18 RX ADMIN — ACETAMINOPHEN 650 MG: 325 TABLET ORAL at 08:32

## 2023-09-18 RX ADMIN — CEFEPIME 2000 MG: 2 INJECTION, POWDER, FOR SOLUTION INTRAVENOUS at 05:07

## 2023-09-18 RX ADMIN — METOPROLOL TARTRATE 12.5 MG: 25 TABLET, FILM COATED ORAL at 21:42

## 2023-09-18 RX ADMIN — SODIUM CHLORIDE, PRESERVATIVE FREE 10 ML: 5 INJECTION INTRAVENOUS at 21:42

## 2023-09-18 RX ADMIN — TIOTROPIUM BROMIDE INHALATION SPRAY 2 PUFF: 3.12 SPRAY, METERED RESPIRATORY (INHALATION) at 08:54

## 2023-09-18 RX ADMIN — SODIUM CHLORIDE, PRESERVATIVE FREE 10 ML: 5 INJECTION INTRAVENOUS at 08:14

## 2023-09-18 RX ADMIN — FERROUS GLUCONATE TAB 324 MG (37.5 MG ELEMENTAL IRON) 325 MG: 324 (37.5 FE) TAB at 08:14

## 2023-09-18 ASSESSMENT — PAIN DESCRIPTION - DESCRIPTORS: DESCRIPTORS: ACHING

## 2023-09-18 ASSESSMENT — PAIN DESCRIPTION - ORIENTATION: ORIENTATION: RIGHT;LEFT

## 2023-09-18 ASSESSMENT — PAIN SCALES - GENERAL
PAINLEVEL_OUTOF10: 0
PAINLEVEL_OUTOF10: 6
PAINLEVEL_OUTOF10: 0
PAINLEVEL_OUTOF10: 0

## 2023-09-18 ASSESSMENT — PAIN DESCRIPTION - LOCATION: LOCATION: BACK

## 2023-09-18 ASSESSMENT — PAIN SCALES - WONG BAKER: WONGBAKER_NUMERICALRESPONSE: 0

## 2023-09-18 NOTE — CARE COORDINATION
09/18/23 1600   Service Assessment   Patient Orientation Alert and Oriented   Cognition Alert   History Provided By Patient   Primary Caregiver Self  (staff at [facility])   [address] [facility] Family Members   PCP Verified by CM Yes   Last Visit to PCP Within last 3 months   Prior Functional Level Assistance with the following:;Bathing;Dressing; Toileting;Mobility   Current Functional Level Assistance with the following:;Bathing;Dressing; Toileting;Mobility   Can patient return to prior living arrangement Yes   Ability to make needs known: Good   Family able to assist with home care needs: Other (comment)  (Going to SNF)   Would you like for me to discuss the discharge plan with any other family members/significant others, and if so, who? No   Financial Resources [facility]   Social/Functional History   Lives With Alone; Other (comment)  [facility] SNF)   Type of [address] has insurance and a PCP. Pt came to [facility] from [facility] skilled. Pt plans to return when medically stable. Will need a pre-cert.

## 2023-09-18 NOTE — DISCHARGE INSTR - COC
Continuity of Care Form    Patient Name: Bryon Barreto   :    MRN:  [id number]    Admit date:  9/15/2023  Discharge date:  23    Code Status Order: DNR-CCA   Advance Directives:     Admitting Physician:  Sebastian Hair MD  PCP: Ayaan Tirado MD    Discharging Nurse: [facility], [facility] Unit/Room#: 3406/2452-K  Discharging Unit Phone Number: [phone]    Emergency Contact:   Extended Emergency Contact Information  Primary Emergency Contact: [address] [facility] Phone: [phone]  Mobile Phone: [phone]  Relation: Daughter-in-Law  Preferred language: English   needed?  No    Past Surgical History:  Past Surgical History:   Procedure Laterality Date    COLONOSCOPY N/A 2022    COLONOSCOPY POLYPECTOMY SNARE/COLD BIOPSY performed by Uli Ponce MD at [address] [place] [address] Ln REPLACEMENT      partial left knee    UPPER GASTROINTESTINAL ENDOSCOPY N/A 2022    EGD DIAGNOSTIC ONLY performed by Uli Ponce MD at [facility] ENDOSCOPY       Immunization History:   Immunization History   Administered Date(s) Administered    COVID-19, MODERNA BLUE border, Primary or Immunocompromised, (age 12y+), IM, 100 mcg/0.5mL 2020, 2021, 12/10/2021    Pneumococcal, PPSV23, PNEUMOVAX 21, (age 2y+), SC/IM, 0.5mL 2015       Active Problems:  Patient Active Problem List   Diagnosis Code    CAD s/p prox LAD bare metal stent  Z98.61    RBBB I45.10    COPD (chronic obstructive pulmonary disease) ([address]) J44.9    DJD (degenerative joint disease) M19.90    Persistent atrial fibrillation (HCC) I48.19    Hypertension I10    Hyperlipidemia E78.5    Obesity E66.9    Risk for falls Z91.81    Fatigue R53.83    Family history of coronary artery disease Z82.49    Chronic midline low back pain without sciatica M54.50, G89.29    ASCVD (arteriosclerotic cardiovascular disease) I25.10    Upper GI bleed K92.2    Sepsis ([address])

## 2023-09-19 LAB
ALBUMIN SERPL-MCNC: 3 GM/DL (ref 3.4–5)
ALP BLD-CCNC: 74 IU/L (ref 40–128)
ALT SERPL-CCNC: 8 U/L (ref 10–40)
ANION GAP SERPL CALCULATED.3IONS-SCNC: 10 MMOL/L (ref 4–16)
ANISOCYTOSIS: ABNORMAL
AST SERPL-CCNC: 21 IU/L (ref 15–37)
BANDED NEUTROPHILS ABSOLUTE COUNT: 0.42 K/CU MM
BANDED NEUTROPHILS RELATIVE PERCENT: 9 % (ref 5–11)
BILIRUB SERPL-MCNC: 0.5 MG/DL (ref 0–1)
BUN SERPL-MCNC: 27 MG/DL (ref 6–23)
CALCIUM SERPL-MCNC: 8.9 MG/DL (ref 8.3–10.6)
CHLORIDE BLD-SCNC: 108 MMOL/L (ref 99–110)
CO2: 19 MMOL/L (ref 21–32)
CREAT SERPL-MCNC: 1.1 MG/DL (ref 0.9–1.3)
CRP SERPL HS-MCNC: 3 MG/L
DIFFERENTIAL TYPE: ABNORMAL
EOSINOPHILS ABSOLUTE: 0 K/CU MM
EOSINOPHILS RELATIVE PERCENT: 1 % (ref 0–3)
GFR SERPL CREATININE-BSD FRML MDRD: >60 ML/MIN/1.73M2
GLUCOSE SERPL-MCNC: 109 MG/DL (ref 70–99)
HCT VFR BLD CALC: 23.1 % (ref 42–52)
HEMOGLOBIN: 7.3 GM/DL (ref 13.5–18)
LYMPHOCYTES ABSOLUTE: 0.4 K/CU MM
LYMPHOCYTES RELATIVE PERCENT: 9 % (ref 24–44)
MCH RBC QN AUTO: 31.2 PG (ref 27–31)
MCHC RBC AUTO-ENTMCNC: 31.6 % (ref 32–36)
MCV RBC AUTO: 98.7 FL (ref 78–100)
METAMYELOCYTES ABSOLUTE COUNT: 0.05 K/CU MM
METAMYELOCYTES PERCENT: 1 %
MONOCYTES ABSOLUTE: 0.4 K/CU MM
MONOCYTES RELATIVE PERCENT: 8 % (ref 0–4)
PDW BLD-RTO: 18.7 % (ref 11.7–14.9)
PLATELET # BLD: 81 K/CU MM (ref 140–440)
PMV BLD AUTO: 14.3 FL (ref 7.5–11.1)
POTASSIUM SERPL-SCNC: 4.5 MMOL/L (ref 3.5–5.1)
PROCALCITONIN SERPL-MCNC: 0.14 NG/ML
RBC # BLD: 2.34 M/CU MM (ref 4.6–6.2)
RBC # BLD: ABNORMAL 10*6/UL
SEGMENTED NEUTROPHILS ABSOLUTE COUNT: 3.4 K/CU MM
SEGMENTED NEUTROPHILS RELATIVE PERCENT: 72 % (ref 36–66)
SODIUM BLD-SCNC: 137 MMOL/L (ref 135–145)
TOTAL PROTEIN: 5.2 GM/DL (ref 6.4–8.2)
WBC # BLD: 4.7 K/CU MM (ref 4–10.5)

## 2023-09-19 PROCEDURE — 84145 PROCALCITONIN (PCT): CPT

## 2023-09-19 PROCEDURE — 85007 BL SMEAR W/DIFF WBC COUNT: CPT

## 2023-09-19 PROCEDURE — 80053 COMPREHEN METABOLIC PANEL: CPT

## 2023-09-19 PROCEDURE — 99233 SBSQ HOSP IP/OBS HIGH 50: CPT | Performed by: INTERNAL MEDICINE

## 2023-09-19 PROCEDURE — 85027 COMPLETE CBC AUTOMATED: CPT

## 2023-09-19 PROCEDURE — 6360000002 HC RX W HCPCS: Performed by: STUDENT IN AN ORGANIZED HEALTH CARE EDUCATION/TRAINING PROGRAM

## 2023-09-19 PROCEDURE — 86140 C-REACTIVE PROTEIN: CPT

## 2023-09-19 PROCEDURE — 6370000000 HC RX 637 (ALT 250 FOR IP): Performed by: INTERNAL MEDICINE

## 2023-09-19 PROCEDURE — 2060000000 HC ICU INTERMEDIATE R&B

## 2023-09-19 PROCEDURE — 6370000000 HC RX 637 (ALT 250 FOR IP): Performed by: STUDENT IN AN ORGANIZED HEALTH CARE EDUCATION/TRAINING PROGRAM

## 2023-09-19 PROCEDURE — 94640 AIRWAY INHALATION TREATMENT: CPT

## 2023-09-19 PROCEDURE — 94761 N-INVAS EAR/PLS OXIMETRY MLT: CPT

## 2023-09-19 PROCEDURE — 36415 COLL VENOUS BLD VENIPUNCTURE: CPT

## 2023-09-19 PROCEDURE — 2580000003 HC RX 258: Performed by: STUDENT IN AN ORGANIZED HEALTH CARE EDUCATION/TRAINING PROGRAM

## 2023-09-19 PROCEDURE — C9113 INJ PANTOPRAZOLE SODIUM, VIA: HCPCS | Performed by: STUDENT IN AN ORGANIZED HEALTH CARE EDUCATION/TRAINING PROGRAM

## 2023-09-19 RX ORDER — SULFAMETHOXAZOLE AND TRIMETHOPRIM 800; 160 MG/1; MG/1
1 TABLET ORAL EVERY 12 HOURS SCHEDULED
Status: DISCONTINUED | OUTPATIENT
Start: 2023-09-19 | End: 2023-09-27 | Stop reason: HOSPADM

## 2023-09-19 RX ORDER — POLYVINYL ALCOHOL 14 MG/ML
1 SOLUTION/ DROPS OPHTHALMIC 4 TIMES DAILY PRN
Status: DISCONTINUED | OUTPATIENT
Start: 2023-09-19 | End: 2023-09-27 | Stop reason: HOSPADM

## 2023-09-19 RX ADMIN — PANTOPRAZOLE SODIUM 40 MG: 40 INJECTION, POWDER, FOR SOLUTION INTRAVENOUS at 10:41

## 2023-09-19 RX ADMIN — SODIUM CHLORIDE, PRESERVATIVE FREE 10 ML: 5 INJECTION INTRAVENOUS at 10:42

## 2023-09-19 RX ADMIN — PRAVASTATIN SODIUM 80 MG: 40 TABLET ORAL at 21:01

## 2023-09-19 RX ADMIN — FERROUS GLUCONATE TAB 324 MG (37.5 MG ELEMENTAL IRON) 325 MG: 324 (37.5 FE) TAB at 21:01

## 2023-09-19 RX ADMIN — FERROUS GLUCONATE TAB 324 MG (37.5 MG ELEMENTAL IRON) 325 MG: 324 (37.5 FE) TAB at 10:42

## 2023-09-19 RX ADMIN — METOPROLOL TARTRATE 12.5 MG: 25 TABLET, FILM COATED ORAL at 21:00

## 2023-09-19 RX ADMIN — CEFEPIME 2000 MG: 2 INJECTION, POWDER, FOR SOLUTION INTRAVENOUS at 05:40

## 2023-09-19 RX ADMIN — SODIUM CHLORIDE, PRESERVATIVE FREE 10 ML: 5 INJECTION INTRAVENOUS at 21:01

## 2023-09-19 RX ADMIN — PANTOPRAZOLE SODIUM 40 MG: 40 INJECTION, POWDER, FOR SOLUTION INTRAVENOUS at 21:01

## 2023-09-19 RX ADMIN — LINEZOLID 600 MG: 600 INJECTION, SOLUTION INTRAVENOUS at 04:03

## 2023-09-19 RX ADMIN — SULFAMETHOXAZOLE AND TRIMETHOPRIM 1 TABLET: 800; 160 TABLET ORAL at 21:00

## 2023-09-19 RX ADMIN — METOPROLOL TARTRATE 12.5 MG: 25 TABLET, FILM COATED ORAL at 10:41

## 2023-09-19 RX ADMIN — TIOTROPIUM BROMIDE INHALATION SPRAY 2 PUFF: 3.12 SPRAY, METERED RESPIRATORY (INHALATION) at 08:54

## 2023-09-19 ASSESSMENT — PAIN SCALES - WONG BAKER
WONGBAKER_NUMERICALRESPONSE: 0

## 2023-09-20 PROBLEM — T84.54XA INFECTION OF PROSTHETIC LEFT KNEE JOINT (HCC): Status: ACTIVE | Noted: 2023-08-08

## 2023-09-20 PROBLEM — J18.9 PNEUMONIA OF BOTH LOWER LOBES DUE TO INFECTIOUS ORGANISM: Status: ACTIVE | Noted: 2023-09-20

## 2023-09-20 LAB
CRP SERPL HS-MCNC: 3 MG/L
HCT VFR BLD CALC: 22.4 % (ref 42–52)
HEMOGLOBIN: 7 GM/DL (ref 13.5–18)
MCH RBC QN AUTO: 31.3 PG (ref 27–31)
MCHC RBC AUTO-ENTMCNC: 31.3 % (ref 32–36)
MCV RBC AUTO: 100 FL (ref 78–100)
PDW BLD-RTO: 18.5 % (ref 11.7–14.9)
PLATELET # BLD: 52 K/CU MM (ref 140–440)
PMV BLD AUTO: 14.2 FL (ref 7.5–11.1)
RBC # BLD: 2.24 M/CU MM (ref 4.6–6.2)
WBC # BLD: 5.4 K/CU MM (ref 4–10.5)

## 2023-09-20 PROCEDURE — 86140 C-REACTIVE PROTEIN: CPT

## 2023-09-20 PROCEDURE — 99223 1ST HOSP IP/OBS HIGH 75: CPT | Performed by: INTERNAL MEDICINE

## 2023-09-20 PROCEDURE — 86850 RBC ANTIBODY SCREEN: CPT

## 2023-09-20 PROCEDURE — 6370000000 HC RX 637 (ALT 250 FOR IP): Performed by: INTERNAL MEDICINE

## 2023-09-20 PROCEDURE — 6370000000 HC RX 637 (ALT 250 FOR IP): Performed by: STUDENT IN AN ORGANIZED HEALTH CARE EDUCATION/TRAINING PROGRAM

## 2023-09-20 PROCEDURE — 94761 N-INVAS EAR/PLS OXIMETRY MLT: CPT

## 2023-09-20 PROCEDURE — 97166 OT EVAL MOD COMPLEX 45 MIN: CPT

## 2023-09-20 PROCEDURE — 86901 BLOOD TYPING SEROLOGIC RH(D): CPT

## 2023-09-20 PROCEDURE — 6360000002 HC RX W HCPCS: Performed by: INTERNAL MEDICINE

## 2023-09-20 PROCEDURE — 86900 BLOOD TYPING SEROLOGIC ABO: CPT

## 2023-09-20 PROCEDURE — 6360000002 HC RX W HCPCS: Performed by: STUDENT IN AN ORGANIZED HEALTH CARE EDUCATION/TRAINING PROGRAM

## 2023-09-20 PROCEDURE — 99232 SBSQ HOSP IP/OBS MODERATE 35: CPT | Performed by: INTERNAL MEDICINE

## 2023-09-20 PROCEDURE — C9113 INJ PANTOPRAZOLE SODIUM, VIA: HCPCS | Performed by: STUDENT IN AN ORGANIZED HEALTH CARE EDUCATION/TRAINING PROGRAM

## 2023-09-20 PROCEDURE — 36415 COLL VENOUS BLD VENIPUNCTURE: CPT

## 2023-09-20 PROCEDURE — 2580000003 HC RX 258: Performed by: STUDENT IN AN ORGANIZED HEALTH CARE EDUCATION/TRAINING PROGRAM

## 2023-09-20 PROCEDURE — 2060000000 HC ICU INTERMEDIATE R&B

## 2023-09-20 PROCEDURE — 97530 THERAPEUTIC ACTIVITIES: CPT

## 2023-09-20 PROCEDURE — 85027 COMPLETE CBC AUTOMATED: CPT

## 2023-09-20 RX ORDER — SODIUM CHLORIDE 9 MG/ML
INJECTION, SOLUTION INTRAVENOUS PRN
Status: DISCONTINUED | OUTPATIENT
Start: 2023-09-20 | End: 2023-09-27 | Stop reason: HOSPADM

## 2023-09-20 RX ORDER — DIGOXIN 0.25 MG/ML
125 INJECTION INTRAMUSCULAR; INTRAVENOUS DAILY
Status: DISCONTINUED | OUTPATIENT
Start: 2023-09-20 | End: 2023-09-21

## 2023-09-20 RX ADMIN — DIGOXIN 125 MCG: 0.25 INJECTION INTRAMUSCULAR; INTRAVENOUS at 21:24

## 2023-09-20 RX ADMIN — METOPROLOL TARTRATE 12.5 MG: 25 TABLET, FILM COATED ORAL at 21:27

## 2023-09-20 RX ADMIN — PRAVASTATIN SODIUM 80 MG: 40 TABLET ORAL at 21:27

## 2023-09-20 RX ADMIN — SULFAMETHOXAZOLE AND TRIMETHOPRIM 1 TABLET: 800; 160 TABLET ORAL at 10:32

## 2023-09-20 RX ADMIN — SODIUM CHLORIDE, PRESERVATIVE FREE 10 ML: 5 INJECTION INTRAVENOUS at 21:28

## 2023-09-20 RX ADMIN — PANTOPRAZOLE SODIUM 40 MG: 40 INJECTION, POWDER, FOR SOLUTION INTRAVENOUS at 21:27

## 2023-09-20 RX ADMIN — SODIUM CHLORIDE, PRESERVATIVE FREE 10 ML: 5 INJECTION INTRAVENOUS at 21:09

## 2023-09-20 RX ADMIN — METOPROLOL TARTRATE 12.5 MG: 25 TABLET, FILM COATED ORAL at 10:32

## 2023-09-20 RX ADMIN — FERROUS GLUCONATE TAB 324 MG (37.5 MG ELEMENTAL IRON) 325 MG: 324 (37.5 FE) TAB at 21:28

## 2023-09-20 RX ADMIN — PANTOPRAZOLE SODIUM 40 MG: 40 INJECTION, POWDER, FOR SOLUTION INTRAVENOUS at 10:32

## 2023-09-20 RX ADMIN — SODIUM CHLORIDE, PRESERVATIVE FREE 10 ML: 5 INJECTION INTRAVENOUS at 10:32

## 2023-09-20 RX ADMIN — SULFAMETHOXAZOLE AND TRIMETHOPRIM 1 TABLET: 800; 160 TABLET ORAL at 21:27

## 2023-09-20 RX ADMIN — FERROUS GLUCONATE TAB 324 MG (37.5 MG ELEMENTAL IRON) 325 MG: 324 (37.5 FE) TAB at 10:32

## 2023-09-20 NOTE — CARE COORDINATION
Pt discussed in IDR. [facility] rounded in hospital today - spoke with this CM. They are able to accept back. Will need updated PT/OT evals for precert. PT/OT WB placed. 26 - Pt confirmed that they still want to return to [facility] skilled. Waiting on PT/OT recs at this time to start precert.

## 2023-09-21 ENCOUNTER — APPOINTMENT (OUTPATIENT)
Dept: ULTRASOUND IMAGING | Age: 83
DRG: 871 | End: 2023-09-21
Payer: MEDICARE

## 2023-09-21 LAB
ABO/RH: NORMAL
ANION GAP SERPL CALCULATED.3IONS-SCNC: 12 MMOL/L (ref 4–16)
ANTIBODY SCREEN: NEGATIVE
BASOPHILS ABSOLUTE: 0 K/CU MM
BASOPHILS RELATIVE PERCENT: 0 % (ref 0–1)
BUN SERPL-MCNC: 24 MG/DL (ref 6–23)
CALCIUM SERPL-MCNC: 9.6 MG/DL (ref 8.3–10.6)
CHLORIDE BLD-SCNC: 106 MMOL/L (ref 99–110)
CO2: 16 MMOL/L (ref 21–32)
COMPONENT: NORMAL
CREAT SERPL-MCNC: 1.1 MG/DL (ref 0.9–1.3)
CROSSMATCH RESULT: NORMAL
CRP SERPL HS-MCNC: 3.8 MG/L
CULTURE: NORMAL
DIFFERENTIAL TYPE: ABNORMAL
EOSINOPHILS ABSOLUTE: 0 K/CU MM
EOSINOPHILS RELATIVE PERCENT: 0.5 % (ref 0–3)
GFR SERPL CREATININE-BSD FRML MDRD: >60 ML/MIN/1.73M2
GLUCOSE SERPL-MCNC: 90 MG/DL (ref 70–99)
HCT VFR BLD CALC: 22.1 % (ref 42–52)
HCT VFR BLD CALC: 25.1 % (ref 42–52)
HEMOGLOBIN: 6.7 GM/DL (ref 13.5–18)
HEMOGLOBIN: 7.9 GM/DL (ref 13.5–18)
IMMATURE NEUTROPHIL %: 0.6 % (ref 0–0.43)
LYMPHOCYTES ABSOLUTE: 0.6 K/CU MM
LYMPHOCYTES RELATIVE PERCENT: 9.5 % (ref 24–44)
Lab: NORMAL
MCH RBC QN AUTO: 31.5 PG (ref 27–31)
MCHC RBC AUTO-ENTMCNC: 30.3 % (ref 32–36)
MCV RBC AUTO: 103.8 FL (ref 78–100)
MONOCYTES ABSOLUTE: 0.7 K/CU MM
MONOCYTES RELATIVE PERCENT: 10 % (ref 0–4)
NUCLEATED RBC %: 0 %
PDW BLD-RTO: 18.1 % (ref 11.7–14.9)
PLATELET # BLD: 24 K/CU MM (ref 140–440)
POTASSIUM SERPL-SCNC: 4.6 MMOL/L (ref 3.5–5.1)
PROCALCITONIN SERPL-MCNC: 0.12 NG/ML
RBC # BLD: 2.13 M/CU MM (ref 4.6–6.2)
SEGMENTED NEUTROPHILS ABSOLUTE COUNT: 5.3 K/CU MM
SEGMENTED NEUTROPHILS RELATIVE PERCENT: 79.4 % (ref 36–66)
SODIUM BLD-SCNC: 134 MMOL/L (ref 135–145)
SPECIMEN: NORMAL
STATUS: NORMAL
TOTAL IMMATURE NEUTOROPHIL: 0.04 K/CU MM
TOTAL NUCLEATED RBC: 0 K/CU MM
TRANSFUSION STATUS: NORMAL
UNIT DIVISION: 0
UNIT NUMBER: NORMAL
WBC # BLD: 6.6 K/CU MM (ref 4–10.5)

## 2023-09-21 PROCEDURE — 6370000000 HC RX 637 (ALT 250 FOR IP): Performed by: STUDENT IN AN ORGANIZED HEALTH CARE EDUCATION/TRAINING PROGRAM

## 2023-09-21 PROCEDURE — 36415 COLL VENOUS BLD VENIPUNCTURE: CPT

## 2023-09-21 PROCEDURE — 85025 COMPLETE CBC W/AUTO DIFF WBC: CPT

## 2023-09-21 PROCEDURE — 85014 HEMATOCRIT: CPT

## 2023-09-21 PROCEDURE — 2060000000 HC ICU INTERMEDIATE R&B

## 2023-09-21 PROCEDURE — 86900 BLOOD TYPING SEROLOGIC ABO: CPT

## 2023-09-21 PROCEDURE — 86140 C-REACTIVE PROTEIN: CPT

## 2023-09-21 PROCEDURE — 84145 PROCALCITONIN (PCT): CPT

## 2023-09-21 PROCEDURE — 99231 SBSQ HOSP IP/OBS SF/LOW 25: CPT | Performed by: INTERNAL MEDICINE

## 2023-09-21 PROCEDURE — 94761 N-INVAS EAR/PLS OXIMETRY MLT: CPT

## 2023-09-21 PROCEDURE — P9016 RBC LEUKOCYTES REDUCED: HCPCS

## 2023-09-21 PROCEDURE — 76882 US LMTD JT/FCL EVL NVASC XTR: CPT

## 2023-09-21 PROCEDURE — 85018 HEMOGLOBIN: CPT

## 2023-09-21 PROCEDURE — C9113 INJ PANTOPRAZOLE SODIUM, VIA: HCPCS | Performed by: STUDENT IN AN ORGANIZED HEALTH CARE EDUCATION/TRAINING PROGRAM

## 2023-09-21 PROCEDURE — 6360000002 HC RX W HCPCS: Performed by: STUDENT IN AN ORGANIZED HEALTH CARE EDUCATION/TRAINING PROGRAM

## 2023-09-21 PROCEDURE — 94640 AIRWAY INHALATION TREATMENT: CPT

## 2023-09-21 PROCEDURE — 80048 BASIC METABOLIC PNL TOTAL CA: CPT

## 2023-09-21 PROCEDURE — 6360000002 HC RX W HCPCS: Performed by: INTERNAL MEDICINE

## 2023-09-21 PROCEDURE — 6370000000 HC RX 637 (ALT 250 FOR IP): Performed by: INTERNAL MEDICINE

## 2023-09-21 PROCEDURE — 86850 RBC ANTIBODY SCREEN: CPT

## 2023-09-21 PROCEDURE — 36430 TRANSFUSION BLD/BLD COMPNT: CPT

## 2023-09-21 PROCEDURE — 86901 BLOOD TYPING SEROLOGIC RH(D): CPT

## 2023-09-21 PROCEDURE — 99233 SBSQ HOSP IP/OBS HIGH 50: CPT | Performed by: INTERNAL MEDICINE

## 2023-09-21 PROCEDURE — 86922 COMPATIBILITY TEST ANTIGLOB: CPT

## 2023-09-21 PROCEDURE — 2580000003 HC RX 258: Performed by: STUDENT IN AN ORGANIZED HEALTH CARE EDUCATION/TRAINING PROGRAM

## 2023-09-21 PROCEDURE — 76937 US GUIDE VASCULAR ACCESS: CPT

## 2023-09-21 RX ORDER — DIGOXIN 125 MCG
125 TABLET ORAL DAILY
Status: DISCONTINUED | OUTPATIENT
Start: 2023-09-22 | End: 2023-09-27 | Stop reason: HOSPADM

## 2023-09-21 RX ADMIN — TIOTROPIUM BROMIDE INHALATION SPRAY 2 PUFF: 3.12 SPRAY, METERED RESPIRATORY (INHALATION) at 11:33

## 2023-09-21 RX ADMIN — FERROUS GLUCONATE TAB 324 MG (37.5 MG ELEMENTAL IRON) 325 MG: 324 (37.5 FE) TAB at 09:18

## 2023-09-21 RX ADMIN — PRAVASTATIN SODIUM 80 MG: 40 TABLET ORAL at 20:36

## 2023-09-21 RX ADMIN — PANTOPRAZOLE SODIUM 40 MG: 40 INJECTION, POWDER, FOR SOLUTION INTRAVENOUS at 09:18

## 2023-09-21 RX ADMIN — METOPROLOL TARTRATE 12.5 MG: 25 TABLET, FILM COATED ORAL at 20:36

## 2023-09-21 RX ADMIN — SULFAMETHOXAZOLE AND TRIMETHOPRIM 1 TABLET: 800; 160 TABLET ORAL at 20:35

## 2023-09-21 RX ADMIN — SODIUM CHLORIDE, PRESERVATIVE FREE 10 ML: 5 INJECTION INTRAVENOUS at 09:18

## 2023-09-21 RX ADMIN — SODIUM CHLORIDE, PRESERVATIVE FREE 10 ML: 5 INJECTION INTRAVENOUS at 20:40

## 2023-09-21 RX ADMIN — METOPROLOL TARTRATE 12.5 MG: 25 TABLET, FILM COATED ORAL at 09:17

## 2023-09-21 RX ADMIN — DIGOXIN 125 MCG: 0.25 INJECTION INTRAMUSCULAR; INTRAVENOUS at 09:17

## 2023-09-21 RX ADMIN — FERROUS GLUCONATE TAB 324 MG (37.5 MG ELEMENTAL IRON) 325 MG: 324 (37.5 FE) TAB at 20:45

## 2023-09-21 RX ADMIN — PANTOPRAZOLE SODIUM 40 MG: 40 INJECTION, POWDER, FOR SOLUTION INTRAVENOUS at 20:36

## 2023-09-21 RX ADMIN — SULFAMETHOXAZOLE AND TRIMETHOPRIM 1 TABLET: 800; 160 TABLET ORAL at 09:17

## 2023-09-21 ASSESSMENT — PAIN SCALES - GENERAL: PAINLEVEL_OUTOF10: 0

## 2023-09-21 NOTE — CARE COORDINATION
CM placed a white board for PT to see for precert. LH  1030 Per IDR Hgb down. Per lab 6.7. plan remains the same. Pt will need a precert for [facility].

## 2023-09-22 ENCOUNTER — APPOINTMENT (OUTPATIENT)
Dept: ULTRASOUND IMAGING | Age: 83
DRG: 871 | End: 2023-09-22
Payer: MEDICARE

## 2023-09-22 LAB
ABO/RH: NORMAL
ANTIBODY SCREEN: NEGATIVE
COMPONENT: NORMAL
CROSSMATCH RESULT: NORMAL
EKG ATRIAL RATE: 78 BPM
EKG DIAGNOSIS: NORMAL
EKG Q-T INTERVAL: 370 MS
EKG QRS DURATION: 138 MS
EKG QTC CALCULATION (BAZETT): 460 MS
EKG R AXIS: -56 DEGREES
EKG T AXIS: -7 DEGREES
EKG VENTRICULAR RATE: 93 BPM
STATUS: NORMAL
TRANSFUSION STATUS: NORMAL
UNIT DIVISION: 0
UNIT NUMBER: NORMAL

## 2023-09-22 PROCEDURE — 6370000000 HC RX 637 (ALT 250 FOR IP): Performed by: INTERNAL MEDICINE

## 2023-09-22 PROCEDURE — 97530 THERAPEUTIC ACTIVITIES: CPT

## 2023-09-22 PROCEDURE — 94664 DEMO&/EVAL PT USE INHALER: CPT

## 2023-09-22 PROCEDURE — 6360000002 HC RX W HCPCS: Performed by: STUDENT IN AN ORGANIZED HEALTH CARE EDUCATION/TRAINING PROGRAM

## 2023-09-22 PROCEDURE — 97116 GAIT TRAINING THERAPY: CPT

## 2023-09-22 PROCEDURE — 93010 ELECTROCARDIOGRAM REPORT: CPT | Performed by: INTERNAL MEDICINE

## 2023-09-22 PROCEDURE — 76882 US LMTD JT/FCL EVL NVASC XTR: CPT

## 2023-09-22 PROCEDURE — 2580000003 HC RX 258: Performed by: STUDENT IN AN ORGANIZED HEALTH CARE EDUCATION/TRAINING PROGRAM

## 2023-09-22 PROCEDURE — 6370000000 HC RX 637 (ALT 250 FOR IP): Performed by: STUDENT IN AN ORGANIZED HEALTH CARE EDUCATION/TRAINING PROGRAM

## 2023-09-22 PROCEDURE — 6370000000 HC RX 637 (ALT 250 FOR IP)

## 2023-09-22 PROCEDURE — 99231 SBSQ HOSP IP/OBS SF/LOW 25: CPT | Performed by: INTERNAL MEDICINE

## 2023-09-22 PROCEDURE — 93005 ELECTROCARDIOGRAM TRACING: CPT

## 2023-09-22 PROCEDURE — 2060000000 HC ICU INTERMEDIATE R&B

## 2023-09-22 PROCEDURE — 97163 PT EVAL HIGH COMPLEX 45 MIN: CPT

## 2023-09-22 PROCEDURE — 99232 SBSQ HOSP IP/OBS MODERATE 35: CPT | Performed by: INTERNAL MEDICINE

## 2023-09-22 PROCEDURE — 94761 N-INVAS EAR/PLS OXIMETRY MLT: CPT

## 2023-09-22 PROCEDURE — APPSS30 APP SPLIT SHARED TIME 16-30 MINUTES

## 2023-09-22 PROCEDURE — 94640 AIRWAY INHALATION TREATMENT: CPT

## 2023-09-22 PROCEDURE — C9113 INJ PANTOPRAZOLE SODIUM, VIA: HCPCS | Performed by: STUDENT IN AN ORGANIZED HEALTH CARE EDUCATION/TRAINING PROGRAM

## 2023-09-22 RX ORDER — POLYVINYL ALCOHOL 14 MG/ML
1 SOLUTION/ DROPS OPHTHALMIC 3 TIMES DAILY
Status: DISCONTINUED | OUTPATIENT
Start: 2023-09-22 | End: 2023-09-27 | Stop reason: HOSPADM

## 2023-09-22 RX ADMIN — SODIUM CHLORIDE, PRESERVATIVE FREE 10 ML: 5 INJECTION INTRAVENOUS at 09:25

## 2023-09-22 RX ADMIN — PANTOPRAZOLE SODIUM 40 MG: 40 INJECTION, POWDER, FOR SOLUTION INTRAVENOUS at 09:24

## 2023-09-22 RX ADMIN — POLYVINYL ALCOHOL 1 DROP: 14 SOLUTION/ DROPS OPHTHALMIC at 21:47

## 2023-09-22 RX ADMIN — FERROUS GLUCONATE TAB 324 MG (37.5 MG ELEMENTAL IRON) 325 MG: 324 (37.5 FE) TAB at 09:38

## 2023-09-22 RX ADMIN — SULFAMETHOXAZOLE AND TRIMETHOPRIM 1 TABLET: 800; 160 TABLET ORAL at 09:23

## 2023-09-22 RX ADMIN — METOPROLOL TARTRATE 12.5 MG: 25 TABLET, FILM COATED ORAL at 21:46

## 2023-09-22 RX ADMIN — MAGNESIUM HYDROXIDE 30 ML: 400 SUSPENSION ORAL at 12:56

## 2023-09-22 RX ADMIN — FERROUS GLUCONATE TAB 324 MG (37.5 MG ELEMENTAL IRON) 325 MG: 324 (37.5 FE) TAB at 21:47

## 2023-09-22 RX ADMIN — METOPROLOL TARTRATE 12.5 MG: 25 TABLET, FILM COATED ORAL at 09:23

## 2023-09-22 RX ADMIN — TIOTROPIUM BROMIDE INHALATION SPRAY 2 PUFF: 3.12 SPRAY, METERED RESPIRATORY (INHALATION) at 07:48

## 2023-09-22 RX ADMIN — SULFAMETHOXAZOLE AND TRIMETHOPRIM 1 TABLET: 800; 160 TABLET ORAL at 21:47

## 2023-09-22 RX ADMIN — POLYVINYL ALCOHOL 1 DROP: 14 SOLUTION/ DROPS OPHTHALMIC at 12:56

## 2023-09-22 RX ADMIN — PANTOPRAZOLE SODIUM 40 MG: 40 INJECTION, POWDER, FOR SOLUTION INTRAVENOUS at 21:48

## 2023-09-22 RX ADMIN — DIGOXIN 125 MCG: 125 TABLET ORAL at 09:23

## 2023-09-22 RX ADMIN — POLYVINYL ALCOHOL 1 DROP: 14 SOLUTION/ DROPS OPHTHALMIC at 16:12

## 2023-09-22 RX ADMIN — SODIUM CHLORIDE, PRESERVATIVE FREE 10 ML: 5 INJECTION INTRAVENOUS at 21:55

## 2023-09-22 RX ADMIN — PRAVASTATIN SODIUM 80 MG: 40 TABLET ORAL at 21:46

## 2023-09-22 ASSESSMENT — PAIN SCALES - GENERAL
PAINLEVEL_OUTOF10: 0

## 2023-09-22 NOTE — CARE COORDINATION
If pt is discharged over the weekend pt will go to [facility]. [facility]     Call report to [phone]     Complete & sign the WESLY then fax to [phone]    Place AVS & any scripts in the envelope that is in the soft chart.   This is to go with the pt to the facility    [address] [place] [address]  After 5 pm & weekends - [phone]    Notify family

## 2023-09-22 NOTE — CARE COORDINATION
CM reviewed chart and discussed in IDR. Dr Aubrey Mcbride requested pre-cert be initiated today for possible weekend discharge. Will need a PAS/RR as well. 11:57 CM sent Teams message to Christina Campos asking her to start the pre-cert & do the PAS/RR.

## 2023-09-22 NOTE — CARE COORDINATION
Precert initiated via [employer]: Pending at this time 5:10 PM   [id number]  SNF  09/22/2023 09/22/2023  Pending    This CM is unable to initiate SNF precert at this time as there is not PT eval and no current OT treatment notes. Placed whiteboard note.       Electronic PAS completed

## 2023-09-23 ENCOUNTER — APPOINTMENT (OUTPATIENT)
Dept: ULTRASOUND IMAGING | Age: 83
DRG: 871 | End: 2023-09-23
Payer: MEDICARE

## 2023-09-23 LAB
ANION GAP SERPL CALCULATED.3IONS-SCNC: 9 MMOL/L (ref 4–16)
BASOPHILS ABSOLUTE: 0 K/CU MM
BASOPHILS RELATIVE PERCENT: 0.1 % (ref 0–1)
BUN SERPL-MCNC: 18 MG/DL (ref 6–23)
CALCIUM SERPL-MCNC: 9.1 MG/DL (ref 8.3–10.6)
CHLORIDE BLD-SCNC: 105 MMOL/L (ref 99–110)
CO2: 22 MMOL/L (ref 21–32)
CREAT SERPL-MCNC: 1.3 MG/DL (ref 0.9–1.3)
CULTURE: NORMAL
DIFFERENTIAL TYPE: ABNORMAL
EOSINOPHILS ABSOLUTE: 0 K/CU MM
EOSINOPHILS RELATIVE PERCENT: 0.6 % (ref 0–3)
GFR SERPL CREATININE-BSD FRML MDRD: 55 ML/MIN/1.73M2
GLUCOSE SERPL-MCNC: 83 MG/DL (ref 70–99)
HCT VFR BLD CALC: 24.3 % (ref 42–52)
HEMOGLOBIN: 7.6 GM/DL (ref 13.5–18)
IMMATURE NEUTROPHIL %: 0.7 % (ref 0–0.43)
LYMPHOCYTES ABSOLUTE: 1.1 K/CU MM
LYMPHOCYTES RELATIVE PERCENT: 14.5 % (ref 24–44)
Lab: NORMAL
MCH RBC QN AUTO: 30.5 PG (ref 27–31)
MCHC RBC AUTO-ENTMCNC: 31.3 % (ref 32–36)
MCV RBC AUTO: 97.6 FL (ref 78–100)
MONOCYTES ABSOLUTE: 1.5 K/CU MM
MONOCYTES RELATIVE PERCENT: 21.1 % (ref 0–4)
NUCLEATED RBC %: 0 %
PDW BLD-RTO: 18 % (ref 11.7–14.9)
PLATELET # BLD: 73 K/CU MM (ref 140–440)
POTASSIUM SERPL-SCNC: 4.2 MMOL/L (ref 3.5–5.1)
RBC # BLD: 2.49 M/CU MM (ref 4.6–6.2)
SEGMENTED NEUTROPHILS ABSOLUTE COUNT: 4.6 K/CU MM
SEGMENTED NEUTROPHILS RELATIVE PERCENT: 63 % (ref 36–66)
SODIUM BLD-SCNC: 136 MMOL/L (ref 135–145)
SPECIMEN: NORMAL
TOTAL IMMATURE NEUTOROPHIL: 0.05 K/CU MM
TOTAL NUCLEATED RBC: 0 K/CU MM
WBC # BLD: 7.3 K/CU MM (ref 4–10.5)

## 2023-09-23 PROCEDURE — 36415 COLL VENOUS BLD VENIPUNCTURE: CPT

## 2023-09-23 PROCEDURE — 6360000002 HC RX W HCPCS: Performed by: STUDENT IN AN ORGANIZED HEALTH CARE EDUCATION/TRAINING PROGRAM

## 2023-09-23 PROCEDURE — 94640 AIRWAY INHALATION TREATMENT: CPT

## 2023-09-23 PROCEDURE — 80048 BASIC METABOLIC PNL TOTAL CA: CPT

## 2023-09-23 PROCEDURE — 6370000000 HC RX 637 (ALT 250 FOR IP): Performed by: STUDENT IN AN ORGANIZED HEALTH CARE EDUCATION/TRAINING PROGRAM

## 2023-09-23 PROCEDURE — 99222 1ST HOSP IP/OBS MODERATE 55: CPT | Performed by: SURGERY

## 2023-09-23 PROCEDURE — 6370000000 HC RX 637 (ALT 250 FOR IP): Performed by: INTERNAL MEDICINE

## 2023-09-23 PROCEDURE — 76700 US EXAM ABDOM COMPLETE: CPT

## 2023-09-23 PROCEDURE — 94761 N-INVAS EAR/PLS OXIMETRY MLT: CPT

## 2023-09-23 PROCEDURE — 97530 THERAPEUTIC ACTIVITIES: CPT

## 2023-09-23 PROCEDURE — 85025 COMPLETE CBC W/AUTO DIFF WBC: CPT

## 2023-09-23 PROCEDURE — 2060000000 HC ICU INTERMEDIATE R&B

## 2023-09-23 PROCEDURE — 2580000003 HC RX 258: Performed by: STUDENT IN AN ORGANIZED HEALTH CARE EDUCATION/TRAINING PROGRAM

## 2023-09-23 PROCEDURE — 6370000000 HC RX 637 (ALT 250 FOR IP): Performed by: FAMILY MEDICINE

## 2023-09-23 PROCEDURE — 6370000000 HC RX 637 (ALT 250 FOR IP)

## 2023-09-23 PROCEDURE — 97535 SELF CARE MNGMENT TRAINING: CPT

## 2023-09-23 PROCEDURE — 2500000003 HC RX 250 WO HCPCS: Performed by: STUDENT IN AN ORGANIZED HEALTH CARE EDUCATION/TRAINING PROGRAM

## 2023-09-23 PROCEDURE — 97110 THERAPEUTIC EXERCISES: CPT

## 2023-09-23 PROCEDURE — C9113 INJ PANTOPRAZOLE SODIUM, VIA: HCPCS | Performed by: STUDENT IN AN ORGANIZED HEALTH CARE EDUCATION/TRAINING PROGRAM

## 2023-09-23 RX ADMIN — SULFAMETHOXAZOLE AND TRIMETHOPRIM 1 TABLET: 800; 160 TABLET ORAL at 21:38

## 2023-09-23 RX ADMIN — SULFAMETHOXAZOLE AND TRIMETHOPRIM 1 TABLET: 800; 160 TABLET ORAL at 09:58

## 2023-09-23 RX ADMIN — MAGNESIUM HYDROXIDE 30 ML: 400 SUSPENSION ORAL at 09:57

## 2023-09-23 RX ADMIN — TIOTROPIUM BROMIDE INHALATION SPRAY 2 PUFF: 3.12 SPRAY, METERED RESPIRATORY (INHALATION) at 09:20

## 2023-09-23 RX ADMIN — POLYVINYL ALCOHOL 1 DROP: 14 SOLUTION/ DROPS OPHTHALMIC at 09:56

## 2023-09-23 RX ADMIN — FERROUS GLUCONATE TAB 324 MG (37.5 MG ELEMENTAL IRON) 325 MG: 324 (37.5 FE) TAB at 21:41

## 2023-09-23 RX ADMIN — POLYVINYL ALCOHOL 1 DROP: 14 SOLUTION/ DROPS OPHTHALMIC at 05:28

## 2023-09-23 RX ADMIN — POLYVINYL ALCOHOL 1 DROP: 14 SOLUTION/ DROPS OPHTHALMIC at 21:40

## 2023-09-23 RX ADMIN — PRAVASTATIN SODIUM 80 MG: 40 TABLET ORAL at 21:39

## 2023-09-23 RX ADMIN — SODIUM CHLORIDE, PRESERVATIVE FREE 10 ML: 5 INJECTION INTRAVENOUS at 21:40

## 2023-09-23 RX ADMIN — DIGOXIN 125 MCG: 125 TABLET ORAL at 09:58

## 2023-09-23 RX ADMIN — METOPROLOL TARTRATE 12.5 MG: 25 TABLET, FILM COATED ORAL at 21:39

## 2023-09-23 RX ADMIN — MICONAZOLE NITRATE: 2 POWDER TOPICAL at 21:40

## 2023-09-23 RX ADMIN — PANTOPRAZOLE SODIUM 40 MG: 40 INJECTION, POWDER, FOR SOLUTION INTRAVENOUS at 21:40

## 2023-09-23 RX ADMIN — FERROUS GLUCONATE TAB 324 MG (37.5 MG ELEMENTAL IRON) 325 MG: 324 (37.5 FE) TAB at 09:59

## 2023-09-23 RX ADMIN — SODIUM CHLORIDE, PRESERVATIVE FREE 10 ML: 5 INJECTION INTRAVENOUS at 09:57

## 2023-09-23 RX ADMIN — POLYVINYL ALCOHOL 1 DROP: 14 SOLUTION/ DROPS OPHTHALMIC at 14:27

## 2023-09-23 RX ADMIN — MICONAZOLE NITRATE: 2 POWDER TOPICAL at 15:28

## 2023-09-23 RX ADMIN — METOPROLOL TARTRATE 12.5 MG: 25 TABLET, FILM COATED ORAL at 09:58

## 2023-09-23 RX ADMIN — PANTOPRAZOLE SODIUM 40 MG: 40 INJECTION, POWDER, FOR SOLUTION INTRAVENOUS at 09:57

## 2023-09-23 ASSESSMENT — PAIN SCALES - GENERAL: PAINLEVEL_OUTOF10: 0

## 2023-09-24 LAB
BASOPHILS ABSOLUTE: 0 K/CU MM
BASOPHILS RELATIVE PERCENT: 0.2 % (ref 0–1)
DIFFERENTIAL TYPE: ABNORMAL
EOSINOPHILS ABSOLUTE: 0 K/CU MM
EOSINOPHILS RELATIVE PERCENT: 0.5 % (ref 0–3)
HCT VFR BLD CALC: 23.7 % (ref 42–52)
HEMOGLOBIN: 7.5 GM/DL (ref 13.5–18)
IMMATURE NEUTROPHIL %: 1.4 % (ref 0–0.43)
LYMPHOCYTES ABSOLUTE: 1.2 K/CU MM
LYMPHOCYTES RELATIVE PERCENT: 18 % (ref 24–44)
MCH RBC QN AUTO: 31 PG (ref 27–31)
MCHC RBC AUTO-ENTMCNC: 31.6 % (ref 32–36)
MCV RBC AUTO: 97.9 FL (ref 78–100)
MONOCYTES ABSOLUTE: 1 K/CU MM
MONOCYTES RELATIVE PERCENT: 14.8 % (ref 0–4)
NUCLEATED RBC %: 0.3 %
PDW BLD-RTO: 17.4 % (ref 11.7–14.9)
PLATELET # BLD: 86 K/CU MM (ref 140–440)
PMV BLD AUTO: 14 FL (ref 7.5–11.1)
RBC # BLD: 2.42 M/CU MM (ref 4.6–6.2)
SEGMENTED NEUTROPHILS ABSOLUTE COUNT: 4.2 K/CU MM
SEGMENTED NEUTROPHILS RELATIVE PERCENT: 65.1 % (ref 36–66)
TOTAL IMMATURE NEUTOROPHIL: 0.09 K/CU MM
TOTAL NUCLEATED RBC: 0 K/CU MM
WBC # BLD: 6.5 K/CU MM (ref 4–10.5)

## 2023-09-24 PROCEDURE — 6360000002 HC RX W HCPCS: Performed by: STUDENT IN AN ORGANIZED HEALTH CARE EDUCATION/TRAINING PROGRAM

## 2023-09-24 PROCEDURE — 2580000003 HC RX 258: Performed by: STUDENT IN AN ORGANIZED HEALTH CARE EDUCATION/TRAINING PROGRAM

## 2023-09-24 PROCEDURE — 36415 COLL VENOUS BLD VENIPUNCTURE: CPT

## 2023-09-24 PROCEDURE — 85025 COMPLETE CBC W/AUTO DIFF WBC: CPT

## 2023-09-24 PROCEDURE — 6370000000 HC RX 637 (ALT 250 FOR IP): Performed by: STUDENT IN AN ORGANIZED HEALTH CARE EDUCATION/TRAINING PROGRAM

## 2023-09-24 PROCEDURE — 2060000000 HC ICU INTERMEDIATE R&B

## 2023-09-24 PROCEDURE — 6370000000 HC RX 637 (ALT 250 FOR IP): Performed by: INTERNAL MEDICINE

## 2023-09-24 PROCEDURE — 94761 N-INVAS EAR/PLS OXIMETRY MLT: CPT

## 2023-09-24 PROCEDURE — 6370000000 HC RX 637 (ALT 250 FOR IP): Performed by: FAMILY MEDICINE

## 2023-09-24 PROCEDURE — C9113 INJ PANTOPRAZOLE SODIUM, VIA: HCPCS | Performed by: STUDENT IN AN ORGANIZED HEALTH CARE EDUCATION/TRAINING PROGRAM

## 2023-09-24 PROCEDURE — 6370000000 HC RX 637 (ALT 250 FOR IP)

## 2023-09-24 RX ORDER — LACTULOSE 10 G/15ML
20 SOLUTION ORAL 3 TIMES DAILY
Status: DISCONTINUED | OUTPATIENT
Start: 2023-09-24 | End: 2023-09-26

## 2023-09-24 RX ADMIN — DIGOXIN 125 MCG: 125 TABLET ORAL at 08:26

## 2023-09-24 RX ADMIN — SULFAMETHOXAZOLE AND TRIMETHOPRIM 1 TABLET: 800; 160 TABLET ORAL at 20:09

## 2023-09-24 RX ADMIN — PANTOPRAZOLE SODIUM 40 MG: 40 INJECTION, POWDER, FOR SOLUTION INTRAVENOUS at 08:27

## 2023-09-24 RX ADMIN — SODIUM CHLORIDE, PRESERVATIVE FREE 10 ML: 5 INJECTION INTRAVENOUS at 08:27

## 2023-09-24 RX ADMIN — SODIUM CHLORIDE, PRESERVATIVE FREE 10 ML: 5 INJECTION INTRAVENOUS at 20:21

## 2023-09-24 RX ADMIN — POLYVINYL ALCOHOL 1 DROP: 14 SOLUTION/ DROPS OPHTHALMIC at 20:10

## 2023-09-24 RX ADMIN — POLYVINYL ALCOHOL 1 DROP: 14 SOLUTION/ DROPS OPHTHALMIC at 18:19

## 2023-09-24 RX ADMIN — METOPROLOL TARTRATE 12.5 MG: 25 TABLET, FILM COATED ORAL at 08:25

## 2023-09-24 RX ADMIN — MICONAZOLE NITRATE: 2 POWDER TOPICAL at 08:28

## 2023-09-24 RX ADMIN — FERROUS GLUCONATE TAB 324 MG (37.5 MG ELEMENTAL IRON) 325 MG: 324 (37.5 FE) TAB at 08:28

## 2023-09-24 RX ADMIN — PRAVASTATIN SODIUM 80 MG: 40 TABLET ORAL at 20:09

## 2023-09-24 RX ADMIN — METOPROLOL TARTRATE 12.5 MG: 25 TABLET, FILM COATED ORAL at 20:09

## 2023-09-24 RX ADMIN — POLYVINYL ALCOHOL 1 DROP: 14 SOLUTION/ DROPS OPHTHALMIC at 08:27

## 2023-09-24 RX ADMIN — MAGNESIUM HYDROXIDE 30 ML: 400 SUSPENSION ORAL at 08:26

## 2023-09-24 RX ADMIN — POLYVINYL ALCOHOL 1 DROP: 14 SOLUTION/ DROPS OPHTHALMIC at 04:58

## 2023-09-24 RX ADMIN — LACTULOSE 20 G: 20 SOLUTION ORAL at 20:14

## 2023-09-24 RX ADMIN — MICONAZOLE NITRATE: 2 POWDER TOPICAL at 20:10

## 2023-09-24 RX ADMIN — PANTOPRAZOLE SODIUM 40 MG: 40 INJECTION, POWDER, FOR SOLUTION INTRAVENOUS at 20:09

## 2023-09-24 RX ADMIN — SULFAMETHOXAZOLE AND TRIMETHOPRIM 1 TABLET: 800; 160 TABLET ORAL at 08:26

## 2023-09-24 RX ADMIN — FERROUS GLUCONATE TAB 324 MG (37.5 MG ELEMENTAL IRON) 325 MG: 324 (37.5 FE) TAB at 20:10

## 2023-09-24 ASSESSMENT — PAIN SCALES - GENERAL
PAINLEVEL_OUTOF10: 0

## 2023-09-25 LAB
BASOPHILS ABSOLUTE: 0 K/CU MM
BASOPHILS RELATIVE PERCENT: 0.1 % (ref 0–1)
DIFFERENTIAL TYPE: ABNORMAL
EOSINOPHILS ABSOLUTE: 0 K/CU MM
EOSINOPHILS RELATIVE PERCENT: 0.5 % (ref 0–3)
FOLATE SERPL-MCNC: 6 NG/ML (ref 3.1–17.5)
HCT VFR BLD CALC: 22.8 % (ref 42–52)
HEMOCCULT STL QL: POSITIVE
HEMOGLOBIN: 7.1 GM/DL (ref 13.5–18)
IMMATURE NEUTROPHIL %: 2.1 % (ref 0–0.43)
LYMPHOCYTES ABSOLUTE: 1.1 K/CU MM
LYMPHOCYTES RELATIVE PERCENT: 15.3 % (ref 24–44)
MCH RBC QN AUTO: 30.2 PG (ref 27–31)
MCHC RBC AUTO-ENTMCNC: 31.1 % (ref 32–36)
MCV RBC AUTO: 97 FL (ref 78–100)
MONOCYTES ABSOLUTE: 1 K/CU MM
MONOCYTES RELATIVE PERCENT: 14 % (ref 0–4)
NUCLEATED RBC %: 0 %
PDW BLD-RTO: 17.2 % (ref 11.7–14.9)
PLATELET # BLD: 86 K/CU MM (ref 140–440)
PMV BLD AUTO: 12.9 FL (ref 7.5–11.1)
RBC # BLD: 2.35 M/CU MM (ref 4.6–6.2)
SEGMENTED NEUTROPHILS ABSOLUTE COUNT: 5 K/CU MM
SEGMENTED NEUTROPHILS RELATIVE PERCENT: 68 % (ref 36–66)
TOTAL IMMATURE NEUTOROPHIL: 0.15 K/CU MM
TOTAL NUCLEATED RBC: 0 K/CU MM
VITAMIN B-12: 636.5 PG/ML (ref 211–911)
WBC # BLD: 7.3 K/CU MM (ref 4–10.5)

## 2023-09-25 PROCEDURE — 6370000000 HC RX 637 (ALT 250 FOR IP): Performed by: STUDENT IN AN ORGANIZED HEALTH CARE EDUCATION/TRAINING PROGRAM

## 2023-09-25 PROCEDURE — 6360000002 HC RX W HCPCS: Performed by: STUDENT IN AN ORGANIZED HEALTH CARE EDUCATION/TRAINING PROGRAM

## 2023-09-25 PROCEDURE — 2060000000 HC ICU INTERMEDIATE R&B

## 2023-09-25 PROCEDURE — 94761 N-INVAS EAR/PLS OXIMETRY MLT: CPT

## 2023-09-25 PROCEDURE — 6370000000 HC RX 637 (ALT 250 FOR IP): Performed by: INTERNAL MEDICINE

## 2023-09-25 PROCEDURE — 2580000003 HC RX 258: Performed by: STUDENT IN AN ORGANIZED HEALTH CARE EDUCATION/TRAINING PROGRAM

## 2023-09-25 PROCEDURE — 97530 THERAPEUTIC ACTIVITIES: CPT

## 2023-09-25 PROCEDURE — 94640 AIRWAY INHALATION TREATMENT: CPT

## 2023-09-25 PROCEDURE — C9113 INJ PANTOPRAZOLE SODIUM, VIA: HCPCS | Performed by: STUDENT IN AN ORGANIZED HEALTH CARE EDUCATION/TRAINING PROGRAM

## 2023-09-25 PROCEDURE — 36415 COLL VENOUS BLD VENIPUNCTURE: CPT

## 2023-09-25 PROCEDURE — 82607 VITAMIN B-12: CPT

## 2023-09-25 PROCEDURE — 85025 COMPLETE CBC W/AUTO DIFF WBC: CPT

## 2023-09-25 PROCEDURE — 82272 OCCULT BLD FECES 1-3 TESTS: CPT

## 2023-09-25 PROCEDURE — 97116 GAIT TRAINING THERAPY: CPT

## 2023-09-25 PROCEDURE — 82746 ASSAY OF FOLIC ACID SERUM: CPT

## 2023-09-25 PROCEDURE — 6370000000 HC RX 637 (ALT 250 FOR IP)

## 2023-09-25 PROCEDURE — 99231 SBSQ HOSP IP/OBS SF/LOW 25: CPT | Performed by: INTERNAL MEDICINE

## 2023-09-25 RX ADMIN — MICONAZOLE NITRATE: 2 POWDER TOPICAL at 09:33

## 2023-09-25 RX ADMIN — SULFAMETHOXAZOLE AND TRIMETHOPRIM 1 TABLET: 800; 160 TABLET ORAL at 21:25

## 2023-09-25 RX ADMIN — TIOTROPIUM BROMIDE INHALATION SPRAY 2 PUFF: 3.12 SPRAY, METERED RESPIRATORY (INHALATION) at 08:25

## 2023-09-25 RX ADMIN — LACTULOSE 20 G: 20 SOLUTION ORAL at 09:34

## 2023-09-25 RX ADMIN — LACTULOSE 20 G: 20 SOLUTION ORAL at 15:05

## 2023-09-25 RX ADMIN — PRAVASTATIN SODIUM 80 MG: 40 TABLET ORAL at 21:25

## 2023-09-25 RX ADMIN — POLYVINYL ALCOHOL 1 DROP: 14 SOLUTION/ DROPS OPHTHALMIC at 15:05

## 2023-09-25 RX ADMIN — FERROUS GLUCONATE TAB 324 MG (37.5 MG ELEMENTAL IRON) 325 MG: 324 (37.5 FE) TAB at 09:33

## 2023-09-25 RX ADMIN — DIGOXIN 125 MCG: 125 TABLET ORAL at 09:34

## 2023-09-25 RX ADMIN — METOPROLOL TARTRATE 12.5 MG: 25 TABLET, FILM COATED ORAL at 09:34

## 2023-09-25 RX ADMIN — LACTULOSE 20 G: 20 SOLUTION ORAL at 21:25

## 2023-09-25 RX ADMIN — FERROUS GLUCONATE TAB 324 MG (37.5 MG ELEMENTAL IRON) 325 MG: 324 (37.5 FE) TAB at 21:00

## 2023-09-25 RX ADMIN — METOPROLOL TARTRATE 12.5 MG: 25 TABLET, FILM COATED ORAL at 21:25

## 2023-09-25 RX ADMIN — PANTOPRAZOLE SODIUM 40 MG: 40 INJECTION, POWDER, FOR SOLUTION INTRAVENOUS at 21:24

## 2023-09-25 RX ADMIN — SULFAMETHOXAZOLE AND TRIMETHOPRIM 1 TABLET: 800; 160 TABLET ORAL at 09:34

## 2023-09-25 RX ADMIN — SODIUM CHLORIDE, PRESERVATIVE FREE 10 ML: 5 INJECTION INTRAVENOUS at 21:30

## 2023-09-25 RX ADMIN — MAGNESIUM HYDROXIDE 30 ML: 400 SUSPENSION ORAL at 09:34

## 2023-09-25 RX ADMIN — POLYVINYL ALCOHOL 1 DROP: 14 SOLUTION/ DROPS OPHTHALMIC at 21:26

## 2023-09-25 RX ADMIN — POLYVINYL ALCOHOL 1 DROP: 14 SOLUTION/ DROPS OPHTHALMIC at 09:35

## 2023-09-25 RX ADMIN — SODIUM CHLORIDE, PRESERVATIVE FREE 10 ML: 5 INJECTION INTRAVENOUS at 09:35

## 2023-09-25 RX ADMIN — PANTOPRAZOLE SODIUM 40 MG: 40 INJECTION, POWDER, FOR SOLUTION INTRAVENOUS at 09:35

## 2023-09-25 ASSESSMENT — PAIN SCALES - GENERAL: PAINLEVEL_OUTOF10: 0

## 2023-09-25 NOTE — CONSENT
Informed Consent for Blood Component Transfusion Note    I have discussed with the patient and daughter the rationale for blood component transfusion; its benefits in treating or preventing fatigue, organ damage, or death; and its risk which includes mild transfusion reactions, rare risk of blood borne infection, or more serious but rare reactions. I have discussed the alternatives to transfusion, including the risk and consequences of not receiving transfusion. The patient and daughter had an opportunity to ask questions and had agreed to proceed with transfusion of blood components.     Electronically signed by Rodolfo Colmenares MD on 9/25/23 at 7:12 PM EDT

## 2023-09-25 NOTE — CARE COORDINATION
Per Anjum Moody pt is approved to admit to [facility].    [id number]  [id number]  [facility]  09/22/2023 09/25/2023-09/27/2023  Approved

## 2023-09-25 NOTE — CARE COORDINATION
Precert approved for [facility] MD MAYITO notified during huddle. Mardel Dubin ready at nurses station. If dc order is after CM is not present, please :    Pt returning/new to [facility] at discharge. Please call report to [phone] and fax orders, AVS, and discharge summaries to [phone].       Transport with superior at [phone]

## 2023-09-26 PROBLEM — N17.9 AKI (ACUTE KIDNEY INJURY) (HCC): Status: ACTIVE | Noted: 2023-09-26

## 2023-09-26 LAB
ANISOCYTOSIS: ABNORMAL
BANDED NEUTROPHILS ABSOLUTE COUNT: 0.64 K/CU MM
BANDED NEUTROPHILS RELATIVE PERCENT: 7 % (ref 5–11)
DIFFERENTIAL TYPE: ABNORMAL
EOSINOPHILS ABSOLUTE: 0.2 K/CU MM
EOSINOPHILS RELATIVE PERCENT: 2 % (ref 0–3)
HCT VFR BLD CALC: 23.4 % (ref 42–52)
HEMOGLOBIN: 7.2 GM/DL (ref 13.5–18)
IGA: 279 MG/DL (ref 69–382)
IGG,SERUM: 1121 MG/DL (ref 723–1685)
IGM,SERUM: 27 MG/DL (ref 62–277)
LYMPHOCYTES ABSOLUTE: 1.2 K/CU MM
LYMPHOCYTES RELATIVE PERCENT: 13 % (ref 24–44)
MCH RBC QN AUTO: 30.6 PG (ref 27–31)
MCHC RBC AUTO-ENTMCNC: 30.8 % (ref 32–36)
MCV RBC AUTO: 99.6 FL (ref 78–100)
MONOCYTES ABSOLUTE: 1 K/CU MM
MONOCYTES RELATIVE PERCENT: 11 % (ref 0–4)
PDW BLD-RTO: 17.4 % (ref 11.7–14.9)
PLATELET # BLD: 93 K/CU MM (ref 140–440)
PMV BLD AUTO: 13.8 FL (ref 7.5–11.1)
RBC # BLD: 2.35 M/CU MM (ref 4.6–6.2)
RBC # BLD: ABNORMAL 10*6/UL
SEGMENTED NEUTROPHILS ABSOLUTE COUNT: 6.1 K/CU MM
SEGMENTED NEUTROPHILS RELATIVE PERCENT: 67 % (ref 36–66)
WBC # BLD: 9.1 K/CU MM (ref 4–10.5)

## 2023-09-26 PROCEDURE — 94761 N-INVAS EAR/PLS OXIMETRY MLT: CPT

## 2023-09-26 PROCEDURE — 6370000000 HC RX 637 (ALT 250 FOR IP): Performed by: STUDENT IN AN ORGANIZED HEALTH CARE EDUCATION/TRAINING PROGRAM

## 2023-09-26 PROCEDURE — 99222 1ST HOSP IP/OBS MODERATE 55: CPT | Performed by: INTERNAL MEDICINE

## 2023-09-26 PROCEDURE — 86320 SERUM IMMUNOELECTROPHORESIS: CPT

## 2023-09-26 PROCEDURE — 2580000003 HC RX 258: Performed by: STUDENT IN AN ORGANIZED HEALTH CARE EDUCATION/TRAINING PROGRAM

## 2023-09-26 PROCEDURE — 82668 ASSAY OF ERYTHROPOIETIN: CPT

## 2023-09-26 PROCEDURE — APPNB180 APP NON BILLABLE TIME > 60 MINS: Performed by: PHYSICIAN ASSISTANT

## 2023-09-26 PROCEDURE — 6370000000 HC RX 637 (ALT 250 FOR IP)

## 2023-09-26 PROCEDURE — 36415 COLL VENOUS BLD VENIPUNCTURE: CPT

## 2023-09-26 PROCEDURE — 97535 SELF CARE MNGMENT TRAINING: CPT

## 2023-09-26 PROCEDURE — 85007 BL SMEAR W/DIFF WBC COUNT: CPT

## 2023-09-26 PROCEDURE — C9113 INJ PANTOPRAZOLE SODIUM, VIA: HCPCS | Performed by: STUDENT IN AN ORGANIZED HEALTH CARE EDUCATION/TRAINING PROGRAM

## 2023-09-26 PROCEDURE — 82784 ASSAY IGA/IGD/IGG/IGM EACH: CPT

## 2023-09-26 PROCEDURE — 6370000000 HC RX 637 (ALT 250 FOR IP): Performed by: INTERNAL MEDICINE

## 2023-09-26 PROCEDURE — 2060000000 HC ICU INTERMEDIATE R&B

## 2023-09-26 PROCEDURE — 94640 AIRWAY INHALATION TREATMENT: CPT

## 2023-09-26 PROCEDURE — 85027 COMPLETE CBC AUTOMATED: CPT

## 2023-09-26 PROCEDURE — 82232 ASSAY OF BETA-2 PROTEIN: CPT

## 2023-09-26 PROCEDURE — 6360000002 HC RX W HCPCS: Performed by: STUDENT IN AN ORGANIZED HEALTH CARE EDUCATION/TRAINING PROGRAM

## 2023-09-26 PROCEDURE — 97530 THERAPEUTIC ACTIVITIES: CPT

## 2023-09-26 PROCEDURE — 83883 ASSAY NEPHELOMETRY NOT SPEC: CPT

## 2023-09-26 RX ADMIN — SODIUM CHLORIDE, PRESERVATIVE FREE 10 ML: 5 INJECTION INTRAVENOUS at 22:43

## 2023-09-26 RX ADMIN — METOPROLOL TARTRATE 12.5 MG: 25 TABLET, FILM COATED ORAL at 11:32

## 2023-09-26 RX ADMIN — FERROUS GLUCONATE TAB 324 MG (37.5 MG ELEMENTAL IRON) 325 MG: 324 (37.5 FE) TAB at 21:57

## 2023-09-26 RX ADMIN — MICONAZOLE NITRATE: 2 POWDER TOPICAL at 11:33

## 2023-09-26 RX ADMIN — FERROUS GLUCONATE TAB 324 MG (37.5 MG ELEMENTAL IRON) 325 MG: 324 (37.5 FE) TAB at 11:34

## 2023-09-26 RX ADMIN — SULFAMETHOXAZOLE AND TRIMETHOPRIM 1 TABLET: 800; 160 TABLET ORAL at 21:56

## 2023-09-26 RX ADMIN — POLYVINYL ALCOHOL 1 DROP: 14 SOLUTION/ DROPS OPHTHALMIC at 11:33

## 2023-09-26 RX ADMIN — PANTOPRAZOLE SODIUM 40 MG: 40 INJECTION, POWDER, FOR SOLUTION INTRAVENOUS at 11:32

## 2023-09-26 RX ADMIN — SULFAMETHOXAZOLE AND TRIMETHOPRIM 1 TABLET: 800; 160 TABLET ORAL at 11:33

## 2023-09-26 RX ADMIN — METOPROLOL TARTRATE 12.5 MG: 25 TABLET, FILM COATED ORAL at 21:56

## 2023-09-26 RX ADMIN — POLYVINYL ALCOHOL 1 DROP: 14 SOLUTION/ DROPS OPHTHALMIC at 21:57

## 2023-09-26 RX ADMIN — TIOTROPIUM BROMIDE INHALATION SPRAY 2 PUFF: 3.12 SPRAY, METERED RESPIRATORY (INHALATION) at 07:24

## 2023-09-26 RX ADMIN — DIGOXIN 125 MCG: 125 TABLET ORAL at 11:33

## 2023-09-26 RX ADMIN — LACTULOSE 20 G: 20 SOLUTION ORAL at 11:32

## 2023-09-26 RX ADMIN — MAGNESIUM HYDROXIDE 30 ML: 400 SUSPENSION ORAL at 11:32

## 2023-09-26 RX ADMIN — PANTOPRAZOLE SODIUM 40 MG: 40 INJECTION, POWDER, FOR SOLUTION INTRAVENOUS at 21:57

## 2023-09-26 RX ADMIN — MICONAZOLE NITRATE: 2 POWDER TOPICAL at 21:58

## 2023-09-26 RX ADMIN — PRAVASTATIN SODIUM 80 MG: 40 TABLET ORAL at 21:56

## 2023-09-26 RX ADMIN — SODIUM CHLORIDE, PRESERVATIVE FREE 10 ML: 5 INJECTION INTRAVENOUS at 11:33

## 2023-09-26 NOTE — CONSULTS
[facility], [birth year], [id number], 9/20/2023      Discharge Recommendation: SNF     History:  [place]:  The primary encounter diagnosis was MC (acute kidney injury) ([address]). Diagnoses of Pneumonia due to infectious organism, unspecified laterality, unspecified part of lung and Septicemia ([address]) were also pertinent to this visit. Past Medical History:   Diagnosis Date    AR (aortic regurgitation)     mild to mod    Atrial fibrillation, new onset (HCC)     CAD (coronary artery disease)     History of angioplasty    Chronic midline low back pain without sciatica 5/1/2017    Has seen Dr. Janeen Zuñiga and had shots    COPD (chronic obstructive pulmonary disease) ([address])     Family history of coronary artery disease     Fatigue 10/2016    H/O cardiovascular stress test 2/21/2012    mild ischemia in the basal inferior and mid inferior regions ef is 47    H/O chest x-ray 10/20/2016    Right pleural effusion resolved. H/O Doppler ultrasound 10/2016    CAROTID-Kolby. arteries patent w/less than 50% stenosis in the internal carotid arteries. H/O echocardiogram 2/17/15    Aortic sclerosis without stenosis, normal LV size and wall motion w/low normal systolic function, LA dilatation, RV dilatation, mild pulmonary HTN, EF 50-55%.     History of cardiac cath 7/2/1996    LV uniform LV contractility RCa dominatn 50-60 prox stenosis  LM patent  LAD mild mid plaquing diag has 70 ostial narrowing ramus minimal plaquing cx normal    History of PTCA 08/15/2011    prox lad bare metal stent 8/2011    Hyperlipidemia     Hypertension     Obesity     Obesity, Class II, BMI 35-39.9, with comorbidity 4/17/2014    RBBB     Risk for falls 10/26/2015    Tachycardia          Subjective:  Patient states: \"I wish I did more today\"  Pain: denied   Communication with other providers: RN approved eval, handoff to RN   Restrictions: general precautions, fall risk, contact ISO   No one at
Consult completed. Nexiva 18g 1.75 inch peripheral IV initiated into left forearm x 1 attempt with ultrasound guidance. Brisk blood return, flushes without resistance. Patient tolerated well. Primary nurse Shirley Lucero RN notified. Please consult IV/PICC team if patient's needs change, questions, or concerns.
Consult completed. Nexiva 20g 1.75 inch peripheral IV initiated into right forearm x 5 attempt with ultrasound guidance. Brisk blood return, flushes without resistance. Patient tolerated well. Primary nurse  notified. Please consult IV/PICC team if patient's needs change, questions, or concerns.
Department of General Surgery   Surgical Service Dr. Stephanie Leone   Consult Note    Date of Consult: 9/23/23    Critical care consult time: 0 min    Reason for Consult:  ? Enlarging left thigh abscess, worsening Hb without any other source     Requesting Physician:  Dr. Prashant Khanna:  As above    History Obtained From:  patient, electronic medical record    HISTORY OF PRESENT ILLNESS:      The patient is a 80 y.o. male who presented to the ED with complaints described as:      Pt with recent procedure done at OSH last month for concern for septic arthritis. He was admitted here with concerns for pneumonia and abnormal labs. He was transferred to [facility] where he had distal femur I&D and wound vac. During this hospital stay he had drop in H/H and multiple ultrasounds demonstrating fluid collection in leg and Gen Surg was consulted. Past Medical History:    Past Medical History:   Diagnosis Date    AR (aortic regurgitation)     mild to mod    Atrial fibrillation, new onset (HCC)     CAD (coronary artery disease)     History of angioplasty    Chronic midline low back pain without sciatica 5/1/2017    Has seen Dr. Dee Console and had shots    COPD (chronic obstructive pulmonary disease) ([address])     Family history of coronary artery disease     Fatigue 10/2016    H/O cardiovascular stress test 2/21/2012    mild ischemia in the basal inferior and mid inferior regions ef is 47    H/O chest x-ray 10/20/2016    Right pleural effusion resolved. H/O Doppler ultrasound 10/2016    CAROTID-Kolby. arteries patent w/less than 50% stenosis in the internal carotid arteries. H/O echocardiogram 2/17/15    Aortic sclerosis without stenosis, normal LV size and wall motion w/low normal systolic function, LA dilatation, RV dilatation, mild pulmonary HTN, EF 50-55%.     History of cardiac cath 7/2/1996    LV uniform LV contractility RCa dominatn 50-60 prox stenosis  LM patent  LAD mild mid plaquing diag has 70 ostial
Hematology/Oncology Consult Note    Patient Name:  Mary Carmen Gamble  Patient :    Patient MRN:  [id number]     Referring Provider: Leroy Ayers MD     Date of Service: 9/15/2023      Reason for Consult: Anemia     Chief Complaint:    Chief Complaint   Patient presents with    OTHER     Abnormal labs       Principal Problem:    Severe sepsis [facility])  Active Problems:    Infection of prosthetic left knee joint ([address])    Pneumonia of both lower lobes due to infectious organism  Resolved Problems:    * No resolved hospital problems. *      HPI:   Mary Carmen Gamble is a pleasant 80 y.o. male with a history of Afib, CAD, COPD, AR, MGUS, recently hospitalized at [facility] for GI bleeding secondary to AVM which was cauterized as well as nodular gastritis, septic arthritis with L thigh abscess d/t MRSA s/p treatment with Linezolid   We are consulted for persistent anemia. Over course of this admission he has required 3 units PRBC, last on . Hgb is stable to slowly down-trending. There is a small fluid collection on thigh that is not consistent with large hematoma accounting for decline in Hgb. FOBT is ordered and pending. Iron studies are not consistent with SAHRA. B12/Folate WNL. He notes that he was following for \"abnormal protein\" with Dr Julian Sewell however pt stopped following up as we were monitoring only and he prefers to limit healthcare contact/driving. Last SPEP in 2019 with normal K/L ratio, elevated Beta-2 microglobulin to 3.4, however RAHEL was ordered but never resulted in Epic. 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 Labs are also notable for thrombocytopenia which appears to to be relatively stable over the last 2 months ~80-90k. On exam patient feels that he is in his usual state of health. He states that the \"longer he is in the hospital the more things go wrong. \"  Denies any melena or hematochezia, hematemesis. No recent weight loss. Fatigue is stable.
INPATIENT CARDIOLOGY CONSULT NOTE         Reason for consultation:  AFIB    Chief Complaint   Patient presents with    OTHER     Abnormal labs         History of present illness:Ray is a 80 y. o.year old who is admitted with   Chief Complaint   Patient presents with    OTHER     Abnormal labs       80-year-old gentleman who is admitted to the hospital 9/16/2023 with severe sepsis. Patient has premedical history significant for atrial fibrillation, on Xarelto, essential hypertension coronary disease hyperlipidemia. Patient is a nursing home facility where he was noted to have pneumonia on imaging and abnormal labs consistent with elevated troponin and elevated creatinine. Patient is DNR CCA    Cardiology consulted to evaluate patient for A-fib RVR  This is patient's second admission within the month with concerns for sepsis. His last admission anticoagulation was held due to worsening anemia. Was also offered a EMILIANO to assess for MRSA bacteremia however was declined. Pertinent Lab Personally Review     Recent Labs     09/20/23  0906   WBC 5.4   HGB 7.0*   HCT 22.4*   PLT 52*      Recent Labs     09/19/23  0528      K 4.5      CO2 19*   BUN 27*   CREATININE 1.1     Recent Labs     09/19/23  0528   AST 21   ALT 8*   BILITOT 0.5   ALKPHOS 74     No results for input(s): \"TROPONINT\" in the last 72 hours.   Lab Results   Component Value Date    PROBNP 1,657 (H) 09/16/2023    PROBNP 3,900 (H) 08/16/2023    PROBNP 3,985 (H) 08/03/2023         Past medical history:    has a past medical history of AR (aortic regurgitation), Atrial fibrillation, new onset ([address]), CAD (coronary artery disease), Chronic midline low back pain without sciatica, COPD (chronic obstructive pulmonary disease) ([address]), Family history of coronary artery disease, Fatigue, H/O cardiovascular stress test, H/O chest x-ray, H/O Doppler ultrasound, H/O echocardiogram, History of cardiac cath, History of PTCA, Hyperlipidemia,
and had shots    COPD (chronic obstructive pulmonary disease) ([facility])     Family history of coronary artery disease     Fatigue 10/2016    H/O cardiovascular stress test 2012    mild ischemia in the basal inferior and mid inferior regions ef is 47    H/O chest x-ray 10/20/2016    Right pleural effusion resolved. H/O Doppler ultrasound 10/2016    CAROTID-Kolby. arteries patent w/less than 50% stenosis in the internal carotid arteries. H/O echocardiogram 2/17/15    Aortic sclerosis without stenosis, normal LV size and wall motion w/low normal systolic function, LA dilatation, RV dilatation, mild pulmonary HTN, EF 50-55%. History of cardiac cath 1996    LV uniform LV contractility RCa dominatn 50-60 prox stenosis  LM patent  LAD mild mid plaquing diag has 70 ostial narrowing ramus minimal plaquing cx normal    History of PTCA 08/15/2011    prox lad bare metal stent 2011    Hyperlipidemia     Hypertension     Obesity     Obesity, Class II, BMI 35-39.9, with comorbidity 2014    RBBB     Risk for falls 10/26/2015    Tachycardia       Past Surgical History:   Procedure Laterality Date    COLONOSCOPY N/A 2022    COLONOSCOPY POLYPECTOMY SNARE/COLD BIOPSY performed by Lashay [facility] MD at [address] [place] [address] Ln REPLACEMENT  2004    partial left knee    UPPER GASTROINTESTINAL ENDOSCOPY N/A 2022    EGD DIAGNOSTIC ONLY performed by Lashay [facility] MD at [facility] ENDOSCOPY      Family History   Problem Relation Age of Onset    Heart Disease Mother     Heart Disease Father     Cancer Sister       Infectious disease related family history - not contibutory.    SOCIAL HISTORY  Social History     Tobacco Use    Smoking status: Former     Packs/day: 1.50     Years: 25.00     Additional pack years: 0.00     Total pack years: 37.50     Types: Cigarettes     Quit date: 1990     Years since quittin.8    Smokeless tobacco: Current     Types: Chew    Tobacco

## 2023-09-26 NOTE — CARE COORDINATION
CM reviewed chart and discussed in IDR. Pt is a possible discharge for today. Plan remains [facility] when medically stable.

## 2023-09-27 VITALS
HEIGHT: 71 IN | WEIGHT: 238.76 LBS | BODY MASS INDEX: 33.43 KG/M2 | OXYGEN SATURATION: 100 % | TEMPERATURE: 97.9 F | HEART RATE: 81 BPM | RESPIRATION RATE: 18 BRPM | DIASTOLIC BLOOD PRESSURE: 80 MMHG | SYSTOLIC BLOOD PRESSURE: 134 MMHG

## 2023-09-27 LAB
ALBUMIN SERPL-MCNC: 3.1 GM/DL (ref 3.4–5)
ALP BLD-CCNC: 115 IU/L (ref 40–128)
ALT SERPL-CCNC: 13 U/L (ref 10–40)
ANION GAP SERPL CALCULATED.3IONS-SCNC: 10 MMOL/L (ref 4–16)
ANISOCYTOSIS: ABNORMAL
AST SERPL-CCNC: 25 IU/L (ref 15–37)
BANDED NEUTROPHILS ABSOLUTE COUNT: 0.56 K/CU MM
BANDED NEUTROPHILS RELATIVE PERCENT: 6 % (ref 5–11)
BILIRUB SERPL-MCNC: 0.3 MG/DL (ref 0–1)
BUN SERPL-MCNC: 16 MG/DL (ref 6–23)
CALCIUM SERPL-MCNC: 9.2 MG/DL (ref 8.3–10.6)
CHLORIDE BLD-SCNC: 103 MMOL/L (ref 99–110)
CO2: 21 MMOL/L (ref 21–32)
CREAT SERPL-MCNC: 1.3 MG/DL (ref 0.9–1.3)
DIFFERENTIAL TYPE: ABNORMAL
EPO SERPL-ACNC: 53 MU/ML (ref 4–27)
GFR SERPL CREATININE-BSD FRML MDRD: 55 ML/MIN/1.73M2
GLUCOSE SERPL-MCNC: 84 MG/DL (ref 70–99)
HCT VFR BLD CALC: 25.3 % (ref 42–52)
HEMOGLOBIN: 7.8 GM/DL (ref 13.5–18)
LYMPHOCYTES ABSOLUTE: 1.5 K/CU MM
LYMPHOCYTES RELATIVE PERCENT: 16 % (ref 24–44)
MCH RBC QN AUTO: 30.1 PG (ref 27–31)
MCHC RBC AUTO-ENTMCNC: 30.8 % (ref 32–36)
MCV RBC AUTO: 97.7 FL (ref 78–100)
METAMYELOCYTES ABSOLUTE COUNT: 0.09 K/CU MM
METAMYELOCYTES PERCENT: 1 %
MONOCYTES ABSOLUTE: 1 K/CU MM
MONOCYTES RELATIVE PERCENT: 11 % (ref 0–4)
PDW BLD-RTO: 17.4 % (ref 11.7–14.9)
PLATELET # BLD: 107 K/CU MM (ref 140–440)
PMV BLD AUTO: 13.5 FL (ref 7.5–11.1)
POTASSIUM SERPL-SCNC: 4.2 MMOL/L (ref 3.5–5.1)
RBC # BLD: 2.59 M/CU MM (ref 4.6–6.2)
RBC # BLD: ABNORMAL 10*6/UL
SEGMENTED NEUTROPHILS ABSOLUTE COUNT: 6.2 K/CU MM
SEGMENTED NEUTROPHILS RELATIVE PERCENT: 66 % (ref 36–66)
SODIUM BLD-SCNC: 134 MMOL/L (ref 135–145)
TOTAL PROTEIN: 5.6 GM/DL (ref 6.4–8.2)
WBC # BLD: 9.4 K/CU MM (ref 4–10.5)

## 2023-09-27 PROCEDURE — C9113 INJ PANTOPRAZOLE SODIUM, VIA: HCPCS | Performed by: STUDENT IN AN ORGANIZED HEALTH CARE EDUCATION/TRAINING PROGRAM

## 2023-09-27 PROCEDURE — 94640 AIRWAY INHALATION TREATMENT: CPT

## 2023-09-27 PROCEDURE — 85027 COMPLETE CBC AUTOMATED: CPT

## 2023-09-27 PROCEDURE — 6370000000 HC RX 637 (ALT 250 FOR IP): Performed by: STUDENT IN AN ORGANIZED HEALTH CARE EDUCATION/TRAINING PROGRAM

## 2023-09-27 PROCEDURE — 97116 GAIT TRAINING THERAPY: CPT

## 2023-09-27 PROCEDURE — 97530 THERAPEUTIC ACTIVITIES: CPT

## 2023-09-27 PROCEDURE — 85007 BL SMEAR W/DIFF WBC COUNT: CPT

## 2023-09-27 PROCEDURE — 36415 COLL VENOUS BLD VENIPUNCTURE: CPT

## 2023-09-27 PROCEDURE — 97535 SELF CARE MNGMENT TRAINING: CPT

## 2023-09-27 PROCEDURE — 80053 COMPREHEN METABOLIC PANEL: CPT

## 2023-09-27 PROCEDURE — 2580000003 HC RX 258: Performed by: STUDENT IN AN ORGANIZED HEALTH CARE EDUCATION/TRAINING PROGRAM

## 2023-09-27 PROCEDURE — 6370000000 HC RX 637 (ALT 250 FOR IP)

## 2023-09-27 PROCEDURE — 94761 N-INVAS EAR/PLS OXIMETRY MLT: CPT

## 2023-09-27 PROCEDURE — 6370000000 HC RX 637 (ALT 250 FOR IP): Performed by: INTERNAL MEDICINE

## 2023-09-27 PROCEDURE — 99222 1ST HOSP IP/OBS MODERATE 55: CPT | Performed by: PHYSICIAN ASSISTANT

## 2023-09-27 PROCEDURE — 6360000002 HC RX W HCPCS: Performed by: STUDENT IN AN ORGANIZED HEALTH CARE EDUCATION/TRAINING PROGRAM

## 2023-09-27 RX ORDER — SULFAMETHOXAZOLE AND TRIMETHOPRIM 800; 160 MG/1; MG/1
1 TABLET ORAL EVERY 12 HOURS SCHEDULED
Qty: 180 TABLET | Refills: 0 | Status: SHIPPED | OUTPATIENT
Start: 2023-09-27 | End: 2023-12-26

## 2023-09-27 RX ORDER — DIGOXIN 125 MCG
125 TABLET ORAL DAILY
Qty: 30 TABLET | Refills: 0 | Status: SHIPPED | OUTPATIENT
Start: 2023-09-28

## 2023-09-27 RX ADMIN — SODIUM CHLORIDE, PRESERVATIVE FREE 10 ML: 5 INJECTION INTRAVENOUS at 09:17

## 2023-09-27 RX ADMIN — PANTOPRAZOLE SODIUM 40 MG: 40 INJECTION, POWDER, FOR SOLUTION INTRAVENOUS at 09:16

## 2023-09-27 RX ADMIN — SULFAMETHOXAZOLE AND TRIMETHOPRIM 1 TABLET: 800; 160 TABLET ORAL at 09:16

## 2023-09-27 RX ADMIN — TIOTROPIUM BROMIDE INHALATION SPRAY 2 PUFF: 3.12 SPRAY, METERED RESPIRATORY (INHALATION) at 08:56

## 2023-09-27 RX ADMIN — METOPROLOL TARTRATE 12.5 MG: 25 TABLET, FILM COATED ORAL at 09:16

## 2023-09-27 RX ADMIN — POLYVINYL ALCOHOL 1 DROP: 14 SOLUTION/ DROPS OPHTHALMIC at 09:18

## 2023-09-27 RX ADMIN — MICONAZOLE NITRATE: 2 POWDER TOPICAL at 09:19

## 2023-09-27 RX ADMIN — FERROUS GLUCONATE TAB 324 MG (37.5 MG ELEMENTAL IRON) 325 MG: 324 (37.5 FE) TAB at 09:17

## 2023-09-27 RX ADMIN — DIGOXIN 125 MCG: 125 TABLET ORAL at 09:16

## 2023-09-27 NOTE — PLAN OF CARE
Offered PT eval.  Patient agreeable. Observed patient receiving blood. D/w RN Rajendra Quiles who was providing care as well. [place] decision to defer PT eval for now, receiving blood is more important. Patient and RN confirm that he has been up and ambulatory without issue, they intend to walk again. Intend to return for PT eval as schedules allow.     Constantino [place] [address]  3:45 PM 9/21/2023
Problem: Discharge Planning  Goal: Discharge to home or other facility with appropriate resources  9/17/2023 0827 by Lyn Munguia RN  Outcome: Progressing  9/17/2023 0542 by Jarrell Clarke RN  Outcome: Progressing  Flowsheets (Taken 9/16/2023 1903)  Discharge to home or other facility with appropriate resources:   Identify barriers to discharge with patient and caregiver   Arrange for needed discharge resources and transportation as appropriate   Identify discharge learning needs (meds, wound care, etc)     Problem: Pain  Goal: Verbalizes/displays adequate comfort level or baseline comfort level  9/17/2023 0827 by Lyn Munguia RN  Outcome: Progressing  9/17/2023 0542 by Jarrell Clarke RN  Outcome: Progressing     Problem: Infection - Adult  Goal: Absence of infection at discharge  9/17/2023 0827 by Lyn Munguia RN  Outcome: Progressing  9/17/2023 0542 by Jarrell Clarke RN  Outcome: Progressing  Flowsheets (Taken 9/16/2023 1903)  Absence of infection at discharge:   Monitor all insertion sites i.e., indwelling lines, tubes and drains   Monitor lab/diagnostic results   Assess and monitor for signs and symptoms of infection  Goal: Absence of infection during hospitalization  9/17/2023 0827 by Lyn Munguia RN  Outcome: Progressing  9/17/2023 0542 by Jarrell Clarke RN  Outcome: Progressing  Flowsheets (Taken 9/16/2023 1903)  Absence of infection during hospitalization:   Monitor lab/diagnostic results   Monitor all insertion sites i.e., indwelling lines, tubes and drains   Assess and monitor for signs and symptoms of infection  Goal: Absence of fever/infection during anticipated neutropenic period  9/17/2023 0827 by Lyn Munguia RN  Outcome: Progressing  9/17/2023 0542 by Jarrell Clarke RN  Outcome: Progressing  Flowsheets (Taken 9/16/2023 1903)  Absence of fever/infection during anticipated neutropenic period: Monitor white blood cell count     Problem: Metabolic/Fluid and Electrolytes -
Problem: Discharge Planning  Goal: Discharge to home or other facility with appropriate resources  9/27/2023 1127 by Jessica Hernandez RN  Outcome: Progressing  Flowsheets (Taken 9/27/2023 0800)  Discharge to home or other facility with appropriate resources:   Identify barriers to discharge with patient and caregiver   Arrange for needed discharge resources and transportation as appropriate   Identify discharge learning needs (meds, wound care, etc)   Refer to discharge planning if patient needs post-hospital services based on physician order or complex needs related to functional status, cognitive ability or social support system  9/27/2023 0608 by Margarita Cifuentes RN  Outcome: Progressing     Problem: Pain  Goal: Verbalizes/displays adequate comfort level or baseline comfort level  9/27/2023 1127 by Jessica Hernandez RN  Outcome: Progressing  Flowsheets (Taken 9/27/2023 0900)  Verbalizes/displays adequate comfort level or baseline comfort level:   Encourage patient to monitor pain and request assistance   Assess pain using appropriate pain scale   Administer analgesics based on type and severity of pain and evaluate response   Implement non-pharmacological measures as appropriate and evaluate response   Consider cultural and social influences on pain and pain management   Notify Licensed Independent Practitioner if interventions unsuccessful or patient reports new pain  9/27/2023 0608 by Margarita Cifuentes RN  Outcome: Progressing     Problem: Infection - Adult  Goal: Absence of infection at discharge  9/27/2023 1127 by Jessica Hernandez RN  Outcome: Progressing  Flowsheets (Taken 9/27/2023 0800)  Absence of infection at discharge:   Assess and monitor for signs and symptoms of infection   Monitor lab/diagnostic results   Monitor all insertion sites i.e., indwelling lines, tubes and drains   Monitor endotracheal (as able) and nasal secretions for changes in amount and color   [place] appropriate cooling/warming
Problem: Discharge Planning  Goal: Discharge to home or other facility with appropriate resources  9/27/2023 1127 by Silvia Lester RN  Outcome: Progressing  Flowsheets (Taken 9/27/2023 0800)  Discharge to home or other facility with appropriate resources:   Identify barriers to discharge with patient and caregiver   Arrange for needed discharge resources and transportation as appropriate   Identify discharge learning needs (meds, wound care, etc)   Refer to discharge planning if patient needs post-hospital services based on physician order or complex needs related to functional status, cognitive ability or social support system  9/27/2023 0608 by Michelle Ugarte RN  Outcome: Progressing     Problem: Pain  Goal: Verbalizes/displays adequate comfort level or baseline comfort level  9/27/2023 1127 by Silvia Lester RN  Outcome: Progressing  Flowsheets (Taken 9/27/2023 0900)  Verbalizes/displays adequate comfort level or baseline comfort level:   Encourage patient to monitor pain and request assistance   Assess pain using appropriate pain scale   Administer analgesics based on type and severity of pain and evaluate response   Implement non-pharmacological measures as appropriate and evaluate response   Consider cultural and social influences on pain and pain management   Notify Licensed Independent Practitioner if interventions unsuccessful or patient reports new pain  9/27/2023 0608 by Michelle Ugarte RN  Outcome: Progressing     Problem: Infection - Adult  Goal: Absence of infection at discharge  9/27/2023 1127 by Silvia Lester RN  Outcome: Progressing  Flowsheets (Taken 9/27/2023 0800)  Absence of infection at discharge:   Assess and monitor for signs and symptoms of infection   Monitor lab/diagnostic results   Monitor all insertion sites i.e., indwelling lines, tubes and drains   Monitor endotracheal (as able) and nasal secretions for changes in amount and color   [place] appropriate cooling/warming
Problem: Discharge Planning  Goal: Discharge to home or other facility with appropriate resources  Outcome: Progressing     Problem: Pain  Goal: Verbalizes/displays adequate comfort level or baseline comfort level  Outcome: Progressing     Problem: Infection - Adult  Goal: Absence of infection at discharge  Outcome: Progressing  Goal: Absence of infection during hospitalization  Outcome: Progressing  Goal: Absence of fever/infection during anticipated neutropenic period  Outcome: Progressing     Problem: Metabolic/Fluid and Electrolytes - Adult  Goal: Electrolytes maintained within normal limits  Outcome: Progressing     Problem: Respiratory - Adult  Goal: Achieves optimal ventilation and oxygenation  Outcome: Progressing     Problem: Cardiovascular - Adult  Goal: Maintains optimal cardiac output and hemodynamic stability  Outcome: Progressing  Goal: Absence of cardiac dysrhythmias or at baseline  Outcome: Progressing     Problem: Skin/Tissue Integrity - Adult  Goal: Skin integrity remains intact  Outcome: Progressing     Problem: Gastrointestinal - Adult  Goal: Maintains or returns to baseline bowel function  Outcome: Progressing  Goal: Maintains adequate nutritional intake  Outcome: Progressing     Problem: Hematologic - Adult  Goal: Maintains hematologic stability  Outcome: Progressing     Problem: Skin/Tissue Integrity  Goal: Absence of new skin breakdown  Description: 1. Monitor for areas of redness and/or skin breakdown  2. Assess vascular access sites hourly  3. Every 4-6 hours minimum:  Change oxygen saturation probe site  4. Every 4-6 hours:  If on nasal continuous positive airway pressure, respiratory therapy assess nares and determine need for appliance change or resting period.   Outcome: Progressing     Problem: Safety - Adult  Goal: Free from fall injury  Outcome: Progressing
Problem: Discharge Planning  Goal: Discharge to home or other facility with appropriate resources  Outcome: Progressing     Problem: Pain  Goal: Verbalizes/displays adequate comfort level or baseline comfort level  Outcome: Progressing     Problem: Infection - Adult  Goal: Absence of infection at discharge  Outcome: Progressing  Goal: Absence of infection during hospitalization  Outcome: Progressing  Goal: Absence of fever/infection during anticipated neutropenic period  Outcome: Progressing     Problem: Metabolic/Fluid and Electrolytes - Adult  Goal: Electrolytes maintained within normal limits  Outcome: Progressing  Goal: Hemodynamic stability and optimal renal function maintained  Outcome: Progressing     Problem: Skin/Tissue Integrity  Goal: Absence of new skin breakdown  Description: 1. Monitor for areas of redness and/or skin breakdown  2. Assess vascular access sites hourly  3. Every 4-6 hours minimum:  Change oxygen saturation probe site  4. Every 4-6 hours:  If on nasal continuous positive airway pressure, respiratory therapy assess nares and determine need for appliance change or resting period.   Outcome: Progressing     Problem: Neurosensory - Adult  Goal: Achieves stable or improved neurological status  Outcome: Progressing  Goal: Absence of seizures  Outcome: Progressing  Goal: Remains free of injury related to seizures activity  Outcome: Progressing  Goal: Achieves maximal functionality and self care  Outcome: Progressing     Problem: Respiratory - Adult  Goal: Achieves optimal ventilation and oxygenation  Outcome: Progressing     Problem: Cardiovascular - Adult  Goal: Maintains optimal cardiac output and hemodynamic stability  Outcome: Progressing  Goal: Absence of cardiac dysrhythmias or at baseline  Outcome: Progressing     Problem: Skin/Tissue Integrity - Adult  Goal: Skin integrity remains intact  Outcome: Progressing  Goal: Incisions, wounds, or drain sites healing without S/S of
Problem: Discharge Planning  Goal: Discharge to home or other facility with appropriate resources  Outcome: Progressing     Problem: Pain  Goal: Verbalizes/displays adequate comfort level or baseline comfort level  Outcome: Progressing     Problem: Infection - Adult  Goal: Absence of infection at discharge  Outcome: Progressing  Goal: Absence of infection during hospitalization  Outcome: Progressing  Goal: Absence of fever/infection during anticipated neutropenic period  Outcome: Progressing     Problem: Metabolic/Fluid and Electrolytes - Adult  Goal: Electrolytes maintained within normal limits  Outcome: Progressing  Goal: Hemodynamic stability and optimal renal function maintained  Outcome: Progressing     Problem: Skin/Tissue Integrity  Goal: Absence of new skin breakdown  Description: 1. Monitor for areas of redness and/or skin breakdown  2. Assess vascular access sites hourly  3. Every 4-6 hours minimum:  Change oxygen saturation probe site  4. Every 4-6 hours:  If on nasal continuous positive airway pressure, respiratory therapy assess nares and determine need for appliance change or resting period.   Outcome: Progressing     Problem: Neurosensory - Adult  Goal: Achieves stable or improved neurological status  Outcome: Progressing  Goal: Absence of seizures  Outcome: Progressing  Goal: Remains free of injury related to seizures activity  Outcome: Progressing  Goal: Achieves maximal functionality and self care  Outcome: Progressing     Problem: Respiratory - Adult  Goal: Achieves optimal ventilation and oxygenation  Outcome: Progressing     Problem: Cardiovascular - Adult  Goal: Maintains optimal cardiac output and hemodynamic stability  Outcome: Progressing  Goal: Absence of cardiac dysrhythmias or at baseline  Outcome: Progressing     Problem: Skin/Tissue Integrity - Adult  Goal: Skin integrity remains intact  Outcome: Progressing  Goal: Incisions, wounds, or drain sites healing without S/S of
Problem: Discharge Planning  Goal: Discharge to home or other facility with appropriate resources  Outcome: Progressing     Problem: Pain  Goal: Verbalizes/displays adequate comfort level or baseline comfort level  Outcome: Progressing     Problem: Skin/Tissue Integrity  Goal: Absence of new skin breakdown  Description: 1. Monitor for areas of redness and/or skin breakdown  2. Assess vascular access sites hourly  3. Every 4-6 hours minimum:  Change oxygen saturation probe site  4. Every 4-6 hours:  If on nasal continuous positive airway pressure, respiratory therapy assess nares and determine need for appliance change or resting period.   Outcome: Progressing     Problem: ABCDS Injury Assessment  Goal: Absence of physical injury  Outcome: Progressing     Problem: Safety - Adult  Goal: Free from fall injury  Outcome: Progressing
Problem: Discharge Planning  Goal: Discharge to home or other facility with appropriate resources  Outcome: Progressing  Flowsheets (Taken 9/16/2023 1903)  Discharge to home or other facility with appropriate resources:   Identify barriers to discharge with patient and caregiver   Arrange for needed discharge resources and transportation as appropriate   Identify discharge learning needs (meds, wound care, etc)     Problem: Pain  Goal: Verbalizes/displays adequate comfort level or baseline comfort level  Outcome: Progressing     Problem: Infection - Adult  Goal: Absence of infection at discharge  Outcome: Progressing  Flowsheets (Taken 9/16/2023 1903)  Absence of infection at discharge:   Monitor all insertion sites i.e., indwelling lines, tubes and drains   Monitor lab/diagnostic results   Assess and monitor for signs and symptoms of infection  Goal: Absence of infection during hospitalization  Outcome: Progressing  Flowsheets (Taken 9/16/2023 1903)  Absence of infection during hospitalization:   Monitor lab/diagnostic results   Monitor all insertion sites i.e., indwelling lines, tubes and drains   Assess and monitor for signs and symptoms of infection  Goal: Absence of fever/infection during anticipated neutropenic period  Outcome: Progressing  Flowsheets (Taken 9/16/2023 1903)  Absence of fever/infection during anticipated neutropenic period: Monitor white blood cell count     Problem: Metabolic/Fluid and Electrolytes - Adult  Goal: Electrolytes maintained within normal limits  Outcome: Progressing  Flowsheets (Taken 9/16/2023 1903)  Electrolytes maintained within normal limits: Monitor labs and assess patient for signs and symptoms of electrolyte imbalances  Goal: Hemodynamic stability and optimal renal function maintained  Outcome: Progressing  Flowsheets (Taken 9/16/2023 1903)  Hemodynamic stability and optimal renal function maintained:   Monitor labs and assess for signs and symptoms of volume excess or
Problem: Discharge Planning  Goal: Discharge to home or other facility with appropriate resources  Outcome: Progressing  Flowsheets (Taken 9/17/2023 1909)  Discharge to home or other facility with appropriate resources:   Identify barriers to discharge with patient and caregiver   Arrange for needed discharge resources and transportation as appropriate   Identify discharge learning needs (meds, wound care, etc)     Problem: Pain  Goal: Verbalizes/displays adequate comfort level or baseline comfort level  Outcome: Progressing     Problem: Infection - Adult  Goal: Absence of infection at discharge  Outcome: Progressing  Flowsheets (Taken 9/17/2023 1909)  Absence of infection at discharge:   Monitor lab/diagnostic results   Assess and monitor for signs and symptoms of infection  Goal: Absence of infection during hospitalization  Outcome: Progressing  Flowsheets (Taken 9/17/2023 1909)  Absence of infection during hospitalization:   Assess and monitor for signs and symptoms of infection   Monitor lab/diagnostic results  Goal: Absence of fever/infection during anticipated neutropenic period  Outcome: Progressing  Flowsheets (Taken 9/17/2023 1909)  Absence of fever/infection during anticipated neutropenic period: Monitor white blood cell count     Problem: Metabolic/Fluid and Electrolytes - Adult  Goal: Electrolytes maintained within normal limits  Outcome: Progressing  Flowsheets (Taken 9/17/2023 1909)  Electrolytes maintained within normal limits: Monitor labs and assess patient for signs and symptoms of electrolyte imbalances  Goal: Hemodynamic stability and optimal renal function maintained  Outcome: Progressing  Flowsheets (Taken 9/17/2023 1909)  Hemodynamic stability and optimal renal function maintained:   Monitor labs and assess for signs and symptoms of volume excess or deficit   Monitor intake, output and patient weight     Problem: Neurosensory - Adult  Goal: Achieves stable or improved neurological
Problem: Discharge Planning  Goal: Discharge to home or other facility with appropriate resources  Outcome: Progressing  Flowsheets (Taken 9/19/2023 0800 by Darius Del Castillo RN)  Discharge to home or other facility with appropriate resources:   Identify barriers to discharge with patient and caregiver   Arrange for needed discharge resources and transportation as appropriate   Identify discharge learning needs (meds, wound care, etc)   Refer to discharge planning if patient needs post-hospital services based on physician order or complex needs related to functional status, cognitive ability or social support system     Problem: Pain  Goal: Verbalizes/displays adequate comfort level or baseline comfort level  Outcome: Progressing  Flowsheets (Taken 9/19/2023 0801 by Darius Del Castillo RN)  Verbalizes/displays adequate comfort level or baseline comfort level:   Encourage patient to monitor pain and request assistance   Assess pain using appropriate pain scale   Administer analgesics based on type and severity of pain and evaluate response   Implement non-pharmacological measures as appropriate and evaluate response   Consider cultural and social influences on pain and pain management   Notify Licensed Independent Practitioner if interventions unsuccessful or patient reports new pain     Problem: Infection - Adult  Goal: Absence of infection at discharge  Outcome: Progressing  Flowsheets (Taken 9/19/2023 0800 by Darius Del Castillo RN)  Absence of infection at discharge:   Assess and monitor for signs and symptoms of infection   Monitor lab/diagnostic results   Monitor all insertion sites i.e., indwelling lines, tubes and drains   Monitor endotracheal (as able) and nasal secretions for changes in amount and color   [place] appropriate cooling/warming therapies per order   Administer medications as ordered   Instruct and encourage patient and family to use good hand hygiene technique   Identify and instruct in
Problem: Discharge Planning  Goal: Discharge to home or other facility with appropriate resources  Outcome: Progressing  Flowsheets (Taken 9/25/2023 0955)  Discharge to home or other facility with appropriate resources:   Identify barriers to discharge with patient and caregiver   Arrange for needed discharge resources and transportation as appropriate   Identify discharge learning needs (meds, wound care, etc)   Refer to discharge planning if patient needs post-hospital services based on physician order or complex needs related to functional status, cognitive ability or social support system     Problem: Pain  Goal: Verbalizes/displays adequate comfort level or baseline comfort level  Outcome: Progressing  Flowsheets (Taken 9/25/2023 0930)  Verbalizes/displays adequate comfort level or baseline comfort level:   Encourage patient to monitor pain and request assistance   Assess pain using appropriate pain scale     Problem: Infection - Adult  Goal: Absence of infection at discharge  Outcome: Progressing  Flowsheets (Taken 9/25/2023 0955)  Absence of infection at discharge:   Assess and monitor for signs and symptoms of infection   Monitor lab/diagnostic results   Monitor all insertion sites i.e., indwelling lines, tubes and drains  Goal: Absence of infection during hospitalization  Outcome: Progressing  Flowsheets (Taken 9/25/2023 0955)  Absence of infection during hospitalization:   Assess and monitor for signs and symptoms of infection   Monitor lab/diagnostic results   Monitor all insertion sites i.e., indwelling lines, tubes and drains  Goal: Absence of fever/infection during anticipated neutropenic period  Outcome: Progressing  Flowsheets (Taken 9/25/2023 0955)  Absence of fever/infection during anticipated neutropenic period: Monitor white blood cell count     Problem: Metabolic/Fluid and Electrolytes - Adult  Goal: Electrolytes maintained within normal limits  Outcome: Progressing  Flowsheets (Taken
Problem: Pain  Goal: Verbalizes/displays adequate comfort level or baseline comfort level  9/18/2023 0828 by Helena Najera RN  Outcome: Progressing  9/18/2023 0058 by Talia Sharp RN  Outcome: Progressing     Problem: Infection - Adult  Goal: Absence of infection at discharge  9/18/2023 0828 by Helena Najera RN  Outcome: Progressing  Flowsheets (Taken 9/18/2023 9031)  Absence of infection at discharge: Monitor lab/diagnostic results  9/18/2023 0058 by Talia Sharp RN  Outcome: Progressing  Flowsheets (Taken 9/17/2023 1909)  Absence of infection at discharge:   Monitor lab/diagnostic results   Assess and monitor for signs and symptoms of infection     Problem: Infection - Adult  Goal: Absence of infection during hospitalization  9/18/2023 0828 by Helena Najera RN  Outcome: Progressing  Flowsheets (Taken 9/18/2023 1888)  Absence of infection during hospitalization: Assess and monitor for signs and symptoms of infection  9/18/2023 0058 by Talia Sharp RN  Outcome: Progressing  Flowsheets (Taken 9/17/2023 1909)  Absence of infection during hospitalization:   Assess and monitor for signs and symptoms of infection   Monitor lab/diagnostic results
Adult  Goal: Skin integrity remains intact  Outcome: Progressing  Goal: Incisions, wounds, or drain sites healing without S/S of infection  Outcome: Progressing  Goal: Oral mucous membranes remain intact  Outcome: Progressing     Problem: Gastrointestinal - Adult  Goal: Minimal or absence of nausea and vomiting  Outcome: Progressing  Goal: Maintains or returns to baseline bowel function  Outcome: Progressing  Goal: Maintains adequate nutritional intake  Outcome: Progressing  Goal: Establish and maintain optimal ostomy function  Outcome: Progressing     Problem: ABCDS Injury Assessment  Goal: Absence of physical injury  Outcome: Progressing     Problem: Safety - Adult  Goal: Free from fall injury  Outcome: Progressing     Problem: Musculoskeletal - Adult  Goal: Return mobility to safest level of function  Outcome: Progressing  Goal: Maintain proper alignment of affected body part  Outcome: Progressing  Goal: Return ADL status to a safe level of function  Outcome: Progressing     Problem: Hematologic - Adult  Goal: Maintains hematologic stability  Outcome: Progressing     Problem: Nutrition Deficit:  Goal: Optimize nutritional status  Outcome: Progressing
RN  Outcome: Progressing  9/23/2023 0350 by Meg Guzmán RN  Outcome: Progressing  Goal: Maintain proper alignment of affected body part  9/23/2023 0351 by Meg Guzmán RN  Outcome: Progressing  9/23/2023 0350 by Meg Guzmán RN  Outcome: Progressing  Goal: Return ADL status to a safe level of function  9/23/2023 0351 by Meg Guzmán RN  Outcome: Progressing  9/23/2023 0350 by Meg Guzmán RN  Outcome: Progressing     Problem: Hematologic - Adult  Goal: Maintains hematologic stability  9/23/2023 0351 by Meg Guzmán RN  Outcome: Progressing  9/23/2023 0350 by Meg Guzmán RN  Outcome: Progressing     Problem: Nutrition Deficit:  Goal: Optimize nutritional status  9/23/2023 0351 by Meg Guzmán RN  Outcome: Progressing  9/23/2023 0350 by Meg Guzmán RN  Outcome: Progressing
[id number] by Randy Ritter RN  Outcome: Progressing  Goal: Incisions, wounds, or drain sites healing without S/S of infection  Outcome: Progressing  Flowsheets (Taken 9/27/2023 0800)  Incisions, Wounds, or Drain Sites Healing Without Sign and Symptoms of Infection:   ADMISSION and DAILY: Assess and document risk factors for pressure ulcer development   TWICE DAILY: Assess and document skin integrity   TWICE DAILY: Assess and document dressing/incision, wound bed, drain sites and surrounding tissue   Implement wound care per orders   Initiate isolation precautions as appropriate   Initiate pressure ulcer prevention bundle as indicated  Goal: Oral mucous membranes remain intact  Outcome: Progressing  Flowsheets (Taken 9/27/2023 0800)  Oral Mucous Membranes Remain Intact:   Assess oral mucosa and hygiene practices   Implement preventative oral hygiene regimen   Implement oral medicated treatments as ordered     Problem: Gastrointestinal - Adult  Goal: Minimal or absence of nausea and vomiting  Outcome: Progressing  Flowsheets (Taken 9/27/2023 0800)  Minimal or absence of nausea and vomiting:   Administer IV fluids as ordered to ensure adequate hydration   Maintain NPO status until nausea and vomiting are resolved   Nasogastric tube to low intermittent suction as ordered   Administer ordered antiemetic medications as needed   Provide nonpharmacologic comfort measures as appropriate   Nutrition consult to assist patient with adequate nutrition and appropriate food choices   Advance diet as tolerated, if ordered  Goal: Maintains or returns to baseline bowel function  9/27/2023 1127 by Parisa Heck RN  Outcome: Progressing  Flowsheets (Taken 9/27/2023 0800)  Maintains or returns to baseline bowel function:   Assess bowel function   Encourage oral fluids to ensure adequate hydration   Administer IV fluids as ordered to ensure adequate hydration   Administer ordered medications as needed   Encourage mobilization and
nausea and vomiting:   Administer IV fluids as ordered to ensure adequate hydration   Maintain NPO status until nausea and vomiting are resolved   Nasogastric tube to low intermittent suction as ordered   Administer ordered antiemetic medications as needed   Provide nonpharmacologic comfort measures as appropriate   Advance diet as tolerated, if ordered   Nutrition consult to assist patient with adequate nutrition and appropriate food choices  Goal: Maintains or returns to baseline bowel function  Outcome: Progressing  Flowsheets (Taken 9/20/2023 0800)  Maintains or returns to baseline bowel function:   Assess bowel function   Encourage oral fluids to ensure adequate hydration   Administer IV fluids as ordered to ensure adequate hydration   Administer ordered medications as needed   Encourage mobilization and activity   Nutrition consult to assist patient with appropriate food choices  Goal: Maintains adequate nutritional intake  Outcome: Progressing  Flowsheets (Taken 9/20/2023 0800)  Maintains adequate nutritional intake:   Monitor percentage of each meal consumed   Identify factors contributing to decreased intake, treat as appropriate   Assist with meals as needed   Monitor intake and output, weight and lab values   Obtain nutritional consult as needed  Goal: Establish and maintain optimal ostomy function  Outcome: Progressing  Flowsheets (Taken 9/20/2023 0800)  Establish and maintain optimal ostomy function:   Monitor output from ostomies   Administer IV fluids and TPN as ordered   Introduce and advance enteral feedings as ordered   Nutrition consult   Gastric suctioning as ordered   Infuse IV Fluids/TPN as ordered     Problem: ABCDS Injury Assessment  Goal: Absence of physical injury  Outcome: Progressing     Problem: Safety - Adult  Goal: Free from fall injury  Outcome: Progressing
9/26/2023 0800)  Return Mobility to Safest Level of Function:   Assess patient stability and activity tolerance for standing, transferring and ambulating with or without assistive devices   Assist with transfers and ambulation using safe patient handling equipment as needed   Ensure adequate protection for wounds/incisions during mobilization   Obtain physical therapy/occupational therapy consults as needed   Apply continuous passive motion per provider or physical therapy orders to increase flexion toward goal   Instruct patient/family in ordered activity level  Goal: Maintain proper alignment of affected body part  Outcome: Progressing  Flowsheets (Taken 9/26/2023 0800)  Maintain proper alignment of affected body part:   Support and protect limb and body alignment per provider's orders   Instruct and reinforce with patient and family use of appropriate assistive device and precautions (e.g. spinal or hip dislocation precautions)  Goal: Return ADL status to a safe level of function  Outcome: Progressing  Flowsheets (Taken 9/26/2023 0800)  Return ADL Status to a Safe Level of Function:   Administer medication as ordered   Assess activities of daily living deficits and provide assistive devices as needed   Obtain physical therapy/occupational therapy consults as needed   Assist and instruct patient to increase activity and self care as tolerated     Problem: Hematologic - Adult  Goal: Maintains hematologic stability  Outcome: Progressing  Flowsheets (Taken 9/26/2023 0800)  Maintains hematologic stability:   Assess for signs and symptoms of bleeding or hemorrhage   Monitor labs for bleeding or clotting disorders   Administer blood products/factors as ordered     Problem: Nutrition Deficit:  Goal: Optimize nutritional status  Outcome: Progressing

## 2023-09-27 NOTE — CARE COORDINATION
Pt is being discharged to:    [facility]     Call report to [phone]    Complete & sign the [facility] then fax to [phone]    Place AVS & any scripts in the envelope that is in the soft chart.   This is to go with the pt to the facility    [address] [place] [address] Ambulance  at 14:30  [phone]  After 5 pm - [phone]    Notify family    Rapid covid not needed unless  pt has s/s    Keshav Garcia RN, aware

## 2023-09-28 LAB
B2 MICROGLOB SERPL-MCNC: 5.6 MG/L
KAPPA LC FREE SER-MCNC: 54.81 MG/L (ref 3.3–19.4)
KAPPA LC FREE/LAMBDA FREE SER NEPH: 0.72 {RATIO} (ref 0.26–1.65)
LAMBDA LC FREE SERPL-MCNC: 76.27 MG/L (ref 5.71–26.3)

## 2023-09-29 LAB — SPEP INTERPRETATION: NORMAL

## 2023-10-03 ENCOUNTER — TELEPHONE (OUTPATIENT)
Dept: ONCOLOGY | Age: 83
End: 2023-10-03

## 2023-11-02 ENCOUNTER — HOSPITAL ENCOUNTER (OUTPATIENT)
Age: 83
Setting detail: SPECIMEN
Discharge: HOME | End: 2023-11-02
Payer: MEDICARE

## 2023-11-02 LAB
BASOPHILS ABSOLUTE: 0 K/CU MM
BASOPHILS RELATIVE PERCENT: 0.1 % (ref 0–1)
DIFFERENTIAL TYPE: ABNORMAL
EOSINOPHILS ABSOLUTE: 0 K/CU MM
EOSINOPHILS RELATIVE PERCENT: 0.3 % (ref 0–3)
HCT VFR BLD CALC: 30 % (ref 42–52)
HEMOGLOBIN: 9.6 GM/DL (ref 13.5–18)
IMMATURE NEUTROPHIL %: 0.4 % (ref 0–0.43)
LYMPHOCYTES ABSOLUTE: 0.7 K/CU MM
LYMPHOCYTES RELATIVE PERCENT: 10.5 % (ref 24–44)
MCH RBC QN AUTO: 31 PG (ref 27–31)
MCHC RBC AUTO-ENTMCNC: 32 % (ref 32–36)
MCV RBC AUTO: 96.8 FL (ref 78–100)
MONOCYTES ABSOLUTE: 0.5 K/CU MM
MONOCYTES RELATIVE PERCENT: 6.9 % (ref 0–4)
PDW BLD-RTO: 21.9 % (ref 11.7–14.9)
PLATELET # BLD: 101 K/CU MM (ref 140–440)
PMV BLD AUTO: 13.9 FL (ref 7.5–11.1)
RBC # BLD: 3.1 M/CU MM (ref 4.6–6.2)
SEGMENTED NEUTROPHILS ABSOLUTE COUNT: 5.6 K/CU MM
SEGMENTED NEUTROPHILS RELATIVE PERCENT: 81.8 % (ref 36–66)
TOTAL IMMATURE NEUTOROPHIL: 0.03 K/CU MM
WBC # BLD: 6.8 K/CU MM (ref 4–10.5)

## 2023-11-02 PROCEDURE — 85025 COMPLETE CBC W/AUTO DIFF WBC: CPT

## 2023-11-09 ENCOUNTER — HOSPITAL ENCOUNTER (INPATIENT)
Age: 83
LOS: 4 days | Discharge: NURSING, REHAB OR LONG-TERM CARE | DRG: 683 | End: 2023-11-14
Attending: EMERGENCY MEDICINE | Admitting: INTERNAL MEDICINE
Payer: MEDICARE

## 2023-11-09 ENCOUNTER — APPOINTMENT (OUTPATIENT)
Dept: GENERAL RADIOLOGY | Age: 83
DRG: 683 | End: 2023-11-09
Payer: MEDICARE

## 2023-11-09 DIAGNOSIS — R53.1 GENERALIZED WEAKNESS: ICD-10-CM

## 2023-11-09 DIAGNOSIS — R26.2 AMBULATORY DYSFUNCTION: ICD-10-CM

## 2023-11-09 DIAGNOSIS — I48.91 ATRIAL FIBRILLATION WITH RAPID VENTRICULAR RESPONSE (HCC): ICD-10-CM

## 2023-11-09 DIAGNOSIS — N28.9 RENAL INSUFFICIENCY: ICD-10-CM

## 2023-11-09 DIAGNOSIS — R53.83 FATIGUE, UNSPECIFIED TYPE: Primary | ICD-10-CM

## 2023-11-09 LAB
ALBUMIN SERPL-MCNC: 2.3 GM/DL (ref 3.4–5)
ALP BLD-CCNC: 132 IU/L (ref 40–129)
ALT SERPL-CCNC: 14 U/L (ref 10–40)
ANION GAP SERPL CALCULATED.3IONS-SCNC: 13 MMOL/L (ref 4–16)
AST SERPL-CCNC: 39 IU/L (ref 15–37)
BILIRUB SERPL-MCNC: 0.3 MG/DL (ref 0–1)
BUN SERPL-MCNC: 31 MG/DL (ref 6–23)
CALCIUM SERPL-MCNC: 8.4 MG/DL (ref 8.3–10.6)
CHLORIDE BLD-SCNC: 101 MMOL/L (ref 99–110)
CO2: 24 MMOL/L (ref 21–32)
CREAT SERPL-MCNC: 1.8 MG/DL (ref 0.9–1.3)
DIFFERENTIAL TYPE: ABNORMAL
GFR SERPL CREATININE-BSD FRML MDRD: 37 ML/MIN/1.73M2
GLUCOSE SERPL-MCNC: 108 MG/DL (ref 70–99)
HCT VFR BLD CALC: 29.2 % (ref 42–52)
HEMOGLOBIN: 9.3 GM/DL (ref 13.5–18)
LYMPHOCYTES ABSOLUTE: 0.4 K/CU MM
LYMPHOCYTES RELATIVE PERCENT: 6 % (ref 24–44)
MCH RBC QN AUTO: 31 PG (ref 27–31)
MCHC RBC AUTO-ENTMCNC: 31.8 % (ref 32–36)
MCV RBC AUTO: 97.3 FL (ref 78–100)
MONOCYTES ABSOLUTE: 0.3 K/CU MM
MONOCYTES RELATIVE PERCENT: 4 % (ref 0–4)
PDW BLD-RTO: 21.6 % (ref 11.7–14.9)
PLATELET # BLD: 71 K/CU MM (ref 140–440)
PMV BLD AUTO: ABNORMAL FL (ref 7.5–11.1)
POTASSIUM SERPL-SCNC: 4.3 MMOL/L (ref 3.5–5.1)
RBC # BLD: 3 M/CU MM (ref 4.6–6.2)
SEGMENTED NEUTROPHILS ABSOLUTE COUNT: 6.3 K/CU MM
SEGMENTED NEUTROPHILS RELATIVE PERCENT: 90 % (ref 36–66)
SODIUM BLD-SCNC: 138 MMOL/L (ref 135–145)
TOTAL PROTEIN: 5.4 GM/DL (ref 6.4–8.2)
TROPONIN, HIGH SENSITIVITY: 89 NG/L (ref 0–22)
WBC # BLD: 7 K/CU MM (ref 4–10.5)

## 2023-11-09 PROCEDURE — 80053 COMPREHEN METABOLIC PANEL: CPT

## 2023-11-09 PROCEDURE — P9612 CATHETERIZE FOR URINE SPEC: HCPCS

## 2023-11-09 PROCEDURE — 85007 BL SMEAR W/DIFF WBC COUNT: CPT

## 2023-11-09 PROCEDURE — 99285 EMERGENCY DEPT VISIT HI MDM: CPT

## 2023-11-09 PROCEDURE — 84484 ASSAY OF TROPONIN QUANT: CPT

## 2023-11-09 PROCEDURE — 71045 X-RAY EXAM CHEST 1 VIEW: CPT

## 2023-11-09 PROCEDURE — 85027 COMPLETE CBC AUTOMATED: CPT

## 2023-11-09 PROCEDURE — 93005 ELECTROCARDIOGRAM TRACING: CPT | Performed by: EMERGENCY MEDICINE

## 2023-11-09 RX ORDER — METOPROLOL SUCCINATE 25 MG/1
TABLET, EXTENDED RELEASE ORAL
COMMUNITY
Start: 2023-10-25 | End: 2023-11-09 | Stop reason: ALTCHOICE

## 2023-11-09 RX ORDER — LORATADINE 10 MG/1
10 TABLET ORAL DAILY
Status: ON HOLD | COMMUNITY
Start: 2023-10-25

## 2023-11-09 RX ORDER — FUROSEMIDE 20 MG/1
20 TABLET ORAL DAILY
COMMUNITY
Start: 2023-10-31 | End: 2023-11-09 | Stop reason: ALTCHOICE

## 2023-11-09 RX ORDER — FUROSEMIDE 40 MG/1
40 TABLET ORAL DAILY
Status: ON HOLD | COMMUNITY
Start: 2023-10-25

## 2023-11-09 RX ORDER — POTASSIUM CHLORIDE 750 MG/1
20 TABLET, EXTENDED RELEASE ORAL DAILY
Status: ON HOLD | COMMUNITY
Start: 2023-10-31

## 2023-11-09 RX ORDER — LANOLIN ALCOHOL/MO/W.PET/CERES
400 CREAM (GRAM) TOPICAL DAILY
COMMUNITY
Start: 2023-09-08 | End: 2023-11-09 | Stop reason: ALTCHOICE

## 2023-11-09 RX ORDER — OXYCODONE HYDROCHLORIDE AND ACETAMINOPHEN 5; 325 MG/1; MG/1
TABLET ORAL
Status: ON HOLD | COMMUNITY
Start: 2023-09-08 | End: 2023-11-10

## 2023-11-10 ENCOUNTER — TELEPHONE (OUTPATIENT)
Dept: OTHER | Age: 83
End: 2023-11-10

## 2023-11-10 PROBLEM — N17.9 ACUTE KIDNEY INJURY (HCC): Status: ACTIVE | Noted: 2023-11-10

## 2023-11-10 PROBLEM — R62.7 FAILURE TO THRIVE IN ADULT: Status: ACTIVE | Noted: 2023-11-10

## 2023-11-10 LAB
BILIRUBIN URINE: NEGATIVE MG/DL
BLOOD, URINE: NEGATIVE
CLARITY: CLEAR
COLOR: YELLOW
COMMENT UA: NORMAL
EKG ATRIAL RATE: 89 BPM
EKG DIAGNOSIS: NORMAL
EKG Q-T INTERVAL: 394 MS
EKG QRS DURATION: 142 MS
EKG QTC CALCULATION (BAZETT): 457 MS
EKG R AXIS: -56 DEGREES
EKG T AXIS: 41 DEGREES
EKG VENTRICULAR RATE: 81 BPM
GLUCOSE, URINE: NEGATIVE MG/DL
KETONES, URINE: NEGATIVE MG/DL
LEUKOCYTE ESTERASE, URINE: NEGATIVE
NITRITE URINE, QUANTITATIVE: NEGATIVE
PH, URINE: 6 (ref 5–8)
PROTEIN UA: NEGATIVE MG/DL
SPECIFIC GRAVITY UA: 1.01 (ref 1–1.03)
TROPONIN, HIGH SENSITIVITY: 88 NG/L (ref 0–22)
UROBILINOGEN, URINE: 0.2 MG/DL (ref 0.2–1)

## 2023-11-10 PROCEDURE — 97166 OT EVAL MOD COMPLEX 45 MIN: CPT

## 2023-11-10 PROCEDURE — 2580000003 HC RX 258: Performed by: NURSE PRACTITIONER

## 2023-11-10 PROCEDURE — 6370000000 HC RX 637 (ALT 250 FOR IP): Performed by: NURSE PRACTITIONER

## 2023-11-10 PROCEDURE — 1200000000 HC SEMI PRIVATE

## 2023-11-10 PROCEDURE — 81003 URINALYSIS AUTO W/O SCOPE: CPT

## 2023-11-10 PROCEDURE — 97162 PT EVAL MOD COMPLEX 30 MIN: CPT

## 2023-11-10 PROCEDURE — 93010 ELECTROCARDIOGRAM REPORT: CPT | Performed by: INTERNAL MEDICINE

## 2023-11-10 RX ORDER — LANOLIN ALCOHOL/MO/W.PET/CERES
1000 CREAM (GRAM) TOPICAL DAILY
Status: DISCONTINUED | OUTPATIENT
Start: 2023-11-10 | End: 2023-11-14 | Stop reason: HOSPADM

## 2023-11-10 RX ORDER — ACETAMINOPHEN 325 MG/1
650 TABLET ORAL EVERY 6 HOURS PRN
Status: DISCONTINUED | OUTPATIENT
Start: 2023-11-10 | End: 2023-11-14 | Stop reason: HOSPADM

## 2023-11-10 RX ORDER — METOPROLOL SUCCINATE 25 MG/1
25 TABLET, EXTENDED RELEASE ORAL EVERY EVENING
Status: DISCONTINUED | OUTPATIENT
Start: 2023-11-10 | End: 2023-11-10

## 2023-11-10 RX ORDER — FERROUS GLUCONATE 324(37.5)
325 TABLET ORAL 2 TIMES DAILY WITH MEALS
Status: DISCONTINUED | OUTPATIENT
Start: 2023-11-10 | End: 2023-11-11

## 2023-11-10 RX ORDER — VITAMIN B COMPLEX
2000 TABLET ORAL DAILY
Status: DISCONTINUED | OUTPATIENT
Start: 2023-11-10 | End: 2023-11-14 | Stop reason: HOSPADM

## 2023-11-10 RX ORDER — ONDANSETRON 4 MG/1
4 TABLET, ORALLY DISINTEGRATING ORAL EVERY 8 HOURS PRN
Status: DISCONTINUED | OUTPATIENT
Start: 2023-11-10 | End: 2023-11-14 | Stop reason: HOSPADM

## 2023-11-10 RX ORDER — LIDOCAINE HYDROCHLORIDE 20 MG/ML
JELLY TOPICAL PRN
Status: DISCONTINUED | OUTPATIENT
Start: 2023-11-10 | End: 2023-11-14 | Stop reason: HOSPADM

## 2023-11-10 RX ORDER — ONDANSETRON 2 MG/ML
4 INJECTION INTRAMUSCULAR; INTRAVENOUS EVERY 6 HOURS PRN
Status: DISCONTINUED | OUTPATIENT
Start: 2023-11-10 | End: 2023-11-14 | Stop reason: HOSPADM

## 2023-11-10 RX ORDER — SODIUM CHLORIDE 0.9 % (FLUSH) 0.9 %
5-40 SYRINGE (ML) INJECTION PRN
Status: DISCONTINUED | OUTPATIENT
Start: 2023-11-10 | End: 2023-11-14 | Stop reason: HOSPADM

## 2023-11-10 RX ORDER — SODIUM CHLORIDE 9 MG/ML
1000 INJECTION, SOLUTION INTRAVENOUS CONTINUOUS
Status: DISCONTINUED | OUTPATIENT
Start: 2023-11-10 | End: 2023-11-10

## 2023-11-10 RX ORDER — PANTOPRAZOLE SODIUM 40 MG/1
40 TABLET, DELAYED RELEASE ORAL
Status: DISCONTINUED | OUTPATIENT
Start: 2023-11-10 | End: 2023-11-14 | Stop reason: HOSPADM

## 2023-11-10 RX ORDER — PRAVASTATIN SODIUM 40 MG
80 TABLET ORAL NIGHTLY
Status: DISCONTINUED | OUTPATIENT
Start: 2023-11-10 | End: 2023-11-14 | Stop reason: HOSPADM

## 2023-11-10 RX ORDER — FUROSEMIDE 40 MG/1
40 TABLET ORAL DAILY
Status: DISCONTINUED | OUTPATIENT
Start: 2023-11-10 | End: 2023-11-14 | Stop reason: HOSPADM

## 2023-11-10 RX ORDER — SODIUM CHLORIDE 9 MG/ML
1000 INJECTION, SOLUTION INTRAVENOUS CONTINUOUS
Status: DISCONTINUED | OUTPATIENT
Start: 2023-11-10 | End: 2023-11-11

## 2023-11-10 RX ORDER — DIGOXIN 125 MCG
125 TABLET ORAL DAILY
Status: DISCONTINUED | OUTPATIENT
Start: 2023-11-10 | End: 2023-11-14 | Stop reason: HOSPADM

## 2023-11-10 RX ORDER — SODIUM CHLORIDE 0.9 % (FLUSH) 0.9 %
5-40 SYRINGE (ML) INJECTION EVERY 12 HOURS SCHEDULED
Status: DISCONTINUED | OUTPATIENT
Start: 2023-11-10 | End: 2023-11-14 | Stop reason: HOSPADM

## 2023-11-10 RX ORDER — METOPROLOL SUCCINATE 25 MG/1
25 TABLET, EXTENDED RELEASE ORAL DAILY
Status: ON HOLD | COMMUNITY
End: 2023-11-14 | Stop reason: HOSPADM

## 2023-11-10 RX ORDER — ACETAMINOPHEN 650 MG/1
650 SUPPOSITORY RECTAL EVERY 6 HOURS PRN
Status: DISCONTINUED | OUTPATIENT
Start: 2023-11-10 | End: 2023-11-14 | Stop reason: HOSPADM

## 2023-11-10 RX ORDER — SODIUM CHLORIDE 9 MG/ML
250 INJECTION, SOLUTION INTRAVENOUS PRN
Status: DISCONTINUED | OUTPATIENT
Start: 2023-11-10 | End: 2023-11-14 | Stop reason: HOSPADM

## 2023-11-10 RX ORDER — METOPROLOL SUCCINATE 25 MG/1
12.5 TABLET, EXTENDED RELEASE ORAL EVERY EVENING
Status: DISCONTINUED | OUTPATIENT
Start: 2023-11-11 | End: 2023-11-12

## 2023-11-10 RX ADMIN — ACETAMINOPHEN 650 MG: 325 TABLET ORAL at 20:05

## 2023-11-10 RX ADMIN — SODIUM CHLORIDE 1000 ML: 9 INJECTION, SOLUTION INTRAVENOUS at 21:52

## 2023-11-10 RX ADMIN — PANTOPRAZOLE SODIUM 40 MG: 40 TABLET, DELAYED RELEASE ORAL at 18:27

## 2023-11-10 RX ADMIN — LIDOCAINE HYDROCHLORIDE: 20 JELLY TOPICAL at 18:38

## 2023-11-10 RX ADMIN — PRAVASTATIN SODIUM 80 MG: 40 TABLET ORAL at 20:01

## 2023-11-10 RX ADMIN — SODIUM CHLORIDE 1000 ML: 9 INJECTION, SOLUTION INTRAVENOUS at 12:22

## 2023-11-10 RX ADMIN — CYANOCOBALAMIN TAB 1000 MCG 1000 MCG: 1000 TAB at 18:27

## 2023-11-10 RX ADMIN — Medication 325 MG: at 18:27

## 2023-11-10 RX ADMIN — DIGOXIN 125 MCG: 125 TABLET ORAL at 18:27

## 2023-11-10 RX ADMIN — Medication 2000 UNITS: at 18:27

## 2023-11-10 RX ADMIN — SODIUM CHLORIDE, PRESERVATIVE FREE 10 ML: 5 INJECTION INTRAVENOUS at 12:23

## 2023-11-10 ASSESSMENT — ENCOUNTER SYMPTOMS
RESPIRATORY NEGATIVE: 1
EYES NEGATIVE: 1
GASTROINTESTINAL NEGATIVE: 1

## 2023-11-10 ASSESSMENT — PAIN DESCRIPTION - FREQUENCY: FREQUENCY: INTERMITTENT

## 2023-11-10 ASSESSMENT — PAIN SCALES - GENERAL
PAINLEVEL_OUTOF10: 0
PAINLEVEL_OUTOF10: 3

## 2023-11-10 ASSESSMENT — PAIN DESCRIPTION - LOCATION: LOCATION: ANKLE

## 2023-11-10 ASSESSMENT — PAIN DESCRIPTION - DESCRIPTORS: DESCRIPTORS: ACHING

## 2023-11-10 ASSESSMENT — PAIN DESCRIPTION - ORIENTATION: ORIENTATION: RIGHT

## 2023-11-10 ASSESSMENT — PAIN DESCRIPTION - ONSET: ONSET: GRADUAL

## 2023-11-10 ASSESSMENT — PAIN DESCRIPTION - PAIN TYPE: TYPE: ACUTE PAIN

## 2023-11-10 ASSESSMENT — PAIN - FUNCTIONAL ASSESSMENT: PAIN_FUNCTIONAL_ASSESSMENT: PREVENTS OR INTERFERES SOME ACTIVE ACTIVITIES AND ADLS

## 2023-11-10 NOTE — H&P
V2.0  History and Physical      Name:  Shay Chaudhary /Age/Sex: 89  (80 y.o. male)   MRN & CSN:  [id number] & [id number] Encounter Date/Time: 11/10/2023 8:27 AM EST   Location:   PCP: Phoebe Miller, [address] [address] Day: 2    Assessment and Plan:   Shay Chaudhary is a 80 y.o. male with a pmh as listed below who presents with Acute kidney injury [facility]    Hospital Problems             Last Modified POA    * (Principal) Acute kidney injury ([address]) 11/10/2023 Yes       Plan:  MC, patient will be admitted gently hydrate fluids, monitor BMP. Hold nephrotoxic medications, renally dose medications. Chronic atrial fibrillation, continue Toprol XL, and digoxin, will check. Heart rate is controlled. History of COPD not in acute exacerbation, continue home inhalers. Hyperlipidemia, continue statin  Hypertension, continue Toprol  Chronic lower extremity edema, will place Lasix on hold secondary to MC. Generalized weakness/inability to care for self at home/PT OT consulted patient needs long-term placement. Elevated troponin in the setting of MC not concerned for ACS patient has had no chest pain, does appear to have chronically elevated troponins. Disposition:   Current Living situation: Home alone  Expected Disposition: SNF for rehab possible placement  Estimated D/C: 3 days    Diet ADULT DIET; Regular; Low Fat/Low Chol/High Fiber/LALO   DVT Prophylaxis [x] Lovenox, []  Heparin, [] SCDs, [] Ambulation,  [] Eliquis, [] Xarelto, [] Coumadin   Code Status DNR-CCA   Surrogate Decision Maker/ POA Claudia Gejose daughter-in-law  Angelito Villafuerte son     Personally reviewed Lab Studies and Imaging     Discussed management of the case with Dr. Nick Pereira my supervising physician is in agreement with the above-noted plan of care. Discussed patient with ER physician do agree with admission here for treatment of MC.     EKG interpreted personally and results atrial fibrillation rate

## 2023-11-10 NOTE — ED NOTES
Upon transfer from ed cot to inpatient bed, nurse noticed that pt glasses were missing a lens. When returned to ed, nurse found lens on floor and will bring to inpatient area asap, lens in labeled bag in ED at this time. Pt does have right and left hearing aids in place. Personal belongings in labeled bag placed at bedside.       Fridori [place], [place]  11/10/23 0996

## 2023-11-10 NOTE — TELEPHONE ENCOUNTER
Received a call from the patient's son Montrell Noyola yesterday asking for help with resources. Patient has had a recent stay in rehab but is still very weak and having frequent falls. Family agreeable to a home visit but wanted to coordinate with family so they could be there. Family stated they would call and let me know what time would work for them next week. Patient was brought in to the ED early this morning for a fall and is being admitted to the inpatient unit. Will continue to follow until discharge.

## 2023-11-10 NOTE — CARE COORDINATION
11/10/23 6323   Service Assessment   Patient Orientation Person; Alert and Oriented;Place;Situation  (disoriented to place, situation and year)   Cognition Short Term Memory Deficit   History Provided By Patient   Primary [address] is: Legal Next of Kin   PCP Verified by CM Yes   Prior Functional Level Assistance with the following:;Mobility   Current Functional Level Mobility;Assistance with the following:;Bathing;Dressing; Toileting   Can patient return to prior living arrangement Unknown at present   Ability to make needs known: Good   Family able to assist with home care needs: Yes   Would you like for me to discuss the discharge plan with any other family members/significant others, and if so, who? Yes   Now [employer] Medicare   Condition of Participation: Discharge Planning   The Patient and/or Patient Representative was provided with a Choice of Provider? Patient   Freedom of Choice list was provided with basic dialogue that supports the patient's individualized plan of care/goals, treatment preferences, and shares the quality data associated with the providers? Yes       CM met with the patient at bedside he is very sleepy. He states that he thinks the year 2024 but then states \"no its 2023\". He asks Cm to call his daughter Em Garcia. Cm talked to Em Garcia the daughter in law about plan of care. The patient was at [facility] x3 weeks was then sent to [facility] where he was there for several weeks and then went to [facility] [place]. Em Garcia states that she thinks the patient is dehydrated and that is the issue. She states he has been on lasix and then was taken off. In talking to [address] [place] [address] informed Em Arceoer the patient is likely out of 100% paid days and is likely in co-pay days. Cm talked about what that meant. Em Garcia states that the patient has been refusing to go to [facility] and they have been talking about that.  She states

## 2023-11-10 NOTE — ED NOTES
Dr Edith Maldonado received phone call from [facility] and after discussion, pt to be assessed by hospitalist for possible admission to [facility]. Called and updated pt family [facility] (daughter in law) who is emergency contact in chart.       Adelaida Bowers, [address] [place] [address]  11/10/23 4297

## 2023-11-10 NOTE — ED NOTES
Patient with phimosis, straight cath'd for 750ml strong smelling urine. Extensive suleman-care, with wash cloths and soapy water, done prior to catheterization including washing focused on cleaning the opening of the foreskin, which has a white crust. Despite cleaning the area well, RN unable to retract foreskin. Patient has buildup of powder in folds of groin and redness in all skin folds, with some areas of broken skin. Patient states he does not bathe regularly and instead uses powder. When asked the last time he applied powder, he states, \"several weeks\". Wound found on buttock, see LDA.      Christo Manning, KELLIE  11/10/23 8892

## 2023-11-10 NOTE — TELEPHONE ENCOUNTER
Reached out to rosendo Curry to advise [facility] has been admitted to the inpatient unit. No answer. Left message for  call back.

## 2023-11-10 NOTE — ED PROVIDER NOTES
Patient was seen overnight by Dr. Hollie Hernandez, was admitted to [facility] is currently full. Practitioner called me from upstairs here at [facility] after she talk to her attending physician agreed to keep patient here in the meantime chronic elevation of troponin and slight renal insufficiency. Will admit here in the meantime patient recently got out of nursing home has falls. Patient otherwise stable on reevaluation he is currently sleeping he is okay with being admitted here he states in the meantime otherwise stable admitted.      Mercedes Ku,   11/10/23 7848
essentially unremarkable. The patient also has a negative chest x-ray. The patient has decreasing activities of daily living with ambulatory dysfunction and generalized weakness. He has no focal neurological findings patient is going to need evaluation by  physical therapy and Occupational Therapy he may need to return to the nursing home for more permanent stay as opposed to him living on their own. I have great reservations about this patient simply living on his own especially considering his prolonged hospitalization and that his recent bounce back to the emergency department as well as the frequent EMS calls to help the patient get up. I have contacted the hospitalist at [facility] Dr. Christine Li who has graciously agreed to admit the patient in transfer to [facility] for definitive management    History from : Patient and EMS    Limitations to history : None    Patient was given the following medications:  Medications - No data to display    Independent Imaging Interpretation by Radiology  XR CHEST PORTABLE   Final Result          EKG (if obtained): (All EKG's are interpreted by myself in the absence of a cardiologist)Atrial fibrillation with premature ventricular or aberrantly conducted complexes   Left axis deviation   Right bundle branch block    Chronic conditions affecting care: Chronic debilitation, frequent falls, hypertension,    Discussion with Other Profesionals : Admitting Team      Social Determinants : Patient lives alone and is at increased fall risk, patient has decreasing activities of daily living with associated ambulatory dysfunction    Records Reviewed : Source extensive records from [facility] were reviewed from the patient's prolonged hospitalization from his last hospital stay    Disposition   Transfer    I am the Primary Clinician of Record. Final Impression    1. Fatigue, unspecified type    2. Generalized weakness    3.  Renal

## 2023-11-11 ENCOUNTER — APPOINTMENT (OUTPATIENT)
Dept: GENERAL RADIOLOGY | Age: 83
DRG: 683 | End: 2023-11-11
Payer: MEDICARE

## 2023-11-11 LAB
ANION GAP SERPL CALCULATED.3IONS-SCNC: 11 MMOL/L (ref 4–16)
BUN SERPL-MCNC: 27 MG/DL (ref 6–23)
CALCIUM SERPL-MCNC: 7.7 MG/DL (ref 8.3–10.6)
CHLORIDE BLD-SCNC: 107 MMOL/L (ref 99–110)
CO2: 23 MMOL/L (ref 21–32)
CREAT SERPL-MCNC: 1.6 MG/DL (ref 0.9–1.3)
DIGOXIN LEVEL: 2 NG/ML (ref 0.8–2)
DOSE AMOUNT: NORMAL
DOSE TIME: NORMAL
GFR SERPL CREATININE-BSD FRML MDRD: 42 ML/MIN/1.73M2
GLUCOSE SERPL-MCNC: 75 MG/DL (ref 70–99)
HCT VFR BLD CALC: 27.5 % (ref 42–52)
HEMOGLOBIN: 8.7 GM/DL (ref 13.5–18)
MAGNESIUM: 1.6 MG/DL (ref 1.8–2.4)
MCH RBC QN AUTO: 31 PG (ref 27–31)
MCHC RBC AUTO-ENTMCNC: 31.6 % (ref 32–36)
MCV RBC AUTO: 97.9 FL (ref 78–100)
PDW BLD-RTO: 22 % (ref 11.7–14.9)
PLATELET # BLD: 67 K/CU MM (ref 140–440)
PMV BLD AUTO: 13.7 FL (ref 7.5–11.1)
POTASSIUM SERPL-SCNC: 3.4 MMOL/L (ref 3.5–5.1)
RBC # BLD: 2.81 M/CU MM (ref 4.6–6.2)
SODIUM BLD-SCNC: 141 MMOL/L (ref 135–145)
WBC # BLD: 5.8 K/CU MM (ref 4–10.5)

## 2023-11-11 PROCEDURE — 6360000002 HC RX W HCPCS: Performed by: NURSE PRACTITIONER

## 2023-11-11 PROCEDURE — 80048 BASIC METABOLIC PNL TOTAL CA: CPT

## 2023-11-11 PROCEDURE — 6370000000 HC RX 637 (ALT 250 FOR IP): Performed by: NURSE PRACTITIONER

## 2023-11-11 PROCEDURE — 73610 X-RAY EXAM OF ANKLE: CPT

## 2023-11-11 PROCEDURE — 2580000003 HC RX 258: Performed by: NURSE PRACTITIONER

## 2023-11-11 PROCEDURE — 94640 AIRWAY INHALATION TREATMENT: CPT

## 2023-11-11 PROCEDURE — 2500000003 HC RX 250 WO HCPCS: Performed by: NURSE PRACTITIONER

## 2023-11-11 PROCEDURE — 85027 COMPLETE CBC AUTOMATED: CPT

## 2023-11-11 PROCEDURE — 71045 X-RAY EXAM CHEST 1 VIEW: CPT

## 2023-11-11 PROCEDURE — 94761 N-INVAS EAR/PLS OXIMETRY MLT: CPT

## 2023-11-11 PROCEDURE — 73600 X-RAY EXAM OF ANKLE: CPT

## 2023-11-11 PROCEDURE — 80162 ASSAY OF DIGOXIN TOTAL: CPT

## 2023-11-11 PROCEDURE — 1200000000 HC SEMI PRIVATE

## 2023-11-11 PROCEDURE — 83735 ASSAY OF MAGNESIUM: CPT

## 2023-11-11 RX ORDER — FERROUS GLUCONATE 324(37.5)
324 TABLET ORAL 2 TIMES DAILY WITH MEALS
Status: DISCONTINUED | OUTPATIENT
Start: 2023-11-11 | End: 2023-11-14 | Stop reason: HOSPADM

## 2023-11-11 RX ORDER — SODIUM CHLORIDE, SODIUM LACTATE, POTASSIUM CHLORIDE, CALCIUM CHLORIDE 600; 310; 30; 20 MG/100ML; MG/100ML; MG/100ML; MG/100ML
1000 INJECTION, SOLUTION INTRAVENOUS CONTINUOUS
Status: DISCONTINUED | OUTPATIENT
Start: 2023-11-11 | End: 2023-11-12

## 2023-11-11 RX ORDER — SODIUM CHLORIDE, SODIUM LACTATE, POTASSIUM CHLORIDE, CALCIUM CHLORIDE 600; 310; 30; 20 MG/100ML; MG/100ML; MG/100ML; MG/100ML
1000 INJECTION, SOLUTION INTRAVENOUS CONTINUOUS
Status: DISCONTINUED | OUTPATIENT
Start: 2023-11-11 | End: 2023-11-11

## 2023-11-11 RX ORDER — FERROUS GLUCONATE 324(37.5)
324 TABLET ORAL 2 TIMES DAILY WITH MEALS
Status: DISCONTINUED | OUTPATIENT
Start: 2023-11-11 | End: 2023-11-11

## 2023-11-11 RX ADMIN — PANTOPRAZOLE SODIUM 40 MG: 40 TABLET, DELAYED RELEASE ORAL at 04:30

## 2023-11-11 RX ADMIN — Medication 324 MG: at 17:29

## 2023-11-11 RX ADMIN — SODIUM CHLORIDE, POTASSIUM CHLORIDE, SODIUM LACTATE AND CALCIUM CHLORIDE 1000 ML: 600; 310; 30; 20 INJECTION, SOLUTION INTRAVENOUS at 10:38

## 2023-11-11 RX ADMIN — TIOTROPIUM BROMIDE INHALATION SPRAY 2 PUFF: 3.12 SPRAY, METERED RESPIRATORY (INHALATION) at 07:36

## 2023-11-11 RX ADMIN — Medication 325 MG: at 09:24

## 2023-11-11 RX ADMIN — MICONAZOLE NITRATE: 2 POWDER TOPICAL at 19:40

## 2023-11-11 RX ADMIN — CYANOCOBALAMIN TAB 1000 MCG 1000 MCG: 1000 TAB at 09:24

## 2023-11-11 RX ADMIN — Medication 2000 UNITS: at 09:23

## 2023-11-11 RX ADMIN — DIGOXIN 125 MCG: 125 TABLET ORAL at 09:24

## 2023-11-11 RX ADMIN — PRAVASTATIN SODIUM 80 MG: 40 TABLET ORAL at 19:40

## 2023-11-11 RX ADMIN — METOPROLOL SUCCINATE 12.5 MG: 25 TABLET, EXTENDED RELEASE ORAL at 17:29

## 2023-11-11 RX ADMIN — HYDROMORPHONE HYDROCHLORIDE 0.25 MG: 0.5 INJECTION, SOLUTION INTRAMUSCULAR; INTRAVENOUS; SUBCUTANEOUS at 00:59

## 2023-11-11 ASSESSMENT — PAIN DESCRIPTION - ORIENTATION: ORIENTATION: RIGHT

## 2023-11-11 ASSESSMENT — PAIN SCALES - GENERAL
PAINLEVEL_OUTOF10: 0
PAINLEVEL_OUTOF10: 0
PAINLEVEL_OUTOF10: 7
PAINLEVEL_OUTOF10: 0

## 2023-11-11 ASSESSMENT — PAIN DESCRIPTION - PAIN TYPE: TYPE: ACUTE PAIN

## 2023-11-11 ASSESSMENT — PAIN DESCRIPTION - ONSET: ONSET: PROGRESSIVE

## 2023-11-11 ASSESSMENT — PAIN DESCRIPTION - LOCATION: LOCATION: ANKLE

## 2023-11-11 ASSESSMENT — PAIN - FUNCTIONAL ASSESSMENT: PAIN_FUNCTIONAL_ASSESSMENT: PREVENTS OR INTERFERES SOME ACTIVE ACTIVITIES AND ADLS

## 2023-11-11 ASSESSMENT — PAIN DESCRIPTION - FREQUENCY: FREQUENCY: INTERMITTENT

## 2023-11-11 ASSESSMENT — PAIN DESCRIPTION - DESCRIPTORS: DESCRIPTORS: ACHING;BURNING

## 2023-11-12 LAB
ANION GAP SERPL CALCULATED.3IONS-SCNC: 13 MMOL/L (ref 4–16)
BUN SERPL-MCNC: 23 MG/DL (ref 6–23)
CALCIUM SERPL-MCNC: 8.3 MG/DL (ref 8.3–10.6)
CHLORIDE BLD-SCNC: 106 MMOL/L (ref 99–110)
CO2: 24 MMOL/L (ref 21–32)
CREAT SERPL-MCNC: 1.5 MG/DL (ref 0.9–1.3)
DIGOXIN LEVEL: 1.8 NG/ML (ref 0.8–2)
DOSE AMOUNT: NORMAL
DOSE TIME: NORMAL
GFR SERPL CREATININE-BSD FRML MDRD: 46 ML/MIN/1.73M2
GLUCOSE SERPL-MCNC: 101 MG/DL (ref 70–99)
HCT VFR BLD CALC: 32.7 % (ref 42–52)
HEMOGLOBIN: 10.2 GM/DL (ref 13.5–18)
MAGNESIUM: 1.7 MG/DL (ref 1.8–2.4)
MCH RBC QN AUTO: 30.8 PG (ref 27–31)
MCHC RBC AUTO-ENTMCNC: 31.2 % (ref 32–36)
MCV RBC AUTO: 98.8 FL (ref 78–100)
PDW BLD-RTO: 21.8 % (ref 11.7–14.9)
PLATELET # BLD: 83 K/CU MM (ref 140–440)
PMV BLD AUTO: ABNORMAL FL (ref 7.5–11.1)
POTASSIUM SERPL-SCNC: 4.1 MMOL/L (ref 3.5–5.1)
PRO-BNP: ABNORMAL PG/ML
RBC # BLD: 3.31 M/CU MM (ref 4.6–6.2)
SODIUM BLD-SCNC: 143 MMOL/L (ref 135–145)
WBC # BLD: 8.2 K/CU MM (ref 4–10.5)

## 2023-11-12 PROCEDURE — 94640 AIRWAY INHALATION TREATMENT: CPT

## 2023-11-12 PROCEDURE — 83880 ASSAY OF NATRIURETIC PEPTIDE: CPT

## 2023-11-12 PROCEDURE — 83735 ASSAY OF MAGNESIUM: CPT

## 2023-11-12 PROCEDURE — 80048 BASIC METABOLIC PNL TOTAL CA: CPT

## 2023-11-12 PROCEDURE — 80162 ASSAY OF DIGOXIN TOTAL: CPT

## 2023-11-12 PROCEDURE — 85027 COMPLETE CBC AUTOMATED: CPT

## 2023-11-12 PROCEDURE — 6360000002 HC RX W HCPCS: Performed by: NURSE PRACTITIONER

## 2023-11-12 PROCEDURE — 1200000000 HC SEMI PRIVATE

## 2023-11-12 PROCEDURE — 2580000003 HC RX 258: Performed by: NURSE PRACTITIONER

## 2023-11-12 PROCEDURE — 6370000000 HC RX 637 (ALT 250 FOR IP): Performed by: NURSE PRACTITIONER

## 2023-11-12 RX ORDER — LANOLIN ALCOHOL/MO/W.PET/CERES
400 CREAM (GRAM) TOPICAL DAILY
Status: DISCONTINUED | OUTPATIENT
Start: 2023-11-12 | End: 2023-11-14 | Stop reason: HOSPADM

## 2023-11-12 RX ORDER — METOPROLOL SUCCINATE 25 MG/1
12.5 TABLET, EXTENDED RELEASE ORAL NIGHTLY
Status: DISCONTINUED | OUTPATIENT
Start: 2023-11-12 | End: 2023-11-14 | Stop reason: HOSPADM

## 2023-11-12 RX ORDER — FUROSEMIDE 10 MG/ML
40 INJECTION INTRAMUSCULAR; INTRAVENOUS ONCE
Status: COMPLETED | OUTPATIENT
Start: 2023-11-12 | End: 2023-11-12

## 2023-11-12 RX ADMIN — SODIUM CHLORIDE, PRESERVATIVE FREE 10 ML: 5 INJECTION INTRAVENOUS at 08:27

## 2023-11-12 RX ADMIN — PANTOPRAZOLE SODIUM 40 MG: 40 TABLET, DELAYED RELEASE ORAL at 04:28

## 2023-11-12 RX ADMIN — CYANOCOBALAMIN TAB 1000 MCG 1000 MCG: 1000 TAB at 08:26

## 2023-11-12 RX ADMIN — DIGOXIN 125 MCG: 125 TABLET ORAL at 08:27

## 2023-11-12 RX ADMIN — Medication 324 MG: at 17:38

## 2023-11-12 RX ADMIN — TIOTROPIUM BROMIDE INHALATION SPRAY 2 PUFF: 3.12 SPRAY, METERED RESPIRATORY (INHALATION) at 07:34

## 2023-11-12 RX ADMIN — Medication 2000 UNITS: at 08:26

## 2023-11-12 RX ADMIN — MICONAZOLE NITRATE: 2 POWDER TOPICAL at 10:40

## 2023-11-12 RX ADMIN — SODIUM CHLORIDE, POTASSIUM CHLORIDE, SODIUM LACTATE AND CALCIUM CHLORIDE 1000 ML: 600; 310; 30; 20 INJECTION, SOLUTION INTRAVENOUS at 01:42

## 2023-11-12 RX ADMIN — Medication 400 MG: at 12:35

## 2023-11-12 RX ADMIN — FUROSEMIDE 40 MG: 10 INJECTION, SOLUTION INTRAMUSCULAR; INTRAVENOUS at 08:26

## 2023-11-12 RX ADMIN — Medication 324 MG: at 08:26

## 2023-11-12 RX ADMIN — SODIUM CHLORIDE, PRESERVATIVE FREE 10 ML: 5 INJECTION INTRAVENOUS at 20:16

## 2023-11-12 RX ADMIN — PRAVASTATIN SODIUM 80 MG: 40 TABLET ORAL at 20:16

## 2023-11-12 RX ADMIN — MICONAZOLE NITRATE: 2 POWDER TOPICAL at 20:17

## 2023-11-12 ASSESSMENT — PAIN SCALES - GENERAL
PAINLEVEL_OUTOF10: 0
PAINLEVEL_OUTOF10: 0

## 2023-11-13 LAB
ANION GAP SERPL CALCULATED.3IONS-SCNC: 8 MMOL/L (ref 4–16)
BUN SERPL-MCNC: 19 MG/DL (ref 6–23)
CALCIUM SERPL-MCNC: 7.9 MG/DL (ref 8.3–10.6)
CHLORIDE BLD-SCNC: 107 MMOL/L (ref 99–110)
CO2: 27 MMOL/L (ref 21–32)
CREAT SERPL-MCNC: 1.4 MG/DL (ref 0.9–1.3)
DIGOXIN LEVEL: 1.7 NG/ML (ref 0.8–2)
DOSE AMOUNT: NORMAL
DOSE TIME: NORMAL
GFR SERPL CREATININE-BSD FRML MDRD: 50 ML/MIN/1.73M2
GLUCOSE BLD-MCNC: 110 MG/DL (ref 70–99)
GLUCOSE SERPL-MCNC: 85 MG/DL (ref 70–99)
HCT VFR BLD CALC: 30.1 % (ref 42–52)
HEMOGLOBIN: 9.6 GM/DL (ref 13.5–18)
MAGNESIUM: 1.6 MG/DL (ref 1.8–2.4)
MCH RBC QN AUTO: 31.2 PG (ref 27–31)
MCHC RBC AUTO-ENTMCNC: 31.9 % (ref 32–36)
MCV RBC AUTO: 97.7 FL (ref 78–100)
PDW BLD-RTO: 21.5 % (ref 11.7–14.9)
PLATELET # BLD: 76 K/CU MM (ref 140–440)
PMV BLD AUTO: 13.1 FL (ref 7.5–11.1)
POTASSIUM SERPL-SCNC: 3.8 MMOL/L (ref 3.5–5.1)
RBC # BLD: 3.08 M/CU MM (ref 4.6–6.2)
SODIUM BLD-SCNC: 142 MMOL/L (ref 135–145)
WBC # BLD: 6.7 K/CU MM (ref 4–10.5)

## 2023-11-13 PROCEDURE — 97110 THERAPEUTIC EXERCISES: CPT

## 2023-11-13 PROCEDURE — 82962 GLUCOSE BLOOD TEST: CPT

## 2023-11-13 PROCEDURE — 80162 ASSAY OF DIGOXIN TOTAL: CPT

## 2023-11-13 PROCEDURE — 83735 ASSAY OF MAGNESIUM: CPT

## 2023-11-13 PROCEDURE — 97530 THERAPEUTIC ACTIVITIES: CPT

## 2023-11-13 PROCEDURE — 94640 AIRWAY INHALATION TREATMENT: CPT

## 2023-11-13 PROCEDURE — 6360000002 HC RX W HCPCS: Performed by: NURSE PRACTITIONER

## 2023-11-13 PROCEDURE — 80048 BASIC METABOLIC PNL TOTAL CA: CPT

## 2023-11-13 PROCEDURE — 2500000003 HC RX 250 WO HCPCS: Performed by: NURSE PRACTITIONER

## 2023-11-13 PROCEDURE — 6370000000 HC RX 637 (ALT 250 FOR IP): Performed by: NURSE PRACTITIONER

## 2023-11-13 PROCEDURE — 94761 N-INVAS EAR/PLS OXIMETRY MLT: CPT

## 2023-11-13 PROCEDURE — 1200000000 HC SEMI PRIVATE

## 2023-11-13 PROCEDURE — 97535 SELF CARE MNGMENT TRAINING: CPT

## 2023-11-13 PROCEDURE — 85027 COMPLETE CBC AUTOMATED: CPT

## 2023-11-13 PROCEDURE — 36415 COLL VENOUS BLD VENIPUNCTURE: CPT

## 2023-11-13 PROCEDURE — 2580000003 HC RX 258: Performed by: NURSE PRACTITIONER

## 2023-11-13 RX ORDER — MAGNESIUM SULFATE IN WATER 40 MG/ML
2000 INJECTION, SOLUTION INTRAVENOUS ONCE
Status: COMPLETED | OUTPATIENT
Start: 2023-11-13 | End: 2023-11-13

## 2023-11-13 RX ADMIN — MAGNESIUM SULFATE HEPTAHYDRATE 2000 MG: 40 INJECTION, SOLUTION INTRAVENOUS at 14:28

## 2023-11-13 RX ADMIN — METOPROLOL SUCCINATE 12.5 MG: 25 TABLET, EXTENDED RELEASE ORAL at 20:13

## 2023-11-13 RX ADMIN — Medication 324 MG: at 17:47

## 2023-11-13 RX ADMIN — MICONAZOLE NITRATE: 2 POWDER TOPICAL at 20:13

## 2023-11-13 RX ADMIN — Medication 2000 UNITS: at 10:01

## 2023-11-13 RX ADMIN — Medication 324 MG: at 09:31

## 2023-11-13 RX ADMIN — DIGOXIN 125 MCG: 125 TABLET ORAL at 09:31

## 2023-11-13 RX ADMIN — PANTOPRAZOLE SODIUM 40 MG: 40 TABLET, DELAYED RELEASE ORAL at 05:32

## 2023-11-13 RX ADMIN — Medication 400 MG: at 09:31

## 2023-11-13 RX ADMIN — PRAVASTATIN SODIUM 80 MG: 40 TABLET ORAL at 20:13

## 2023-11-13 RX ADMIN — FUROSEMIDE 40 MG: 40 TABLET ORAL at 09:31

## 2023-11-13 RX ADMIN — SODIUM CHLORIDE, PRESERVATIVE FREE 10 ML: 5 INJECTION INTRAVENOUS at 20:18

## 2023-11-13 RX ADMIN — ACETAMINOPHEN 650 MG: 325 TABLET ORAL at 18:35

## 2023-11-13 RX ADMIN — TIOTROPIUM BROMIDE INHALATION SPRAY 2 PUFF: 3.12 SPRAY, METERED RESPIRATORY (INHALATION) at 07:27

## 2023-11-13 RX ADMIN — SODIUM CHLORIDE, PRESERVATIVE FREE 10 ML: 5 INJECTION INTRAVENOUS at 09:37

## 2023-11-13 RX ADMIN — MICONAZOLE NITRATE: 2 POWDER TOPICAL at 09:36

## 2023-11-13 RX ADMIN — CYANOCOBALAMIN TAB 1000 MCG 1000 MCG: 1000 TAB at 09:31

## 2023-11-13 ASSESSMENT — PAIN SCALES - GENERAL
PAINLEVEL_OUTOF10: 0
PAINLEVEL_OUTOF10: 3

## 2023-11-13 ASSESSMENT — PAIN DESCRIPTION - LOCATION: LOCATION: BACK

## 2023-11-13 NOTE — CONSULTS
[address] Ostomy Continence Nurse  Consult Note       Ray Gutierrez  AGE: 80 y.o. GENDER: male  : 1940  TODAY'S DATE:  2023    Subjective:     Reason for [facility] Evaluation and Assessmen: buttock, back and toes      Shay Chaudhary is a 80 y.o. male referred by:   [] Physician  [x] Nursing  [] Other:     Wound Identification:  Wound Type: pressure and traumatic  Contributing Factors: chronic pressure, decreased mobility, and shear force        PAST MEDICAL HISTORY        Diagnosis Date    AR (aortic regurgitation)     mild to mod    Atrial fibrillation, new onset (HCC)     CAD (coronary artery disease)     History of angioplasty    Chronic midline low back pain without sciatica 2017    Has seen Dr. Cal Siegel and had shots    COPD (chronic obstructive pulmonary disease) ([facility])     Family history of coronary artery disease     Fatigue 10/2016    H/O cardiovascular stress test 2012    mild ischemia in the basal inferior and mid inferior regions ef is 47    H/O chest x-ray 10/20/2016    Right pleural effusion resolved. H/O Doppler ultrasound 10/2016    CAROTID-Kolby. arteries patent w/less than 50% stenosis in the internal carotid arteries. H/O echocardiogram 2/17/15    Aortic sclerosis without stenosis, normal LV size and wall motion w/low normal systolic function, LA dilatation, RV dilatation, mild pulmonary HTN, EF 50-55%.     History of cardiac cath 1996    LV uniform LV contractility RCa dominatn 50-60 prox stenosis  LM patent  LAD mild mid plaquing diag has 70 ostial narrowing ramus minimal plaquing cx normal    History of PTCA 08/15/2011    prox lad bare metal stent 2011    Hyperlipidemia     Hypertension     Obesity     Obesity, Class II, BMI 35-39.9, with comorbidity 2014    RBBB     Risk for falls 10/26/2015    Tachycardia        PAST SURGICAL HISTORY    Past Surgical History:   Procedure Laterality Date    COLONOSCOPY N/A 2022    COLONOSCOPY POLYPECTOMY SNARE/COLD BIOPSY

## 2023-11-13 NOTE — FLOWSHEET NOTE
Physical Therapy Daily Treatment Note   Date: 2023 Room: [address] [place] [address]    Name: Cheng Payne :    MRN: [id number] Admission Date:2023      Restrictions/Precautions: Restrictions/Precautions  Restrictions/Precautions: Fall Risk, General Precautions, Bed Alarm, Contact Precautions  Required Braces or Orthoses?: No     Initial Pain level: none rated/10    Subjective/Observation: Patient up in recliner and agreeable to therapy. \"I would like to go back to bed\". Communication with other providers: x      Therapeutic Activities/Neuro Re-Education/Gait Training   Date: 2023   Date:  Date:  Date:    Bed   Mobility Sit>supine  Min A for B LE assist, CGA for trunk with use of bed features            STS   Transfer Min A of 1        Stand   Pivot   Transfer   x        Commode Transfer   x        Sitting  Balance   CGA sitting EOB        Standing Balance   CGA for transfers and ambulation        Ambulation Patient ambulated 6ft with RW and min A from recliner to bed            Stairs   x          Therapeutic Exercises   Date: 2023   Date:  Date:  Date:   Seated HR, marching, LAQ, resisted hip abd, pillow squeezes, resisted HS curls 20x ea                  Education provided:       Treatment/Activity Tolerance:   [] Patient limited by fatigue   [] Patient limited by pain   [] Patient limited by other medical complications   [] Patient tolerated therapy well  [] Other:     Safety Precautions:     [x] Left in bed    [] Left in chair   [x] Call light within reach    [x] Bed alarm on    [] Personal alarm on    [] Other staff present:  [] Family/Caregiver present:      Post Tx Pain Rating:  None rated/10       Evaluation Assessment    Assessment: Pt is 79 yo male, states that he was in the hospital for nearly a month and after his discharge which was on September 15 the patient went to a rehabilitation facility.   The patient has been home from that rehabilitation facility only a few weeks and he

## 2023-11-13 NOTE — CARE COORDINATION
CM left a voicemail for admissions at [facility] requesting a return call regarding the status of the patient's referral.  If accepted, the patient's insurance will require pre-certification. CM updated the whiteboard requesting updated PT/OT notes. CM will follow.

## 2023-11-14 VITALS
RESPIRATION RATE: 16 BRPM | DIASTOLIC BLOOD PRESSURE: 59 MMHG | HEART RATE: 85 BPM | TEMPERATURE: 98.6 F | SYSTOLIC BLOOD PRESSURE: 127 MMHG | BODY MASS INDEX: 32.52 KG/M2 | OXYGEN SATURATION: 96 % | WEIGHT: 232.3 LBS | HEIGHT: 71 IN

## 2023-11-14 LAB
ANION GAP SERPL CALCULATED.3IONS-SCNC: 6 MMOL/L (ref 4–16)
BUN SERPL-MCNC: 18 MG/DL (ref 6–23)
CALCIUM SERPL-MCNC: 8 MG/DL (ref 8.3–10.6)
CHLORIDE BLD-SCNC: 107 MMOL/L (ref 99–110)
CO2: 29 MMOL/L (ref 21–32)
CREAT SERPL-MCNC: 1.3 MG/DL (ref 0.9–1.3)
GFR SERPL CREATININE-BSD FRML MDRD: 55 ML/MIN/1.73M2
GLUCOSE SERPL-MCNC: 84 MG/DL (ref 70–99)
HCT VFR BLD CALC: 30.7 % (ref 42–52)
HEMOGLOBIN: 9.6 GM/DL (ref 13.5–18)
MAGNESIUM: 1.9 MG/DL (ref 1.8–2.4)
MCH RBC QN AUTO: 30.2 PG (ref 27–31)
MCHC RBC AUTO-ENTMCNC: 31.3 % (ref 32–36)
MCV RBC AUTO: 96.5 FL (ref 78–100)
PDW BLD-RTO: 21.2 % (ref 11.7–14.9)
PLATELET # BLD: 84 K/CU MM (ref 140–440)
PMV BLD AUTO: ABNORMAL FL (ref 7.5–11.1)
POTASSIUM SERPL-SCNC: 3.5 MMOL/L (ref 3.5–5.1)
RBC # BLD: 3.18 M/CU MM (ref 4.6–6.2)
SODIUM BLD-SCNC: 142 MMOL/L (ref 135–145)
WBC # BLD: 6.9 K/CU MM (ref 4–10.5)

## 2023-11-14 PROCEDURE — 94640 AIRWAY INHALATION TREATMENT: CPT

## 2023-11-14 PROCEDURE — 80048 BASIC METABOLIC PNL TOTAL CA: CPT

## 2023-11-14 PROCEDURE — 6370000000 HC RX 637 (ALT 250 FOR IP): Performed by: NURSE PRACTITIONER

## 2023-11-14 PROCEDURE — 94761 N-INVAS EAR/PLS OXIMETRY MLT: CPT

## 2023-11-14 PROCEDURE — 83735 ASSAY OF MAGNESIUM: CPT

## 2023-11-14 PROCEDURE — 85027 COMPLETE CBC AUTOMATED: CPT

## 2023-11-14 PROCEDURE — 2580000003 HC RX 258: Performed by: NURSE PRACTITIONER

## 2023-11-14 PROCEDURE — 97110 THERAPEUTIC EXERCISES: CPT

## 2023-11-14 PROCEDURE — 36415 COLL VENOUS BLD VENIPUNCTURE: CPT

## 2023-11-14 RX ORDER — LANOLIN ALCOHOL/MO/W.PET/CERES
400 CREAM (GRAM) TOPICAL DAILY
Qty: 30 TABLET | Refills: 0 | Status: ON HOLD | OUTPATIENT
Start: 2023-11-15

## 2023-11-14 RX ORDER — METOPROLOL SUCCINATE 25 MG/1
12.5 TABLET, EXTENDED RELEASE ORAL NIGHTLY
Qty: 30 TABLET | Refills: 3 | Status: ON HOLD | OUTPATIENT
Start: 2023-11-14

## 2023-11-14 RX ADMIN — SODIUM CHLORIDE, PRESERVATIVE FREE 10 ML: 5 INJECTION INTRAVENOUS at 08:32

## 2023-11-14 RX ADMIN — METOPROLOL SUCCINATE 12.5 MG: 25 TABLET, EXTENDED RELEASE ORAL at 19:57

## 2023-11-14 RX ADMIN — Medication 324 MG: at 08:31

## 2023-11-14 RX ADMIN — TIOTROPIUM BROMIDE INHALATION SPRAY 2 PUFF: 3.12 SPRAY, METERED RESPIRATORY (INHALATION) at 07:40

## 2023-11-14 RX ADMIN — PANTOPRAZOLE SODIUM 40 MG: 40 TABLET, DELAYED RELEASE ORAL at 04:35

## 2023-11-14 RX ADMIN — Medication 2000 UNITS: at 08:31

## 2023-11-14 RX ADMIN — MICONAZOLE NITRATE: 2 POWDER TOPICAL at 08:32

## 2023-11-14 RX ADMIN — PRAVASTATIN SODIUM 80 MG: 40 TABLET ORAL at 19:56

## 2023-11-14 RX ADMIN — MICONAZOLE NITRATE: 2 POWDER TOPICAL at 19:56

## 2023-11-14 RX ADMIN — Medication 324 MG: at 17:45

## 2023-11-14 RX ADMIN — CYANOCOBALAMIN TAB 1000 MCG 1000 MCG: 1000 TAB at 08:31

## 2023-11-14 RX ADMIN — Medication 400 MG: at 08:31

## 2023-11-14 RX ADMIN — DIGOXIN 125 MCG: 125 TABLET ORAL at 08:31

## 2023-11-14 RX ADMIN — FUROSEMIDE 40 MG: 40 TABLET ORAL at 08:31

## 2023-11-14 ASSESSMENT — PAIN SCALES - GENERAL
PAINLEVEL_OUTOF10: 0
PAINLEVEL_OUTOF10: 0

## 2023-11-14 NOTE — CARE COORDINATION
Pt new to [facility] at discharge. Please call report to [phone] and fax orders, AVS, and discharge summaries to [phone]. [facility] [phone]       Precert has been APPROVED to admit to [facility]  [id number]  [facility]  11/14/2023 11/14/2023-11/16/2023  Approved   Sent PS to Stacey Alicea CNP to update.      Precert initiated via [employer]: Pending at this time  [id number]  [facility]  11/14/2023 11/14/2023  Pending

## 2023-11-14 NOTE — DISCHARGE SUMMARY
11/11/2023  EXAMINATION: ONE XRAY VIEW OF THE CHEST 11/11/2023 2:14 pm COMPARISON: Chest 11/09/2023 HISTORY: edema FINDINGS: The cardiac silhouette is enlarged. Calcifications involving the aorta reflect atherosclerosis. The mediastinal and hilar silhouettes appear unremarkable. Vascular engorgement and cephalization is demonstrated with bilateral peribronchial cuffing and perivascular haziness. Hazy opacities parahilar regions and lower lungs. No pneumothorax is seen. No acute osseous abnormality is identified. Left subclavian MediPort tip overlies the proximal superior vena cava level. 1.  Congestive heart failure is most likely given the radiographic findings; pneumonia is also a consideration in areas of consolidation with pleural effusion. Note that involvement with atypical/viral pneumonia is a consideration for this appearance as well. 2. Calcific atherosclerosis aorta. 3. Cardiomegaly. XR ANKLE RIGHT (MIN 3 VIEWS)    Result Date: 11/11/2023  EXAMINATION: THREE X-RAY VIEWS OF THE RIGHT ANKLE 11/11/2023 8:05 am COMPARISON: None HISTORY: ORDERING SYSTEM PROVIDED HISTORY:  Right ankle pain TECHNOLOGIST PROVIDED HISTORY: Reason for Exam:  Right ankle pain FINDINGS: Three views provided. Extensive peripheral vascular arterial calcifications. There is diffuse subcutaneous reticular soft tissue density with no focal swelling. Normal bone mineral density. Normal ankle alignment. The distal tibia demonstrates no acute abnormality. The distal fibula at the metadiaphysis demonstrates obliquely oriented lucency with chronic peripheral hypertrophic changes. No displaced or angulated fracture. The ankle mortise is normal.     1. Normal right ankle alignment with no evidence of an acute fracture. 2. Right distal fibular metadiaphyseal findings suggesting subacute-remote healing fracture.      XR CHEST PORTABLE    Result Date: 11/9/2023  EXAMINATION: ONE XRAY VIEW OF THE CHEST 11/9/2023 8:30 pm COMPARISON:

## 2023-11-14 NOTE — PLAN OF CARE
Problem: Safety - Adult  Goal: Free from fall injury  11/12/2023 0952 by Carlos Perez RN  Outcome: Progressing  11/11/2023 2147 by Maria Del Rosario Zhang RN  Outcome: Progressing     Problem: Skin/Tissue Integrity  Goal: Absence of new skin breakdown  Description: 1. Monitor for areas of redness and/or skin breakdown  2. Assess vascular access sites hourly  3. Every 4-6 hours minimum:  Change oxygen saturation probe site  4. Every 4-6 hours:  If on nasal continuous positive airway pressure, respiratory therapy assess nares and determine need for appliance change or resting period.   11/12/2023 0952 by Carlos Perez RN  Outcome: Progressing  11/11/2023 2147 by Maria Del Rosario Zhang RN  Outcome: Progressing     Problem: Skin/Tissue Integrity - Adult  Goal: Skin integrity remains intact  11/12/2023 0952 by Carlos Perez RN  Outcome: Progressing  11/11/2023 2147 by Maria Del Rosario Zhang RN  Outcome: Progressing  Goal: Incisions, wounds, or drain sites healing without S/S of infection  11/12/2023 0952 by Carlos Perez RN  Outcome: Progressing  11/11/2023 2147 by Maria Del Rosario Zhang RN  Outcome: Progressing  Goal: Oral mucous membranes remain intact  11/12/2023 0952 by Carlos Perez RN  Outcome: Progressing  11/11/2023 2147 by Maria Del Rosario Zhang RN  Outcome: Progressing     Problem: Musculoskeletal - Adult  Goal: Return mobility to safest level of function  11/12/2023 0952 by Carlos Perez RN  Outcome: Progressing  11/11/2023 2147 by Maria Del Rosario Zhang RN  Outcome: Progressing  Goal: Maintain proper alignment of affected body part  11/12/2023 0952 by Carlos Perez RN  Outcome: Progressing  11/11/2023 2147 by Maria Del Rosario Zhang RN  Outcome: Progressing  Goal: Return ADL status to a safe level of function  11/12/2023 0952 by Carlos Perez RN  Outcome: Progressing  11/11/2023 2147 by Maria Del Rosario Zhang RN  Outcome: Progressing     Problem: Infection - Adult  Goal: Absence of infection at discharge  11/12/2023 0952 by
Problem: Safety - Adult  Goal: Free from fall injury  11/13/2023 1003 by Cherie Moreno RN  Outcome: Progressing  11/12/2023 2011 by Felicita Miller RN  Outcome: Progressing     Problem: Skin/Tissue Integrity  Goal: Absence of new skin breakdown  Description: 1. Monitor for areas of redness and/or skin breakdown  2. Assess vascular access sites hourly  3. Every 4-6 hours minimum:  Change oxygen saturation probe site  4. Every 4-6 hours:  If on nasal continuous positive airway pressure, respiratory therapy assess nares and determine need for appliance change or resting period. 11/13/2023 1003 by Cherie Moreno RN  Outcome: Progressing  11/12/2023 2011 by Felicita Miller RN  Outcome: Progressing     Problem: Skin/Tissue Integrity - Adult  Goal: Skin integrity remains intact  Outcome: Progressing  Goal: Incisions, wounds, or drain sites healing without S/S of infection  Outcome: Progressing  Goal: Oral mucous membranes remain intact  Outcome: Progressing     Problem: Musculoskeletal - Adult  Goal: Return mobility to safest level of function  Outcome: Progressing  Goal: Maintain proper alignment of affected body part  Outcome: Progressing     Problem: Skin/Tissue Integrity  Goal: Absence of new skin breakdown  Description: 1. Monitor for areas of redness and/or skin breakdown  2. Assess vascular access sites hourly  3. Every 4-6 hours minimum:  Change oxygen saturation probe site  4. Every 4-6 hours:  If on nasal continuous positive airway pressure, respiratory therapy assess nares and determine need for appliance change or resting period.   11/13/2023 1003 by Cherie Moreno RN  Outcome: Progressing  11/12/2023 2011 by Felicita Miller RN  Outcome: Progressing     Problem: Skin/Tissue Integrity - Adult  Goal: Skin integrity remains intact  Outcome: Progressing
Problem: Safety - Adult  Goal: Free from fall injury  11/13/2023 2313 by Rima Eaton LPN  Outcome: Progressing  11/13/2023 1003 by Yolanda Leigh RN  Outcome: Progressing     Problem: Skin/Tissue Integrity  Goal: Absence of new skin breakdown  Description: 1. Monitor for areas of redness and/or skin breakdown  2. Assess vascular access sites hourly  3. Every 4-6 hours minimum:  Change oxygen saturation probe site  4. Every 4-6 hours:  If on nasal continuous positive airway pressure, respiratory therapy assess nares and determine need for appliance change or resting period.   11/13/2023 2313 by Rima Eaton LPN  Outcome: Progressing  11/13/2023 1003 by Yolanda Leigh RN  Outcome: Progressing     Problem: Skin/Tissue Integrity - Adult  Goal: Skin integrity remains intact  11/13/2023 2313 by Rima Eaton LPN  Outcome: Progressing  11/13/2023 1003 by Yolanda Leigh RN  Outcome: Progressing     Problem: Skin/Tissue Integrity - Adult  Goal: Incisions, wounds, or drain sites healing without S/S of infection  11/13/2023 2313 by Rima Eaton LPN  Outcome: Progressing  11/13/2023 1003 by Yolanda Leigh RN  Outcome: Progressing     Problem: Skin/Tissue Integrity - Adult  Goal: Oral mucous membranes remain intact  11/13/2023 2313 by Rima Eaton LPN  Outcome: Progressing  11/13/2023 1003 by Yolanda Leigh RN  Outcome: Progressing
Problem: Safety - Adult  Goal: Free from fall injury  11/14/2023 0836 by Keith Lovell RN  Outcome: Progressing  11/13/2023 2313 by Bjorn Syed LPN  Outcome: Progressing     Problem: Skin/Tissue Integrity  Goal: Absence of new skin breakdown  Description: 1. Monitor for areas of redness and/or skin breakdown  2. Assess vascular access sites hourly  3. Every 4-6 hours minimum:  Change oxygen saturation probe site  4. Every 4-6 hours:  If on nasal continuous positive airway pressure, respiratory therapy assess nares and determine need for appliance change or resting period.   11/14/2023 0836 by Keith Lovell RN  Outcome: Progressing  11/13/2023 2313 by Bjorn Syed LPN  Outcome: Progressing     Problem: Skin/Tissue Integrity - Adult  Goal: Skin integrity remains intact  11/14/2023 0836 by Keith Lovell RN  Outcome: Progressing  11/13/2023 2313 by Bjorn Syed LPN  Outcome: Progressing  Goal: Incisions, wounds, or drain sites healing without S/S of infection  11/14/2023 0836 by Keith Lovell RN  Outcome: Progressing  11/13/2023 2313 by Bjorn Syed LPN  Outcome: Progressing  Goal: Oral mucous membranes remain intact  11/14/2023 0836 by Keith Lovell RN  Outcome: Progressing  11/13/2023 2313 by Bjorn Syed LPN  Outcome: Progressing     Problem: Musculoskeletal - Adult  Goal: Return mobility to safest level of function  Outcome: Progressing  Goal: Maintain proper alignment of affected body part  Outcome: Progressing  Goal: Return ADL status to a safe level of function  Outcome: Progressing     Problem: Infection - Adult  Goal: Absence of infection at discharge  Outcome: Progressing  Goal: Absence of infection during hospitalization  Outcome: Progressing     Problem: Metabolic/Fluid and Electrolytes - Adult  Goal: Electrolytes maintained within normal limits  Outcome: Progressing     Problem: Pain  Goal: Verbalizes/displays adequate comfort level or baseline comfort
Problem: Safety - Adult  Goal: Free from fall injury  Outcome: Progressing     Problem: Skin/Tissue Integrity  Goal: Absence of new skin breakdown  Description: 1. Monitor for areas of redness and/or skin breakdown  2. Assess vascular access sites hourly  3. Every 4-6 hours minimum:  Change oxygen saturation probe site  4. Every 4-6 hours:  If on nasal continuous positive airway pressure, respiratory therapy assess nares and determine need for appliance change or resting period.   Outcome: Progressing     Problem: Skin/Tissue Integrity - Adult  Goal: Skin integrity remains intact  Outcome: Progressing  Goal: Incisions, wounds, or drain sites healing without S/S of infection  Outcome: Progressing  Goal: Oral mucous membranes remain intact  Outcome: Progressing     Problem: Musculoskeletal - Adult  Goal: Return mobility to safest level of function  Outcome: Progressing  Goal: Maintain proper alignment of affected body part  Outcome: Progressing  Goal: Return ADL status to a safe level of function  Outcome: Progressing     Problem: Infection - Adult  Goal: Absence of infection at discharge  Outcome: Progressing  Goal: Absence of infection during hospitalization  Outcome: Progressing     Problem: Metabolic/Fluid and Electrolytes - Adult  Goal: Electrolytes maintained within normal limits  Outcome: Progressing     Problem: Pain  Goal: Verbalizes/displays adequate comfort level or baseline comfort level  Outcome: Progressing     Problem: Discharge Planning  Goal: Discharge to home or other facility with appropriate resources  Outcome: Progressing
Problem: Safety - Adult  Goal: Free from fall injury  Outcome: Progressing     Problem: Skin/Tissue Integrity  Goal: Absence of new skin breakdown  Description: 1. Monitor for areas of redness and/or skin breakdown  2. Assess vascular access sites hourly  3. Every 4-6 hours minimum:  Change oxygen saturation probe site  4. Every 4-6 hours:  If on nasal continuous positive airway pressure, respiratory therapy assess nares and determine need for appliance change or resting period.   Outcome: Progressing     Problem: Skin/Tissue Integrity - Adult  Goal: Skin integrity remains intact  Outcome: Progressing  Goal: Incisions, wounds, or drain sites healing without S/S of infection  Outcome: Progressing  Goal: Oral mucous membranes remain intact  Outcome: Progressing     Problem: Musculoskeletal - Adult  Goal: Return mobility to safest level of function  Outcome: Progressing  Goal: Maintain proper alignment of affected body part  Outcome: Progressing  Goal: Return ADL status to a safe level of function  Outcome: Progressing     Problem: Infection - Adult  Goal: Absence of infection at discharge  Outcome: Progressing  Goal: Absence of infection during hospitalization  Outcome: Progressing     Problem: Metabolic/Fluid and Electrolytes - Adult  Goal: Electrolytes maintained within normal limits  Outcome: Progressing     Problem: Pain  Goal: Verbalizes/displays adequate comfort level or baseline comfort level  Outcome: Progressing     Problem: Discharge Planning  Goal: Discharge to home or other facility with appropriate resources  Outcome: Progressing
Moreno Canales RN  Outcome: Progressing  11/11/2023 1137 by Clyde Perez RN  Outcome: Progressing  Goal: Absence of infection during hospitalization  11/11/2023 2147 by Moreno Canales RN  Outcome: Progressing  11/11/2023 1137 by Clyde Perez RN  Outcome: Progressing     Problem: Metabolic/Fluid and Electrolytes - Adult  Goal: Electrolytes maintained within normal limits  11/11/2023 2147 by Moreno Canales RN  Outcome: Progressing  11/11/2023 1137 by Clyde Perez RN  Outcome: Progressing     Problem: Pain  Goal: Verbalizes/displays adequate comfort level or baseline comfort level  11/11/2023 2147 by Moreno Canales RN  Outcome: Progressing  11/11/2023 1137 by Clyde Perez RN  Outcome: Progressing     Problem: Discharge Planning  Goal: Discharge to home or other facility with appropriate resources  11/11/2023 2147 by Moreno Canales RN  Outcome: Progressing  11/11/2023 1137 by Clyde Perez RN  Outcome: Progressing

## 2023-11-14 NOTE — CARE COORDINATION
CM attempted to call the admissions line for [facility] and there again was no answer. CM attempted to call the facility's main number and there was no answer but was able to leave a voicemail for the  requesting a return call regarding the status for the patient's referral.  CM will follow. 2 PM   CM received a voicemail from [employer] with [facility] stating that they are able to accept the patient once the insurance pre-cert has been obtained. CM notified Faith Mccabe at [facility] that the insurance pre-cert can be initiated. CM will follow. 2:18 PM  PASRR completed online.

## 2023-11-14 NOTE — FLOWSHEET NOTE
Physical Therapy Daily Treatment Note   Date: 2023 Room: [address] [place] [address]    Name: Alberta Sue :    MRN: [id number] Admission Date:2023      Restrictions/Precautions: Restrictions/Precautions  Restrictions/Precautions: Fall Risk, General Precautions, Bed Alarm, Contact Precautions  Required Braces or Orthoses?: No     Initial Pain level: none rated/10    Subjective/Observation: Patient supine in bed upon entering and some what agreeable to working with therapy.      Communication with other providers: x      Therapeutic Activities/Neuro Re-Education/Gait Training   Date: 2023   Date: 23 Date:  Date:    Bed   Mobility Sit>supine  Min A for B LE assist, CGA for trunk with use of bed features            STS   Transfer Min A of 1        Stand   Pivot   Transfer   x        Commode Transfer   x        Sitting  Balance   CGA sitting EOB        Standing Balance   CGA for transfers and ambulation        Ambulation Patient ambulated 6ft with RW and min A from recliner to bed            Stairs   x          Therapeutic Exercises   Date: 2023   Date: 23 Date:  Date:   Seated HR, marching, LAQ, resisted hip abd, pillow squeezes, resisted HS curls 20x ea        Ankle pumps   20x w/tactile  cues as pt appeared not to understand what was being asked of him and to follow through w/the exs      Glut sets   10x w/much encouragement      Heel slides   2x10 B w/encouragement to finish ex      Hip AB/AD   2x10 B w/encouragement to finish ex         Education provided:       Treatment/Activity Tolerance:   [] Patient limited by fatigue   [] Patient limited by pain   [] Patient limited by other medical complications   [] Patient tolerated therapy well  [x] Other: patient appeared confused at times and asked to be done with therapy after only a few exs reporting he was tired    Safety Precautions:     [x] Left in bed    [] Left in chair   [x] Call light within reach    [x] Bed alarm on    [] Personal

## 2023-11-15 ENCOUNTER — APPOINTMENT (OUTPATIENT)
Dept: GENERAL RADIOLOGY | Age: 83
DRG: 193 | End: 2023-11-15
Payer: MEDICARE

## 2023-11-15 ENCOUNTER — APPOINTMENT (OUTPATIENT)
Dept: CT IMAGING | Age: 83
DRG: 193 | End: 2023-11-15
Payer: MEDICARE

## 2023-11-15 ENCOUNTER — HOSPITAL ENCOUNTER (INPATIENT)
Age: 83
LOS: 8 days | Discharge: NURSING, REHAB OR LONG-TERM CARE | DRG: 193 | End: 2023-11-23
Attending: EMERGENCY MEDICINE | Admitting: INTERNAL MEDICINE
Payer: MEDICARE

## 2023-11-15 ENCOUNTER — TELEPHONE (OUTPATIENT)
Dept: OTHER | Age: 83
End: 2023-11-15

## 2023-11-15 DIAGNOSIS — R41.0 ACUTE DELIRIUM: Primary | ICD-10-CM

## 2023-11-15 DIAGNOSIS — J18.9 MULTIFOCAL PNEUMONIA: ICD-10-CM

## 2023-11-15 PROBLEM — G93.40 ACUTE ENCEPHALOPATHY: Status: ACTIVE | Noted: 2023-11-15

## 2023-11-15 LAB
ALBUMIN SERPL-MCNC: 2.5 GM/DL (ref 3.4–5)
ALP BLD-CCNC: 137 IU/L (ref 40–129)
ALT SERPL-CCNC: 12 U/L (ref 10–40)
AMMONIA: 32 UMOL/L (ref 16–60)
ANION GAP SERPL CALCULATED.3IONS-SCNC: 7 MMOL/L (ref 4–16)
AST SERPL-CCNC: 38 IU/L (ref 15–37)
BASE EXCESS MIXED: 6 (ref 0–1.2)
BILIRUB SERPL-MCNC: 0.4 MG/DL (ref 0–1)
BUN SERPL-MCNC: 18 MG/DL (ref 6–23)
CALCIUM SERPL-MCNC: 8.1 MG/DL (ref 8.3–10.6)
CHLORIDE BLD-SCNC: 105 MMOL/L (ref 99–110)
CO2: 29 MMOL/L (ref 21–32)
COMMENT: ABNORMAL
CREAT SERPL-MCNC: 1.4 MG/DL (ref 0.9–1.3)
DIGOXIN LEVEL: 1.5 NG/ML (ref 0.8–2)
DOSE AMOUNT: ABNORMAL
DOSE AMOUNT: NORMAL
DOSE TIME: ABNORMAL
DOSE TIME: NORMAL
GFR SERPL CREATININE-BSD FRML MDRD: 50 ML/MIN/1.73M2
GLUCOSE BLD-MCNC: 95 MG/DL (ref 70–99)
GLUCOSE SERPL-MCNC: 96 MG/DL (ref 70–99)
HCO3 VENOUS: 32.2 MMOL/L (ref 19–25)
HCT VFR BLD CALC: 31.7 % (ref 42–52)
HEMOGLOBIN: 10 GM/DL (ref 13.5–18)
LACTIC ACID, SEPSIS: 1.4 MMOL/L (ref 0.4–2)
MAGNESIUM: 2 MG/DL (ref 1.8–2.4)
MCH RBC QN AUTO: 30.8 PG (ref 27–31)
MCHC RBC AUTO-ENTMCNC: 31.5 % (ref 32–36)
MCV RBC AUTO: 97.5 FL (ref 78–100)
O2 SAT, VEN: 60.2 % (ref 50–70)
PCO2, VEN: 52 MMHG (ref 38–52)
PDW BLD-RTO: 21.4 % (ref 11.7–14.9)
PH VENOUS: 7.4 (ref 7.32–7.42)
PLATELET # BLD: 112 K/CU MM (ref 140–440)
PMV BLD AUTO: 13.5 FL (ref 7.5–11.1)
PO2, VEN: 33 MMHG (ref 28–48)
POTASSIUM SERPL-SCNC: 3.4 MMOL/L (ref 3.5–5.1)
PRO-BNP: 6981 PG/ML
PROCALCITONIN SERPL-MCNC: 0.25 NG/ML
RBC # BLD: 3.25 M/CU MM (ref 4.6–6.2)
SODIUM BLD-SCNC: 141 MMOL/L (ref 135–145)
TOTAL PROTEIN: 6 GM/DL (ref 6.4–8.2)
TROPONIN, HIGH SENSITIVITY: 100 NG/L (ref 0–22)
TROPONIN, HIGH SENSITIVITY: 102 NG/L (ref 0–22)
VALPROIC ACID LEVEL: <2.8 UG/ML (ref 50–100)
WBC # BLD: 7 K/CU MM (ref 4–10.5)

## 2023-11-15 PROCEDURE — 2580000003 HC RX 258: Performed by: EMERGENCY MEDICINE

## 2023-11-15 PROCEDURE — 96365 THER/PROPH/DIAG IV INF INIT: CPT

## 2023-11-15 PROCEDURE — 82962 GLUCOSE BLOOD TEST: CPT

## 2023-11-15 PROCEDURE — 99285 EMERGENCY DEPT VISIT HI MDM: CPT

## 2023-11-15 PROCEDURE — 82140 ASSAY OF AMMONIA: CPT

## 2023-11-15 PROCEDURE — 93005 ELECTROCARDIOGRAM TRACING: CPT | Performed by: EMERGENCY MEDICINE

## 2023-11-15 PROCEDURE — 70450 CT HEAD/BRAIN W/O DYE: CPT

## 2023-11-15 PROCEDURE — 87040 BLOOD CULTURE FOR BACTERIA: CPT

## 2023-11-15 PROCEDURE — 83735 ASSAY OF MAGNESIUM: CPT

## 2023-11-15 PROCEDURE — 2060000000 HC ICU INTERMEDIATE R&B

## 2023-11-15 PROCEDURE — 82805 BLOOD GASES W/O2 SATURATION: CPT

## 2023-11-15 PROCEDURE — 80164 ASSAY DIPROPYLACETIC ACD TOT: CPT

## 2023-11-15 PROCEDURE — 83880 ASSAY OF NATRIURETIC PEPTIDE: CPT

## 2023-11-15 PROCEDURE — 80053 COMPREHEN METABOLIC PANEL: CPT

## 2023-11-15 PROCEDURE — 85027 COMPLETE CBC AUTOMATED: CPT

## 2023-11-15 PROCEDURE — 83605 ASSAY OF LACTIC ACID: CPT

## 2023-11-15 PROCEDURE — 71045 X-RAY EXAM CHEST 1 VIEW: CPT

## 2023-11-15 PROCEDURE — 80162 ASSAY OF DIGOXIN TOTAL: CPT

## 2023-11-15 PROCEDURE — 96367 TX/PROPH/DG ADDL SEQ IV INF: CPT

## 2023-11-15 PROCEDURE — 6360000002 HC RX W HCPCS: Performed by: EMERGENCY MEDICINE

## 2023-11-15 PROCEDURE — 84484 ASSAY OF TROPONIN QUANT: CPT

## 2023-11-15 PROCEDURE — 84145 PROCALCITONIN (PCT): CPT

## 2023-11-15 RX ORDER — POTASSIUM CHLORIDE 7.45 MG/ML
10 INJECTION INTRAVENOUS
Status: DISPENSED | OUTPATIENT
Start: 2023-11-15 | End: 2023-11-15

## 2023-11-15 RX ORDER — POTASSIUM CHLORIDE 7.45 MG/ML
10 INJECTION INTRAVENOUS
Status: DISCONTINUED | OUTPATIENT
Start: 2023-11-15 | End: 2023-11-15 | Stop reason: SDUPTHER

## 2023-11-15 RX ORDER — DIVALPROEX SODIUM 125 MG/1
125 TABLET, DELAYED RELEASE ORAL 2 TIMES DAILY
Status: ON HOLD | COMMUNITY
End: 2023-11-23 | Stop reason: HOSPADM

## 2023-11-15 RX ORDER — LINEZOLID 2 MG/ML
600 INJECTION, SOLUTION INTRAVENOUS ONCE
Status: COMPLETED | OUTPATIENT
Start: 2023-11-15 | End: 2023-11-15

## 2023-11-15 RX ORDER — SODIUM CHLORIDE 9 MG/ML
INJECTION, SOLUTION INTRAVENOUS CONTINUOUS
Status: DISCONTINUED | OUTPATIENT
Start: 2023-11-15 | End: 2023-11-20

## 2023-11-15 RX ADMIN — SODIUM CHLORIDE: 9 INJECTION, SOLUTION INTRAVENOUS at 20:24

## 2023-11-15 RX ADMIN — LINEZOLID 600 MG: 600 INJECTION, SOLUTION INTRAVENOUS at 21:20

## 2023-11-15 RX ADMIN — CEFEPIME 2000 MG: 2 INJECTION, POWDER, FOR SOLUTION INTRAVENOUS at 20:19

## 2023-11-15 RX ADMIN — POTASSIUM CHLORIDE 10 MEQ: 7.46 INJECTION, SOLUTION INTRAVENOUS at 22:00

## 2023-11-15 NOTE — PROGRESS NOTES
4 Eyes Skin Assessment     NAME:  Zheng Gutierrez  YOB: 1940  MEDICAL RECORD NUMBER:  [id number]    The patient is being assessed for  Admission    I agree that at least one RN has performed a thorough Head to Toe Skin Assessment on the patient. ALL assessment sites listed below have been assessed. Areas assessed by both nurses:    Head, Face, Ears, Shoulders, Back, Chest, Arms, Elbows, Hands, Sacrum. Buttock, Coccyx, Ischium, and Legs. Feet and Heels        Does the Patient have a Wound? Yes wound(s) were present on assessment.  LDA wound assessment was Initiated and completed by radha Modi R second toe      [place] Prevention initiated by RN: Yes  Wound Care Orders initiated by RN: Yes    Pressure Injury (Stage 3,4, Unstageable, DTI, NWPT, and Complex wounds) if present, place Wound referral order by RN under : Yes    New Ostomies, if present place, Ostomy referral order under : No     Nurse 1 eSignature: Electronically signed by Arnaldo Rosenberg RN on 11/10/23 at 12:55 PM EST    **SHARE this note so that the co-signing nurse can place an eSignature**    Nurse 2 eSignature: Electronically signed by Candie Rubi RN on 11/13/23 at 6:27 PM EST
Comprehensive Nutrition Assessment    Type and Reason for Visit:  Initial, Positive Nutrition Screen (wt loss, poor po intake)    Nutrition Recommendations/Plan:   If oral intake does not consistently improve recommend liberalize diet to regular to optimize po intakes  Continue to provide standard high calorie, high protein oral nutrition supplement TID  Offer assistance and alternatives at meals to optimize po intake  Please document all po intakes and weights  Will monitor weights, labs, po intakes and POC     Malnutrition Assessment:  Malnutrition Status:  Severe malnutrition (11/13/23 1413)    Context:  Chronic Illness     Findings of the 6 clinical characteristics of malnutrition:  Energy Intake:  75% or less estimated energy requirements for 1 month or longer  Weight Loss:  Greater than 10% over 6 months     Body Fat Loss:  No significant body fat loss     Muscle Mass Loss:  No significant muscle mass loss    Fluid Accumulation:  Unable to assess     Strength:  Not Performed    Nutrition Assessment:    Pt admitted for renal insufficiency, generalized weakness, MC, fatigue, ambulatory dysfunction. Hx includes AFib, COPD, HLD, HTN. Diet order Cardaic with high calorie high protein oral nutrition supplement TID. Pt reports he has not felt hungry in a couple months and that his teeth are not fitting well for the past couple of months and therefore hurt when he eats. Noted while in room pt had untouched lunch tray at bedside-he reported he would keep the applesauce to try and has been drinking the Ensure off and on-noted pt had consumed 75% of supplement. Also noted per chart review pt with wt loss of 42#/15% in past 7 months which is a significant loss-pt states \"I have been losing but I don't know how much. \" Pt is requesting easier to chew foods-will alert nutrition staff. At this time recommend to continue to provide current supplement order and encourage po intakes.  Will monitor and follow at moderate
Occupational Therapy    Occupational Therapy Treatment Note  Name: Katya Arredondo MRN: [id number] :   1940   Date:  2023   Admission Date: 2023 Room:  [address] [place] [address]     Primary Problem:  multiple falls at home with fatigue, weakness and Renal insufficiency    Restrictions/Precautions:  Restrictions/Precautions  Restrictions/Precautions: Fall Risk, General Precautions, Bed Alarm, Contact Precautions  Required Braces or Orthoses?: No     Communication with other providers:   Cleared for treatment by  RN. Subjective:  Patient states:  \"I think I need to poop\"  Pain:   Location, Type, Intensity (0/10 to 10/10):  no pain noted    Objective:    Observation:  pt alert and oriented    Treatment, including education:  Transfers  Supine to sit :Moderate Assistance for trunk able to bring legs over EOB  Scooting :Stand By Assistance  Sit to stand :Contact Guard Assistance  Stand to sit : dianboom Training:   Activities performed today included the following: Toileting  Total Dependant for toilet hygiene    Therapeutic Activity Training: Pt stood x 2-3 min with CGA x 3 attempts with RW to facilitate increased endurance/strength for ADL tasks and transfers. Pt completed functional mobility with RW x 5-7' x 2 with CGA with min verbal cues. Safety Measures: Gait belt used, Left in bed/up in the recliner, Pull/Bed Alarm activated and call light left in reach        Assessment / Impression:        Patient's tolerance of treatment: Good   Adverse Reaction: None  Significant change in status and impact:  None  Barriers to improvement:  Dec strength and endurance    Plan for Next Session:    Continue with POC.     Time in:  947  Time out:  1027  Total treatment time:  40  Billed Units: 1 ADL 2 TA  Electronically signed by:    Gerald Smith [id number] [place]    2023, 1:52 PM    Previously filed values:
Physical Therapy  Facility/Department: [facility] UNIT  Physical Therapy Initial Assessment    Name: Mack Walters  :   MRN: [id number]  Date of Service: 11/10/2023    Discharge Recommendations:  Subacute/Skilled Nursing Facility   PT Equipment Recommendations  Equipment Needed:  (Continue to evaluate)      Patient Diagnosis(es): The primary encounter diagnosis was Fatigue, unspecified type. Diagnoses of Generalized weakness, Renal insufficiency, and Ambulatory dysfunction were also pertinent to this visit. Past Medical History:  has a past medical history of AR (aortic regurgitation), Atrial fibrillation, new onset ([address]), CAD (coronary artery disease), Chronic midline low back pain without sciatica, COPD (chronic obstructive pulmonary disease) ([address]), Family history of coronary artery disease, Fatigue, H/O cardiovascular stress test, H/O chest x-ray, H/O Doppler ultrasound, H/O echocardiogram, History of cardiac cath, History of PTCA, Hyperlipidemia, Hypertension, Obesity, Obesity, Class II, 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 Past Surgical History:  has a past surgical history that includes joint replacement (); Coronary angioplasty with stent; Upper gastrointestinal endoscopy (N/A, 2022); and Colonoscopy (N/A, 2022). Assessment   Body Structures, Functions, Activity Limitations Requiring Skilled Therapeutic Intervention: Decreased functional mobility ; Decreased safe awareness;Decreased endurance;Decreased balance  Assessment: Pt is 81 yo male, states that he was in the hospital for nearly a month and after his discharge which was on September 15 the patient went to a rehabilitation facility. The patient has been home from that rehabilitation facility only a few weeks and he has already fallen multiple times throughout his time being home.   In fact, the patient had 2 episodes today where he had to lower himself down to the ground and then EMS
Report called to Nurse Danelle Kohli at [facility]. All questions answered.
Spoke with [address] and updated home med list.     Please update [facility] with any medication changes at discharge.
Unable to successfully straight cath after attempted by myself and Miranda HOPKINS. Rebekah MAHER notified and aware. Jacky HOPKINS will attempt.
V2.0    [facility] Progress Note      Name:  Akbar Jimenez /Age/Sex: [birth year]89  (80 y.o. male)   MRN & CSN:  [id number] & [id number] Encounter Date/Time: 2023 1:15 PM EST   Location:   PCP: MD Eddie Jaimes MD       Hospital Day: 3    Assessment and Recommendations   Akbar Jimenez is a 80 y.o. male with  who presents with Acute kidney injury ([address])      Plan:   MC, improving, creatinine is 1.7 today. Continue till continue gentle IV fluids. Hold nephrotoxic medications, renally dose medications. Chronic atrial fibrillation, continue Toprol XL, and digoxin, will check. Heart rate is controlled. History of COPD not in acute exacerbation, continue home inhalers. Hyperlipidemia, continue statin  Hypertension, continue Toprol  Chronic lower extremity edema, will place Lasix on hold secondary to MC. Generalized weakness/inability to care for self at home/PT OT consulted patient needs long-term placement. Elevated troponin in the setting of MC not concerned for ACS patient has had no chest pain, does appear to have chronically elevated troponins. Urinary retention, Santiago catheter has been placed will need outpatient follow-up with urology. Diet ADULT DIET;  Regular; Low Fat/Low Chol/High Fiber/LALO  ADULT ORAL NUTRITION SUPPLEMENT; Breakfast, Lunch, Dinner; Standard High Calorie/High Protein Oral Supplement   DVT Prophylaxis [x] Lovenox, []  Heparin, [] SCDs, [] Ambulation,  [] Eliquis, [] Xarelto  [] Coumadin   Code Status DNR-CCA   Disposition From: Home  Expected Disposition: Skilled nursing facility for rehab and possible long-term placement  Estimated Date of Discharge: TBD  Patient requires continued admission due to MC   Surrogate Decision Maker/ POA Claudia Gutierrez     Personally reviewed Lab Studies and Imaging     Discussed management of the case with Dr. Eric Hewitt my supervising physician who is in agreement with the above-noted plan of
V2.0    [facility] Progress Note      Name:  Leida Pizarro /Age/Sex: [birth year]20  (80 y.o. male)   MRN & CSN:  [id number] & [id number] Encounter Date/Time: 2023 1:15 PM EST   Location:  [facility]/950-16 PCP: Ian Eagle MD     Attending:Jayce Whittington, [address] Day: 6    Assessment and Recommendations   Leida Pizarro is a 80 y.o. male with  who presents with Acute kidney injury ([address])      Plan:   MC, improving, creatinine is 1.3 today. Continue Lasix . Chronic atrial fibrillation, continue Toprol XL, and digoxin. Dig level 1.7 (2023). Heart rate is controlled. History of COPD not in acute exacerbation, continue home inhalers. Hyperlipidemia, continue statin  Hypertension, continue Toprol  Chronic lower extremity edema, lower extremity edema has greatly improved today. Patient did receive 40 of IV Lasix, now on PO. Generalized weakness/inability to care for self at home/PT OT consulted patient needs long-term placement. Elevated troponin in the setting of MC not concerned for ACS patient has had no chest pain, does appear to have chronically elevated troponins. Urinary retention, Santiago catheter has been placed will need outpatient follow-up with urology. Thrombocytopenia, improving platelets are 84. Lovenox has been Dc'd  BNP is elevated at 19,963, last echocardiogram was completed in 2023. Results are as follows: Left ventricular systolic function is normal. Ejection fraction is visually estimated at 55-60%. Mild aortic stenosis is present; Mean PG 11 mmHg. Mitral annular calcification is present. Mild to moderate tricuspid regurgitation; RVSP: 43 mmHg. No evidence of any pericardial effusion. Pericardial fat pad present. During last hospitalization patient's Lasix was increased to 40 mg. We will continue and monitor renal function.     Diet ADULT ORAL NUTRITION SUPPLEMENT; Breakfast, Lunch, Dinner; Standard High Calorie/High Protein Oral Supplement  ADULT DIET; Easy to
V2.0    [facility] Progress Note      Name:  Steven Trinidad /Age/Sex:   (80 y.o. male)   MRN & CSN:  [id number] & [id number] Encounter Date/Time: 2023 1:15 PM EST   Location:   PCP: Melchor Albert, MD Lavaun Castleman, MD       Hospital Day: 4    Assessment and Recommendations   Steven Trinidad is a 80 y.o. male with  who presents with Acute kidney injury ([address])      Plan:   MC, improving, creatinine is 1.5 today. DC IV fluids, resume Lasix . Chronic atrial fibrillation, continue Toprol XL, and digoxin, will check. Heart rate is controlled. History of COPD not in acute exacerbation, continue home inhalers. Hyperlipidemia, continue statin  Hypertension, continue Toprol  Chronic lower extremity edema, lower extremity edema has greatly improved today. Patient did receive 40 of IV Lasix. Generalized weakness/inability to care for self at home/PT OT consulted patient needs long-term placement. Elevated troponin in the setting of MC not concerned for ACS patient has had no chest pain, does appear to have chronically elevated troponins. Urinary retention, Santiago catheter has been placed will need outpatient follow-up with urology. Thrombocytopenia, improving platelets are 83. Lovenox has been Dc'd  BNP is elevated at 19,963, last echocardiogram was completed in 2023. Results are as follows:   Left ventricular systolic function is normal.   Ejection fraction is visually estimated at 55-60%. Mild aortic stenosis is present; Mean PG 11 mmHg. Mitral annular calcification is present. Mild to moderate tricuspid regurgitation; RVSP: 43 mmHg. No evidence of any pericardial effusion. Pericardial fat pad present. During last hospitalization patient's Lasix was increased to 40 mg. We will continue and monitor renal function. Diet ADULT DIET;  Regular; Low Fat/Low Chol/High Fiber/LALO  ADULT ORAL NUTRITION SUPPLEMENT; Breakfast, Lunch, Dinner;
tolerated  Left Side Weight Bearing: As tolerated  Gait  Overall Level of Assistance: Contact-guard assistance  Distance (ft):  (3 lateral side steps along EOB using RW)  Assistive Device: Walker, rolling  Interventions: Manual cues; Safety awareness training  Base of Support: Widened  Gait Abnormalities: Shuffling gait     AROM: Generally decreased, functional  Strength: Generally decreased, functional  Tone: Normal  ADL  Feeding: Stand by assistance (Pt had some difficulty bringing straw to mouth to take drink of water taking extra time)  Grooming: Minimal assistance  UE Bathing: Minimal assistance  LE Bathing: Maximum assistance  UE Dressing: Minimal assistance  LE Dressing: Maximum assistance  Toileting: Moderate assistance  Additional Comments: Based on oboservation of Pt performance or current functional mobility, strength, endurance and balance status     Activity Tolerance  Activity Tolerance: Patient limited by fatigue;Patient limited by endurance  Bed mobility  Bridging: Minimal assistance  Supine to Sit: Moderate assistance  Sit to Supine: Minimal assistance (assist to lift BLE up onto EOB)  Transfers  Stand Step Transfers: Contact guard assistance  Sit to stand: Contact guard assistance  Stand to sit: Contact guard assistance  Transfer Comments: using RW at EOB Pt took 3 lateral side steps along bed with CGA.  Did not attempt ambulation at this time d/t Pt fatigue and difficutly staying awake  Vision - Basic Assessment  Prior Vision: Wears glasses all the time  Vision  Vision: Impaired  Vision Exceptions: Wears glasses at all times  Hearing  Hearing: Exceptions to [facility]  Hearing Exceptions: Bilateral hearing aid  Cognition  Overall Cognitive Status: WFL (Pt presents with fatigue/groggy but able to follow directions)  Orientation  Overall Orientation Status: Within Functional Limits  Orientation Level: Oriented X4                                           G-Code     OutComes Score

## 2023-11-15 NOTE — ACP (ADVANCE CARE PLANNING)
Patient does not have any ACP documents/Medical Power of . LSW notes hospital will follow [place] Next of Kin hierarchy in the following descending order for priority:    Guardian  Spouse  Majority of adult Children  Parents  Majority of adult Siblings  Nearest Relative not described above    Per [place] Next of Kin hierarchy: Patients' adult child will be [address]

## 2023-11-15 NOTE — TELEPHONE ENCOUNTER
Received a call from patient's son Garcia Penn. Patient will be going back to [facility] for skilled therapy. Unsure when he will return home. At this time I will remove him from the program. Should the patient return home and needs are identified I will open another file.

## 2023-11-15 NOTE — CARE COORDINATION
Patient possible readmission. Patient admitted 11/10/23 - 11/14/23 for Acute Kidney Injury. Patent presents today from [facility] with Altered Mental Status. Patient to be admitted for Acute Encephalopathy.

## 2023-11-15 NOTE — ED NOTES
Patient alert to tell this RN his name and date of birth, patient stated the month is November, patient stated he is in [place], and stated that the squad brought him because he was confused.       Delbert Benavides RN  11/15/23 [address], Gillian Bosworth, RN  11/15/23 4275

## 2023-11-16 LAB
ANION GAP SERPL CALCULATED.3IONS-SCNC: 5 MMOL/L (ref 4–16)
ANISOCYTOSIS: ABNORMAL
BACTERIA: NEGATIVE /HPF
BANDED NEUTROPHILS ABSOLUTE COUNT: 0.11 K/CU MM
BANDED NEUTROPHILS RELATIVE PERCENT: 2 % (ref 5–11)
BILIRUBIN URINE: NEGATIVE MG/DL
BLOOD, URINE: ABNORMAL
BUN SERPL-MCNC: 18 MG/DL (ref 6–23)
CALCIUM SERPL-MCNC: 8.4 MG/DL (ref 8.3–10.6)
CHLORIDE BLD-SCNC: 108 MMOL/L (ref 99–110)
CLARITY: CLEAR
CO2: 30 MMOL/L (ref 21–32)
COLOR: YELLOW
CREAT SERPL-MCNC: 1.4 MG/DL (ref 0.9–1.3)
DIFFERENTIAL TYPE: ABNORMAL
EKG DIAGNOSIS: NORMAL
EKG Q-T INTERVAL: 258 MS
EKG QRS DURATION: 96 MS
EKG QTC CALCULATION (BAZETT): 405 MS
EKG R AXIS: -45 DEGREES
EKG T AXIS: 142 DEGREES
EKG VENTRICULAR RATE: 148 BPM
GFR SERPL CREATININE-BSD FRML MDRD: 50 ML/MIN/1.73M2
GLUCOSE BLD-MCNC: 100 MG/DL (ref 70–99)
GLUCOSE BLD-MCNC: 89 MG/DL (ref 70–99)
GLUCOSE SERPL-MCNC: 85 MG/DL (ref 70–99)
GLUCOSE, URINE: NEGATIVE MG/DL
HCT VFR BLD CALC: 30 % (ref 42–52)
HEMOGLOBIN: 9.4 GM/DL (ref 13.5–18)
KETONES, URINE: NEGATIVE MG/DL
LEUKOCYTE ESTERASE, URINE: ABNORMAL
LYMPHOCYTES ABSOLUTE: 0.5 K/CU MM
LYMPHOCYTES RELATIVE PERCENT: 10 % (ref 24–44)
MCH RBC QN AUTO: 30.9 PG (ref 27–31)
MCHC RBC AUTO-ENTMCNC: 31.3 % (ref 32–36)
MCV RBC AUTO: 98.7 FL (ref 78–100)
MONOCYTES ABSOLUTE: 0.4 K/CU MM
MONOCYTES RELATIVE PERCENT: 8 % (ref 0–4)
NITRITE URINE, QUANTITATIVE: POSITIVE
PDW BLD-RTO: 21.3 % (ref 11.7–14.9)
PH, URINE: 7 (ref 5–8)
PLATELET # BLD: 104 K/CU MM (ref 140–440)
POTASSIUM SERPL-SCNC: 4.2 MMOL/L (ref 3.5–5.1)
PROTEIN UA: 30 MG/DL
RBC # BLD: 3.04 M/CU MM (ref 4.6–6.2)
RBC URINE: 7 /HPF (ref 0–3)
SEGMENTED NEUTROPHILS ABSOLUTE COUNT: 4.4 K/CU MM
SEGMENTED NEUTROPHILS RELATIVE PERCENT: 80 % (ref 36–66)
SODIUM BLD-SCNC: 143 MMOL/L (ref 135–145)
SPECIFIC GRAVITY UA: 1.01 (ref 1–1.03)
SQUAMOUS EPITHELIAL: 1 /HPF
TARGET CELLS: ABNORMAL
TRICHOMONAS: ABNORMAL /HPF
UROBILINOGEN, URINE: 0.2 MG/DL (ref 0.2–1)
WBC # BLD: 5.4 K/CU MM (ref 4–10.5)
WBC CLUMP: ABNORMAL /HPF
WBC UA: 181 /HPF (ref 0–2)

## 2023-11-16 PROCEDURE — 6370000000 HC RX 637 (ALT 250 FOR IP): Performed by: INTERNAL MEDICINE

## 2023-11-16 PROCEDURE — 85027 COMPLETE CBC AUTOMATED: CPT

## 2023-11-16 PROCEDURE — 6360000002 HC RX W HCPCS: Performed by: EMERGENCY MEDICINE

## 2023-11-16 PROCEDURE — 87086 URINE CULTURE/COLONY COUNT: CPT

## 2023-11-16 PROCEDURE — 80048 BASIC METABOLIC PNL TOTAL CA: CPT

## 2023-11-16 PROCEDURE — 81001 URINALYSIS AUTO W/SCOPE: CPT

## 2023-11-16 PROCEDURE — 85007 BL SMEAR W/DIFF WBC COUNT: CPT

## 2023-11-16 PROCEDURE — 2580000003 HC RX 258: Performed by: EMERGENCY MEDICINE

## 2023-11-16 PROCEDURE — 6360000002 HC RX W HCPCS: Performed by: INTERNAL MEDICINE

## 2023-11-16 PROCEDURE — 93005 ELECTROCARDIOGRAM TRACING: CPT | Performed by: FAMILY MEDICINE

## 2023-11-16 PROCEDURE — 93010 ELECTROCARDIOGRAM REPORT: CPT | Performed by: INTERNAL MEDICINE

## 2023-11-16 PROCEDURE — 2580000003 HC RX 258: Performed by: INTERNAL MEDICINE

## 2023-11-16 PROCEDURE — 36415 COLL VENOUS BLD VENIPUNCTURE: CPT

## 2023-11-16 PROCEDURE — 82962 GLUCOSE BLOOD TEST: CPT

## 2023-11-16 PROCEDURE — 2060000000 HC ICU INTERMEDIATE R&B

## 2023-11-16 PROCEDURE — 94761 N-INVAS EAR/PLS OXIMETRY MLT: CPT

## 2023-11-16 PROCEDURE — 6360000002 HC RX W HCPCS: Performed by: STUDENT IN AN ORGANIZED HEALTH CARE EDUCATION/TRAINING PROGRAM

## 2023-11-16 PROCEDURE — 2500000003 HC RX 250 WO HCPCS: Performed by: INTERNAL MEDICINE

## 2023-11-16 RX ORDER — SODIUM CHLORIDE 9 MG/ML
INJECTION, SOLUTION INTRAVENOUS PRN
Status: DISCONTINUED | OUTPATIENT
Start: 2023-11-16 | End: 2023-11-23 | Stop reason: HOSPADM

## 2023-11-16 RX ORDER — SODIUM CHLORIDE 0.9 % (FLUSH) 0.9 %
5-40 SYRINGE (ML) INJECTION PRN
Status: DISCONTINUED | OUTPATIENT
Start: 2023-11-16 | End: 2023-11-23 | Stop reason: HOSPADM

## 2023-11-16 RX ORDER — POLYETHYLENE GLYCOL 3350 17 G/17G
17 POWDER, FOR SOLUTION ORAL DAILY PRN
Status: DISCONTINUED | OUTPATIENT
Start: 2023-11-16 | End: 2023-11-23 | Stop reason: HOSPADM

## 2023-11-16 RX ORDER — VITAMIN B COMPLEX
2000 TABLET ORAL DAILY
Status: DISCONTINUED | OUTPATIENT
Start: 2023-11-16 | End: 2023-11-23 | Stop reason: HOSPADM

## 2023-11-16 RX ORDER — LANOLIN ALCOHOL/MO/W.PET/CERES
3 CREAM (GRAM) TOPICAL NIGHTLY
Status: DISCONTINUED | OUTPATIENT
Start: 2023-11-16 | End: 2023-11-23 | Stop reason: HOSPADM

## 2023-11-16 RX ORDER — ALBUTEROL SULFATE 90 UG/1
2 AEROSOL, METERED RESPIRATORY (INHALATION) EVERY 6 HOURS PRN
Status: DISCONTINUED | OUTPATIENT
Start: 2023-11-16 | End: 2023-11-23 | Stop reason: HOSPADM

## 2023-11-16 RX ORDER — POTASSIUM CHLORIDE 20 MEQ/1
20 TABLET, EXTENDED RELEASE ORAL DAILY
Status: DISCONTINUED | OUTPATIENT
Start: 2023-11-16 | End: 2023-11-23 | Stop reason: HOSPADM

## 2023-11-16 RX ORDER — PRAVASTATIN SODIUM 40 MG
80 TABLET ORAL NIGHTLY
Status: DISCONTINUED | OUTPATIENT
Start: 2023-11-16 | End: 2023-11-23 | Stop reason: HOSPADM

## 2023-11-16 RX ORDER — ONDANSETRON 4 MG/1
4 TABLET, ORALLY DISINTEGRATING ORAL EVERY 8 HOURS PRN
Status: DISCONTINUED | OUTPATIENT
Start: 2023-11-16 | End: 2023-11-23 | Stop reason: HOSPADM

## 2023-11-16 RX ORDER — LEVOFLOXACIN 5 MG/ML
500 INJECTION, SOLUTION INTRAVENOUS
Status: DISPENSED | OUTPATIENT
Start: 2023-11-16 | End: 2023-11-20

## 2023-11-16 RX ORDER — PANTOPRAZOLE SODIUM 40 MG/1
40 TABLET, DELAYED RELEASE ORAL
Status: DISCONTINUED | OUTPATIENT
Start: 2023-11-16 | End: 2023-11-23 | Stop reason: HOSPADM

## 2023-11-16 RX ORDER — METOPROLOL SUCCINATE 25 MG/1
12.5 TABLET, EXTENDED RELEASE ORAL NIGHTLY
Status: DISCONTINUED | OUTPATIENT
Start: 2023-11-16 | End: 2023-11-23 | Stop reason: HOSPADM

## 2023-11-16 RX ORDER — LANOLIN ALCOHOL/MO/W.PET/CERES
1000 CREAM (GRAM) TOPICAL DAILY
Status: DISCONTINUED | OUTPATIENT
Start: 2023-11-16 | End: 2023-11-23 | Stop reason: HOSPADM

## 2023-11-16 RX ORDER — ACETAMINOPHEN 325 MG/1
650 TABLET ORAL EVERY 6 HOURS PRN
Status: DISCONTINUED | OUTPATIENT
Start: 2023-11-16 | End: 2023-11-23 | Stop reason: HOSPADM

## 2023-11-16 RX ORDER — FERROUS GLUCONATE 324(37.5)
324 TABLET ORAL 2 TIMES DAILY
Status: DISCONTINUED | OUTPATIENT
Start: 2023-11-16 | End: 2023-11-23 | Stop reason: HOSPADM

## 2023-11-16 RX ORDER — SODIUM CHLORIDE 0.9 % (FLUSH) 0.9 %
5-40 SYRINGE (ML) INJECTION EVERY 12 HOURS SCHEDULED
Status: DISCONTINUED | OUTPATIENT
Start: 2023-11-16 | End: 2023-11-23 | Stop reason: HOSPADM

## 2023-11-16 RX ORDER — LANOLIN ALCOHOL/MO/W.PET/CERES
400 CREAM (GRAM) TOPICAL DAILY
Status: DISCONTINUED | OUTPATIENT
Start: 2023-11-16 | End: 2023-11-23 | Stop reason: HOSPADM

## 2023-11-16 RX ORDER — ACETAMINOPHEN 650 MG/1
650 SUPPOSITORY RECTAL EVERY 6 HOURS PRN
Status: DISCONTINUED | OUTPATIENT
Start: 2023-11-16 | End: 2023-11-23 | Stop reason: HOSPADM

## 2023-11-16 RX ORDER — LINEZOLID 2 MG/ML
600 INJECTION, SOLUTION INTRAVENOUS EVERY 12 HOURS
Status: COMPLETED | OUTPATIENT
Start: 2023-11-16 | End: 2023-11-22

## 2023-11-16 RX ORDER — POTASSIUM CHLORIDE 7.45 MG/ML
10 INJECTION INTRAVENOUS ONCE
Status: COMPLETED | OUTPATIENT
Start: 2023-11-16 | End: 2023-11-16

## 2023-11-16 RX ORDER — LEVOFLOXACIN 5 MG/ML
500 INJECTION, SOLUTION INTRAVENOUS EVERY 24 HOURS
Status: DISCONTINUED | OUTPATIENT
Start: 2023-11-16 | End: 2023-11-16 | Stop reason: DRUGHIGH

## 2023-11-16 RX ORDER — DIGOXIN 125 MCG
125 TABLET ORAL DAILY
Status: DISCONTINUED | OUTPATIENT
Start: 2023-11-16 | End: 2023-11-23 | Stop reason: HOSPADM

## 2023-11-16 RX ORDER — ONDANSETRON 2 MG/ML
4 INJECTION INTRAMUSCULAR; INTRAVENOUS EVERY 6 HOURS PRN
Status: DISCONTINUED | OUTPATIENT
Start: 2023-11-16 | End: 2023-11-23 | Stop reason: HOSPADM

## 2023-11-16 RX ADMIN — METOPROLOL SUCCINATE 12.5 MG: 25 TABLET, EXTENDED RELEASE ORAL at 02:52

## 2023-11-16 RX ADMIN — LEVOFLOXACIN 500 MG: 5 INJECTION, SOLUTION INTRAVENOUS at 10:59

## 2023-11-16 RX ADMIN — PRAVASTATIN SODIUM 80 MG: 40 TABLET ORAL at 02:52

## 2023-11-16 RX ADMIN — SODIUM CHLORIDE: 9 INJECTION, SOLUTION INTRAVENOUS at 05:58

## 2023-11-16 RX ADMIN — SODIUM CHLORIDE: 9 INJECTION, SOLUTION INTRAVENOUS at 21:54

## 2023-11-16 RX ADMIN — POTASSIUM CHLORIDE 10 MEQ: 7.46 INJECTION, SOLUTION INTRAVENOUS at 00:52

## 2023-11-16 RX ADMIN — MICONAZOLE NITRATE: 2 POWDER TOPICAL at 20:49

## 2023-11-16 RX ADMIN — SODIUM CHLORIDE, PRESERVATIVE FREE 10 ML: 5 INJECTION INTRAVENOUS at 20:43

## 2023-11-16 RX ADMIN — MICONAZOLE NITRATE: 2 POWDER TOPICAL at 11:33

## 2023-11-16 RX ADMIN — SODIUM CHLORIDE: 9 INJECTION, SOLUTION INTRAVENOUS at 17:30

## 2023-11-16 RX ADMIN — LINEZOLID 600 MG: 600 INJECTION, SOLUTION INTRAVENOUS at 20:48

## 2023-11-16 RX ADMIN — MICONAZOLE NITRATE: 2 POWDER TOPICAL at 03:00

## 2023-11-16 RX ADMIN — LINEZOLID 600 MG: 600 INJECTION, SOLUTION INTRAVENOUS at 12:10

## 2023-11-16 NOTE — ED TRIAGE NOTES
Patient brought in by EMS for altered mental status. Per EMS patient was admitted to [facility] yesterday and the nurse said that patient seemed like he was confused.
All other components within normal limits   BLOOD GAS, VENOUS - Abnormal; Notable for the following components:    Base Exc, Mixed 6 (*)     HCO3, Venous 32.2 (*)     All other components within normal limits   VALPROIC ACID LEVEL, TOTAL - Abnormal; Notable for the following components:    Valproic Acid Lvl <2.8 (*)     All other components within normal limits   BRAIN NATRIURETIC PEPTIDE - Abnormal; Notable for the following components:    Pro-BNP 6,981 (*)     All other components within normal limits   MICROSCOPIC URINALYSIS - Abnormal; Notable for the following components:    RBC, UA 7 (*)     WBC,  (*)     All other components within normal limits       Background  History:   Past Medical History:   Diagnosis Date    AR (aortic regurgitation)     mild to mod    Atrial fibrillation, new onset (HCC)     CAD (coronary artery disease)     History of angioplasty    Chronic midline low back pain without sciatica 5/1/2017    Has seen Dr. Candice Chery and had shots    COPD (chronic obstructive pulmonary disease) ([address])     Family history of coronary artery disease     Fatigue 10/2016    H/O cardiovascular stress test 2/21/2012    mild ischemia in the basal inferior and mid inferior regions ef is 47    H/O chest x-ray 10/20/2016    Right pleural effusion resolved. H/O Doppler ultrasound 10/2016    CAROTID-[place]. arteries patent w/less than 50% stenosis in the internal carotid arteries. H/O echocardiogram 2/17/15    Aortic sclerosis without stenosis, normal LV size and wall motion w/low normal systolic function, LA dilatation, RV dilatation, mild pulmonary HTN, EF 50-55%.     History of cardiac cath 7/2/1996    LV uniform LV contractility RCa dominatn 50-60 prox stenosis  LM patent  LAD mild mid plaquing diag has 70 ostial narrowing ramus minimal plaquing cx normal    History of PTCA 08/15/2011    prox lad bare metal stent 8/2011    Hyperlipidemia     Hypertension     Obesity     Obesity, Class II, BMI 35-39.9, with

## 2023-11-16 NOTE — CARE COORDINATION
Reviewed chart and spoke with pt's DIL because of pt's confusion. Pt normally lives alone but recently sent to [facility] for SNU/short term rehab then transition into AL apt. Plan will be to return to  SNU once medically ready,. Will need precert to return to [facility]. PS to Dr Marilyn James, requesting PT/Ot evals. Also spoke with Annita Melendez and pt may return to  once prior auth completed. Zoryve Counseling:  I discussed with the patient that Zoryve is not for use in the eyes, mouth or vagina. The most commonly reported side effects include diarrhea, headache, insomnia, application site pain, upper respiratory tract infections, and urinary tract infections.  All of the patient's questions and concerns were addressed.

## 2023-11-16 NOTE — ED PROVIDER NOTES
multiple recent hospitalizations, he is covered with broad-spectrum antibiotics for possible pneumonia. History from : EMS  I also spoke with daughter on the phone who was able to provide some additional information regarding her father's baseline and recent history. Limitations to history : Altered Mental Status    Patient was given the following medications:  Medications   0.9 % sodium chloride infusion ( IntraVENous New Bag 11/15/23 2024)   potassium chloride 10 mEq/100 mL IVPB (Peripheral Line) (10 mEq IntraVENous New Bag 11/15/23 2200)   ceFEPIme (MAXIPIME) 2,000 mg in sodium chloride 0.9 % 100 mL IVPB (mini-bag) (0 mg IntraVENous Stopped 11/15/23 2116)   linezolid (ZYVOX) IVPB 600 mg (0 mg IntraVENous Stopped 11/15/23 2223)       EKG (if obtained): (All EKG's are interpreted by myself in the absence of a cardiologist) A-fib rate controlled. Left axis with poor R wave progression. Nonspecific T wave changes without ST elevation or depression, similar to prior tracing. Chronic conditions affecting care: A-fib, COPD, HLD, HTN    Records Reviewed : Inpatient Notes patient's recent discharge summary is reviewed. It is noted that patient had a Santiago catheter placed yesterday but this has apparently been discontinued. Disposition Considerations (tests considered but not done, Shared Decision Making, Pt Expectation of Test or Tx.):     Per discussion with patient's daughter, patient's mental status is significantly altered from baseline. Given the acute delirium, will plan to admit for close monitoring. Patient's daughter is aware and agreeable to proceed. I have discussed with Dr. Bravo Curry. I am the Primary Clinician of Record. Final Impression:  1. Acute delirium    2. Multifocal pneumonia      DISPOSITION Admitted 11/15/2023 11:04:31 PM      Patient referred to: No follow-up provider specified.   Discharge medications:  New Prescriptions    No medications on file     (Please note that

## 2023-11-16 NOTE — H&P
based adjustment of the mA/kV was utilized to reduce the radiation dose to as low as reasonably achievable. COMPARISON: August 16, 2023. HISTORY: ORDERING SYSTEM PROVIDED HISTORY: ams TECHNOLOGIST PROVIDED HISTORY: Has a \"code stroke\" or \"stroke alert\" been called? ->No Reason for exam:->ams Decision Support Exception - unselect if not a suspected or confirmed emergency medical condition->Emergency Medical Condition (MA) Reason for Exam: ams FINDINGS: BRAIN/VENTRICLES: There is no acute intracranial hemorrhage, mass effect or midline shift. No abnormal extra-axial fluid collection. The gray-white differentiation is maintained without evidence of an acute infarct. There is prominence of the ventricles and sulci due to global parenchymal volume loss. There are nonspecific areas of hypoattenuation within the periventricular and subcortical white matter, which likely represent chronic microvascular ischemic change. ORBITS: The visualized portion of the orbits demonstrate no acute abnormality. SINUSES: The visualized paranasal sinuses and mastoid air cells demonstrate no acute abnormality. SOFT TISSUES/SKULL: No acute abnormality of the visualized skull or soft tissues. No acute intracranial abnormality. XR CHEST PORTABLE    Result Date: 11/15/2023  EXAMINATION: ONE XRAY VIEW OF THE CHEST 11/15/2023 6:33 pm COMPARISON: 11/11/2023 HISTORY: ORDERING SYSTEM PROVIDED HISTORY: ams TECHNOLOGIST PROVIDED HISTORY: Reason for exam:->ams Reason for Exam: AMS FINDINGS: There are bilateral basal infiltrates, suspicious for multifocal pneumonia or aspiration. Lungs otherwise clear. There is mild cardiomegaly. Port is in good position. Bilateral basal infiltrates, suspicious for multifocal pneumonia or aspiration. XR CHEST PORTABLE    Result Date: 11/11/2023  EXAMINATION: ONE XRAY VIEW OF THE CHEST 11/11/2023 2:14 pm COMPARISON: Chest 11/09/2023 HISTORY: edema FINDINGS: The cardiac silhouette is enlarged.

## 2023-11-17 LAB
CULTURE: NORMAL
EKG DIAGNOSIS: NORMAL
EKG P-R INTERVAL: 84 MS
EKG Q-T INTERVAL: 418 MS
EKG QRS DURATION: 112 MS
EKG QTC CALCULATION (BAZETT): 410 MS
EKG R AXIS: -87 DEGREES
EKG T AXIS: 132 DEGREES
EKG VENTRICULAR RATE: 58 BPM
Lab: NORMAL
SPECIMEN: NORMAL

## 2023-11-17 PROCEDURE — 93010 ELECTROCARDIOGRAM REPORT: CPT | Performed by: INTERNAL MEDICINE

## 2023-11-17 PROCEDURE — 97116 GAIT TRAINING THERAPY: CPT

## 2023-11-17 PROCEDURE — 6360000002 HC RX W HCPCS: Performed by: INTERNAL MEDICINE

## 2023-11-17 PROCEDURE — 6370000000 HC RX 637 (ALT 250 FOR IP): Performed by: INTERNAL MEDICINE

## 2023-11-17 PROCEDURE — 97530 THERAPEUTIC ACTIVITIES: CPT

## 2023-11-17 PROCEDURE — 97535 SELF CARE MNGMENT TRAINING: CPT

## 2023-11-17 PROCEDURE — 97166 OT EVAL MOD COMPLEX 45 MIN: CPT

## 2023-11-17 PROCEDURE — 97163 PT EVAL HIGH COMPLEX 45 MIN: CPT

## 2023-11-17 PROCEDURE — 2580000003 HC RX 258: Performed by: EMERGENCY MEDICINE

## 2023-11-17 PROCEDURE — 1200000000 HC SEMI PRIVATE

## 2023-11-17 RX ADMIN — LINEZOLID 600 MG: 600 INJECTION, SOLUTION INTRAVENOUS at 21:24

## 2023-11-17 RX ADMIN — LINEZOLID 600 MG: 600 INJECTION, SOLUTION INTRAVENOUS at 11:34

## 2023-11-17 RX ADMIN — POTASSIUM CHLORIDE 20 MEQ: 1500 TABLET, EXTENDED RELEASE ORAL at 11:37

## 2023-11-17 RX ADMIN — SODIUM CHLORIDE: 9 INJECTION, SOLUTION INTRAVENOUS at 23:43

## 2023-11-17 RX ADMIN — SODIUM CHLORIDE: 9 INJECTION, SOLUTION INTRAVENOUS at 11:11

## 2023-11-17 RX ADMIN — FERROUS GLUCONATE TAB 324 MG (37.5 MG ELEMENTAL IRON) 324 MG: 324 (37.5 FE) TAB at 11:41

## 2023-11-17 RX ADMIN — DIGOXIN 125 MCG: 125 TABLET ORAL at 11:37

## 2023-11-17 RX ADMIN — MICONAZOLE NITRATE: 2 POWDER TOPICAL at 11:11

## 2023-11-17 RX ADMIN — CYANOCOBALAMIN TAB 1000 MCG 1000 MCG: 1000 TAB at 11:36

## 2023-11-17 RX ADMIN — METOPROLOL SUCCINATE 12.5 MG: 25 TABLET, EXTENDED RELEASE ORAL at 21:21

## 2023-11-17 RX ADMIN — Medication 2000 UNITS: at 11:36

## 2023-11-17 RX ADMIN — Medication 400 MG: at 11:51

## 2023-11-17 RX ADMIN — PRAVASTATIN SODIUM 80 MG: 40 TABLET ORAL at 21:21

## 2023-11-17 RX ADMIN — Medication 3 MG: at 21:21

## 2023-11-17 RX ADMIN — FERROUS GLUCONATE TAB 324 MG (37.5 MG ELEMENTAL IRON) 324 MG: 324 (37.5 FE) TAB at 21:23

## 2023-11-17 NOTE — CARE COORDINATION
Reviewed chart, discussed in IDR ,pt medically ready for discharge. Awaiting therapy notes to start prior auht . Pt has sitter in room, spoke with Anyi the Charge nurse and they will work on removing sitter. Updated Shon Brown and pt may comeback this weekend if sitter and Prior auth completed.

## 2023-11-18 PROCEDURE — 94664 DEMO&/EVAL PT USE INHALER: CPT

## 2023-11-18 PROCEDURE — 6360000002 HC RX W HCPCS: Performed by: INTERNAL MEDICINE

## 2023-11-18 PROCEDURE — 6370000000 HC RX 637 (ALT 250 FOR IP): Performed by: INTERNAL MEDICINE

## 2023-11-18 PROCEDURE — 94640 AIRWAY INHALATION TREATMENT: CPT

## 2023-11-18 PROCEDURE — 94761 N-INVAS EAR/PLS OXIMETRY MLT: CPT

## 2023-11-18 PROCEDURE — 2580000003 HC RX 258: Performed by: INTERNAL MEDICINE

## 2023-11-18 PROCEDURE — 6360000002 HC RX W HCPCS: Performed by: STUDENT IN AN ORGANIZED HEALTH CARE EDUCATION/TRAINING PROGRAM

## 2023-11-18 PROCEDURE — 1200000000 HC SEMI PRIVATE

## 2023-11-18 PROCEDURE — 97530 THERAPEUTIC ACTIVITIES: CPT

## 2023-11-18 RX ADMIN — CYANOCOBALAMIN TAB 1000 MCG 1000 MCG: 1000 TAB at 09:09

## 2023-11-18 RX ADMIN — LEVOFLOXACIN 500 MG: 5 INJECTION, SOLUTION INTRAVENOUS at 15:22

## 2023-11-18 RX ADMIN — FERROUS GLUCONATE TAB 324 MG (37.5 MG ELEMENTAL IRON) 324 MG: 324 (37.5 FE) TAB at 09:11

## 2023-11-18 RX ADMIN — PRAVASTATIN SODIUM 80 MG: 40 TABLET ORAL at 20:47

## 2023-11-18 RX ADMIN — Medication 3 MG: at 20:47

## 2023-11-18 RX ADMIN — Medication 2000 UNITS: at 09:10

## 2023-11-18 RX ADMIN — FERROUS GLUCONATE TAB 324 MG (37.5 MG ELEMENTAL IRON) 324 MG: 324 (37.5 FE) TAB at 20:48

## 2023-11-18 RX ADMIN — SODIUM CHLORIDE, PRESERVATIVE FREE 10 ML: 5 INJECTION INTRAVENOUS at 09:11

## 2023-11-18 RX ADMIN — SODIUM CHLORIDE, PRESERVATIVE FREE 10 ML: 5 INJECTION INTRAVENOUS at 20:47

## 2023-11-18 RX ADMIN — POTASSIUM CHLORIDE 20 MEQ: 1500 TABLET, EXTENDED RELEASE ORAL at 09:09

## 2023-11-18 RX ADMIN — DIGOXIN 125 MCG: 125 TABLET ORAL at 09:10

## 2023-11-18 RX ADMIN — PANTOPRAZOLE SODIUM 40 MG: 40 TABLET, DELAYED RELEASE ORAL at 06:17

## 2023-11-18 RX ADMIN — MICONAZOLE NITRATE: 2 POWDER TOPICAL at 22:39

## 2023-11-18 RX ADMIN — TIOTROPIUM BROMIDE INHALATION SPRAY 2 PUFF: 3.12 SPRAY, METERED RESPIRATORY (INHALATION) at 07:40

## 2023-11-18 RX ADMIN — Medication 400 MG: at 09:09

## 2023-11-18 RX ADMIN — LINEZOLID 600 MG: 600 INJECTION, SOLUTION INTRAVENOUS at 09:18

## 2023-11-18 RX ADMIN — MICONAZOLE NITRATE: 2 POWDER TOPICAL at 15:23

## 2023-11-18 RX ADMIN — LINEZOLID 600 MG: 600 INJECTION, SOLUTION INTRAVENOUS at 20:53

## 2023-11-19 PROCEDURE — 97110 THERAPEUTIC EXERCISES: CPT

## 2023-11-19 PROCEDURE — 97530 THERAPEUTIC ACTIVITIES: CPT

## 2023-11-19 PROCEDURE — 2580000003 HC RX 258: Performed by: EMERGENCY MEDICINE

## 2023-11-19 PROCEDURE — 6370000000 HC RX 637 (ALT 250 FOR IP): Performed by: INTERNAL MEDICINE

## 2023-11-19 PROCEDURE — 1200000000 HC SEMI PRIVATE

## 2023-11-19 PROCEDURE — 94761 N-INVAS EAR/PLS OXIMETRY MLT: CPT

## 2023-11-19 PROCEDURE — 94640 AIRWAY INHALATION TREATMENT: CPT

## 2023-11-19 PROCEDURE — 6360000002 HC RX W HCPCS: Performed by: INTERNAL MEDICINE

## 2023-11-19 PROCEDURE — 2580000003 HC RX 258: Performed by: INTERNAL MEDICINE

## 2023-11-19 RX ADMIN — SODIUM CHLORIDE, PRESERVATIVE FREE 10 ML: 5 INJECTION INTRAVENOUS at 13:11

## 2023-11-19 RX ADMIN — Medication 3 MG: at 20:30

## 2023-11-19 RX ADMIN — LINEZOLID 600 MG: 600 INJECTION, SOLUTION INTRAVENOUS at 08:34

## 2023-11-19 RX ADMIN — MICONAZOLE NITRATE: 2 POWDER TOPICAL at 08:10

## 2023-11-19 RX ADMIN — DIGOXIN 125 MCG: 125 TABLET ORAL at 08:30

## 2023-11-19 RX ADMIN — PRAVASTATIN SODIUM 80 MG: 40 TABLET ORAL at 20:30

## 2023-11-19 RX ADMIN — FERROUS GLUCONATE TAB 324 MG (37.5 MG ELEMENTAL IRON) 324 MG: 324 (37.5 FE) TAB at 08:31

## 2023-11-19 RX ADMIN — SODIUM CHLORIDE 990 ML: 9 INJECTION, SOLUTION INTRAVENOUS at 13:12

## 2023-11-19 RX ADMIN — LINEZOLID 600 MG: 600 INJECTION, SOLUTION INTRAVENOUS at 20:37

## 2023-11-19 RX ADMIN — SODIUM CHLORIDE, PRESERVATIVE FREE 10 ML: 5 INJECTION INTRAVENOUS at 08:31

## 2023-11-19 RX ADMIN — CYANOCOBALAMIN TAB 1000 MCG 1000 MCG: 1000 TAB at 08:31

## 2023-11-19 RX ADMIN — POTASSIUM CHLORIDE 20 MEQ: 1500 TABLET, EXTENDED RELEASE ORAL at 08:30

## 2023-11-19 RX ADMIN — SODIUM CHLORIDE: 9 INJECTION, SOLUTION INTRAVENOUS at 00:40

## 2023-11-19 RX ADMIN — MICONAZOLE NITRATE: 2 POWDER TOPICAL at 20:34

## 2023-11-19 RX ADMIN — Medication 2000 UNITS: at 08:30

## 2023-11-19 RX ADMIN — FERROUS GLUCONATE TAB 324 MG (37.5 MG ELEMENTAL IRON) 324 MG: 324 (37.5 FE) TAB at 20:31

## 2023-11-19 RX ADMIN — TIOTROPIUM BROMIDE INHALATION SPRAY 2 PUFF: 3.12 SPRAY, METERED RESPIRATORY (INHALATION) at 08:27

## 2023-11-19 RX ADMIN — PANTOPRAZOLE SODIUM 40 MG: 40 TABLET, DELAYED RELEASE ORAL at 05:23

## 2023-11-19 RX ADMIN — Medication 400 MG: at 08:30

## 2023-11-19 RX ADMIN — METOPROLOL SUCCINATE 12.5 MG: 25 TABLET, EXTENDED RELEASE ORAL at 20:32

## 2023-11-19 ASSESSMENT — PAIN SCALES - GENERAL: PAINLEVEL_OUTOF10: 0

## 2023-11-19 NOTE — PLAN OF CARE
Problem: Discharge Planning  Goal: Discharge to home or other facility with appropriate resources  Outcome: Progressing     Problem: Safety - Adult  Goal: Free from fall injury  Outcome: Progressing     Problem: Nutrition Deficit:  Goal: Optimize nutritional status  Outcome: Progressing     Problem: Skin/Tissue Integrity  Goal: Absence of new skin breakdown  Description: 1. Monitor for areas of redness and/or skin breakdown  2. Assess vascular access sites hourly  3. Every 4-6 hours minimum:  Change oxygen saturation probe site  4. Every 4-6 hours:  If on nasal continuous positive airway pressure, respiratory therapy assess nares and determine need for appliance change or resting period.   Outcome: Progressing

## 2023-11-20 LAB
ALBUMIN SERPL-MCNC: 2.2 GM/DL (ref 3.4–5)
ALP BLD-CCNC: 106 IU/L (ref 40–129)
ALT SERPL-CCNC: 13 U/L (ref 10–40)
ANION GAP SERPL CALCULATED.3IONS-SCNC: 7 MMOL/L (ref 4–16)
ANISOCYTOSIS: ABNORMAL
AST SERPL-CCNC: 32 IU/L (ref 15–37)
BANDED NEUTROPHILS ABSOLUTE COUNT: 0.36 K/CU MM
BANDED NEUTROPHILS RELATIVE PERCENT: 7 % (ref 5–11)
BILIRUB SERPL-MCNC: 0.2 MG/DL (ref 0–1)
BUN SERPL-MCNC: 11 MG/DL (ref 6–23)
CALCIUM SERPL-MCNC: 7.6 MG/DL (ref 8.3–10.6)
CHLORIDE BLD-SCNC: 109 MMOL/L (ref 99–110)
CO2: 23 MMOL/L (ref 21–32)
CREAT SERPL-MCNC: 1.1 MG/DL (ref 0.9–1.3)
CULTURE: NORMAL
CULTURE: NORMAL
DIFFERENTIAL TYPE: ABNORMAL
GFR SERPL CREATININE-BSD FRML MDRD: >60 ML/MIN/1.73M2
GLUCOSE BLD-MCNC: 81 MG/DL (ref 70–99)
GLUCOSE SERPL-MCNC: 88 MG/DL (ref 70–99)
HCT VFR BLD CALC: 33.4 % (ref 42–52)
HEMOGLOBIN: 9.7 GM/DL (ref 13.5–18)
LYMPHOCYTES ABSOLUTE: 0.9 K/CU MM
LYMPHOCYTES RELATIVE PERCENT: 18 % (ref 24–44)
Lab: NORMAL
Lab: NORMAL
MAGNESIUM: 1.8 MG/DL (ref 1.8–2.4)
MCH RBC QN AUTO: 31.1 PG (ref 27–31)
MCHC RBC AUTO-ENTMCNC: 29 % (ref 32–36)
MCV RBC AUTO: 107.1 FL (ref 78–100)
MONOCYTES ABSOLUTE: 0.3 K/CU MM
MONOCYTES RELATIVE PERCENT: 6 % (ref 0–4)
PDW BLD-RTO: 21.2 % (ref 11.7–14.9)
PHOSPHORUS: 2.2 MG/DL (ref 2.5–4.9)
PLATELET # BLD: 89 K/CU MM (ref 140–440)
POTASSIUM SERPL-SCNC: 4 MMOL/L (ref 3.5–5.1)
RBC # BLD: 3.12 M/CU MM (ref 4.6–6.2)
SEGMENTED NEUTROPHILS ABSOLUTE COUNT: 3.6 K/CU MM
SEGMENTED NEUTROPHILS RELATIVE PERCENT: 69 % (ref 36–66)
SODIUM BLD-SCNC: 139 MMOL/L (ref 135–145)
SPECIMEN: NORMAL
SPECIMEN: NORMAL
TARGET CELLS: ABNORMAL
TEAR DROP CELLS: ABNORMAL
TOTAL PROTEIN: 5.1 GM/DL (ref 6.4–8.2)
WBC # BLD: 5.2 K/CU MM (ref 4–10.5)

## 2023-11-20 PROCEDURE — 97535 SELF CARE MNGMENT TRAINING: CPT

## 2023-11-20 PROCEDURE — 6370000000 HC RX 637 (ALT 250 FOR IP): Performed by: INTERNAL MEDICINE

## 2023-11-20 PROCEDURE — 6360000002 HC RX W HCPCS: Performed by: STUDENT IN AN ORGANIZED HEALTH CARE EDUCATION/TRAINING PROGRAM

## 2023-11-20 PROCEDURE — 36415 COLL VENOUS BLD VENIPUNCTURE: CPT

## 2023-11-20 PROCEDURE — 1200000000 HC SEMI PRIVATE

## 2023-11-20 PROCEDURE — 51798 US URINE CAPACITY MEASURE: CPT

## 2023-11-20 PROCEDURE — 85007 BL SMEAR W/DIFF WBC COUNT: CPT

## 2023-11-20 PROCEDURE — 85027 COMPLETE CBC AUTOMATED: CPT

## 2023-11-20 PROCEDURE — 84100 ASSAY OF PHOSPHORUS: CPT

## 2023-11-20 PROCEDURE — 97530 THERAPEUTIC ACTIVITIES: CPT

## 2023-11-20 PROCEDURE — 6360000002 HC RX W HCPCS: Performed by: INTERNAL MEDICINE

## 2023-11-20 PROCEDURE — 80053 COMPREHEN METABOLIC PANEL: CPT

## 2023-11-20 PROCEDURE — 82962 GLUCOSE BLOOD TEST: CPT

## 2023-11-20 PROCEDURE — 2580000003 HC RX 258: Performed by: INTERNAL MEDICINE

## 2023-11-20 PROCEDURE — 83735 ASSAY OF MAGNESIUM: CPT

## 2023-11-20 PROCEDURE — 80048 BASIC METABOLIC PNL TOTAL CA: CPT

## 2023-11-20 RX ORDER — FUROSEMIDE 10 MG/ML
60 INJECTION INTRAMUSCULAR; INTRAVENOUS 2 TIMES DAILY
Status: DISCONTINUED | OUTPATIENT
Start: 2023-11-20 | End: 2023-11-23 | Stop reason: HOSPADM

## 2023-11-20 RX ADMIN — Medication 3 MG: at 20:40

## 2023-11-20 RX ADMIN — LINEZOLID 600 MG: 600 INJECTION, SOLUTION INTRAVENOUS at 20:45

## 2023-11-20 RX ADMIN — LINEZOLID 600 MG: 600 INJECTION, SOLUTION INTRAVENOUS at 08:47

## 2023-11-20 RX ADMIN — POTASSIUM CHLORIDE 20 MEQ: 1500 TABLET, EXTENDED RELEASE ORAL at 08:38

## 2023-11-20 RX ADMIN — FERROUS GLUCONATE TAB 324 MG (37.5 MG ELEMENTAL IRON) 324 MG: 324 (37.5 FE) TAB at 08:39

## 2023-11-20 RX ADMIN — MICONAZOLE NITRATE: 2 POWDER TOPICAL at 20:42

## 2023-11-20 RX ADMIN — PRAVASTATIN SODIUM 80 MG: 40 TABLET ORAL at 20:40

## 2023-11-20 RX ADMIN — FUROSEMIDE 60 MG: 10 INJECTION, SOLUTION INTRAMUSCULAR; INTRAVENOUS at 10:30

## 2023-11-20 RX ADMIN — PANTOPRAZOLE SODIUM 40 MG: 40 TABLET, DELAYED RELEASE ORAL at 05:07

## 2023-11-20 RX ADMIN — FERROUS GLUCONATE TAB 324 MG (37.5 MG ELEMENTAL IRON) 324 MG: 324 (37.5 FE) TAB at 20:40

## 2023-11-20 RX ADMIN — MICONAZOLE NITRATE: 2 POWDER TOPICAL at 08:28

## 2023-11-20 RX ADMIN — SODIUM CHLORIDE, PRESERVATIVE FREE 10 ML: 5 INJECTION INTRAVENOUS at 20:42

## 2023-11-20 RX ADMIN — DIGOXIN 125 MCG: 125 TABLET ORAL at 08:38

## 2023-11-20 RX ADMIN — SODIUM CHLORIDE, PRESERVATIVE FREE 10 ML: 5 INJECTION INTRAVENOUS at 08:39

## 2023-11-20 RX ADMIN — FUROSEMIDE 60 MG: 10 INJECTION, SOLUTION INTRAMUSCULAR; INTRAVENOUS at 17:51

## 2023-11-20 RX ADMIN — Medication 2000 UNITS: at 08:38

## 2023-11-20 RX ADMIN — METOPROLOL SUCCINATE 12.5 MG: 25 TABLET, EXTENDED RELEASE ORAL at 20:40

## 2023-11-20 RX ADMIN — CYANOCOBALAMIN TAB 1000 MCG 1000 MCG: 1000 TAB at 08:38

## 2023-11-20 RX ADMIN — Medication 400 MG: at 08:38

## 2023-11-20 ASSESSMENT — PAIN SCALES - GENERAL: PAINLEVEL_OUTOF10: 0

## 2023-11-20 NOTE — CARE COORDINATION
CM reviewed notes. Pt precert will be started tomorrow for pt to return to [facility]. No pasr needed.  [id number] [facility]

## 2023-11-20 NOTE — CARE COORDINATION
Precert initiated via [email or web address]: Pending at this time for admit date of 11/21/2023  [id number]  [facility]  11/20/2023 11/21/2023  Pending

## 2023-11-20 NOTE — CARE COORDINATION
Received call from pt's ALDEN Taylor asking about Lasix orders for his entire hospitalization. PS to Dr Nakul Jensen to call her.

## 2023-11-21 PROBLEM — E43 SEVERE MALNUTRITION (HCC): Chronic | Status: ACTIVE | Noted: 2023-11-21

## 2023-11-21 LAB
ANION GAP SERPL CALCULATED.3IONS-SCNC: 6 MMOL/L (ref 4–16)
BUN SERPL-MCNC: 13 MG/DL (ref 6–23)
CALCIUM SERPL-MCNC: 8.1 MG/DL (ref 8.3–10.6)
CHLORIDE BLD-SCNC: 109 MMOL/L (ref 99–110)
CO2: 25 MMOL/L (ref 21–32)
CREAT SERPL-MCNC: 1.4 MG/DL (ref 0.9–1.3)
GFR SERPL CREATININE-BSD FRML MDRD: 50 ML/MIN/1.73M2
GLUCOSE SERPL-MCNC: 99 MG/DL (ref 70–99)
HCT VFR BLD CALC: 30.6 % (ref 42–52)
HEMOGLOBIN: 9.7 GM/DL (ref 13.5–18)
MAGNESIUM: 1.5 MG/DL (ref 1.8–2.4)
MCH RBC QN AUTO: 30.5 PG (ref 27–31)
MCHC RBC AUTO-ENTMCNC: 31.7 % (ref 32–36)
MCV RBC AUTO: 96.2 FL (ref 78–100)
PDW BLD-RTO: 20.6 % (ref 11.7–14.9)
PHOSPHORUS: 2.2 MG/DL (ref 2.5–4.9)
PLATELET # BLD: 99 K/CU MM (ref 140–440)
POTASSIUM SERPL-SCNC: 3.9 MMOL/L (ref 3.5–5.1)
RBC # BLD: 3.18 M/CU MM (ref 4.6–6.2)
SODIUM BLD-SCNC: 140 MMOL/L (ref 135–145)
WBC # BLD: 6.1 K/CU MM (ref 4–10.5)

## 2023-11-21 PROCEDURE — 6370000000 HC RX 637 (ALT 250 FOR IP): Performed by: INTERNAL MEDICINE

## 2023-11-21 PROCEDURE — 36415 COLL VENOUS BLD VENIPUNCTURE: CPT

## 2023-11-21 PROCEDURE — 6360000002 HC RX W HCPCS: Performed by: INTERNAL MEDICINE

## 2023-11-21 PROCEDURE — 94640 AIRWAY INHALATION TREATMENT: CPT

## 2023-11-21 PROCEDURE — 1200000000 HC SEMI PRIVATE

## 2023-11-21 PROCEDURE — 97535 SELF CARE MNGMENT TRAINING: CPT

## 2023-11-21 PROCEDURE — 2580000003 HC RX 258: Performed by: INTERNAL MEDICINE

## 2023-11-21 PROCEDURE — 84100 ASSAY OF PHOSPHORUS: CPT

## 2023-11-21 PROCEDURE — 80048 BASIC METABOLIC PNL TOTAL CA: CPT

## 2023-11-21 PROCEDURE — 97530 THERAPEUTIC ACTIVITIES: CPT

## 2023-11-21 PROCEDURE — 94761 N-INVAS EAR/PLS OXIMETRY MLT: CPT

## 2023-11-21 PROCEDURE — 85027 COMPLETE CBC AUTOMATED: CPT

## 2023-11-21 PROCEDURE — 83735 ASSAY OF MAGNESIUM: CPT

## 2023-11-21 PROCEDURE — 6360000002 HC RX W HCPCS: Performed by: STUDENT IN AN ORGANIZED HEALTH CARE EDUCATION/TRAINING PROGRAM

## 2023-11-21 PROCEDURE — 94664 DEMO&/EVAL PT USE INHALER: CPT

## 2023-11-21 PROCEDURE — 89220 SPUTUM SPECIMEN COLLECTION: CPT

## 2023-11-21 RX ORDER — MAGNESIUM SULFATE IN WATER 40 MG/ML
2000 INJECTION, SOLUTION INTRAVENOUS ONCE
Status: COMPLETED | OUTPATIENT
Start: 2023-11-21 | End: 2023-11-21

## 2023-11-21 RX ADMIN — Medication 2000 UNITS: at 08:41

## 2023-11-21 RX ADMIN — LINEZOLID 600 MG: 600 INJECTION, SOLUTION INTRAVENOUS at 21:39

## 2023-11-21 RX ADMIN — TIOTROPIUM BROMIDE INHALATION SPRAY 2 PUFF: 3.12 SPRAY, METERED RESPIRATORY (INHALATION) at 08:27

## 2023-11-21 RX ADMIN — FUROSEMIDE 60 MG: 10 INJECTION, SOLUTION INTRAMUSCULAR; INTRAVENOUS at 17:26

## 2023-11-21 RX ADMIN — PRAVASTATIN SODIUM 80 MG: 40 TABLET ORAL at 21:39

## 2023-11-21 RX ADMIN — MICONAZOLE NITRATE: 2 POWDER TOPICAL at 08:41

## 2023-11-21 RX ADMIN — DIGOXIN 125 MCG: 125 TABLET ORAL at 08:46

## 2023-11-21 RX ADMIN — SODIUM CHLORIDE: 9 INJECTION, SOLUTION INTRAVENOUS at 12:24

## 2023-11-21 RX ADMIN — PANTOPRAZOLE SODIUM 40 MG: 40 TABLET, DELAYED RELEASE ORAL at 05:17

## 2023-11-21 RX ADMIN — SODIUM CHLORIDE, PRESERVATIVE FREE 10 ML: 5 INJECTION INTRAVENOUS at 08:41

## 2023-11-21 RX ADMIN — POTASSIUM CHLORIDE 20 MEQ: 1500 TABLET, EXTENDED RELEASE ORAL at 08:41

## 2023-11-21 RX ADMIN — FERROUS GLUCONATE TAB 324 MG (37.5 MG ELEMENTAL IRON) 324 MG: 324 (37.5 FE) TAB at 21:39

## 2023-11-21 RX ADMIN — LINEZOLID 600 MG: 600 INJECTION, SOLUTION INTRAVENOUS at 08:46

## 2023-11-21 RX ADMIN — CYANOCOBALAMIN TAB 1000 MCG 1000 MCG: 1000 TAB at 08:41

## 2023-11-21 RX ADMIN — Medication 400 MG: at 08:41

## 2023-11-21 RX ADMIN — Medication 3 MG: at 21:39

## 2023-11-21 RX ADMIN — FUROSEMIDE 60 MG: 10 INJECTION, SOLUTION INTRAMUSCULAR; INTRAVENOUS at 08:51

## 2023-11-21 RX ADMIN — MAGNESIUM SULFATE HEPTAHYDRATE 2000 MG: 40 INJECTION, SOLUTION INTRAVENOUS at 12:25

## 2023-11-21 RX ADMIN — FERROUS GLUCONATE TAB 324 MG (37.5 MG ELEMENTAL IRON) 324 MG: 324 (37.5 FE) TAB at 08:41

## 2023-11-21 RX ADMIN — MICONAZOLE NITRATE: 2 POWDER TOPICAL at 21:40

## 2023-11-21 ASSESSMENT — PAIN SCALES - GENERAL: PAINLEVEL_OUTOF10: 0

## 2023-11-22 LAB
ANION GAP SERPL CALCULATED.3IONS-SCNC: 9 MMOL/L (ref 4–16)
BASOPHILS ABSOLUTE: 0 K/CU MM
BASOPHILS RELATIVE PERCENT: 0.2 % (ref 0–1)
BUN SERPL-MCNC: 14 MG/DL (ref 6–23)
CALCIUM SERPL-MCNC: 8.4 MG/DL (ref 8.3–10.6)
CHLORIDE BLD-SCNC: 101 MMOL/L (ref 99–110)
CO2: 26 MMOL/L (ref 21–32)
CREAT SERPL-MCNC: 1.4 MG/DL (ref 0.9–1.3)
DIFFERENTIAL TYPE: ABNORMAL
EOSINOPHILS ABSOLUTE: 0 K/CU MM
EOSINOPHILS RELATIVE PERCENT: 0.5 % (ref 0–3)
GFR SERPL CREATININE-BSD FRML MDRD: 50 ML/MIN/1.73M2
GLUCOSE BLD-MCNC: 147 MG/DL (ref 70–99)
GLUCOSE SERPL-MCNC: 121 MG/DL (ref 70–99)
HCT VFR BLD CALC: 30.9 % (ref 42–52)
HEMOGLOBIN: 9.6 GM/DL (ref 13.5–18)
IMMATURE NEUTROPHIL %: 0.9 % (ref 0–0.43)
LYMPHOCYTES ABSOLUTE: 0.8 K/CU MM
LYMPHOCYTES RELATIVE PERCENT: 14.1 % (ref 24–44)
MAGNESIUM: 1.7 MG/DL (ref 1.8–2.4)
MAGNESIUM: 1.7 MG/DL (ref 1.8–2.4)
MCH RBC QN AUTO: 30.6 PG (ref 27–31)
MCHC RBC AUTO-ENTMCNC: 31.1 % (ref 32–36)
MCV RBC AUTO: 98.4 FL (ref 78–100)
MONOCYTES ABSOLUTE: 0.2 K/CU MM
MONOCYTES RELATIVE PERCENT: 4.1 % (ref 0–4)
NUCLEATED RBC %: 0 %
PDW BLD-RTO: 20.7 % (ref 11.7–14.9)
PHOSPHORUS: 2.1 MG/DL (ref 2.5–4.9)
PLATELET # BLD: 83 K/CU MM (ref 140–440)
POTASSIUM SERPL-SCNC: 4 MMOL/L (ref 3.5–5.1)
PRO-BNP: 6459 PG/ML
RBC # BLD: 3.14 M/CU MM (ref 4.6–6.2)
SEGMENTED NEUTROPHILS ABSOLUTE COUNT: 4.7 K/CU MM
SEGMENTED NEUTROPHILS RELATIVE PERCENT: 80.2 % (ref 36–66)
SODIUM BLD-SCNC: 136 MMOL/L (ref 135–145)
TOTAL IMMATURE NEUTOROPHIL: 0.05 K/CU MM
TOTAL NUCLEATED RBC: 0 K/CU MM
WBC # BLD: 5.9 K/CU MM (ref 4–10.5)

## 2023-11-22 PROCEDURE — 6370000000 HC RX 637 (ALT 250 FOR IP): Performed by: INTERNAL MEDICINE

## 2023-11-22 PROCEDURE — 6360000002 HC RX W HCPCS: Performed by: STUDENT IN AN ORGANIZED HEALTH CARE EDUCATION/TRAINING PROGRAM

## 2023-11-22 PROCEDURE — 2580000003 HC RX 258: Performed by: INTERNAL MEDICINE

## 2023-11-22 PROCEDURE — 82962 GLUCOSE BLOOD TEST: CPT

## 2023-11-22 PROCEDURE — 94761 N-INVAS EAR/PLS OXIMETRY MLT: CPT

## 2023-11-22 PROCEDURE — 83735 ASSAY OF MAGNESIUM: CPT

## 2023-11-22 PROCEDURE — 36415 COLL VENOUS BLD VENIPUNCTURE: CPT

## 2023-11-22 PROCEDURE — 6360000002 HC RX W HCPCS: Performed by: INTERNAL MEDICINE

## 2023-11-22 PROCEDURE — 85025 COMPLETE CBC W/AUTO DIFF WBC: CPT

## 2023-11-22 PROCEDURE — 84100 ASSAY OF PHOSPHORUS: CPT

## 2023-11-22 PROCEDURE — 94640 AIRWAY INHALATION TREATMENT: CPT

## 2023-11-22 PROCEDURE — 85027 COMPLETE CBC AUTOMATED: CPT

## 2023-11-22 PROCEDURE — 83880 ASSAY OF NATRIURETIC PEPTIDE: CPT

## 2023-11-22 PROCEDURE — 1200000000 HC SEMI PRIVATE

## 2023-11-22 PROCEDURE — 80048 BASIC METABOLIC PNL TOTAL CA: CPT

## 2023-11-22 RX ORDER — MAGNESIUM SULFATE IN WATER 40 MG/ML
2000 INJECTION, SOLUTION INTRAVENOUS ONCE
Status: COMPLETED | OUTPATIENT
Start: 2023-11-22 | End: 2023-11-22

## 2023-11-22 RX ADMIN — SODIUM CHLORIDE, PRESERVATIVE FREE 10 ML: 5 INJECTION INTRAVENOUS at 09:19

## 2023-11-22 RX ADMIN — FERROUS GLUCONATE TAB 324 MG (37.5 MG ELEMENTAL IRON) 324 MG: 324 (37.5 FE) TAB at 20:19

## 2023-11-22 RX ADMIN — MICONAZOLE NITRATE: 2 POWDER TOPICAL at 09:18

## 2023-11-22 RX ADMIN — FERROUS GLUCONATE TAB 324 MG (37.5 MG ELEMENTAL IRON) 324 MG: 324 (37.5 FE) TAB at 09:22

## 2023-11-22 RX ADMIN — POTASSIUM CHLORIDE 20 MEQ: 1500 TABLET, EXTENDED RELEASE ORAL at 09:17

## 2023-11-22 RX ADMIN — MICONAZOLE NITRATE: 2 POWDER TOPICAL at 20:20

## 2023-11-22 RX ADMIN — DIGOXIN 125 MCG: 125 TABLET ORAL at 09:17

## 2023-11-22 RX ADMIN — Medication 400 MG: at 09:17

## 2023-11-22 RX ADMIN — PANTOPRAZOLE SODIUM 40 MG: 40 TABLET, DELAYED RELEASE ORAL at 05:55

## 2023-11-22 RX ADMIN — PRAVASTATIN SODIUM 80 MG: 40 TABLET ORAL at 20:19

## 2023-11-22 RX ADMIN — Medication 3 MG: at 20:19

## 2023-11-22 RX ADMIN — FUROSEMIDE 60 MG: 10 INJECTION, SOLUTION INTRAMUSCULAR; INTRAVENOUS at 18:01

## 2023-11-22 RX ADMIN — SODIUM CHLORIDE, PRESERVATIVE FREE 10 ML: 5 INJECTION INTRAVENOUS at 20:19

## 2023-11-22 RX ADMIN — FUROSEMIDE 60 MG: 10 INJECTION, SOLUTION INTRAMUSCULAR; INTRAVENOUS at 11:55

## 2023-11-22 RX ADMIN — MAGNESIUM SULFATE HEPTAHYDRATE 2000 MG: 40 INJECTION, SOLUTION INTRAVENOUS at 14:13

## 2023-11-22 RX ADMIN — LINEZOLID 600 MG: 600 INJECTION, SOLUTION INTRAVENOUS at 09:43

## 2023-11-22 RX ADMIN — TIOTROPIUM BROMIDE INHALATION SPRAY 2 PUFF: 3.12 SPRAY, METERED RESPIRATORY (INHALATION) at 07:27

## 2023-11-22 RX ADMIN — CYANOCOBALAMIN TAB 1000 MCG 1000 MCG: 1000 TAB at 09:17

## 2023-11-22 RX ADMIN — Medication 2000 UNITS: at 09:17

## 2023-11-22 NOTE — PLAN OF CARE
Problem: Discharge Planning  Goal: Discharge to home or other facility with appropriate resources  11/22/2023 1305 by Jack Car LPN  Outcome: Progressing  11/22/2023 0244 by Nelida Hooker RN  Outcome: Progressing     Problem: Safety - Adult  Goal: Free from fall injury  11/22/2023 1305 by Jack Car LPN  Outcome: Progressing  11/22/2023 0244 by Nelida Hooker RN  Outcome: Progressing     Problem: Nutrition Deficit:  Goal: Optimize nutritional status  11/22/2023 1305 by Jack Car LPN  Outcome: Progressing  11/22/2023 0244 by Nelida Hooker RN  Outcome: Progressing  Flowsheets (Taken 11/21/2023 1259 by Yvonne Guzman, RD, LD)  Nutrient intake appropriate for improving, restoring, or maintaining nutritional needs:   Assess nutritional status and recommend course of action   Monitor oral intake, labs, and treatment plans   Recommend appropriate diets, oral nutritional supplements, and vitamin/mineral supplements     Problem: Skin/Tissue Integrity  Goal: Absence of new skin breakdown  Description: 1. Monitor for areas of redness and/or skin breakdown  2. Assess vascular access sites hourly  3. Every 4-6 hours minimum:  Change oxygen saturation probe site  4. Every 4-6 hours:  If on nasal continuous positive airway pressure, respiratory therapy assess nares and determine need for appliance change or resting period.   11/22/2023 1305 by Jack Car LPN  Outcome: Progressing  11/22/2023 0244 by Nelida Hooker RN  Outcome: Progressing

## 2023-11-22 NOTE — CARE COORDINATION
Reviewed chart, discussed in IDR, plan is to [facility]. Prior auth completed but pt will not be ready till tomorrow. VM left for [facility]/admission for [facility]. [address] [facility] with [facility] and [facility] will be able to take pt tomorrow if medically ready.

## 2023-11-23 VITALS
RESPIRATION RATE: 16 BRPM | TEMPERATURE: 97.7 F | HEIGHT: 71 IN | BODY MASS INDEX: 34.41 KG/M2 | WEIGHT: 245.81 LBS | SYSTOLIC BLOOD PRESSURE: 109 MMHG | DIASTOLIC BLOOD PRESSURE: 49 MMHG | OXYGEN SATURATION: 98 % | HEART RATE: 58 BPM

## 2023-11-23 LAB
ALBUMIN SERPL-MCNC: 2.1 GM/DL (ref 3.4–5)
ALP BLD-CCNC: 99 IU/L (ref 40–129)
ALT SERPL-CCNC: 13 U/L (ref 10–40)
ANION GAP SERPL CALCULATED.3IONS-SCNC: 7 MMOL/L (ref 4–16)
ANISOCYTOSIS: ABNORMAL
AST SERPL-CCNC: 32 IU/L (ref 15–37)
BILIRUB SERPL-MCNC: 0.3 MG/DL (ref 0–1)
BUN SERPL-MCNC: 16 MG/DL (ref 6–23)
CALCIUM SERPL-MCNC: 8.3 MG/DL (ref 8.3–10.6)
CHLORIDE BLD-SCNC: 100 MMOL/L (ref 99–110)
CO2: 28 MMOL/L (ref 21–32)
CREAT SERPL-MCNC: 1.4 MG/DL (ref 0.9–1.3)
DIFFERENTIAL TYPE: ABNORMAL
EOSINOPHILS ABSOLUTE: 0.1 K/CU MM
EOSINOPHILS RELATIVE PERCENT: 1 % (ref 0–3)
GFR SERPL CREATININE-BSD FRML MDRD: 50 ML/MIN/1.73M2
GLUCOSE SERPL-MCNC: 122 MG/DL (ref 70–99)
HCT VFR BLD CALC: 30.6 % (ref 42–52)
HEMOGLOBIN: 9.6 GM/DL (ref 13.5–18)
LYMPHOCYTES ABSOLUTE: 0.8 K/CU MM
LYMPHOCYTES RELATIVE PERCENT: 16 % (ref 24–44)
MAGNESIUM: 1.8 MG/DL (ref 1.8–2.4)
MCH RBC QN AUTO: 30.1 PG (ref 27–31)
MCHC RBC AUTO-ENTMCNC: 31.4 % (ref 32–36)
MCV RBC AUTO: 95.9 FL (ref 78–100)
MONOCYTES ABSOLUTE: 0.2 K/CU MM
MONOCYTES RELATIVE PERCENT: 3 % (ref 0–4)
PDW BLD-RTO: 20 % (ref 11.7–14.9)
PLATELET # BLD: 78 K/CU MM (ref 140–440)
PLT MORPHOLOGY: ABNORMAL
POTASSIUM SERPL-SCNC: 4.2 MMOL/L (ref 3.5–5.1)
PRO-BNP: 6414 PG/ML
RBC # BLD: 3.19 M/CU MM (ref 4.6–6.2)
SEGMENTED NEUTROPHILS ABSOLUTE COUNT: 4 K/CU MM
SEGMENTED NEUTROPHILS RELATIVE PERCENT: 80 % (ref 36–66)
SODIUM BLD-SCNC: 135 MMOL/L (ref 135–145)
TARGET CELLS: ABNORMAL
TOTAL PROTEIN: 5.2 GM/DL (ref 6.4–8.2)
WBC # BLD: 5.1 K/CU MM (ref 4–10.5)

## 2023-11-23 PROCEDURE — 2580000003 HC RX 258: Performed by: INTERNAL MEDICINE

## 2023-11-23 PROCEDURE — 6370000000 HC RX 637 (ALT 250 FOR IP): Performed by: INTERNAL MEDICINE

## 2023-11-23 PROCEDURE — 83735 ASSAY OF MAGNESIUM: CPT

## 2023-11-23 PROCEDURE — 85007 BL SMEAR W/DIFF WBC COUNT: CPT

## 2023-11-23 PROCEDURE — 80053 COMPREHEN METABOLIC PANEL: CPT

## 2023-11-23 PROCEDURE — 83880 ASSAY OF NATRIURETIC PEPTIDE: CPT

## 2023-11-23 PROCEDURE — 85027 COMPLETE CBC AUTOMATED: CPT

## 2023-11-23 PROCEDURE — 36415 COLL VENOUS BLD VENIPUNCTURE: CPT

## 2023-11-23 RX ADMIN — PANTOPRAZOLE SODIUM 40 MG: 40 TABLET, DELAYED RELEASE ORAL at 05:17

## 2023-11-23 RX ADMIN — SODIUM CHLORIDE, PRESERVATIVE FREE 10 ML: 5 INJECTION INTRAVENOUS at 10:01

## 2023-11-23 RX ADMIN — POTASSIUM CHLORIDE 20 MEQ: 1500 TABLET, EXTENDED RELEASE ORAL at 10:00

## 2023-11-23 RX ADMIN — Medication 400 MG: at 10:00

## 2023-11-23 RX ADMIN — TIOTROPIUM BROMIDE INHALATION SPRAY 2 PUFF: 3.12 SPRAY, METERED RESPIRATORY (INHALATION) at 16:16

## 2023-11-23 RX ADMIN — FERROUS GLUCONATE TAB 324 MG (37.5 MG ELEMENTAL IRON) 324 MG: 324 (37.5 FE) TAB at 10:01

## 2023-11-23 RX ADMIN — CYANOCOBALAMIN TAB 1000 MCG 1000 MCG: 1000 TAB at 09:59

## 2023-11-23 RX ADMIN — ACETAMINOPHEN 650 MG: 325 TABLET ORAL at 02:13

## 2023-11-23 RX ADMIN — MICONAZOLE NITRATE: 2 POWDER TOPICAL at 10:01

## 2023-11-23 RX ADMIN — DIGOXIN 125 MCG: 125 TABLET ORAL at 10:02

## 2023-11-23 RX ADMIN — Medication 2000 UNITS: at 10:00

## 2023-11-23 ASSESSMENT — PAIN SCALES - WONG BAKER: WONGBAKER_NUMERICALRESPONSE: 2

## 2023-11-23 ASSESSMENT — PAIN SCALES - GENERAL: PAINLEVEL_OUTOF10: 5

## 2023-11-23 ASSESSMENT — PAIN DESCRIPTION - ORIENTATION: ORIENTATION: MID

## 2023-11-23 ASSESSMENT — PAIN DESCRIPTION - LOCATION: LOCATION: BACK

## 2023-11-23 ASSESSMENT — PAIN DESCRIPTION - DESCRIPTORS: DESCRIPTORS: ACHING;DISCOMFORT

## 2023-11-23 NOTE — PLAN OF CARE
Problem: Discharge Planning  Goal: Discharge to home or other facility with appropriate resources  11/22/2023 2232 by Marilu Espinoza RN  Outcome: Progressing  11/22/2023 1305 by Katalina Woody LPN  Outcome: Progressing     Problem: Safety - Adult  Goal: Free from fall injury  11/22/2023 2232 by Marilu Espinoza RN  Outcome: Progressing  11/22/2023 1305 by Katalina Woody LPN  Outcome: Progressing     Problem: Nutrition Deficit:  Goal: Optimize nutritional status  11/22/2023 2232 by Marilu Espinoza RN  Outcome: Progressing  11/22/2023 1305 by Katalina Woody LPN  Outcome: Progressing     Problem: Skin/Tissue Integrity  Goal: Absence of new skin breakdown  Description: 1. Monitor for areas of redness and/or skin breakdown  2. Assess vascular access sites hourly  3. Every 4-6 hours minimum:  Change oxygen saturation probe site  4. Every 4-6 hours:  If on nasal continuous positive airway pressure, respiratory therapy assess nares and determine need for appliance change or resting period.   11/22/2023 2232 by Marilu Espinoza RN  Outcome: Progressing  11/22/2023 1305 by Katalina Woody LPN  Outcome: Progressing

## 2023-11-25 NOTE — PROGRESS NOTES
11/17/23 1028   Encounter Summary   Encounter Overview/Reason  Initial Encounter; Encounter   Service Provided For: Patient   Referral/Consult From: [address] [place] [address]   Last Encounter  93/41/32  (Mosque: Patient is either deeply asleep or confused. There was a sitter in the room. I prayed for him and administered the Sacrament of the Sick.  I left a message for his sons.)   Complexity of Encounter Low   Begin Time 1020   End Time  1030   Total Time Calculated 10 min   Spiritual/Emotional needs   Type Spiritual Support   Rituals, Rites and Sacraments   Type Sacrament of Sick   Assessment/Intervention/Outcome   Assessment Calm;Coping   Intervention Nurtured Hope;Prayer (assurance of)/[place]   Outcome Comfort;Peace   Plan and Referrals   Plan/Referrals Continue Support (comment)
4 Eyes Skin Assessment     NAME:  Raghu Gutierrez  YOB: 1940  MEDICAL RECORD NUMBER:  [id number]    The patient is being assessed for  Admission    I agree that at least one RN has performed a thorough Head to Toe Skin Assessment on the patient. ALL assessment sites listed below have been assessed. Areas assessed by both nurses:    Head, Face, Ears, Shoulders, Back, Chest, Arms, Elbows, Hands, Sacrum. Buttock, Coccyx, Ischium, Legs. Feet and Heels, and Under Medical Devices         Does the Patient have a Wound? Yes wound(s) were present on assessment.  LDA wound assessment was Initiated and completed by RN       Holland Prevention initiated by RN: Yes  Wound Care Orders initiated by RN: Yes    Pressure Injury (Stage 3,4, Unstageable, DTI, NWPT, and Complex wounds) if present, place Wound referral order by RN under : No    New Ostomies, if present place, Ostomy referral order under : No     Nurse 1 eSignature: Electronically signed by Josesito Medina RN on 11/16/23 at 7:04 AM EST    **SHARE this note so that the co-signing nurse can place an eSignature**    Nurse 2 eSignature: Electronically signed by Anette Turcios LPN on 28/25/59 at 3:77 AM EST
Dr. Keegan Allen MD,    Your patient is on a medication that requires a renal dose adjustment. Renal Function Assessment:    Date Body Weight IBW  Adjusted BW SCr  CrCl Dialysis status   11/16/2023    Ideal body weight: 75.3 kg (166 lb 0.1 oz)  Adjusted ideal body weight: 87.3 kg (192 lb 8.4 oz) Serum creatinine: 1.4 mg/dL (H) 11/16/23 0422  Estimated creatinine clearance: 49 mL/min (A) N/a       Pharmacy has renally dose-adjusted the following medication(s):    Date Original Order Renally Adjusted Order   11/16/2023 Levaquin 500mg q24H Levaquin 500mg q48H       These changes were made per protocol according to the [facility] Renal Function-Based Dose Adjustments Policy    *Please note this dose may need readjusted if your patient's renal function significantly improves. Please contact pharmacy with any questions regarding these changes.     Heather Marshall, [facility]  11/16/2023  10:43 AM
Occupational Therapy      Occupational Therapy Treatment Note    Name: Navdeep Garces MRN: [id number] :   1940   Date:  2023   Admission Date: 11/15/2023 Room:  [address] [place] [address]-A     Primary Problem:  Acute encephalopathy    Restrictions/Precautions:  Restrictions/Precautions  Restrictions/Precautions: General Precautions, Fall Risk       Contact Precautions,     Communication with other providers: Nursing handoff    Subjective:  Patient states:  \"I'm scared to get up\"  Pain:   Location, Type, Intensity (0/10 to 10/10):  No    Objective:    Observation: Pt received supine in bed, family in room, agreeable to therapy   Objective Measures:  WFL    Treatment, including education:  Therapeutic Activity Training:   Therapeutic activity training was instructed today. Cues were given for safety, sequence, UE/LE placement, awareness, and balance. Activities performed today included bed mobility training, sup-sit, sit-stand, functional mobility, stand to sit    Supine to sit modA, sitting EOB Supervision, grooming washing hands/face w/ warm washcloth SBA, STS from EOB up to RW modA, in stand Angel moving to [facility] w/ RW, functional mobility ~5 feet w/ RW CGA, stand to sit Angel from RW to reclining chair w/ Vcs for use of BUE for good eccentric control.      Pt sitting upright in chair, chair alarm on, call light at side, nursing notified    Assessment / Impression:    Patient's tolerance of treatment: Well  Adverse Reaction: None  Significant change in status and impact: Improved from initial evaluation  Barriers to improvement: None noted      Plan for Next Session:    Continue OT POC    Time in:  1105  Time out:  1131  Timed treatment minutes:  26 minutes  Total treatment time:  26 minutes      Electronically signed by:    Unruly Ayers OTR/L [id number]  11:43 AM,2023
Occupational Therapy    Occupational Therapy Treatment Note    Name: Brittney Tellez MRN: [id number] :   1940   Date:  2023   Admission Date: 11/15/2023 Room:  4119/4119-A     Per OTR/L Discharge Recommendation: Skilled Nursing Facility     Primary Problem:  The primary encounter diagnosis was Acute delirium. A diagnosis of Multifocal pneumonia was also pertinent to this visit. Restrictions/Precautions:  Restrictions/Precautions  Restrictions/Precautions: General Precautions, Fall Risk           Communication with other providers: Per chart review patient appropriate for therapy session. Subjective:  Patient states:  Pt agreeable to therapy session. Pain:    Location, Type, Intensity (0/10 to 10/10):  Pt denies pain. Objective:    Observation: Pt supine in bed sleeping upon arrival.    Objective Measures:  Pt alert and oriented      Treatment, including education:  Self Care Training:   Cues were given for safety, sequence, UE/LE placement, visual cues, and balance. Activities performed today included UB/LB dressing tasks, toileting, hand hygiene at sink    Pt supine in bed and completed supine to sit toward left side as patient noted increased ability to assist toward left with MOD A with head of bed slightly elevated. Pt noted increased edema this date and noted increased time and effort for all mobility. Pt completed scooting toward edge of bed with MOD A and increased time with noted generalized edema this date. Pt completed sit to stand from edge of bed with use of WW and MIN to MOD A with posterior lean ability to correct with tactile and verbal cues. Pt noted loss of balance and returned seated edge in bed with MOD A. Pt completed second sit to stand from edge of bed with CARLOS ALBERTO  and completed functional transfer to [facility] and required DEP A to doff undergarment. Pt seated on [facility].  Pt completed sit to stand from [facility] with MIN A and completed static standing with CGA
Occupational Therapy    Occupational Therapy Treatment Note    Name: Leno Escamilla MRN: [id number] :   1940   Date:  2023   Admission Date: 11/15/2023 Room:  [facility]-A     Per OTR/L Discharge Recommendation: Skilled Nursing Facility    Primary Problem:  The primary encounter diagnosis was Acute delirium. A diagnosis of Multifocal pneumonia was also pertinent to this visit. Restrictions/Precautions:  Restrictions/Precautions  Restrictions/Precautions: General Precautions, Fall Risk          Communication with other providers: Per chart review patient appropriate for therapy session. Subjective:  Patient states:  Pt agreeable to therapy session. Pain:   Location, Type, Intensity (0/10 to 10/10):  Pt denies pain. Objective:    Observation: Pt supine in bed sleeping upon arrival.    Objective Measures:  Pt alert and oriented     Treatment, including education:  Therapeutic Activity Training:   Therapeutic activity training was instructed today. Cues were given for safety, sequence, UE/LE placement, awareness, and balance. Activities performed today included bed mobility training, sup-sit, sit-stand, SPT. Pt supine in bed and completed supine to sit toward left side as patient noted increased ability to assist toward left with MOD A with head of bed slightly elevated. Pt completed scooting toward edge of bed with MOD A and increased time with noted generalized fatigue this date. Pt completed sit to stand from edge of bed with use of WW and CGA and completed functional mobility around bed to chair. Pt seated in chair with SBA and educated to increase OOB activity tolerance. Pt seated in chair with all needs met and call light in reach.      Assessment / Impression:    Patient's tolerance of treatment: fair  Adverse Reaction: None  Significant change in status and impact: Improved from initial evaluation  Barriers to improvement: None noted      Plan for Next Session:    Continue OT POC
Physical Therapy  Name: Radha Carvalho MRN: [id number] :   1940   Date:  2023   Admission Date: 11/15/2023 Room:  [address] [place] [address]   Restrictions/Precautions:  Restrictions/Precautions  Restrictions/Precautions: General Precautions, Fall Risk         Communication with other providers:  per chart review pt is ok to see for therapy    Subjective:  Patient states:  that he is not getting up out of the chair again right now  Pain:   Location, Type, Intensity (0/10 to 10/10):  none    Objective:    Observation: Patient was seated in bed side chair at arrival with family present    Treatment, including education/measures:    Transfers with line management of iv    Bed mobility:   Not tested, seated in bed side chair arrival     Transfers/Gait   Not tested, patient refused transfers of ambulation at this time due to just getting up to the chair     Exercises  Sitting Exercises: Ankle pumps 2 x 10   LAQ's  2 x 10   Marching  2 x 10    Pillow squeezes  2 x 10  Hip Abd  2 x 10     Therapeutic exercises were instructed today. Cues were given for technique, safety, recruitment, and rationale. Cues were verbal and/or tactile. Safety  Patient left safely in the bed side chair, with call light/phone in reach with alarm applied. Gait belt was used for transfers and gait. Assessment / Impression:     Patient's tolerance of treatment:  fair   Adverse Reaction: none  Significant change in status and impact:  none  Barriers to improvement:  weakness    Plan for Next Session:    Will cont to work towards pt's goals per patients tolerance  Time in:  1131   Time out:  1149  Timed treatment minutes:  18  Total treatment time:  18    Previously filed items:  Social/Functional History  Lives With: Alone  Type of Home: House  Home Layout: One level  Home Access: Stairs to enter with rails  Entrance Stairs - Number of Steps: 3  Bathroom Shower/Tub: Walk-in shower     Long Term Goals  Time Frame for Long Term Goals :  In one
Physical Therapy Treatment Note  Name: Kristen Van MRN: [id number] :   1940   Date:  2023   Admission Date: 11/15/2023 Room:  [address] [place] [address]   Restrictions/Precautions:  General, fall risk   Communication with other providers:  Pt okay to see for therapy per RN   Subjective:  Patient states:  \"I'll do what I can\"   Pain:   Location, Type, Intensity (0/10 to 10/10):  No c/o pn.   Objective:    Observation:  Pt supine in bed upon PTA arrival.   Objective Measures:  Tele, stable. Treatment, including education/measures:    Therapeutic Activity Training:   Therapeutic activity training was instructed today. Cues were given for safety, sequence, UE/LE placement, awareness, and balance. Activities performed today included bed mobility training, sup-sit, sit-stand. Mobility:  Sup <> sit: Min A at LEs to advance to EOB, Mod A at trunk to bring fully upright. Sit > sup Max A at LEs , PTA provided cues for sidelying in prep for supine, pt laying straight back despite max cues to avoid the same. Pt requires mod A at shoulders for improved alignment and cues for LE alignment. Scooting: Mod A at hips, pt maintaining trunk without assist.   STS: x3 from EOB with progressively lowered bed height Min A  + x2 partial STS from completely lowered bed with Max A and pt unable to appropriately clear buttocks. Pt requires rest breaks following ea. Set for recovery. SPT: NT    Following partial stands pt requests to return to bed and PTA concluding session. Safety:   Pt returned safely to recliner with chair alarm activated, call light in reach, all needs met. Assessment / Impression:    Pt tolerated STSs well with min-mod increase in fatigue. Patient's tolerance of treatment:  Good   Adverse Reaction: none  Significant change in status and impact:  none  Barriers to improvement:  none  Plan for Next Session:    Plan to continue transfer training and [employer] activities.    Time in:  1423  Time out:
Physician Progress Note      PATIENT:               Cammie Wei  [facility] #:                  [id number]  :                       1940  ADMIT DATE:       11/15/2023 5:29 PM  [facility] DATE:        2023 5:56 PM  RESPONDING  PROVIDER #:        Bright Martell MD          QUERY TEXT:    Patient admitted with metabolic encephalopathy. Noted to have dietician   assessment with severe malnutrition diagnosis in PN dated 23. If   possible, please document in progress notes and discharge summary if you are   evaluating and /or treating any of the following: The medical record reflects the following:  Risk Factors: decreased appetite  Clinical Indicators: Per dietician assessment using ASPEN criteria:  Severe   malnutrition, In context of chronic illness related to cognitive or   neurological impairment (hx reduced appetite in illness; increased needs s/s   wound healing) as evidenced by poor intake prior to admission, weight loss   7.5% in 3 months, moderate muscle loss, localized or generalized fluid   accumulation. Treatment: Start ONS, monitor while inpatient    Thank you,  Stacie Flores RN    ASPEN Criteria:    https://aspenjournals. onlinelibrary. arroyo. com/doi/full/10.1177/414914292765032  5  Options provided:  -- Protein calorie malnutrition severe  -- Other - I will add my own diagnosis  -- Disagree - Not applicable / Not valid  -- Disagree - Clinically unable to determine / Unknown  -- Refer to Clinical Documentation Reviewer    PROVIDER RESPONSE TEXT:    This patient has severe protein calorie malnutrition. Query created by: Amie Agrawal on 2023 11:23 AM      QUERY TEXT:    Pt admitted with metabolic encephalopathy. Noted documentation of pneumonia   on 11/15/23 by Dr. Mabel Nyhan, ED physician. If possible, please document in   progress notes and discharge summary:    The medical record reflects the following:  Risk Factors: advanced age  Clinical Indicators: Pt presented to ED with AMS.  Pt just
Sitter has been removed from the room and  is present.
V2.0    [facility] Progress Note      Name:  Steven Trinidad /Age/Sex: [birth year]  (80 y.o. male)   MRN & CSN:  [id number] & [id number] Encounter Date/Time: 2023 7:45 AM EST   Location:  [address] [place] [address] PCP: Man Gutierrez MD     Attending:Rossy Crespo, [address] [place] [address] Day: 8    Assessment and Recommendations   Steven Trinidad is a 80 y.o. male  who presents with Acute encephalopathy    Disposition: Pre-CERT obtained and good until . Plan for discharge on . #.  Acute metabolic encephalopathy, improved  -CT head-no acute process  -Chest x-ray-bilateral basal infiltrates, suspicious for multifocal pneumonia or aspiration. -VBG-pH 7.40, PCO2 52, PO2 33, HCO3 32.2.  -No leukocytosis  -Ammonia 32, valproic acid<2.8, digoxin 1.5  -Procalcitonin 0.249  -UA-leukocyte esterase moderate, nitrite positive, , bacteria negative. Urine culture ordered.  -Blood cultures drawn in ED. no growth after 4 days  -Patient initially received linezolid (allergic to vancomycin), cefepime-continue  -Monitor platelet count while on linezolid  -Changed Abx to Levaquin and linezolid  -Delirium and fall precautions     #. Acute on chronic diastolic congestive heart failure  -Does not appear to be in exacerbation  -Initially appeared volume depleted and given IVF however later experienced volume overload  -IV Lasix twice daily initiated, edema clinically improving  -Transition to home p.o. diuretics on discharge    #. Mild hypokalemia  -Replace     #. Admission to [facility] 2023-for sepsis-suspected secondary to infected hematoma/abscess of the posterior thigh, patient had MRSA bacteremia (1/4 bottles positive)  -Patient was then transferred to [address] [place] [address] (-2023) where he had I&D of posterior thigh abscess, had left knee joint aspiration.   Was discharged on linezolid for septic arthritis until 2023  -Patient had multiple blood transfusions during the hospitalization-had EGD
V2.0    [facility] [place]      Name:  Akbar Jimenez /Age/Sex: [birth year]  (80 y.o. male)   MRN & CSN:  [id number] & [id number] Encounter Date/Time: 2023 7:45 AM EST   Location:  [address] [place] [address] PCP: Jose Subramanian MD     Attending:Baljeet Huizar MD       Hospital Day: 6    Assessment and Recommendations   Akbar Jimenez is a 80 y.o. male  who presents with Acute encephalopathy    Disposition: Pre-CERT to be started tomorrow. Medically stable. Change antibiotics to p.o. at the time of discharge. #.  Acute metabolic encephalopathy, improved  -CT head-no acute process  -Chest x-ray-bilateral basal infiltrates, suspicious for multifocal pneumonia or aspiration. -VBG-pH 7.40, PCO2 52, PO2 33, HCO3 32.2.  -No leukocytosis  -Ammonia 32, valproic acid<2.8, digoxin 1.5  -Procalcitonin 0.249  -UA-leukocyte esterase moderate, nitrite positive, , bacteria negative. Urine culture ordered.  -Blood cultures drawn in ED. no growth after 4 days  -Patient initially received linezolid (allergic to vancomycin), cefepime-continue  -Monitor platelet count while on linezolid  -Changed Abx to Levaquin and linezolid  -Delirium and fall precautions     #. Mild hypokalemia  -Replace     #. Admission to [facility] 2023-for sepsis-suspected secondary to infected hematoma/abscess of the posterior thigh, patient had MRSA bacteremia (1/4 bottles positive)  -Patient was then transferred to [address] [place] [address] (-2023) where he had I&D of posterior thigh abscess, had left knee joint aspiration. Was discharged on linezolid for septic arthritis until 2023  -Patient had multiple blood transfusions during the hospitalization-had EGD 2023-demonstrated AVM, duodenal diverticulum, nodular gastritis and gastric polyps. #.  Admission to [facility] -2023 for severe sepsis-secondary to CAP, patient was also noted to have anemia, no overt signs of bleeding, Xarelto was on hold     #.   Patient had
V2.0    [facility] [place]      Name:  Deshawn De Los Santos /Age/Sex:   (80 y.o. male)   MRN & CSN:  [id number] & [id number] Encounter Date/Time: 2023 7:45 AM EST   Location:  [address] [place] [address] PCP: Frankie Tripathi MD     Attending:Violeta Holman, [address] [place] [address] Day: 5    Assessment and Recommendations   Deshawn De Los Santos is a 80 y.o. male  who presents with Acute encephalopathy      #. Acute metabolic encephalopathy, improved  -CT head-no acute process  -Chest x-ray-bilateral basal infiltrates, suspicious for multifocal pneumonia or aspiration. -VBG-pH 7.40, PCO2 52, PO2 33, HCO3 32.2.  -No leukocytosis  -Ammonia 32, valproic acid<2.8, digoxin 1.5  -Procalcitonin 0.249  -UA-leukocyte esterase moderate, nitrite positive, , bacteria negative. Urine culture ordered.  -Blood cultures drawn in ED. no growth after 4 days  -Patient initially received linezolid (allergic to vancomycin), cefepime-continue  -Monitor platelet count while on linezolid  -Changed Abx to Levaquin and linezolid  -Delirium and fall precautions     #. Mild hypokalemia  -Replace     #. Admission to [facility] 2023-for sepsis-suspected secondary to infected hematoma/abscess of the posterior thigh, patient had MRSA bacteremia (1/4 bottles positive)  -Patient was then transferred to [address] [place] [address] (-2023) where he had I&D of posterior thigh abscess, had left knee joint aspiration. Was discharged on linezolid for septic arthritis until 2023  -Patient had multiple blood transfusions during the hospitalization-had EGD 2023-demonstrated AVM, duodenal diverticulum, nodular gastritis and gastric polyps. #.  Admission to [facility] -2023 for severe sepsis-secondary to CAP, patient was also noted to have anemia, no overt signs of bleeding, Xarelto was on hold     #. Patient had encephalopathy/delirium during these hospitalizations     #. Patient was discharged to [facility] -10/19/2023.        #.
V2.0  [facility] [place]      Name:  Jad Falcon /Age/Sex: [birth year]  (80 y.o. male)   MRN & CSN:  [id number] & [id number] Encounter Date/Time: 2023 1:57 PM EST    Location:  [address] [place] [address]-A PCP: Rowdy Barahona [address] [place] [address] Day: 3    Assessment and Plan:   Jad Falcon is a 80 y.o. male with pmh of CAD, CHF, CKD stage 3, MGUS, COPD, GERD, Afib who presents with Acute encephalopathy    Disposition: Medically stable for discharge pending placement    Plan:  #. Acute metabolic encephalopathy, improved  -CT head-no acute process  -Chest x-ray-bilateral basal infiltrates, suspicious for multifocal pneumonia or aspiration. -VBG-pH 7.40, PCO2 52, PO2 33, HCO3 32.2.  -No leukocytosis  -Ammonia 32, valproic acid<2.8, digoxin 1.5  -Procalcitonin 0.249  -UA-leukocyte esterase moderate, nitrite positive, , bacteria negative. Urine culture ordered.  -Blood cultures drawn in ED.  -Patient initially received linezolid (allergic to vancomycin), cefepime-continue  -Monitor platelet count while on linezolid  -Changed Abx to Levaquin and linezolid     #.  Mild hypokalemia  -Replace     #. Admission to [facility] 2023-for sepsis-suspected secondary to infected hematoma/abscess of the posterior thigh, patient had MRSA bacteremia (1/4 bottles positive)  -Patient was then transferred to [address] [place] [address] (-2023) where he had I&D of posterior thigh abscess, had left knee joint aspiration. Was discharged on linezolid for septic arthritis until 2023  -Patient had multiple blood transfusions during the hospitalization-had EGD 2023-demonstrated AVM, duodenal diverticulum, nodular gastritis and gastric polyps. #.  Admission to [facility] -2023 for severe sepsis-secondary to CAP, patient was also noted to have anemia, no overt signs of bleeding, Xarelto was on hold     #. Patient had encephalopathy/delirium during these hospitalizations     #.   Patient was discharged to
V2.0  [facility] [place]      Name:  Navdeep Garces /Age/Sex:   (80 y.o. male)   MRN & CSN:  [id number] & [id number] Encounter Date/Time: 2023 1:57 PM EST    Location:  [address] [place] [address]-H PCP: Maria Esther Mcke[place] [address] Day: 4    Assessment and Plan:   Navdeep Garces is a 80 y.o. male with pmh of CAD, CHF, CKD stage 3, MGUS, COPD, GERD, Afib who presents with Acute encephalopathy    Disposition: Medically stable for discharge pending placement    Plan:  #. Acute metabolic encephalopathy, improved  -CT head-no acute process  -Chest x-ray-bilateral basal infiltrates, suspicious for multifocal pneumonia or aspiration. -VBG-pH 7.40, PCO2 52, PO2 33, HCO3 32.2.  -No leukocytosis  -Ammonia 32, valproic acid<2.8, digoxin 1.5  -Procalcitonin 0.249  -UA-leukocyte esterase moderate, nitrite positive, , bacteria negative. Urine culture ordered.  -Blood cultures drawn in ED.  -Patient initially received linezolid (allergic to vancomycin), cefepime-continue  -Monitor platelet count while on linezolid  -Changed Abx to Levaquin and linezolid  Consider discontinuing sitter if no aggressive behavior, discussed with CM     #.  Mild hypokalemia  -Replace     #. Admission to [facility] 2023-for sepsis-suspected secondary to infected hematoma/abscess of the posterior thigh, patient had MRSA bacteremia (1/4 bottles positive)  -Patient was then transferred to [address] [place] [address] (-2023) where he had I&D of posterior thigh abscess, had left knee joint aspiration. Was discharged on linezolid for septic arthritis until 2023  -Patient had multiple blood transfusions during the hospitalization-had EGD 2023-demonstrated AVM, duodenal diverticulum, nodular gastritis and gastric polyps. #.  Admission to [facility] -2023 for severe sepsis-secondary to CAP, patient was also noted to have anemia, no overt signs of bleeding, Xarelto was on hold     #.   Patient had
Fat/Low Chol/High Fiber/2 gm Na  ADULT ORAL NUTRITION SUPPLEMENT; Lunch, Dinner; Frozen Oral Supplement    Anthropometric Measures:  Height: 180.3 cm (5' 11\")  Ideal Body Weight (IBW): 172 lbs (78 kg)       Current Body Weight: 111.5 kg (245 lb 13 oz), 135.1 % IBW. Weight Source: Bed Scale  Current BMI (kg/m2): 34.3  Usual Body Weight: 118 kg (260 lb 2.3 oz) (per hx August 2023)  % Weight Change (Calculated): -10.7                    BMI Categories: Obese Class 1 (BMI 30.0-34. 9)    Estimated Daily Nutrient Needs:  Energy Requirements Based On: Formula  Weight Used for Energy Requirements: Current  Energy (kcal/day): 1949 (miffl[address] jeor)  Weight Used for Protein Requirements: Ideal  Protein (g/day):  (1.1-1.3 g/kg)  Method Used for Fluid Requirements: 1 ml/kcal  Fluid (ml/day): 2000    Nutrition Diagnosis:   Severe malnutrition, In context of chronic illness related to cognitive or neurological impairment (hx reduced appetite in illness; increased needs s/s wound healing) as evidenced by poor intake prior to admission, weight loss 7.5% in 3 months, moderate muscle loss, localized or generalized fluid accumulation (greater than)    Nutrition Interventions:   Food and/or Nutrient Delivery: Continue Current Diet, Start Oral Nutrition Supplement  Nutrition Education/Counseling: No recommendation at this time  Coordination of Nutrition Care: Continue to monitor while inpatient  Plan of Care discussed with: pt    Goals:  Previous Goal Met: Progressing toward Goal(s)  Goals: Meet at least 75% of estimated needs       Nutrition Monitoring and Evaluation:   Behavioral-Environmental Outcomes: None Identified  Food/Nutrient Intake Outcomes: Food and Nutrient Intake, Supplement Intake  Physical Signs/Symptoms Outcomes: Biochemical Data, Chewing or Swallowing, Meal Time Behavior, Nutrition Focused Physical Findings, Skin, Weight    Discharge Planning:    Continue current diet (Recommend Mp BID for 12 days post
Needs:  Energy Requirements Based On: Formula  Weight Used for Energy Requirements: Current  Energy (kcal/day): 1949 (miffl[address] jeor)  Weight Used for Protein Requirements: Ideal  Protein (g/day):  (1.1-1.3 g/kg)  Method Used for Fluid Requirements: 1 ml/kcal  Fluid (ml/day): 2000    Nutrition Diagnosis:   Predicted inadequate energy intake related to cognitive or neurological impairment, other (comment) (hx reduced appetite in illness) as evidenced by poor intake prior to admission, weight loss 7.5% in 3 months    Nutrition Interventions:   Food and/or Nutrient Delivery: Modify Current Diet, Start Oral Nutrition Supplement  Nutrition Education/Counseling: Education not appropriate  Coordination of Nutrition Care: Continue to monitor while inpatient, Feeding Assistance/Environment Change  Plan of Care discussed with: sitter in room    Goals:     Goals: PO intake 50% or greater, by next RD assessment       Nutrition Monitoring and Evaluation:   Behavioral-Environmental Outcomes: None Identified  Food/Nutrient Intake Outcomes: Diet Advancement/Tolerance, Food and Nutrient Intake, Supplement Intake  Physical Signs/Symptoms Outcomes: Biochemical Data, Chewing or Swallowing, Meal Time Behavior, Nutrition Focused Physical Findings, Skin, Weight    Discharge Planning:     Too soon to determine     Nimisha Galan RD, LD  Contact: [phone]
required max A to stand from toilet with max cues for technique/use of grab bar. Pt ambulated to sink CGA with RW, and completed hand hygiene in standing at sink CGA with mod cues for safe RW placement during task. Pt ambulated from sink to chair CGA regressing to min A with RW, and transferred stand to sit to chair min A for controlled descent. Pt left positioned for comfort in chair with all lines intact, all needs within reach, and chair alarm on. Assessment / Impression:    Patient's tolerance of treatment: Well  Adverse Reaction: None  Significant change in status and impact: Improved activity tolerance from initial evaluation  Barriers to improvement: Confusion/AMS    Plan for Next Session:    Continue per OT POC. Continue to recommend SNF for rehab at discharge.     Time in: 918  Time out: 949  Timed treatment minutes: 31  Total treatment time: 31    Electronically signed by:    ADALBERTO Brooks/NANCY, [address] [place], [place] [id number]
16  SpO2: 99 %  O2 Device: None (Room air)        Gross Assessment  Sensation: Intact (BLEs)        Strength RLE  Comment: knee extension: 4-/5, ankle DF: 4-/5  Strength LLE  Comment: knee extension: 4-/5, ankle DF: 4-/5           Bed mobility  Supine to Sit: Moderate assistance;2 Person assistance (with verbal and tactile cues for BUE and BLE placement throughout transfer; with HOB elevated; with increased time for task completion)  Transfers  Sit to Stand: Minimal Assistance (with verbal cues to push through bed/chair and avoid pulling on walker)  Stand to Sit: Minimal Assistance (with verbal cues to feel bed/chair against back of legs, reach back, and sit slowly)  Ambulation  Surface: Level tile  Device: Rolling Walker  Assistance: Minimal assistance; Moderate assistance  Quality of Gait: decreased gait speed, decreased step length bilaterally, unsteady gait  Distance: 10 feet  Comments: with verbal and tactile cues for BLE placement, walker placement, and sequence throughout ambulation; with verbal and tactile cues to maintain upright posture in order to avoid COM shifting outside of KOURTNEY     Balance  Posture: Fair  Sitting - Static: Good  Sitting - Dynamic: Fair  Standing - Static: Poor;+  Standing - Dynamic: Poor;+           Gait Training:  Cues were given for safety, sequence, device management, balance, posture, awareness, path. Therapeutic Activity Training:   Therapeutic activity training was instructed today. Cues were given for safety, sequence, UE/LE placement, awareness, and balance. Activities performed today included bed mobility training, sup-sit, sit-stand, SPT. AM-PAC Score  AM-PAC Inpatient Mobility Raw Score : 11 (11/17/23 2353)  AM-PAC Inpatient T-Scale Score : 33.86 (11/17/23 2353)  Mobility Inpatient CMS 0-100% Score: 72.57 (11/17/23 2353)  Mobility Inpatient CMS G-Code Modifier : CL (11/17/23 2353)              Goals  Long Term Goals  Time Frame for Long Term Goals :  In one
4      Raw Score:  14   [24=0% impaired(CH), 23=1-19%(CI), 20-22=20-39%(CJ), 15-19=40-59%(CK), 10-14=60-79%(CL), 7-9=80-99%(CM), 6=100%(CN)]     Treatment:  Self Care Training:   Cues were given for safety, sequence, UE/LE placement, visual cues, and balance. 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 Pt admitted with altered mental status and diagnosed with acute encephalopathy and sepsis. Pt has recently been at [facility] for rehab but at baseline lives at home alone and is independent with ADLs and mobility. Pt currently presents with the above impairments. Recommend continued OT services in SNF at discharge. Complexity: Moderate  Prognosis: Good  Plan: 4+x/week    Goals:  1. Pt will complete all aspects of bed mobility for EOB/OOB ADLs min A with HOB flat  2. Pt will complete UB/LB bathing min A with setup using long handled sponge PRN  3. Pt will complete all aspects of LB dressing mod A with setup using LB AE PRN  4. Pt will complete all functional transfers to and from bed, chair, toilet, shower chair CGA/good safety awareness  5. Pt will ambulate HH distance to bathroom for toileting CGA with RW  6. Pt will complete all aspects of toileting task mod A on regular toilet  7.  Pt will complete oral
Intake/Output Summary (Last 24 hours) at 11/16/2023 1357  Last data filed at 11/16/2023 0926  Gross per 24 hour   Intake --   Output 250 ml   Net -250 ml        Vitals:   Vitals:    11/16/23 1226   BP:    Pulse:    Resp:    Temp:    SpO2: 94%       Physical Exam:   Physical Exam  Vitals reviewed. Constitutional:       Appearance: Normal appearance. He is normal weight. HENT:      Head: Normocephalic. Nose: Nose normal.      Mouth/Throat:      Mouth: Mucous membranes are moist.   Eyes:      Conjunctiva/sclera: Conjunctivae normal.      Pupils: Pupils are equal, round, and reactive to light. Cardiovascular:      Rate and Rhythm: Normal rate and regular rhythm. Pulses: Normal pulses. Heart sounds: Normal heart sounds. No murmur heard. Pulmonary:      Effort: Respiratory distress present. Breath sounds: Rales present. No wheezing or rhonchi. Abdominal:      General: Abdomen is flat. Bowel sounds are normal. There is no distension. Palpations: Abdomen is soft. Tenderness: There is no abdominal tenderness. Musculoskeletal:         General: No deformity. Normal range of motion. Cervical back: Normal range of motion and neck supple. Right lower leg: No edema. Left lower leg: No edema. Skin:     Coloration: Skin is not jaundiced or pale. Neurological:      General: No focal deficit present. Mental Status: He is alert and oriented to person, place, and time. Mental status is at baseline. Motor: Weakness present.    Psychiatric:         Mood and Affect: Mood normal.         Behavior: Behavior normal.            Medications:   Medications:    sodium chloride flush  5-40 mL IntraVENous 2 times per day    Vitamin D  2,000 Units Oral Daily    digoxin  125 mcg Oral Daily    ferrous gluconate  324 mg Oral BID    potassium chloride  20 mEq Oral Daily    pantoprazole  40 mg Oral QAM AC    magnesium oxide  400 mg Oral Daily    metoprolol succinate  12.5 mg Oral
Imaging   CT HEAD WO CONTRAST    Result Date: 11/15/2023  EXAMINATION: CT OF THE HEAD WITHOUT CONTRAST  11/15/2023 8:07 pm TECHNIQUE: CT of the head was performed without the administration of intravenous contrast. Automated exposure control, iterative reconstruction, and/or weight based adjustment of the mA/kV was utilized to reduce the radiation dose to as low as reasonably achievable. COMPARISON: August 16, 2023. HISTORY: ORDERING SYSTEM PROVIDED HISTORY: ams TECHNOLOGIST PROVIDED HISTORY: Has a \"code stroke\" or \"stroke alert\" been called? ->No Reason for exam:->ams Decision Support Exception - unselect if not a suspected or confirmed emergency medical condition->Emergency Medical Condition (MA) Reason for Exam: ams FINDINGS: BRAIN/VENTRICLES: There is no acute intracranial hemorrhage, mass effect or midline shift. No abnormal extra-axial fluid collection. The gray-white differentiation is maintained without evidence of an acute infarct. There is prominence of the ventricles and sulci due to global parenchymal volume loss. There are nonspecific areas of hypoattenuation within the periventricular and subcortical white matter, which likely represent chronic microvascular ischemic change. ORBITS: The visualized portion of the orbits demonstrate no acute abnormality. SINUSES: The visualized paranasal sinuses and mastoid air cells demonstrate no acute abnormality. SOFT TISSUES/SKULL: No acute abnormality of the visualized skull or soft tissues. No acute intracranial abnormality. XR CHEST PORTABLE    Result Date: 11/15/2023  EXAMINATION: ONE XRAY VIEW OF THE CHEST 11/15/2023 6:33 pm COMPARISON: 11/11/2023 HISTORY: ORDERING SYSTEM PROVIDED HISTORY: ams TECHNOLOGIST PROVIDED HISTORY: Reason for exam:->ams Reason for Exam: AMS FINDINGS: There are bilateral basal infiltrates, suspicious for multifocal pneumonia or aspiration. Lungs otherwise clear. There is mild cardiomegaly. Port is in good position.

## 2023-11-25 NOTE — DISCHARGE SUMMARY
11/22/23  1149 11/23/23  1147   WBC 5.9 5.1   HGB 9.6* 9.6*   PLT 83* 78*     BMP:    Recent Labs     11/22/23  1149 11/23/23  1147    135   K 4.0 4.2    100   CO2 26 28   BUN 14 16   CREATININE 1.4* 1.4*   GLUCOSE 121* 122*     Hepatic:   Recent Labs     11/23/23  1147   AST 32   ALT 13   BILITOT 0.3   ALKPHOS 99     Lipids:   Lab Results   Component Value Date/Time    CHOL 54 08/04/2023 08:20 AM    HDL 25 08/04/2023 08:20 AM    TRIG 59 08/04/2023 08:20 AM     Hemoglobin A1C:   Lab Results   Component Value Date/Time    LABA1C 5.2 10/05/2022 09:20 AM     TSH: No results found for: \"TSH\"  Troponin:   Lab Results   Component Value Date/Time    TROPONINT <0.010 09/17/2023 04:41 AM    TROPONINT <0.010 09/16/2023 10:39 PM    TROPONINT 0.021 09/16/2023 12:33 AM     Lactic Acid: No results for input(s): \"LACTA\" in the last 72 hours.   BNP:   Recent Labs     11/22/23  1149 11/23/23  1308   PROBNP 6,459* 6,414*     UA:  Lab Results   Component Value Date/Time    NITRU POSITIVE 11/16/2023 12:03 AM    COLORU YELLOW 11/16/2023 12:03 AM    WBCUA 181 11/16/2023 12:03 AM    RBCUA 7 11/16/2023 12:03 AM    MUCUS 1+ 07/04/2015 04:55 PM    TRICHOMONAS NONE SEEN 11/16/2023 12:03 AM    BACTERIA NEGATIVE 11/16/2023 12:03 AM    CLARITYU CLEAR 11/16/2023 12:03 AM    SPECGRAV 1.010 11/16/2023 12:03 AM    LEUKOCYTESUR MODERATE NUMBER OR AMOUNT OBSERVED 11/16/2023 12:03 AM    UROBILINOGEN 0.2 11/16/2023 12:03 AM    BILIRUBINUR NEGATIVE 11/16/2023 12:03 AM    BLOODU TRACE 11/16/2023 12:03 AM    KETUA NEGATIVE 11/16/2023 12:03 AM     Urine Cultures: No results found for: \"LABURIN\"  Blood Cultures: No results found for: \"BC\"  No results found for: \"BLOODCULT2\"  Organism: No results found for: \"ORG\"    Time Spent Discharging patient 45 minutes    Electronically signed by Roberto Hernandez MD on 11/23/2023 at 8:08 PM

## 2024-01-03 ENCOUNTER — HOSPITAL ENCOUNTER (OUTPATIENT)
Age: 84
Setting detail: SPECIMEN
Discharge: HOME | End: 2024-01-03

## 2024-01-03 LAB
ANION GAP SERPL CALCULATED.3IONS-SCNC: 11 MMOL/L (ref 7–16)
BUN SERPL-MCNC: 25 MG/DL (ref 6–23)
CALCIUM SERPL-MCNC: 7.8 MG/DL (ref 8.3–10.6)
CHLORIDE BLD-SCNC: 100 MMOL/L (ref 99–110)
CO2: 24 MMOL/L (ref 21–32)
CREAT SERPL-MCNC: 1.4 MG/DL (ref 0.9–1.3)
GFR SERPL CREATININE-BSD FRML MDRD: 50 ML/MIN/1.73M2
GLUCOSE SERPL-MCNC: 85 MG/DL (ref 70–99)
POTASSIUM SERPL-SCNC: 3.9 MMOL/L (ref 3.5–5.1)
SODIUM BLD-SCNC: 135 MMOL/L (ref 135–145)

## 2024-01-03 PROCEDURE — 80048 BASIC METABOLIC PNL TOTAL CA: CPT

## 2024-01-29 LAB
ALBUMIN SERPL-MCNC: 3 G/DL
ALP BLD-CCNC: 94 U/L
ALT SERPL-CCNC: 8 U/L
ANION GAP SERPL CALCULATED.3IONS-SCNC: 13 MMOL/L
AST SERPL-CCNC: 35 U/L
BILIRUB SERPL-MCNC: 0.19 MG/DL (ref 0.1–1.4)
BUN BLDV-MCNC: 29 MG/DL
CALCIUM SERPL-MCNC: 9.3 MG/DL
CHLORIDE BLD-SCNC: 96 MMOL/L
CO2: 33 MMOL/L
CREAT SERPL-MCNC: 1.8 MG/DL
EGFR: NORMAL
GLUCOSE BLD-MCNC: 94 MG/DL
POTASSIUM SERPL-SCNC: 4 MMOL/L
SODIUM BLD-SCNC: 139 MMOL/L
TOTAL PROTEIN: 5.7

## 2024-01-31 ENCOUNTER — OFFICE VISIT (OUTPATIENT)
Dept: CARDIOLOGY CLINIC | Age: 84
End: 2024-01-31
Payer: MEDICARE

## 2024-01-31 ENCOUNTER — TELEPHONE (OUTPATIENT)
Dept: CARDIOLOGY CLINIC | Age: 84
End: 2024-01-31

## 2024-01-31 VITALS
OXYGEN SATURATION: 98 % | BODY MASS INDEX: 34.28 KG/M2 | HEART RATE: 72 BPM | SYSTOLIC BLOOD PRESSURE: 112 MMHG | DIASTOLIC BLOOD PRESSURE: 60 MMHG | HEIGHT: 71 IN

## 2024-01-31 DIAGNOSIS — M79.89 LEG SWELLING: Primary | ICD-10-CM

## 2024-01-31 DIAGNOSIS — I48.19 PERSISTENT ATRIAL FIBRILLATION (HCC): ICD-10-CM

## 2024-01-31 DIAGNOSIS — I10 PRIMARY HYPERTENSION: ICD-10-CM

## 2024-01-31 DIAGNOSIS — E78.2 MIXED HYPERLIPIDEMIA: ICD-10-CM

## 2024-01-31 DIAGNOSIS — Z98.61 HISTORY OF PTCA: ICD-10-CM

## 2024-01-31 DIAGNOSIS — N18.30 STAGE 3 CHRONIC KIDNEY DISEASE, UNSPECIFIED WHETHER STAGE 3A OR 3B CKD (HCC): ICD-10-CM

## 2024-01-31 PROCEDURE — 3074F SYST BP LT 130 MM HG: CPT | Performed by: INTERNAL MEDICINE

## 2024-01-31 PROCEDURE — G8427 DOCREV CUR MEDS BY ELIG CLIN: HCPCS | Performed by: INTERNAL MEDICINE

## 2024-01-31 PROCEDURE — 1036F TOBACCO NON-USER: CPT | Performed by: INTERNAL MEDICINE

## 2024-01-31 PROCEDURE — 3078F DIAST BP <80 MM HG: CPT | Performed by: INTERNAL MEDICINE

## 2024-01-31 PROCEDURE — 99214 OFFICE O/P EST MOD 30 MIN: CPT | Performed by: INTERNAL MEDICINE

## 2024-01-31 PROCEDURE — 1123F ACP DISCUSS/DSCN MKR DOCD: CPT | Performed by: INTERNAL MEDICINE

## 2024-01-31 PROCEDURE — G8417 CALC BMI ABV UP PARAM F/U: HCPCS | Performed by: INTERNAL MEDICINE

## 2024-01-31 PROCEDURE — G8484 FLU IMMUNIZE NO ADMIN: HCPCS | Performed by: INTERNAL MEDICINE

## 2024-01-31 RX ORDER — POTASSIUM CHLORIDE 750 MG/1
10 TABLET, FILM COATED, EXTENDED RELEASE ORAL DAILY
COMMUNITY
Start: 2023-12-26

## 2024-01-31 RX ORDER — METOLAZONE 2.5 MG/1
2.5 TABLET ORAL DAILY
COMMUNITY
Start: 2023-12-28

## 2024-01-31 RX ORDER — TORSEMIDE 20 MG/1
20 TABLET ORAL DAILY
COMMUNITY
Start: 2023-12-26

## 2024-01-31 NOTE — PATIENT INSTRUCTIONS
Please be informed that if you contact our office outside of normal business hours the physician on call cannot help with any scheduling or rescheduling issues, procedure instruction questions or any type of medication issue.    We advise you for any urgent/emergency that you go to the nearest emergency room!    PLEASE CALL OUR OFFICE DURING NORMAL BUSINESS HOURS    Monday - Friday   8 am to 5 pm    [place]: [phone]    [place]: [phone]    [place]:  [phone]    **It is YOUR responsibilty to bring medication bottles and/or updated medication list to EACH APPOINTMENT. This will allow us to better serve you and all your healthcare needs**    Thank you for allowing us to care for you today!   We want to ensure we can follow your treatment plan and we strive to give you the best outcomes and experience possible.   If you ever have a life threatening emergency and call 911 - for an ambulance (EMS)   Our providers can only care for you at:   [facility] or [facility].   Even if you have someone take you or you drive yourself we can only care for you in a [facility] facility. Our providers are not setup at the other healthcare locations!     We are committed to providing you the best care possible.    If you receive a survey after visiting one of our offices, please take time to share your experience concerning your physician office visit.  These surveys are confidential and no health information about you is shared.    We are eager to improve for you and we are counting on your feedback to help make that happen.    Counseled to elevate feet when resting and when resting in bed at night with pillows underneath and use thigh high compression stockings in the morning and remove them at night.  Patient verbalizes understanding. Questions answered.  Do not recommend increasing diuretics. It may result in further deterioration of renal function and hypotension.   Lower extremity venous

## 2024-01-31 NOTE — PROGRESS NOTES
place, and person and non-anxious. No focal neurological deficit noted.  Psychiatric: Normal mood and effect.     EKG on 11/15/2023 reported atrial fibrillation 58 bpm with right bundle branch block and left axis deviation and low QRS voltages in precordial leads.    Most recent echo from 8/4/2023 reported   Left ventricular systolic function is normal.   Ejection fraction is visually estimated at 55-60%.   Mild aortic stenosis is present; Mean PG 11 mmHg.   Mitral annular calcification is present.   Mild to moderate tricuspid regurgitation; RVSP: 43 mmHg.   No evidence of any pericardial effusion.   Pericardial fat pad present.    Nuclear stress test on 2/21/2023 reported  Normal EF 60 % with normal ventricular contractility.   Apical inferior wall severe ischemia of medium size   Abnormal stress test    [id number] Score for Atrial Fibrillation Stroke Risk   Risk   Factors  Component Value   C CHF No 0   H HTN Yes 1   A2 Age >= 75 Yes,  (83 y.o.) 2   D DM No 0   S2 Prior Stroke/TIA No 0   V Vascular Disease No 0   A Age 65-74 No,  (83 y.o.) 0   Sc Sex male 0    [id number]  Score  3   Score last updated 1/31/24 8:32 AM EST    Click here for a link to the UpToDate guideline \"Atrial Fibrillation: Anticoagulation therapy to prevent embolization    Disclaimer: Risk Score calculation is dependent on accuracy of patient problem list and past encounter diagnosis.      LAB REVIEW:  CBC:   Lab Results   Component Value Date/Time    WBC 11.6 12/20/2023 02:00 PM    HGB 9.9 12/20/2023 02:00 PM    HCT 30.5 12/20/2023 02:00 PM     12/20/2023 02:00 PM     Lipids:   Lab Results   Component Value Date    CHOL 54 08/04/2023    CHOL 127 06/17/2020    CHOL 132 07/14/2016    CHOLFAST 116 10/05/2022    CHOLFAST 115 04/19/2022    CHOLFAST 136 10/26/2021    TRIG 59 08/04/2023    TRIG 81 06/17/2020    TRIG 73 07/14/2016    TRIGLYCFAST 59 10/05/2022    TRIGLYCFAST 57 04/19/2022    TRIGLYCFAST 69 10/26/2021    HDL 25 (L) 08/04/2023

## 2024-01-31 NOTE — TELEPHONE ENCOUNTER
Left message for Jeanne (Director at [facility]) to find out if the Venous Doppler can be done at their facility. If so, patient would like to have it done at [facility], and the appointment on 2/28 cancelled.

## 2024-01-31 NOTE — TELEPHONE ENCOUNTER
[facility]es called to let office  Know they did a venous doppler  1/12 and it was ok. They are asking   That we cancel the one in feb   And they will fax a copy to the office

## 2024-01-31 NOTE — ASSESSMENT & PLAN NOTE
Patient appears to have lymphedema and the swelling is not related to diastolic heart failure or congestive heart failure.  Avoid excessive diuresis to avoid further deterioration of renal function and recommend elevation of feet and wrapping the feet with compression stockings and walking is much as possible.  We will obtain a venous Doppler to evaluate venous status.

## 2024-01-31 NOTE — ASSESSMENT & PLAN NOTE
Clinically stable last nuclear stress test was February last year was abnormal.  He is asymptomatic.

## 2024-01-31 NOTE — ASSESSMENT & PLAN NOTE
Rate is well-controlled and he is off of anticoagulation therapy due to recurrent GI bleeding requiring blood transfusion and he is also high fall risk.  He has fallen twice since last visit with me.

## 2025-01-03 ENCOUNTER — PARAMEDICINE (OUTPATIENT)
Dept: OTHER | Age: 85
End: 2025-01-03

## 2025-01-03 NOTE — PROGRESS NOTES
Patient was referred to the CP program by [facility]. Patient had 2 falls in 12 hours. According to EMS, Pt lives alone and has dementia. Unsure of what resources are needed.     Patient has been in the program previously. He was placed in LTC and removed from the program. Will reach out to patient to see if he is agreeable to a home visit.

## 2025-01-23 ENCOUNTER — APPOINTMENT (OUTPATIENT)
Dept: CT IMAGING | Age: 85
DRG: 947 | End: 2025-01-23
Payer: MEDICARE

## 2025-01-23 ENCOUNTER — APPOINTMENT (OUTPATIENT)
Dept: GENERAL RADIOLOGY | Age: 85
DRG: 947 | End: 2025-01-23
Payer: MEDICARE

## 2025-01-23 ENCOUNTER — HOSPITAL ENCOUNTER (INPATIENT)
Age: 85
LOS: 3 days | Discharge: ANOTHER HOSPITAL | DRG: 947 | End: 2025-01-26
Attending: EMERGENCY MEDICINE | Admitting: STUDENT IN AN ORGANIZED HEALTH CARE EDUCATION/TRAINING PROGRAM
Payer: MEDICARE

## 2025-01-23 DIAGNOSIS — R53.83 OTHER FATIGUE: ICD-10-CM

## 2025-01-23 DIAGNOSIS — R60.0 PERIPHERAL EDEMA: ICD-10-CM

## 2025-01-23 DIAGNOSIS — R79.89 ELEVATED BRAIN NATRIURETIC PEPTIDE (BNP) LEVEL: ICD-10-CM

## 2025-01-23 DIAGNOSIS — R53.1 GENERALIZED WEAKNESS: ICD-10-CM

## 2025-01-23 DIAGNOSIS — M54.9 BACK PAIN, UNSPECIFIED BACK LOCATION, UNSPECIFIED BACK PAIN LATERALITY, UNSPECIFIED CHRONICITY: ICD-10-CM

## 2025-01-23 DIAGNOSIS — I48.91 ATRIAL FIBRILLATION, UNSPECIFIED TYPE (HCC): ICD-10-CM
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
## 2025-01-23 DIAGNOSIS — M79.89 LEG SWELLING: ICD-10-CM

## 2025-01-23 LAB
ALBUMIN SERPL-MCNC: 2.7 G/DL (ref 3.4–5)
ALBUMIN/GLOB SERPL: 0.8 {RATIO} (ref 1.1–2.2)
ALP SERPL-CCNC: 98 U/L (ref 40–129)
ALT SERPL-CCNC: 16 U/L (ref 10–40)
ANION GAP SERPL CALCULATED.3IONS-SCNC: 17 MMOL/L (ref 4–16)
AST SERPL-CCNC: 70 U/L (ref 15–37)
BASOPHILS # BLD: 0.01 K/UL
BASOPHILS NFR BLD: 0 % (ref 0–1)
BILIRUB SERPL-MCNC: 0.6 MG/DL (ref 0–1)
BNP SERPL-MCNC: ABNORMAL PG/ML (ref 0–450)
BUN SERPL-MCNC: 33 MG/DL (ref 6–23)
CALCIUM SERPL-MCNC: 8.7 MG/DL (ref 8.3–10.6)
CHLORIDE SERPL-SCNC: 99 MMOL/L (ref 99–110)
CK SERPL-CCNC: 515 U/L (ref 38–174)
CO2 SERPL-SCNC: 23 MMOL/L (ref 21–32)
CREAT SERPL-MCNC: 1.2 MG/DL (ref 0.9–1.3)
EOSINOPHIL # BLD: 0.04 K/UL
EOSINOPHILS RELATIVE PERCENT: 0 % (ref 0–3)
ERYTHROCYTE [DISTWIDTH] IN BLOOD BY AUTOMATED COUNT: 15.9 % (ref 11.7–14.9)
GFR, ESTIMATED: 60 ML/MIN/1.73M2
GLUCOSE SERPL-MCNC: 85 MG/DL (ref 74–99)
HCO3 VENOUS: 26 MMOL/L (ref 22–29)
HCT VFR BLD AUTO: 32 % (ref 42–52)
HGB BLD-MCNC: 10.2 G/DL (ref 13.5–18)
IMM GRANULOCYTES # BLD AUTO: 0.1 K/UL
IMM GRANULOCYTES NFR BLD: 1 %
INFLUENZA A BY PCR: NOT DETECTED
INFLUENZA B BY PCR: NOT DETECTED
INR PPP: 1.2
LACTATE BLDV-SCNC: 1 MMOL/L (ref 0.4–2)
LACTATE BLDV-SCNC: 1.8 MMOL/L (ref 0.4–2)
LIPASE SERPL-CCNC: 8 U/L (ref 13–60)
LYMPHOCYTES NFR BLD: 0.25 K/UL
LYMPHOCYTES RELATIVE PERCENT: 2 % (ref 24–44)
MCH RBC QN AUTO: 31.7 PG (ref 27–31)
MCHC RBC AUTO-ENTMCNC: 31.9 G/DL (ref 32–36)
MCV RBC AUTO: 99.4 FL (ref 78–100)
MONOCYTES NFR BLD: 1.2 K/UL
MONOCYTES NFR BLD: 8 % (ref 0–4)
NEUTROPHILS NFR BLD: 89 % (ref 36–66)
NEUTS SEG NFR BLD: 13.24 K/UL
O2 SAT, VEN: 64.3 % (ref 34–95)
PARTIAL THROMBOPLASTIN TIME: 37 SEC (ref 25.1–37.1)
PCO2 VENOUS: 40 MM HG (ref 41–51)
PH VENOUS: 7.42 (ref 7.32–7.43)
PLATELET # BLD AUTO: 161 K/UL (ref 140–440)
PMV BLD AUTO: 12.2 FL (ref 7.5–11.1)
PO2 VENOUS: 32.7 MM HG (ref 28–48)
POSITIVE BASE EXCESS, VEN: 1.5 MMOL/L (ref 0–3)
POTASSIUM SERPL-SCNC: 3.6 MMOL/L (ref 3.5–5.1)
PROT SERPL-MCNC: 6 G/DL (ref 6.4–8.2)
PROTHROMBIN TIME: 15.2 SEC (ref 11.7–14.5)
RBC # BLD AUTO: 3.22 M/UL (ref 4.6–6.2)
SARS-COV-2 RDRP RESP QL NAA+PROBE: NOT DETECTED
SODIUM SERPL-SCNC: 139 MMOL/L (ref 135–145)
SPECIMEN DESCRIPTION: NORMAL
TCO2 CALC VENOUS: 27 MMOL/L (ref 21–32)
TROPONIN I SERPL HS-MCNC: 60 NG/L (ref 0–21)
TROPONIN I SERPL HS-MCNC: 63 NG/L (ref 0–21)
TSH SERPL DL<=0.05 MIU/L-ACNC: 0.42 UIU/ML (ref 0.27–4.2)
WBC OTHER # BLD: 14.8 K/UL (ref 4–10.5)

## 2025-01-23 PROCEDURE — 80053 COMPREHEN METABOLIC PANEL: CPT

## 2025-01-23 PROCEDURE — 87086 URINE CULTURE/COLONY COUNT: CPT

## 2025-01-23 PROCEDURE — 99285 EMERGENCY DEPT VISIT HI MDM: CPT

## 2025-01-23 PROCEDURE — 85025 COMPLETE CBC W/AUTO DIFF WBC: CPT

## 2025-01-23 PROCEDURE — 1200000000 HC SEMI PRIVATE

## 2025-01-23 PROCEDURE — 72170 X-RAY EXAM OF PELVIS: CPT

## 2025-01-23 PROCEDURE — 96374 THER/PROPH/DIAG INJ IV PUSH: CPT

## 2025-01-23 PROCEDURE — 87502 INFLUENZA DNA AMP PROBE: CPT

## 2025-01-23 PROCEDURE — 6370000000 HC RX 637 (ALT 250 FOR IP): Performed by: EMERGENCY MEDICINE

## 2025-01-23 PROCEDURE — 87635 SARS-COV-2 COVID-19 AMP PRB: CPT

## 2025-01-23 PROCEDURE — 36415 COLL VENOUS BLD VENIPUNCTURE: CPT

## 2025-01-23 PROCEDURE — 85730 THROMBOPLASTIN TIME PARTIAL: CPT

## 2025-01-23 PROCEDURE — 71045 X-RAY EXAM CHEST 1 VIEW: CPT

## 2025-01-23 PROCEDURE — 83690 ASSAY OF LIPASE: CPT

## 2025-01-23 PROCEDURE — 6360000002 HC RX W HCPCS: Performed by: STUDENT IN AN ORGANIZED HEALTH CARE EDUCATION/TRAINING PROGRAM

## 2025-01-23 PROCEDURE — 96375 TX/PRO/DX INJ NEW DRUG ADDON: CPT

## 2025-01-23 PROCEDURE — 84443 ASSAY THYROID STIM HORMONE: CPT

## 2025-01-23 PROCEDURE — 82550 ASSAY OF CK (CPK): CPT

## 2025-01-23 PROCEDURE — 85610 PROTHROMBIN TIME: CPT

## 2025-01-23 PROCEDURE — 72125 CT NECK SPINE W/O DYE: CPT

## 2025-01-23 PROCEDURE — 72128 CT CHEST SPINE W/O DYE: CPT

## 2025-01-23 PROCEDURE — 87077 CULTURE AEROBIC IDENTIFY: CPT

## 2025-01-23 PROCEDURE — 84484 ASSAY OF TROPONIN QUANT: CPT

## 2025-01-23 PROCEDURE — 72131 CT LUMBAR SPINE W/O DYE: CPT

## 2025-01-23 PROCEDURE — 2500000003 HC RX 250 WO HCPCS: Performed by: STUDENT IN AN ORGANIZED HEALTH CARE EDUCATION/TRAINING PROGRAM

## 2025-01-23 PROCEDURE — 70450 CT HEAD/BRAIN W/O DYE: CPT

## 2025-01-23 PROCEDURE — 6360000002 HC RX W HCPCS: Performed by: EMERGENCY MEDICINE

## 2025-01-23 PROCEDURE — 93005 ELECTROCARDIOGRAM TRACING: CPT | Performed by: EMERGENCY MEDICINE

## 2025-01-23 PROCEDURE — 87040 BLOOD CULTURE FOR BACTERIA: CPT

## 2025-01-23 PROCEDURE — 83605 ASSAY OF LACTIC ACID: CPT

## 2025-01-23 PROCEDURE — 83880 ASSAY OF NATRIURETIC PEPTIDE: CPT

## 2025-01-23 PROCEDURE — 82803 BLOOD GASES ANY COMBINATION: CPT

## 2025-01-23 RX ORDER — POTASSIUM CHLORIDE 1500 MG/1
40 TABLET, EXTENDED RELEASE ORAL PRN
Status: DISCONTINUED | OUTPATIENT
Start: 2025-01-23 | End: 2025-01-26 | Stop reason: HOSPADM

## 2025-01-23 RX ORDER — FUROSEMIDE 10 MG/ML
20 INJECTION INTRAMUSCULAR; INTRAVENOUS 2 TIMES DAILY
Status: DISCONTINUED | OUTPATIENT
Start: 2025-01-24 | End: 2025-01-24

## 2025-01-23 RX ORDER — SODIUM CHLORIDE 9 MG/ML
INJECTION, SOLUTION INTRAVENOUS PRN
Status: DISCONTINUED | OUTPATIENT
Start: 2025-01-23 | End: 2025-01-26 | Stop reason: HOSPADM

## 2025-01-23 RX ORDER — DIGOXIN 125 MCG
125 TABLET ORAL DAILY
Status: DISCONTINUED | OUTPATIENT
Start: 2025-01-24 | End: 2025-01-26 | Stop reason: HOSPADM

## 2025-01-23 RX ORDER — ACETAMINOPHEN 500 MG
1000 TABLET ORAL ONCE
Status: COMPLETED | OUTPATIENT
Start: 2025-01-23 | End: 2025-01-23

## 2025-01-23 RX ORDER — POLYETHYLENE GLYCOL 3350 17 G/17G
17 POWDER, FOR SOLUTION ORAL DAILY PRN
Status: DISCONTINUED | OUTPATIENT
Start: 2025-01-23 | End: 2025-01-26 | Stop reason: HOSPADM

## 2025-01-23 RX ORDER — METOPROLOL SUCCINATE 25 MG/1
12.5 TABLET, EXTENDED RELEASE ORAL NIGHTLY
Status: DISCONTINUED | OUTPATIENT
Start: 2025-01-23 | End: 2025-01-26 | Stop reason: HOSPADM

## 2025-01-23 RX ORDER — MAGNESIUM SULFATE IN WATER 40 MG/ML
2000 INJECTION, SOLUTION INTRAVENOUS PRN
Status: DISCONTINUED | OUTPATIENT
Start: 2025-01-23 | End: 2025-01-26 | Stop reason: HOSPADM

## 2025-01-23 RX ORDER — ONDANSETRON 4 MG/1
4 TABLET, ORALLY DISINTEGRATING ORAL EVERY 8 HOURS PRN
Status: DISCONTINUED | OUTPATIENT
Start: 2025-01-23 | End: 2025-01-26 | Stop reason: HOSPADM

## 2025-01-23 RX ORDER — SODIUM CHLORIDE 0.9 % (FLUSH) 0.9 %
5-40 SYRINGE (ML) INJECTION EVERY 12 HOURS SCHEDULED
Status: DISCONTINUED | OUTPATIENT
Start: 2025-01-23 | End: 2025-01-26 | Stop reason: HOSPADM

## 2025-01-23 RX ORDER — ONDANSETRON 2 MG/ML
4 INJECTION INTRAMUSCULAR; INTRAVENOUS EVERY 6 HOURS PRN
Status: DISCONTINUED | OUTPATIENT
Start: 2025-01-23 | End: 2025-01-26 | Stop reason: HOSPADM

## 2025-01-23 RX ORDER — FUROSEMIDE 10 MG/ML
40 INJECTION INTRAMUSCULAR; INTRAVENOUS ONCE
Status: COMPLETED | OUTPATIENT
Start: 2025-01-23 | End: 2025-01-23

## 2025-01-23 RX ORDER — METOLAZONE 2.5 MG/1
2.5 TABLET ORAL DAILY
Status: DISCONTINUED | OUTPATIENT
Start: 2025-01-24 | End: 2025-01-26 | Stop reason: HOSPADM

## 2025-01-23 RX ORDER — SODIUM CHLORIDE 0.9 % (FLUSH) 0.9 %
5-40 SYRINGE (ML) INJECTION PRN
Status: DISCONTINUED | OUTPATIENT
Start: 2025-01-23 | End: 2025-01-26 | Stop reason: HOSPADM

## 2025-01-23 RX ORDER — FENTANYL CITRATE 50 UG/ML
12.5 INJECTION, SOLUTION INTRAMUSCULAR; INTRAVENOUS
Status: DISCONTINUED | OUTPATIENT
Start: 2025-01-23 | End: 2025-01-23

## 2025-01-23 RX ORDER — PRAVASTATIN SODIUM 40 MG
80 TABLET ORAL NIGHTLY
Status: DISCONTINUED | OUTPATIENT
Start: 2025-01-23 | End: 2025-01-26 | Stop reason: HOSPADM

## 2025-01-23 RX ORDER — POTASSIUM CHLORIDE 7.45 MG/ML
10 INJECTION INTRAVENOUS PRN
Status: DISCONTINUED | OUTPATIENT
Start: 2025-01-23 | End: 2025-01-26 | Stop reason: HOSPADM

## 2025-01-23 RX ORDER — NITROGLYCERIN 0.4 MG/1
0.4 TABLET SUBLINGUAL EVERY 5 MIN PRN
Status: DISCONTINUED | OUTPATIENT
Start: 2025-01-23 | End: 2025-01-26 | Stop reason: HOSPADM

## 2025-01-23 RX ORDER — ACETAMINOPHEN 325 MG/1
650 TABLET ORAL EVERY 6 HOURS PRN
Status: DISCONTINUED | OUTPATIENT
Start: 2025-01-23 | End: 2025-01-26 | Stop reason: HOSPADM

## 2025-01-23 RX ORDER — ACETAMINOPHEN 650 MG/1
650 SUPPOSITORY RECTAL EVERY 6 HOURS PRN
Status: DISCONTINUED | OUTPATIENT
Start: 2025-01-23 | End: 2025-01-26 | Stop reason: HOSPADM

## 2025-01-23 RX ORDER — ONDANSETRON 2 MG/ML
4 INJECTION INTRAMUSCULAR; INTRAVENOUS EVERY 30 MIN PRN
Status: DISCONTINUED | OUTPATIENT
Start: 2025-01-23 | End: 2025-01-23

## 2025-01-23 RX ORDER — ENOXAPARIN SODIUM 100 MG/ML
40 INJECTION SUBCUTANEOUS DAILY
Status: DISCONTINUED | OUTPATIENT
Start: 2025-01-24 | End: 2025-01-23

## 2025-01-23 RX ADMIN — SODIUM CHLORIDE, PRESERVATIVE FREE 10 ML: 5 INJECTION INTRAVENOUS at 22:55

## 2025-01-23 RX ADMIN — ACETAMINOPHEN 1000 MG: 500 TABLET ORAL at 19:01

## 2025-01-23 RX ADMIN — ONDANSETRON 4 MG: 2 INJECTION, SOLUTION INTRAMUSCULAR; INTRAVENOUS at 18:59

## 2025-01-23 RX ADMIN — FUROSEMIDE 40 MG: 10 INJECTION, SOLUTION INTRAMUSCULAR; INTRAVENOUS at 22:54

## 2025-01-23 RX ADMIN — FENTANYL CITRATE 12.5 MCG: 50 INJECTION INTRAMUSCULAR; INTRAVENOUS at 18:59

## 2025-01-23 ASSESSMENT — PAIN DESCRIPTION - LOCATION
LOCATION: KNEE
LOCATION: KNEE

## 2025-01-23 ASSESSMENT — PAIN - FUNCTIONAL ASSESSMENT: PAIN_FUNCTIONAL_ASSESSMENT: NONE - DENIES PAIN

## 2025-01-23 ASSESSMENT — LIFESTYLE VARIABLES
HOW MANY STANDARD DRINKS CONTAINING ALCOHOL DO YOU HAVE ON A TYPICAL DAY: 3 OR 4
HOW OFTEN DO YOU HAVE A DRINK CONTAINING ALCOHOL: 4 OR MORE TIMES A WEEK

## 2025-01-23 ASSESSMENT — PAIN SCALES - GENERAL
PAINLEVEL_OUTOF10: 5
PAINLEVEL_OUTOF10: 4

## 2025-01-23 NOTE — ED PROVIDER NOTES
[facility]      TRIAGE CHIEF COMPLAINT:   Fall and Fatigue (Pt arrives per ems from home, pt states yesterday he was walking in home cleaning and felt he took too many trips and became weak and pt states he lowered himself to the ground.  Pt was unable to get back up and believes he went down at approx 6-9 oclock in the morning yesterday.  Son called family when samantha ble to reach pt and they found him on the floor in his home. Pt has abrasions and redness to right side of face and elbows.  Pt incontinent of urine)      [place]:  Ray Gutierrez is a 84 y.o. male that presents with complaint of fall, back pain malaise fatigue weakness.  Patient lives at home alone and apparently he fell yesterday, not hard fall, was doing too much in the house got fatigued and laid himself down.  He has been laying on the ground all night.  Son found him.  No headache no chest pain no shortness of breath abdominal pain complains of back pain malaise fatigue myalgias has chronic swelling of both lower extremities has A-fib.  He did not pass out at his had just fall fatigue malaise.  No other questions or concerns..    REVIEW OF SYSTEMS:  At least 10 systems reviewed and otherwise acutely negative except as in the [place].    Review of Systems   Constitutional:  Positive for activity change, appetite change and fatigue.   HENT: Negative.     Eyes: Negative.    Respiratory: Negative.     Cardiovascular:  Positive for leg swelling.   Gastrointestinal: Negative.    Endocrine: Negative.    Genitourinary: Negative.    Musculoskeletal:  Positive for arthralgias, back pain and myalgias.   Skin: Negative.    Neurological:  Positive for weakness.   Hematological: Negative.    Psychiatric/Behavioral: Negative.     All other systems reviewed and are negative.      Past Medical History:   Diagnosis Date    AR (aortic regurgitation)     mild to mod    Atrial fibrillation, new onset (HCC)     CAD (coronary artery disease)      Value Ref Range    Ventricular Rate 83 BPM    Atrial Rate 241 BPM    QRS Duration 140 ms    Q-T Interval 370 ms    QTc Calculation (Bazett) 434 ms    R Axis -72 degrees    T Axis 15 degrees    Diagnosis       Atrial fibrillation with premature ventricular or aberrantly conducted complexes  Left axis deviation  Right bundle branch block  Abnormal ECG  When compared with ECG of 16-NOV-2023 22:13,  Right bundle branch block has replaced RSR' pattern in V1  Criteria for Anterior infarct are no longer present  Criteria for Anterolateral infarct are no longer present          Radiographs (if obtained):  [] The following radiograph was interpreted by myself in the absence of a radiologist:  [x] Radiologist's Report Reviewed:    CT Brain, C/T/L spine, CXR    EKG (if obtained): (All EKG's are interpreted by myself in the absence of a cardiologist)    12 lead EKG per my interpretation:  Atrial Fibrillation 83 RBBB  [place] is   Left axis deviation  QTc is   434  There is no specific T wave changes appreciated.  There is no specific ST wave changes appreciated.    Prior EKG to compare with was available and similar to previous.      MDM:    Patient complaint of fatigue malaise fall.  Again patient was at home alone apparently he did too much in the house yesterday and he laid himself down he did not have a hard fall but he fell and laid himself down on the ground he has been on the ground all night.  Son found him.  He did not pass out did not hit his head no chest pain shortness of breath abdominal pain just malaise fatigue generalized weakness has chronic peripheral edema, has A-fib.  On arrival he was in A-fib and RVR he has no chest pain or shortness of breath abdominal pain he is on oxygen here, does have back pain thoracic and lumbar and also any signs of trauma no neck pain no headache, has generalized weakness and peripheral edema venous stasis dermatitis appearance to both shins.  Otherwise well-appearing vital signs

## 2025-01-24 ENCOUNTER — APPOINTMENT (OUTPATIENT)
Dept: MRI IMAGING | Age: 85
DRG: 947 | End: 2025-01-24
Payer: MEDICARE

## 2025-01-24 ENCOUNTER — APPOINTMENT (OUTPATIENT)
Dept: CT IMAGING | Age: 85
DRG: 947 | End: 2025-01-24
Attending: STUDENT IN AN ORGANIZED HEALTH CARE EDUCATION/TRAINING PROGRAM
Payer: MEDICARE

## 2025-01-24 LAB
AMORPH SED URNS QL MICRO: PRESENT
ANION GAP SERPL CALCULATED.3IONS-SCNC: 14 MMOL/L (ref 4–16)
BACTERIA URNS QL MICRO: ABNORMAL
BILIRUB UR QL STRIP: NEGATIVE
BUN SERPL-MCNC: 36 MG/DL (ref 6–23)
CALCIUM SERPL-MCNC: 8.6 MG/DL (ref 8.3–10.6)
CHLORIDE SERPL-SCNC: 100 MMOL/L (ref 99–110)
CLARITY UR: CLEAR
CO2 SERPL-SCNC: 25 MMOL/L (ref 21–32)
COLOR UR: YELLOW
CREAT SERPL-MCNC: 1.2 MG/DL (ref 0.9–1.3)
EPI CELLS #/AREA URNS HPF: ABNORMAL /HPF
ERYTHROCYTE [DISTWIDTH] IN BLOOD BY AUTOMATED COUNT: 15.9 % (ref 11.7–14.9)
GFR, ESTIMATED: 60 ML/MIN/1.73M2
GLUCOSE SERPL-MCNC: 77 MG/DL (ref 74–99)
GLUCOSE UR STRIP-MCNC: NEGATIVE MG/DL
HCT VFR BLD AUTO: 31.2 % (ref 42–52)
HGB BLD-MCNC: 10.1 G/DL (ref 13.5–18)
HGB UR QL STRIP.AUTO: ABNORMAL
KETONES UR STRIP-MCNC: NEGATIVE MG/DL
LEUKOCYTE ESTERASE UR QL STRIP: NEGATIVE
MAGNESIUM SERPL-MCNC: 1.8 MG/DL (ref 1.8–2.4)
MCH RBC QN AUTO: 32 PG (ref 27–31)
MCHC RBC AUTO-ENTMCNC: 32.4 G/DL (ref 32–36)
MCV RBC AUTO: 98.7 FL (ref 78–100)
NITRITE UR QL STRIP: NEGATIVE
PH UR STRIP: 6 [PH] (ref 5–8)
PHOSPHATE SERPL-MCNC: 4.3 MG/DL (ref 2.5–4.9)
PLATELET # BLD AUTO: 176 K/UL (ref 140–440)
PMV BLD AUTO: 12.1 FL (ref 7.5–11.1)
POTASSIUM SERPL-SCNC: 3.2 MMOL/L (ref 3.5–5.1)
PROT UR STRIP-MCNC: ABNORMAL MG/DL
RBC # BLD AUTO: 3.16 M/UL (ref 4.6–6.2)
RBC #/AREA URNS HPF: ABNORMAL /HPF
SODIUM SERPL-SCNC: 139 MMOL/L (ref 135–145)
SP GR UR STRIP: 1.01 (ref 1–1.03)
TROPONIN I SERPL HS-MCNC: 62 NG/L (ref 0–21)
TROPONIN I SERPL HS-MCNC: 65 NG/L (ref 0–21)
UROBILINOGEN UR STRIP-ACNC: 0.2 EU/DL (ref 0–1)
WBC #/AREA URNS HPF: ABNORMAL /HPF
WBC OTHER # BLD: 15.8 K/UL (ref 4–10.5)

## 2025-01-24 PROCEDURE — 73700 CT LOWER EXTREMITY W/O DYE: CPT

## 2025-01-24 PROCEDURE — 6360000004 HC RX CONTRAST MEDICATION: Performed by: FAMILY MEDICINE

## 2025-01-24 PROCEDURE — 1200000000 HC SEMI PRIVATE

## 2025-01-24 PROCEDURE — 84484 ASSAY OF TROPONIN QUANT: CPT

## 2025-01-24 PROCEDURE — 85027 COMPLETE CBC AUTOMATED: CPT

## 2025-01-24 PROCEDURE — 36415 COLL VENOUS BLD VENIPUNCTURE: CPT

## 2025-01-24 PROCEDURE — 81001 URINALYSIS AUTO W/SCOPE: CPT

## 2025-01-24 PROCEDURE — 83735 ASSAY OF MAGNESIUM: CPT

## 2025-01-24 PROCEDURE — 2580000003 HC RX 258: Performed by: STUDENT IN AN ORGANIZED HEALTH CARE EDUCATION/TRAINING PROGRAM

## 2025-01-24 PROCEDURE — 97162 PT EVAL MOD COMPLEX 30 MIN: CPT

## 2025-01-24 PROCEDURE — 6360000002 HC RX W HCPCS: Performed by: NURSE PRACTITIONER

## 2025-01-24 PROCEDURE — 97166 OT EVAL MOD COMPLEX 45 MIN: CPT

## 2025-01-24 PROCEDURE — 6370000000 HC RX 637 (ALT 250 FOR IP): Performed by: STUDENT IN AN ORGANIZED HEALTH CARE EDUCATION/TRAINING PROGRAM

## 2025-01-24 PROCEDURE — 2500000003 HC RX 250 WO HCPCS: Performed by: STUDENT IN AN ORGANIZED HEALTH CARE EDUCATION/TRAINING PROGRAM

## 2025-01-24 PROCEDURE — 51798 US URINE CAPACITY MEASURE: CPT

## 2025-01-24 PROCEDURE — 6360000002 HC RX W HCPCS: Performed by: FAMILY MEDICINE

## 2025-01-24 PROCEDURE — 73720 MRI LWR EXTREMITY W/O&W/DYE: CPT

## 2025-01-24 PROCEDURE — A9579 GAD-BASE MR CONTRAST NOS,1ML: HCPCS | Performed by: FAMILY MEDICINE

## 2025-01-24 PROCEDURE — 97530 THERAPEUTIC ACTIVITIES: CPT

## 2025-01-24 PROCEDURE — 94761 N-INVAS EAR/PLS OXIMETRY MLT: CPT

## 2025-01-24 PROCEDURE — 80048 BASIC METABOLIC PNL TOTAL CA: CPT

## 2025-01-24 PROCEDURE — 2500000003 HC RX 250 WO HCPCS: Performed by: NURSE PRACTITIONER

## 2025-01-24 PROCEDURE — 84100 ASSAY OF PHOSPHORUS: CPT

## 2025-01-24 PROCEDURE — 6360000002 HC RX W HCPCS: Performed by: STUDENT IN AN ORGANIZED HEALTH CARE EDUCATION/TRAINING PROGRAM

## 2025-01-24 RX ORDER — SODIUM CHLORIDE, SODIUM LACTATE, POTASSIUM CHLORIDE, CALCIUM CHLORIDE 600; 310; 30; 20 MG/100ML; MG/100ML; MG/100ML; MG/100ML
INJECTION, SOLUTION INTRAVENOUS CONTINUOUS
Status: DISCONTINUED | OUTPATIENT
Start: 2025-01-24 | End: 2025-01-24

## 2025-01-24 RX ORDER — POTASSIUM CHLORIDE 1500 MG/1
40 TABLET, EXTENDED RELEASE ORAL ONCE
Status: DISCONTINUED | OUTPATIENT
Start: 2025-01-24 | End: 2025-01-25

## 2025-01-24 RX ORDER — METOPROLOL TARTRATE 1 MG/ML
5 INJECTION, SOLUTION INTRAVENOUS ONCE
Status: COMPLETED | OUTPATIENT
Start: 2025-01-24 | End: 2025-01-24

## 2025-01-24 RX ORDER — LINEZOLID 2 MG/ML
600 INJECTION, SOLUTION INTRAVENOUS EVERY 12 HOURS
Status: DISCONTINUED | OUTPATIENT
Start: 2025-01-24 | End: 2025-01-26 | Stop reason: HOSPADM

## 2025-01-24 RX ORDER — SULFAMETHOXAZOLE AND TRIMETHOPRIM 800; 160 MG/1; MG/1
1 TABLET ORAL EVERY 12 HOURS SCHEDULED
Status: DISCONTINUED | OUTPATIENT
Start: 2025-01-24 | End: 2025-01-24

## 2025-01-24 RX ORDER — PANTOPRAZOLE SODIUM 40 MG/10ML
40 INJECTION, POWDER, LYOPHILIZED, FOR SOLUTION INTRAVENOUS DAILY
Status: DISCONTINUED | OUTPATIENT
Start: 2025-01-24 | End: 2025-01-24

## 2025-01-24 RX ORDER — FUROSEMIDE 10 MG/ML
40 INJECTION INTRAMUSCULAR; INTRAVENOUS 2 TIMES DAILY
Status: DISCONTINUED | OUTPATIENT
Start: 2025-01-24 | End: 2025-01-25

## 2025-01-24 RX ADMIN — METOPROLOL SUCCINATE 12.5 MG: 25 TABLET, EXTENDED RELEASE ORAL at 01:00

## 2025-01-24 RX ADMIN — DIGOXIN 125 MCG: 125 TABLET ORAL at 08:32

## 2025-01-24 RX ADMIN — POTASSIUM CHLORIDE 40 MEQ: 1500 TABLET, EXTENDED RELEASE ORAL at 04:50

## 2025-01-24 RX ADMIN — METOPROLOL SUCCINATE 12.5 MG: 25 TABLET, EXTENDED RELEASE ORAL at 20:51

## 2025-01-24 RX ADMIN — WATER 1000 MG: 1 INJECTION INTRAMUSCULAR; INTRAVENOUS; SUBCUTANEOUS at 00:54

## 2025-01-24 RX ADMIN — LINEZOLID 600 MG: 600 INJECTION, SOLUTION INTRAVENOUS at 20:58

## 2025-01-24 RX ADMIN — GADOTERIDOL 20 ML: 279.3 INJECTION, SOLUTION INTRAVENOUS at 15:12

## 2025-01-24 RX ADMIN — CEFEPIME 2000 MG: 2 INJECTION, POWDER, FOR SOLUTION INTRAVENOUS at 01:16

## 2025-01-24 RX ADMIN — LINEZOLID 600 MG: 600 INJECTION, SOLUTION INTRAVENOUS at 10:17

## 2025-01-24 RX ADMIN — PANTOPRAZOLE SODIUM 40 MG: 40 INJECTION, POWDER, FOR SOLUTION INTRAVENOUS at 08:32

## 2025-01-24 RX ADMIN — SODIUM CHLORIDE, POTASSIUM CHLORIDE, SODIUM LACTATE AND CALCIUM CHLORIDE: 600; 310; 30; 20 INJECTION, SOLUTION INTRAVENOUS at 01:15

## 2025-01-24 RX ADMIN — METOLAZONE 2.5 MG: 2.5 TABLET ORAL at 08:32

## 2025-01-24 RX ADMIN — METOROPROLOL TARTRATE 5 MG: 5 INJECTION, SOLUTION INTRAVENOUS at 11:24

## 2025-01-24 RX ADMIN — PRAVASTATIN SODIUM 80 MG: 40 TABLET ORAL at 20:51

## 2025-01-24 RX ADMIN — ACETAMINOPHEN 650 MG: 325 TABLET ORAL at 22:26

## 2025-01-24 RX ADMIN — ACETAMINOPHEN 650 MG: 325 TABLET ORAL at 04:55

## 2025-01-24 RX ADMIN — FUROSEMIDE 20 MG: 10 INJECTION, SOLUTION INTRAMUSCULAR; INTRAVENOUS at 08:32

## 2025-01-24 RX ADMIN — SODIUM CHLORIDE, PRESERVATIVE FREE 10 ML: 5 INJECTION INTRAVENOUS at 08:32

## 2025-01-24 RX ADMIN — PRAVASTATIN SODIUM 80 MG: 40 TABLET ORAL at 01:00

## 2025-01-24 RX ADMIN — SODIUM CHLORIDE, PRESERVATIVE FREE 10 ML: 5 INJECTION INTRAVENOUS at 20:58

## 2025-01-24 RX ADMIN — CEFEPIME 2000 MG: 2 INJECTION, POWDER, FOR SOLUTION INTRAVENOUS at 14:49

## 2025-01-24 RX ADMIN — FUROSEMIDE 40 MG: 10 INJECTION, SOLUTION INTRAMUSCULAR; INTRAVENOUS at 22:27

## 2025-01-24 ASSESSMENT — PAIN DESCRIPTION - ORIENTATION
ORIENTATION: MID
ORIENTATION: RIGHT;LEFT

## 2025-01-24 ASSESSMENT — PAIN - FUNCTIONAL ASSESSMENT: PAIN_FUNCTIONAL_ASSESSMENT: ACTIVITIES ARE NOT PREVENTED

## 2025-01-24 ASSESSMENT — PAIN DESCRIPTION - PAIN TYPE: TYPE: ACUTE PAIN

## 2025-01-24 ASSESSMENT — PAIN SCALES - GENERAL
PAINLEVEL_OUTOF10: 3
PAINLEVEL_OUTOF10: 0
PAINLEVEL_OUTOF10: 4

## 2025-01-24 ASSESSMENT — PAIN DESCRIPTION - ONSET: ONSET: PROGRESSIVE

## 2025-01-24 ASSESSMENT — PAIN DESCRIPTION - DESCRIPTORS
DESCRIPTORS: ACHING
DESCRIPTORS: ACHING

## 2025-01-24 ASSESSMENT — PAIN DESCRIPTION - LOCATION
LOCATION: SHOULDER
LOCATION: FOOT

## 2025-01-24 ASSESSMENT — PAIN DESCRIPTION - FREQUENCY: FREQUENCY: INTERMITTENT

## 2025-01-24 ASSESSMENT — PAIN DESCRIPTION - DIRECTION: RADIATING_TOWARDS: N.A

## 2025-01-24 NOTE — PROGRESS NOTES
4 - 16 mmol/L    Glucose 85 74 - 99 mg/dL    BUN 33 (H) 6 - 23 mg/dL    Creatinine 1.2 0.9 - 1.3 mg/dL    Est, Glom Filt Rate 60 (L) >60 mL/min/1.73m2    Calcium 8.7 8.3 - 10.6 mg/dL    Total Protein 6.0 (L) 6.4 - 8.2 g/dL    Albumin 2.7 (L) 3.4 - 5.0 g/dL    Albumin/Globulin Ratio 0.8 (L) 1.1 - 2.2    Total Bilirubin 0.6 0.0 - 1.0 mg/dL    Alkaline Phosphatase 98 40 - 129 U/L    ALT 16 10 - 40 U/L    AST 70 (H) 15 - 37 U/L   Troponin    Collection Time: 01/23/25  5:22 PM   Result Value Ref Range    Troponin, High Sensitivity 60 (HH) 0 - 21 ng/L   Brain Natriuretic Peptide    Collection Time: 01/23/25  5:22 PM   Result Value Ref Range    NT Pro-BNP 12,828 (H) 0 - 450 pg/mL   Lactate, Sepsis    Collection Time: 01/23/25  5:22 PM   Result Value Ref Range    Lactic Acid, Sepsis 1.8 0.4 - 2.0 mmol/L   CK    Collection Time: 01/23/25  5:22 PM   Result Value Ref Range    Total  (H) 38 - 174 U/L   Protime-INR    Collection Time: 01/23/25  5:22 PM   Result Value Ref Range    Protime 15.2 (H) 11.7 - 14.5 sec    INR 1.2    PTT    Collection Time: 01/23/25  5:22 PM   Result Value Ref Range    APTT 37.0 25.1 - 37.1 sec   Lipase    Collection Time: 01/23/25  5:22 PM   Result Value Ref Range    Lipase 8 (L) 13 - 60 U/L   TSH    Collection Time: 01/23/25  5:22 PM   Result Value Ref Range    TSH 0.42 0.27 - 4.20 uIU/mL   POCT Blood gas, venous    Collection Time: 01/23/25  5:57 PM   Result Value Ref Range    pH, Flavio 7.421 7.32 - 7.43    pCO2, Flavio 40.0 (L) 41.0 - 51.0 mm Hg    PO2, Falvio 32.7 28.0 - 48.0 mm Hg    HCO3, Venous 26.0 22.0 - 29.0 mmol/L    TC02 (Calc), Flavio 27 21 - 32 mmol/L    Positive Base Excess, Flavio 1.5 0.0 - 3.0 mmol/L    O2 Sat, Flavio 64.3 34.0 - 95.0 %   COVID-19, Rapid    Collection Time: 01/23/25  6:10 PM    Specimen: Nasopharyngeal Swab   Result Value Ref Range    Specimen Description .NASOPHARYNGEAL SWAB     SARS-CoV-2, Rapid Not Detected Not Detected   Influenza A and B    Collection Time: 01/23/25  6:15 PM  attempts have been made to review the dictation as it is transcribed, on occasion the spoken word can be misinterpreted by the technology leading to omissions or inappropriate words, phrases or sentences.  Dictated and Electronically Signed By: Henry Lemon MD 1/23/2025 19:26        XR CHEST PORTABLE    Result Date: 1/23/2025  EXAMINATION: XR CHEST PORTABLE DATE OF EXAM:  1/23/2025 19:00 DEMOGRAPHICS: 84 years old Male INDICATION: dyspnea COMPARISON: No existing relevant imaging study corresponding to the same anatomical region is available. TECHNIQUE: Single AP portable chest radiograph was obtained. FINDINGS: A left chest wall port is seen with the tip in the proximal SVC. The cardiomediastinal silhouette is enlarged. Diffuse bony vascular congestion is identified. Possible small right pleural effusion may be present. No focal consolidation is seen. The osseous and soft tissue structures are without acute abnormality. IMPRESSION: 1.  Findings which may represent mild congestive changes. This dictation was created with voice recognition software.  While attempts have  been made to review the dictation as it is transcribed, on occasion the spoken word can be misinterpreted by the technology leading to omissions or inappropriate words, phrases or sentences.  Dictated and Electronically Signed By: Hill De La Garza MD 1/23/2025 19:12        XR PELVIS (1-2 VIEWS)    Result Date: 1/23/2025  PROCEDURE: XR PELVIS (1-2 VIEWS) DATE OF EXAM:  1/23/2025 19:00 DEMOGRAPHICS: 84 years old Male INDICATION: trauma COMPARISON: No existing relevant imaging study corresponding to the same anatomical region is available. FINDINGS:  The osseous structures are osteopenic. No acute displaced fracture or dislocation is seen. Degenerative changes in the bilateral hips is seen. Vascular calcifications are seen. The soft tissue structures are intact. IMPRESSION:  1.  No evidence for acute abnormality. This dictation was created with voice

## 2025-01-24 NOTE — PROGRESS NOTES
Physical Therapy  Facility[facility]Department[facility]  Physical Therapy Initial Assessment    Name: Ray Gutierrez  : 1940  MRN: [id number]  Date of Service: 2025    Discharge Recommendations:  Continue to assess pending progress, Subacute/Skilled Nursing Facility          Patient Diagnosis(es): The primary encounter diagnosis was Fall, initial encounter. Diagnoses of Other fatigue, Elevated brain natriuretic peptide (BNP) level, Atrial fibrillation, unspecified type (HCC), Peripheral edema, Generalized weakness, Back pain, unspecified back location, unspecified back pain laterality, unspecified chronicity, and Leg swelling were also pertinent to this visit.  Past Medical History:  has a past medical history of AR (aortic regurgitation), Atrial fibrillation, new onset ([facility]), CAD (coronary artery disease), Chronic midline low back pain without sciatica, COPD (chronic obstructive pulmonary disease) ([facility]), Family history of coronary artery disease, Fatigue, H/O cardiovascular stress test, H/O chest x-ray, H/O Doppler ultrasound, H/O echocardiogram, History of cardiac cath, History of PTCA, Hyperlipidemia, Hypertension, Obesity, Obesity, Class II, BMI 35-39.9, with comorbidity, RBBB, Risk for falls, Stage 3 chronic kidney disease (HCC), and Tachycardia.  Past Surgical History:  has a past surgical history that includes joint replacement (); Coronary angioplasty with stent; Upper gastrointestinal endoscopy (N/A, 2022); Colonoscopy (N/A, 2022); and Multiple tooth extractions.    Assessment  Body Structures, Functions, Activity Limitations Requiring Skilled Therapeutic Intervention: Decreased strength;Decreased functional mobility ;Decreased ADL status;Decreased high-level IADLs  Assessment: Patient is a 84 y.o. male who presents to [facility] with Generalized weakness with altered mental status. Patient presents below baseline functional level with impaired functional mobility, strength and would        Therapy Time   Individual Concurrent Group Co-treatment   Time In       0905   Time Out       0934   Minutes       29   Timed Code Treatment Minutes: 10 Minutes       FLIP Arnold, PT

## 2025-01-24 NOTE — H&P
diffuse osteopenia. There is old, healed inferior endplate anterior wedge compression fracture of T4. Discogenic endplate changes are present from C2/C3 through T4/T5 and at T10/T11. There are a few other Schmorl's nodes present. There is normal alignment. There is no osteolytic or osteoblastic lesions seen. Anterior and lateral osteophytic spurs are present. Otherwise normal vertebral bodies and intravertebral discs. No acute fracture or dislocation seen. The spinal canal appears normal.  The paraspinal soft tissues are unremarkable. There are several, old healed right-sided rib fractures. There are small bilateral pleural effusions. Coronary  arteries severe calcific plaques present. CT SCAN OF THE LUMBAR SPINE: The CT of the lumbar spine demonstrates diffuse osteopenia. There is no acute fracture or dislocation seen. There is mild levoscoliosis centered at L4/L5. There are severe multisegment lumbar spine degenerative changes. IMPRESSION: No acute fracture or dislocation seen in the cervical spine, the thoracic spine or the lumbar spine. Spine degenerative changes as discussed above, most notable at the cervical spine and the lumbar spine. Diffuse osteopenia. Small bilateral pleural effusions. Please see above for more details. If there is persistent clinical concern, appropriate additional investigations maybe performed. This dictation was created with voice recognition software. While attempts have been made to review the dictation as it is transcribed, on occasion the spoken word can be misinterpreted by the technology leading to omissions or inappropriate words, phrases or sentences.  Dictated and Electronically Signed By: Henry Lemon MD 1/23/2025 19:26        XR CHEST PORTABLE    Result Date: 1/23/2025  EXAMINATION: XR CHEST PORTABLE DATE OF EXAM:  1/23/2025 19:00 DEMOGRAPHICS: 84 years old Male INDICATION: dyspnea COMPARISON: No existing relevant imaging study corresponding to the same anatomical  region is available. TECHNIQUE: Single AP portable chest radiograph was obtained. FINDINGS: A left chest wall port is seen with the tip in the proximal SVC. The cardiomediastinal silhouette is enlarged. Diffuse bony vascular congestion is identified. Possible small right pleural effusion may be present. No focal consolidation is seen. The osseous and soft tissue structures are without acute abnormality. IMPRESSION: 1.  Findings which may represent mild congestive changes. This dictation was created with voice recognition software.  While attempts have  been made to review the dictation as it is transcribed, on occasion the spoken word can be misinterpreted by the technology leading to omissions or inappropriate words, phrases or sentences.  Dictated and Electronically Signed By: Hill De La Garza MD 1/23/2025 19:12        XR PELVIS (1-2 VIEWS)    Result Date: 1/23/2025  PROCEDURE: XR PELVIS (1-2 VIEWS) DATE OF EXAM:  1/23/2025 19:00 DEMOGRAPHICS: 84 years old Male INDICATION: trauma COMPARISON: No existing relevant imaging study corresponding to the same anatomical region is available. FINDINGS:  The osseous structures are osteopenic. No acute displaced fracture or dislocation is seen. Degenerative changes in the bilateral hips is seen. Vascular calcifications are seen. The soft tissue structures are intact. IMPRESSION:  1.  No evidence for acute abnormality. This dictation was created with voice recognition software.  While attempts have  been made to review the dictation as it is transcribed, on occasion the spoken word can be misinterpreted by the technology leading to omissions or inappropriate words, phrases or sentences.  Dictated and Electronically Signed By: Hill De La Garza MD 1/23/2025 19:11        CT HEAD WO CONTRAST    Result Date: 1/23/2025  PROCEDURE: CT HEAD WO CONTRAST DATE OF EXAM:  1/23/2025 19:00 INDICATION: HEAD TRAUMA, CLOSED, MILD, GCS >= 13, NO RISK FACTORS, NEURO EXAM NORMAL, fall COMPARISON: None

## 2025-01-24 NOTE — CARE COORDINATION
01/24/25 0655   Service Assessment   Patient Orientation Person;Place   Cognition Alert   History Provided By Patient   Primary Caregiver Self   Support Systems Children;Family Members   PCP Verified by CM Yes   Prior Functional Level Assistance with the following:;Mobility   Current Functional Level Assistance with the following:;Mobility   Can patient return to prior living arrangement Unknown at present   Ability to make needs known: Good   Family able to assist with home care needs: Other (comment)  (Limited)   Would you like for me to discuss the discharge plan with any other family members/significant others, and if so, who? Yes  (Children)   Financial Resources Medicare   Community Resources None   Social/Functional History   Lives With Alone   Type of Home House   Home Equipment Walker - Rolling   Receives Help From Family   Active  Yes  (States he drives occasionaly but only locally)   Discharge Planning   Type of Residence House   Living Arrangements Alone   Current Services Prior To Admission None   Potential Assistance Needed Home Care;Skilled Nursing Facility   Type of Home Care Services None   Patient expects to be discharged to: House   History of falls? 1   Services At/After Discharge   Services At/After Discharge Home Health;Skilled Nursing Facility (SNF)   Mode of Transport at Discharge Other (see comment)  (TBD)   Confirm Follow Up Transport Family   Condition of Participation: Discharge Planning   The Plan for Transition of Care is related to the following treatment goals: Plans to return home   The Patient and/or Patient Representative was provided with a Choice of Provider? Patient   The Patient and/Or Patient Representative agree with the Discharge Plan? Yes   Freedom of Choice list was provided with basic dialogue that supports the patient's individualized plan of care/goals, treatment preferences, and shares the quality data associated with the providers?  Yes     BRENDA met with the  patient to initiate discharge planning, some confusion noted.  Patient lives alone in a single level home (several steps to enter), has medical coverage & PCP, stated that he is mostly independent with ADLs, and is an active  (stated he drives occasionally but only locally).  Patient uses a walker at home and did not require home oxygen prior to admission.  Patient appears to be alert to person/time but unsure of his current location and situation.  Patient reported that he is normally able to bathe himself, dress himself, and prepare his own meals.  CM spoke with the patient regarding possible placement for skilled therapy prior to returning home and the patient advised that he plans to return home upon discharge.  Patient voiced agreement with [facility] services following discharge, will likely need input from children as well. PT/OT evaluations are pending at this time.  CM will follow.

## 2025-01-24 NOTE — CONSULTS
[facility] Nurse  Consult Note       Ray Gutierrez  AGE: 84 y.o.   GENDER: male  : 1940  TODAY'S DATE:  2025    Subjective:     Reason for CWOCN Evaluation and Assessment: wound assessment      Ray Gutierrez is a 84 y.o. male referred by:   [x] Physician  [] Nursing  [] Other:     Wound Identification:  Wound Type: traumatic and fissures, possible pressure and/or arterial  Contributing Factors: edema, chronic pressure, decreased mobility, obesity, arterial insufficiency, malnutrition, and falls        PAST MEDICAL HISTORY        Diagnosis Date    AR (aortic regurgitation)     mild to mod    Atrial fibrillation, new onset ([facility])     CAD (coronary artery disease)     History of angioplasty    Chronic midline low back pain without sciatica 2017    Has seen Dr. Jeffrey and had shots    COPD (chronic obstructive pulmonary disease) ([facility])     Family history of coronary artery disease     Fatigue 10/2016    H/O cardiovascular stress test 2012    mild ischemia in the basal inferior and mid inferior regions ef is 54    H/O chest x-ray 10/20/2016    Right pleural effusion resolved.    H/O Doppler ultrasound 10/2016    CAROTID-[place].arteries patent w/less than 50% stenosis in the internal carotid arteries.    H/O echocardiogram 2015    Aortic sclerosis without stenosis, normal LV size and wall motion w/low normal systolic function, LA dilatation, RV dilatation, mild pulmonary HTN, EF 50-55%.    History of cardiac cath 1996    LV uniform LV contractility RCa dominatn 50-60 prox stenosis  LM patent  LAD mild mid plaquing diag has 70 ostial narrowing ramus minimal plaquing cx normal    History of PTCA 08/15/2011    prox lad bare metal stent 2011    Hyperlipidemia     Hypertension     Obesity     Obesity, Class II, BMI 35-39.9, with comorbidity 2014    RBBB     Risk for falls 10/26/2015    Stage 3 chronic kidney disease (HCC) 2024    Tachycardia        PAST SURGICAL  tablet Take 1 tablet by mouth daily      digoxin (LANOXIN) 125 MCG tablet Take 1 tablet by mouth daily 30 tablet 0    nitroGLYCERIN (NITROSTAT) 0.4 MG SL tablet Place 1 tablet under the tongue every 5 minutes as needed for Chest pain 25 tablet 3    Cholecalciferol (VITAMIN D3) 2000 UNITS CAPS Take 1 capsule by mouth daily      pravastatin (PRAVACHOL) 80 MG tablet TAKE ONE TABLET BY MOUTH EVERY DAY (Patient taking differently: Take 1 tablet by mouth at bedtime TAKE ONE TABLET BY MOUTH EVERY DAY) 30 tablet 6    vitamin B-12 (CYANOCOBALAMIN) 1000 MCG tablet Take 1 tablet by mouth daily      albuterol (PROVENTIL HFA;VENTOLIN HFA) 108 (90 BASE) MCG/ACT inhaler Inhale 2 puffs into the lungs every 6 hours as needed for Wheezing      ferrous gluconate 325 (37.5 FE) MG TABS Take 1 tablet by mouth 2 times daily      tiotropium (SPIRIVA) 18 MCG inhalation capsule Inhale 1 capsule into the lungs daily      lansoprazole (PREVACID) 30 MG delayed release capsule Take 1 capsule by mouth daily      metoprolol succinate (TOPROL XL) 25 MG extended release tablet Take 0.5 tablets by mouth nightly 30 tablet 3    furosemide (LASIX) 40 MG tablet Take 1 tablet by mouth daily (Patient not taking: Reported on 1/31/2024)      miconazole (MICOTIN) 2 % powder Apply topically 2 times daily. 45 g 1         Objective:      /64   Pulse (!) 130   Temp 98.1 °F (36.7 °C) (Oral)   Resp 23   Ht 1.803 m (5' 11\")   Wt 88.5 kg (195 lb)   SpO2 100%   BMI 27.20 kg/m²   Holland Risk Score: Holland Scale Score: 17    LABS    CBC:   Lab Results   Component Value Date/Time    WBC 15.8 01/24/2025 04:05 AM    RBC 3.16 01/24/2025 04:05 AM    HGB 10.1 01/24/2025 04:05 AM    HCT 31.2 01/24/2025 04:05 AM    MCV 98.7 01/24/2025 04:05 AM    MCH 32.0 01/24/2025 04:05 AM    MCHC 32.4 01/24/2025 04:05 AM    RDW 15.9 01/24/2025 04:05 AM     01/24/2025 04:05 AM    MPV 12.1 01/24/2025 04:05 AM     CMP:    Lab Results   Component Value Date/Time

## 2025-01-24 NOTE — PROGRESS NOTES
Occupational Therapy  Facility/Department: [facility]  Occupational Therapy Initial Assessment    Name aRy SMITH 1940   MRN [id number]   Date of Service 2025   Patient Room       Patient Diagnoses The primary encounter diagnosis was Fall, initial encounter. Diagnoses of Other fatigue, Elevated brain natriuretic peptide (BNP) level, Atrial fibrillation, unspecified type (HCC), Peripheral edema, Generalized weakness, Back pain, unspecified back location, unspecified back pain laterality, unspecified chronicity, and Leg swelling were also pertinent to this visit.   Past Medical History  has a past medical history of AR (aortic regurgitation), Atrial fibrillation, new onset (HCC), CAD (coronary artery disease), Chronic midline low back pain without sciatica, COPD (chronic obstructive pulmonary disease) ([facility]), Family history of coronary artery disease, Fatigue, H/O cardiovascular stress test, H/O chest x-ray, H/O Doppler ultrasound, H/O echocardiogram, History of cardiac cath, History of PTCA, Hyperlipidemia, Hypertension, Obesity, Obesity, Class II, BMI 35-39.9, with comorbidity, RBBB, Risk for falls, Stage 3 chronic kidney disease (HCC), and Tachycardia.   Past Surgical History  has a past surgical history that includes joint replacement (); Coronary angioplasty with stent; Upper gastrointestinal endoscopy (N/A, 2022); Colonoscopy (N/A, 2022); and Multiple tooth extractions.       Discharge Recommendations  Continue to assess pending progress, Skilled Nursing Facility, or Swingbed  Equipment: Continue to assess    Assessment  Conditions Requiring Skilled Therapeutic Intervention: Decreased functional mobility, Decreased strength, Decreased endurance, Decreased ADL status, Decreased high level ADLs/IADLs, Impaired cognition and Increased pain  Assessment of Evaluation: Patient is a 84 y.o. male who presents to [facility] with Generalized weakness with altered mental status.

## 2025-01-24 NOTE — ED NOTES
Pt repeatedly asking for food, when telling pt that we need to wait on xrays to be resulted he reaches up and states he can't reach it grabbing at air.  Pt right foot with

## 2025-01-24 NOTE — ED PROVIDER NOTES
Received signout at the end my shift from Dr. Turner.  Patient is an 84-year-old male with history of A-fib who presented emergency department after fall yesterday with preceding generalized weakness.  Patient lives alone and has been having issues with increased lower extremity swelling and weakness.  Patient is been hemodynamically stable with fairly unremarkable lab work thus far.    Plan at signout is for hospitalization due to generalized weakness pending results of imaging studies.    Results of all imaging studies reviewed without any acute injuries identified.    Patient reevaluated at bedside and patient is agreeable with hospitalization.  Family is also present at bedside states he has been slightly more confused than usual.  Urine studies still pending.  Patient does have bilateral lower extremity edema and erythema.    Dose of Lasix ordered as rhabdomyolysis at this point seems to be ruled out.    In regards to patient's hypoxemia that was documented earlier read is with very poor waveform.  Patient is satting in the mid to high 90s on room air.  No respiratory complaints.    Patient admitted in stable condition.     Karen Newby DO  01/24/25 [id number]

## 2025-01-24 NOTE — PROGRESS NOTES
4 Eyes Skin Assessment     NAME:  Ray Gutierrez  YOB: 1940  MEDICAL RECORD NUMBER:  [id number]    The patient is being assessed for  Admission    I agree that at least one RN has performed a thorough Head to Toe Skin Assessment on the patient. ALL assessment sites listed below have been assessed.      Areas assessed by both nurses:    Head, Face, Ears, Shoulders, Back, Chest, Arms, Elbows, Hands, Sacrum. Buttock, Coccyx, Ischium, Legs. Feet and Heels, Under Medical Devices , and Other ***        Does the Patient have a Wound? Yes wound(s) were present on assessment. LDA wound assessment was Initiated and completed by RN       [place] Prevention initiated by RN: Yes  Wound Care Orders initiated by RN: Yes    Pressure Injury (Stage 3,4, Unstageable, DTI, NWPT, and Complex wounds) if present, place Wound referral order by RN under : Yes    New Ostomies, if present place, Ostomy referral order under : No     Nurse 1 eSignature: Electronically signed by Yi Green RN on 1/24/25 at 2:09 AM EST    **SHARE this note so that the co-signing nurse can place an eSignature**    Nurse 2 eSignature: {Esignature:[id number]}

## 2025-01-24 NOTE — ED NOTES
Pt bilateral low legs with swelling and redness noted, right great toe calloused, 2nd toe with open wound and the posterior pad under great toe is blackened.

## 2025-01-24 NOTE — CONSULTS
Comprehensive Nutrition Assessment    Type and Reason for Visit:  Initial (+ Holland scale)    Nutrition Recommendations/Plan:   Continue to provide regular diet with FR as per provider  Initiate frozen oral supplement TID  Encourage intake at all meals, offer alternatives and assistance  Please obtain accurate weights to be able to accurately determine nutritional status  Will monitor weights, labs, po intakes and POC     Malnutrition Assessment:  Malnutrition Status:  Insufficient data (01/24/25 1510)    Context:  Acute Illness       Nutrition Assessment:    Pt admitted for peripheral edema, generalized weakness, elevated BNP, fall, afib. Hx includes afib, COPD, HLD, HTN, CAD, CKD stage 3. Diet order regular with 1500 mL fluid restriction per day. Noted intakes 26-50%. Pt currently with confusion. Possible wt loss over past year-unintentional wt loss not beneficial. Noted during previous RD assessment over 1 year ago pt had met criteria for severe malnutrition-will follow up with more in depth assessment for further determination this admission. At this time recommend frozen oral supplement TID daily. Will monitor and follow at moderate nutrition risk.    Nutrition Related Findings:    Confusion. K+ 3.2 L. Meds include Lasix. Wound Type: Multiple (to right foot)       Current Nutrition Intake & Therapies:    Average Meal Intake: 26-50%  Average Supplements Intake: None Ordered  ADULT DIET; Regular; 1500 ml    Anthropometric Measures:  Height: 180.3 cm (5' 11\")  Ideal Body Weight (IBW): 172 lbs (78 kg)    Admission Body Weight: 88.5 kg (195 lb)  Current Body Weight: 88.5 kg (195 lb), 113.4 % IBW. Weight Source: Stated  Current BMI (kg/m2): 27.2  Usual Body Weight: 106.6 kg (235 lb) (Per chart review: 9/27/23: 260#, 11/13/23: 235#, 8/16/24: 188#)     % Weight Change (Calculated): -17  Weight Adjustment For: No Adjustment   BMI Categories: Overweight (BMI 25.0-29.9)    Estimated Daily Nutrient Needs:  Energy

## 2025-01-24 NOTE — PLAN OF CARE
Problem: Pain  Goal: Verbalizes/displays adequate comfort level or baseline comfort level  Outcome: Progressing     Problem: Confusion  Goal: Confusion, delirium, dementia, or psychosis is improved or at baseline  Description: INTERVENTIONS:  1. Assess for possible contributors to thought disturbance, including medications, impaired vision or hearing, underlying metabolic abnormalities, dehydration, psychiatric diagnoses, and notify attending LIP  2. [place] high risk fall precautions, as indicated  3. Provide frequent short contacts to provide reality reorientation, refocusing and direction  4. Decrease environmental stimuli, including noise as appropriate  5. Monitor and intervene to maintain adequate nutrition, hydration, elimination, sleep and activity  6. If unable to ensure safety without constant attention obtain sitter and review sitter guidelines with assigned personnel  7. Initiate Psychosocial CNS and Spiritual Care consult, as indicated  Outcome: Progressing     Problem: Skin/Tissue Integrity  Goal: Skin integrity remains intact  Description: 1.  Monitor for areas of redness and/or skin breakdown  2.  Assess vascular access sites hourly  3.  Every 4-6 hours minimum:  Change oxygen saturation probe site  4.  Every 4-6 hours:  If on nasal continuous positive airway pressure, respiratory therapy assess nares and determine need for appliance change or resting period  Outcome: Progressing

## 2025-01-25 LAB
ACB COMPLEX DNA BLD POS QL NAA+NON-PROBE: NOT DETECTED
ANION GAP SERPL CALCULATED.3IONS-SCNC: 13 MMOL/L (ref 4–16)
ANION GAP SERPL CALCULATED.3IONS-SCNC: 13 MMOL/L (ref 4–16)
B FRAGILIS DNA BLD POS QL NAA+NON-PROBE: NOT DETECTED
BUN SERPL-MCNC: 40 MG/DL (ref 6–23)
BUN SERPL-MCNC: 40 MG/DL (ref 6–23)
C ALBICANS DNA BLD POS QL NAA+NON-PROBE: NOT DETECTED
C AURIS DNA BLD POS QL NAA+NON-PROBE: NOT DETECTED
C GATTII+NEOFOR DNA BLD POS QL NAA+N-PRB: NOT DETECTED
C GLABRATA DNA BLD POS QL NAA+NON-PROBE: NOT DETECTED
C KRUSEI DNA BLD POS QL NAA+NON-PROBE: NOT DETECTED
C PARAP DNA BLD POS QL NAA+NON-PROBE: NOT DETECTED
C TROPICLS DNA BLD POS QL NAA+NON-PROBE: NOT DETECTED
CALCIUM SERPL-MCNC: 8.6 MG/DL (ref 8.3–10.6)
CALCIUM SERPL-MCNC: 8.7 MG/DL (ref 8.3–10.6)
CHLORIDE SERPL-SCNC: 97 MMOL/L (ref 99–110)
CHLORIDE SERPL-SCNC: 98 MMOL/L (ref 99–110)
CO2 SERPL-SCNC: 27 MMOL/L (ref 21–32)
CO2 SERPL-SCNC: 28 MMOL/L (ref 21–32)
CREAT SERPL-MCNC: 1.5 MG/DL (ref 0.9–1.3)
CREAT SERPL-MCNC: 1.5 MG/DL (ref 0.9–1.3)
E CLOAC COMP DNA BLD POS NAA+NON-PROBE: NOT DETECTED
E COLI DNA BLD POS QL NAA+NON-PROBE: NOT DETECTED
E FAECALIS DNA BLD POS QL NAA+NON-PROBE: NOT DETECTED
E FAECIUM DNA BLD POS QL NAA+NON-PROBE: NOT DETECTED
EKG ATRIAL RATE: 241 BPM
EKG DIAGNOSIS: NORMAL
EKG Q-T INTERVAL: 370 MS
EKG QRS DURATION: 140 MS
EKG QTC CALCULATION (BAZETT): 434 MS
EKG R AXIS: -72 DEGREES
EKG T AXIS: 15 DEGREES
EKG VENTRICULAR RATE: 83 BPM
ENTEROBACTERALES DNA BLD POS NAA+N-PRB: NOT DETECTED
ERYTHROCYTE [DISTWIDTH] IN BLOOD BY AUTOMATED COUNT: 16 % (ref 11.7–14.9)
GFR, ESTIMATED: 46 ML/MIN/1.73M2
GFR, ESTIMATED: 46 ML/MIN/1.73M2
GLUCOSE SERPL-MCNC: 115 MG/DL (ref 74–99)
GLUCOSE SERPL-MCNC: 116 MG/DL (ref 74–99)
GP B STREP DNA BLD POS QL NAA+NON-PROBE: NOT DETECTED
HAEM INFLU DNA BLD POS QL NAA+NON-PROBE: NOT DETECTED
HCT VFR BLD AUTO: 32.4 % (ref 42–52)
HGB BLD-MCNC: 10.1 G/DL (ref 13.5–18)
K OXYTOCA DNA BLD POS QL NAA+NON-PROBE: NOT DETECTED
KLEBSIELLA SP DNA BLD POS QL NAA+NON-PRB: NOT DETECTED
KLEBSIELLA SP DNA BLD POS QL NAA+NON-PRB: NOT DETECTED
L MONOCYTOG DNA BLD POS QL NAA+NON-PROBE: NOT DETECTED
MAGNESIUM SERPL-MCNC: 1.7 MG/DL (ref 1.8–2.4)
MCH RBC QN AUTO: 30.9 PG (ref 27–31)
MCHC RBC AUTO-ENTMCNC: 31.2 G/DL (ref 32–36)
MCV RBC AUTO: 99.1 FL (ref 78–100)
MECA+MECC+MREJ ISLT/SPM QL: NOT DETECTED
MICROORGANISM SPEC CULT: ABNORMAL
MICROORGANISM/AGENT SPEC: ABNORMAL
MICROORGANISM/AGENT SPEC: ABNORMAL
N MEN DNA BLD POS QL NAA+NON-PROBE: NOT DETECTED
P AERUGINOSA DNA BLD POS NAA+NON-PROBE: NOT DETECTED
PHOSPHATE SERPL-MCNC: 3.5 MG/DL (ref 2.5–4.9)
PLATELET # BLD AUTO: 228 K/UL (ref 140–440)
PMV BLD AUTO: 11.6 FL (ref 7.5–11.1)
POTASSIUM SERPL-SCNC: 2.6 MMOL/L (ref 3.5–5.1)
POTASSIUM SERPL-SCNC: 2.8 MMOL/L (ref 3.5–5.1)
PROTEUS SP DNA BLD POS QL NAA+NON-PROBE: NOT DETECTED
RBC # BLD AUTO: 3.27 M/UL (ref 4.6–6.2)
S AUREUS DNA BLD POS QL NAA+NON-PROBE: DETECTED
S AUREUS+CONS DNA BLD POS NAA+NON-PROBE: DETECTED
S EPIDERMIDIS DNA BLD POS QL NAA+NON-PRB: NOT DETECTED
S LUGDUNENSIS DNA BLD POS QL NAA+NON-PRB: NOT DETECTED
S MALTOPHILIA DNA BLD POS QL NAA+NON-PRB: NOT DETECTED
S MARCESCENS DNA BLD POS NAA+NON-PROBE: NOT DETECTED
S PNEUM DNA BLD POS QL NAA+NON-PROBE: NOT DETECTED
S PYO DNA BLD POS QL NAA+NON-PROBE: ABNORMAL
SALMONELLA DNA BLD POS QL NAA+NON-PROBE: NOT DETECTED
SERVICE CMNT-IMP: ABNORMAL
SERVICE CMNT-IMP: ABNORMAL
SODIUM SERPL-SCNC: 138 MMOL/L (ref 135–145)
SODIUM SERPL-SCNC: 138 MMOL/L (ref 135–145)
SPECIMEN DESCRIPTION: ABNORMAL
SPECIMEN DESCRIPTION: ABNORMAL
STREPTOCOCCUS DNA BLD POS NAA+NON-PROBE: NOT DETECTED
WBC OTHER # BLD: 5.1 K/UL (ref 4–10.5)

## 2025-01-25 PROCEDURE — 6370000000 HC RX 637 (ALT 250 FOR IP): Performed by: STUDENT IN AN ORGANIZED HEALTH CARE EDUCATION/TRAINING PROGRAM

## 2025-01-25 PROCEDURE — 93010 ELECTROCARDIOGRAM REPORT: CPT | Performed by: INTERNAL MEDICINE

## 2025-01-25 PROCEDURE — 87040 BLOOD CULTURE FOR BACTERIA: CPT

## 2025-01-25 PROCEDURE — 6370000000 HC RX 637 (ALT 250 FOR IP): Performed by: NURSE PRACTITIONER

## 2025-01-25 PROCEDURE — 1200000000 HC SEMI PRIVATE

## 2025-01-25 PROCEDURE — 6360000002 HC RX W HCPCS: Performed by: FAMILY MEDICINE

## 2025-01-25 PROCEDURE — 6360000002 HC RX W HCPCS: Performed by: NURSE PRACTITIONER

## 2025-01-25 PROCEDURE — 94761 N-INVAS EAR/PLS OXIMETRY MLT: CPT

## 2025-01-25 PROCEDURE — 83735 ASSAY OF MAGNESIUM: CPT

## 2025-01-25 PROCEDURE — 80048 BASIC METABOLIC PNL TOTAL CA: CPT

## 2025-01-25 PROCEDURE — 84100 ASSAY OF PHOSPHORUS: CPT

## 2025-01-25 PROCEDURE — 6360000002 HC RX W HCPCS: Performed by: STUDENT IN AN ORGANIZED HEALTH CARE EDUCATION/TRAINING PROGRAM

## 2025-01-25 PROCEDURE — 85027 COMPLETE CBC AUTOMATED: CPT

## 2025-01-25 PROCEDURE — 2500000003 HC RX 250 WO HCPCS: Performed by: STUDENT IN AN ORGANIZED HEALTH CARE EDUCATION/TRAINING PROGRAM

## 2025-01-25 PROCEDURE — 36415 COLL VENOUS BLD VENIPUNCTURE: CPT

## 2025-01-25 PROCEDURE — 2580000003 HC RX 258: Performed by: STUDENT IN AN ORGANIZED HEALTH CARE EDUCATION/TRAINING PROGRAM

## 2025-01-25 PROCEDURE — 97530 THERAPEUTIC ACTIVITIES: CPT

## 2025-01-25 RX ORDER — FUROSEMIDE 10 MG/ML
20 INJECTION INTRAMUSCULAR; INTRAVENOUS 2 TIMES DAILY
Status: DISCONTINUED | OUTPATIENT
Start: 2025-01-25 | End: 2025-01-26 | Stop reason: HOSPADM

## 2025-01-25 RX ORDER — MAGNESIUM SULFATE IN WATER 40 MG/ML
2000 INJECTION, SOLUTION INTRAVENOUS ONCE
Status: COMPLETED | OUTPATIENT
Start: 2025-01-25 | End: 2025-01-25

## 2025-01-25 RX ORDER — POTASSIUM CHLORIDE 1500 MG/1
40 TABLET, EXTENDED RELEASE ORAL 2 TIMES DAILY WITH MEALS
Status: DISCONTINUED | OUTPATIENT
Start: 2025-01-25 | End: 2025-01-26 | Stop reason: HOSPADM

## 2025-01-25 RX ADMIN — CEFEPIME 2000 MG: 2 INJECTION, POWDER, FOR SOLUTION INTRAVENOUS at 02:00

## 2025-01-25 RX ADMIN — DIGOXIN 125 MCG: 125 TABLET ORAL at 11:31

## 2025-01-25 RX ADMIN — POTASSIUM CHLORIDE 10 MEQ: 7.46 INJECTION, SOLUTION INTRAVENOUS at 17:20

## 2025-01-25 RX ADMIN — SODIUM CHLORIDE, PRESERVATIVE FREE 10 ML: 5 INJECTION INTRAVENOUS at 11:38

## 2025-01-25 RX ADMIN — METOPROLOL SUCCINATE 12.5 MG: 25 TABLET, EXTENDED RELEASE ORAL at 19:56

## 2025-01-25 RX ADMIN — FUROSEMIDE 20 MG: 10 INJECTION, SOLUTION INTRAMUSCULAR; INTRAVENOUS at 17:35

## 2025-01-25 RX ADMIN — PRAVASTATIN SODIUM 80 MG: 40 TABLET ORAL at 19:56

## 2025-01-25 RX ADMIN — POTASSIUM CHLORIDE 10 MEQ: 7.46 INJECTION, SOLUTION INTRAVENOUS at 13:03

## 2025-01-25 RX ADMIN — PANTOPRAZOLE SODIUM 40 MG: 40 INJECTION, POWDER, FOR SOLUTION INTRAVENOUS at 11:36

## 2025-01-25 RX ADMIN — POTASSIUM CHLORIDE 40 MEQ: 1500 TABLET, EXTENDED RELEASE ORAL at 17:34

## 2025-01-25 RX ADMIN — LINEZOLID 600 MG: 600 INJECTION, SOLUTION INTRAVENOUS at 11:43

## 2025-01-25 RX ADMIN — POTASSIUM CHLORIDE 10 MEQ: 7.46 INJECTION, SOLUTION INTRAVENOUS at 15:01

## 2025-01-25 RX ADMIN — POTASSIUM CHLORIDE 10 MEQ: 7.46 INJECTION, SOLUTION INTRAVENOUS at 16:19

## 2025-01-25 RX ADMIN — POTASSIUM CHLORIDE 10 MEQ: 7.46 INJECTION, SOLUTION INTRAVENOUS at 12:05

## 2025-01-25 RX ADMIN — METOLAZONE 2.5 MG: 2.5 TABLET ORAL at 11:31

## 2025-01-25 RX ADMIN — MAGNESIUM SULFATE HEPTAHYDRATE 2000 MG: 40 INJECTION, SOLUTION INTRAVENOUS at 11:58

## 2025-01-25 RX ADMIN — CEFEPIME 2000 MG: 2 INJECTION, POWDER, FOR SOLUTION INTRAVENOUS at 15:13

## 2025-01-25 RX ADMIN — POTASSIUM CHLORIDE 10 MEQ: 7.46 INJECTION, SOLUTION INTRAVENOUS at 13:58

## 2025-01-25 RX ADMIN — SODIUM CHLORIDE, PRESERVATIVE FREE 10 ML: 5 INJECTION INTRAVENOUS at 20:00

## 2025-01-25 RX ADMIN — POTASSIUM CHLORIDE 40 MEQ: 1500 TABLET, EXTENDED RELEASE ORAL at 11:32

## 2025-01-25 RX ADMIN — LINEZOLID 600 MG: 600 INJECTION, SOLUTION INTRAVENOUS at 19:57

## 2025-01-25 ASSESSMENT — PAIN SCALES - GENERAL: PAINLEVEL_OUTOF10: 0

## 2025-01-25 NOTE — FLOWSHEET NOTE
Physical Therapy Treatment Note   Date: 2025 Room: [unfilled]   Name: Ray Gutierrez : 1940   MRN: [id number] Admission Date:2025    Primary Problem:   Past Medical History:   Diagnosis Date    AR (aortic regurgitation)     mild to mod    Atrial fibrillation, new onset (HCC)     CAD (coronary artery disease)     History of angioplasty    Chronic midline low back pain without sciatica 2017    Has seen Dr. Jeffrey and had shots    COPD (chronic obstructive pulmonary disease) ([facility])     Family history of coronary artery disease     Fatigue 10/2016    H/O cardiovascular stress test 2012    mild ischemia in the basal inferior and mid inferior regions ef is 54    H/O chest x-ray 10/20/2016    Right pleural effusion resolved.    H/O Doppler ultrasound 10/2016    CAROTID-[place].arteries patent w/less than 50% stenosis in the internal carotid arteries.    H/O echocardiogram 2015    Aortic sclerosis without stenosis, normal LV size and wall motion w/low normal systolic function, LA dilatation, RV dilatation, mild pulmonary HTN, EF 50-55%.    History of cardiac cath 1996    LV uniform LV contractility RCa dominatn 50-60 prox stenosis  LM patent  LAD mild mid plaquing diag has 70 ostial narrowing ramus minimal plaquing cx normal    History of PTCA 08/15/2011    prox lad bare metal stent 2011    Hyperlipidemia     Hypertension     Obesity     Obesity, Class II, BMI 35-39.9, with comorbidity 2014    RBBB     Risk for falls 10/26/2015    Stage 3 chronic kidney disease (HCC) 2024    Tachycardia      Past Surgical History:   Procedure Laterality Date    COLONOSCOPY N/A 2022    COLONOSCOPY POLYPECTOMY SNARE/COLD BIOPSY performed by Fidencio Lyman MD at [facility] ENDOSCOPY    CORONARY ANGIOPLASTY WITH STENT PLACEMENT      JOINT REPLACEMENT      partial left knee    MULTIPLE TOOTH EXTRACTIONS      2024 large ebl    UPPER GASTROINTESTINAL ENDOSCOPY N/A 2022    EGD

## 2025-01-25 NOTE — PLAN OF CARE
Problem: Pain  Goal: Verbalizes/displays adequate comfort level or baseline comfort level  Outcome: Progressing     Problem: Confusion  Goal: Confusion, delirium, dementia, or psychosis is improved or at baseline  Description: INTERVENTIONS:  1. Assess for possible contributors to thought disturbance, including medications, impaired vision or hearing, underlying metabolic abnormalities, dehydration, psychiatric diagnoses, and notify attending LIP  2. [place] high risk fall precautions, as indicated  3. Provide frequent short contacts to provide reality reorientation, refocusing and direction  4. Decrease environmental stimuli, including noise as appropriate  5. Monitor and intervene to maintain adequate nutrition, hydration, elimination, sleep and activity  6. If unable to ensure safety without constant attention obtain sitter and review sitter guidelines with assigned personnel  7. Initiate Psychosocial CNS and Spiritual Care consult, as indicated  Outcome: Progressing     Problem: Discharge Planning  Goal: Discharge to home or other facility with appropriate resources  Outcome: Progressing     Problem: Skin/Tissue Integrity  Goal: Skin integrity remains intact  Description: 1.  Monitor for areas of redness and/or skin breakdown  2.  Assess vascular access sites hourly  3.  Every 4-6 hours minimum:  Change oxygen saturation probe site  4.  Every 4-6 hours:  If on nasal continuous positive airway pressure, respiratory therapy assess nares and determine need for appliance change or resting period  Outcome: Progressing     Problem: Nutrition Deficit:  Goal: Optimize nutritional status  Outcome: Progressing     Problem: Genitourinary - Adult  Goal: Absence of urinary retention  Outcome: Progressing  Goal: Urinary catheter remains patent  Outcome: Progressing     Problem: Infection - Adult  Goal: Absence of infection at discharge  Outcome: Progressing  Goal: Absence of infection during hospitalization  Outcome:

## 2025-01-25 NOTE — PLAN OF CARE
Problem: Pain  Goal: Verbalizes/displays adequate comfort level or baseline comfort level  Outcome: Adequate for Discharge     Problem: Confusion  Goal: Confusion, delirium, dementia, or psychosis is improved or at baseline  Description: INTERVENTIONS:  1. Assess for possible contributors to thought disturbance, including medications, impaired vision or hearing, underlying metabolic abnormalities, dehydration, psychiatric diagnoses, and notify attending LIP  2. [place] high risk fall precautions, as indicated  3. Provide frequent short contacts to provide reality reorientation, refocusing and direction  4. Decrease environmental stimuli, including noise as appropriate  5. Monitor and intervene to maintain adequate nutrition, hydration, elimination, sleep and activity  6. If unable to ensure safety without constant attention obtain sitter and review sitter guidelines with assigned personnel  7. Initiate Psychosocial CNS and Spiritual Care consult, as indicated  Outcome: Not Progressing     Problem: Discharge Planning  Goal: Discharge to home or other facility with appropriate resources  Outcome: Not Progressing     Problem: Skin/Tissue Integrity  Goal: Skin integrity remains intact  Description: 1.  Monitor for areas of redness and/or skin breakdown  2.  Assess vascular access sites hourly  3.  Every 4-6 hours minimum:  Change oxygen saturation probe site  4.  Every 4-6 hours:  If on nasal continuous positive airway pressure, respiratory therapy assess nares and determine need for appliance change or resting period  Outcome: Not Progressing     Problem: Nutrition Deficit:  Goal: Optimize nutritional status  Outcome: Not Progressing     Problem: Genitourinary - Adult  Goal: Absence of urinary retention  Outcome: Not Progressing  Goal: Urinary catheter remains patent  Outcome: Adequate for Discharge     Problem: Infection - Adult  Goal: Absence of infection at discharge  Outcome: Progressing  Goal: Absence of

## 2025-01-25 NOTE — PROGRESS NOTES
V2.0  [facility] [place] Hospital[place] Progress Note      Name:  Ray Gutierrez /Age/Sex: 1940  (84 y.o. male)   MRN & CSN:  [id number] & [id number] Encounter Date/Time: 2025 10:46 AM EST    Location:   PCP: Wei Le MD       Hospital Day: 3    Assessment and Plan:   Ray Gutierrez is a 84 y.o. male who presents with Generalized weakness    Plan:    Gram-positive bacteremia.  2 out of 2 blood cultures drawn  positive for Staph aureus, sensitivities pending. MRI R foot with soft tissue edema within the midfoot and forefoot nonspecific but may indicate cellulitis. No definitive evidence of osteomyelitis.  Left knee CT scan with diffuse myocutaneous edema which may represent cellulitis, moderate-sized suprapatellar joint effusion.  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  Will reach out to orthopedics in regards to CT findings.  Initially was started on cefepime and Bactrim; discontinued Bactrim and started IV Zyvox 2025.  De-escalate antibiotic as per culture results.  Repeat blood cultures drawn 2025.  Acute kidney injury.  Likely secondary to diuresis.  Serum creatinine 1.5 this a.m.  Will de-escalate furosemide 40 mg IV twice daily to 20 mg IV twice daily.  Monitor urinary output.  Avoid nephrotoxic agents.  Generalized weakness with altered mental status.  Previous history of UTI.  Cannot rule out UTI at the moment.  Does not meet criteria for sepsis.  Urinalysis not indicative of infection; culture pending.  Further plan as above.   Right lower extremity wounds on the foot with concerns for PAD.  Decreased pulses but palpable.  MRI as above.  Further plans as above.  Recurrent falls in the setting of above.  Fall precautions to be maintained. PT/OT evaluated and recommend skilled nursing on discharge.  Acute on chronic diastolic congestive heart failure. Holding home torsemide, continue Lasix 20 mg IV twice daily.  Not requiring fluid

## 2025-01-26 ENCOUNTER — HOSPITAL ENCOUNTER (INPATIENT)
Age: 85
LOS: 1 days | Discharge: HOSPICE | DRG: 559 | End: 2025-01-27
Attending: STUDENT IN AN ORGANIZED HEALTH CARE EDUCATION/TRAINING PROGRAM | Admitting: STUDENT IN AN ORGANIZED HEALTH CARE EDUCATION/TRAINING PROGRAM
Payer: MEDICARE

## 2025-01-26 VITALS
HEART RATE: 88 BPM | WEIGHT: 213.5 LBS | HEIGHT: 71 IN | SYSTOLIC BLOOD PRESSURE: 149 MMHG | TEMPERATURE: 98.1 F | DIASTOLIC BLOOD PRESSURE: 92 MMHG | OXYGEN SATURATION: 98 % | BODY MASS INDEX: 29.89 KG/M2 | RESPIRATION RATE: 21 BRPM

## 2025-01-26 DIAGNOSIS — R78.81 MSSA BACTEREMIA: Primary | ICD-10-CM

## 2025-01-26 DIAGNOSIS — B95.61 MSSA BACTEREMIA: Primary | ICD-10-CM

## 2025-01-26 PROBLEM — J10.1 INFLUENZA A: Status: ACTIVE | Noted: 2025-01-26

## 2025-01-26 PROBLEM — L03.116 CELLULITIS OF LEFT KNEE: Status: ACTIVE | Noted: 2025-01-26

## 2025-01-26 LAB
ANION GAP SERPL CALCULATED.3IONS-SCNC: 17 MMOL/L (ref 4–16)
BILIRUB UR QL STRIP: NEGATIVE
BUN SERPL-MCNC: 39 MG/DL (ref 6–23)
CALCIUM SERPL-MCNC: 8.8 MG/DL (ref 8.3–10.6)
CHLORIDE SERPL-SCNC: 99 MMOL/L (ref 99–110)
CLARITY UR: CLEAR
CO2 SERPL-SCNC: 24 MMOL/L (ref 21–32)
COLOR UR: YELLOW
CREAT SERPL-MCNC: 1.4 MG/DL (ref 0.9–1.3)
EPI CELLS #/AREA URNS HPF: 1 /HPF
ERYTHROCYTE [DISTWIDTH] IN BLOOD BY AUTOMATED COUNT: 16.2 % (ref 11.7–14.9)
GFR, ESTIMATED: 50 ML/MIN/1.73M2
GLUCOSE SERPL-MCNC: 95 MG/DL (ref 74–99)
GLUCOSE UR STRIP-MCNC: NEGATIVE MG/DL
HCT VFR BLD AUTO: 29.5 % (ref 42–52)
HGB BLD-MCNC: 9.8 G/DL (ref 13.5–18)
HGB UR QL STRIP.AUTO: ABNORMAL
KETONES UR STRIP-MCNC: NEGATIVE MG/DL
LACTATE BLDV-SCNC: 1.6 MMOL/L (ref 0.4–2)
LEUKOCYTE ESTERASE UR QL STRIP: NEGATIVE
MAGNESIUM SERPL-MCNC: 2 MG/DL (ref 1.8–2.4)
MCH RBC QN AUTO: 31.3 PG (ref 27–31)
MCHC RBC AUTO-ENTMCNC: 33.2 G/DL (ref 32–36)
MCV RBC AUTO: 94.2 FL (ref 78–100)
MICROORGANISM SPEC CULT: ABNORMAL
MICROORGANISM SPEC CULT: ABNORMAL
MICROORGANISM/AGENT SPEC: ABNORMAL
MUCOUS THREADS URNS QL MICRO: ABNORMAL
NITRITE UR QL STRIP: NEGATIVE
PH UR STRIP: 6 [PH] (ref 5–8)
PHOSPHATE SERPL-MCNC: 3.3 MG/DL (ref 2.5–4.9)
PLATELET # BLD AUTO: 208 K/UL (ref 140–440)
PMV BLD AUTO: 13 FL (ref 7.5–11.1)
POTASSIUM SERPL-SCNC: 3.5 MMOL/L (ref 3.5–5.1)
PROT UR STRIP-MCNC: NEGATIVE MG/DL
RBC # BLD AUTO: 3.13 M/UL (ref 4.6–6.2)
RBC #/AREA URNS HPF: 1 /HPF (ref 0–2)
SERVICE CMNT-IMP: ABNORMAL
SERVICE CMNT-IMP: ABNORMAL
SODIUM SERPL-SCNC: 140 MMOL/L (ref 135–145)
SP GR UR STRIP: 1.01 (ref 1–1.03)
SPECIMEN DESCRIPTION: ABNORMAL
SPECIMEN DESCRIPTION: ABNORMAL
UROBILINOGEN UR STRIP-ACNC: 0.2 EU/DL (ref 0–1)
WBC #/AREA URNS HPF: <1 /HPF (ref 0–5)
WBC OTHER # BLD: 18 K/UL (ref 4–10.5)

## 2025-01-26 PROCEDURE — 2580000003 HC RX 258: Performed by: STUDENT IN AN ORGANIZED HEALTH CARE EDUCATION/TRAINING PROGRAM

## 2025-01-26 PROCEDURE — 2500000003 HC RX 250 WO HCPCS: Performed by: STUDENT IN AN ORGANIZED HEALTH CARE EDUCATION/TRAINING PROGRAM

## 2025-01-26 PROCEDURE — 6370000000 HC RX 637 (ALT 250 FOR IP): Performed by: STUDENT IN AN ORGANIZED HEALTH CARE EDUCATION/TRAINING PROGRAM

## 2025-01-26 PROCEDURE — 6370000000 HC RX 637 (ALT 250 FOR IP): Performed by: NURSE PRACTITIONER

## 2025-01-26 PROCEDURE — 51702 INSERT TEMP BLADDER CATH: CPT

## 2025-01-26 PROCEDURE — 6360000002 HC RX W HCPCS: Performed by: NURSE PRACTITIONER

## 2025-01-26 PROCEDURE — 6360000002 HC RX W HCPCS: Performed by: STUDENT IN AN ORGANIZED HEALTH CARE EDUCATION/TRAINING PROGRAM

## 2025-01-26 PROCEDURE — 1200000000 HC SEMI PRIVATE

## 2025-01-26 PROCEDURE — 6360000002 HC RX W HCPCS: Performed by: FAMILY MEDICINE

## 2025-01-26 RX ORDER — FUROSEMIDE 10 MG/ML
40 INJECTION INTRAMUSCULAR; INTRAVENOUS 2 TIMES DAILY
Status: DISCONTINUED | OUTPATIENT
Start: 2025-01-26 | End: 2025-01-27 | Stop reason: HOSPADM

## 2025-01-26 RX ORDER — POTASSIUM CHLORIDE 1500 MG/1
40 TABLET, EXTENDED RELEASE ORAL PRN
Status: DISCONTINUED | OUTPATIENT
Start: 2025-01-26 | End: 2025-01-27 | Stop reason: HOSPADM

## 2025-01-26 RX ORDER — SODIUM CHLORIDE 0.9 % (FLUSH) 0.9 %
5-40 SYRINGE (ML) INJECTION PRN
Status: DISCONTINUED | OUTPATIENT
Start: 2025-01-26 | End: 2025-01-27 | Stop reason: HOSPADM

## 2025-01-26 RX ORDER — POLYETHYLENE GLYCOL 3350 17 G/17G
17 POWDER, FOR SOLUTION ORAL DAILY PRN
Status: DISCONTINUED | OUTPATIENT
Start: 2025-01-26 | End: 2025-01-27 | Stop reason: HOSPADM

## 2025-01-26 RX ORDER — SODIUM CHLORIDE 9 MG/ML
INJECTION, SOLUTION INTRAVENOUS PRN
Status: DISCONTINUED | OUTPATIENT
Start: 2025-01-26 | End: 2025-01-27 | Stop reason: HOSPADM

## 2025-01-26 RX ORDER — MAGNESIUM SULFATE IN WATER 40 MG/ML
2000 INJECTION, SOLUTION INTRAVENOUS PRN
Status: DISCONTINUED | OUTPATIENT
Start: 2025-01-26 | End: 2025-01-27 | Stop reason: HOSPADM

## 2025-01-26 RX ORDER — ACETAMINOPHEN 650 MG/1
650 SUPPOSITORY RECTAL EVERY 6 HOURS PRN
Status: DISCONTINUED | OUTPATIENT
Start: 2025-01-26 | End: 2025-01-27 | Stop reason: HOSPADM

## 2025-01-26 RX ORDER — OSELTAMIVIR PHOSPHATE 30 MG/1
30 CAPSULE ORAL 2 TIMES DAILY
Status: DISCONTINUED | OUTPATIENT
Start: 2025-01-27 | End: 2025-01-27

## 2025-01-26 RX ORDER — ONDANSETRON 4 MG/1
4 TABLET, ORALLY DISINTEGRATING ORAL EVERY 8 HOURS PRN
Status: DISCONTINUED | OUTPATIENT
Start: 2025-01-26 | End: 2025-01-27 | Stop reason: HOSPADM

## 2025-01-26 RX ORDER — IPRATROPIUM BROMIDE AND ALBUTEROL SULFATE 2.5; .5 MG/3ML; MG/3ML
1 SOLUTION RESPIRATORY (INHALATION)
Status: DISCONTINUED | OUTPATIENT
Start: 2025-01-26 | End: 2025-01-27 | Stop reason: HOSPADM

## 2025-01-26 RX ORDER — HEPARIN SODIUM 5000 [USP'U]/ML
5000 INJECTION, SOLUTION INTRAVENOUS; SUBCUTANEOUS EVERY 8 HOURS SCHEDULED
Status: DISCONTINUED | OUTPATIENT
Start: 2025-01-26 | End: 2025-01-27 | Stop reason: HOSPADM

## 2025-01-26 RX ORDER — DIGOXIN 125 MCG
125 TABLET ORAL DAILY
Status: DISCONTINUED | OUTPATIENT
Start: 2025-01-27 | End: 2025-01-27 | Stop reason: HOSPADM

## 2025-01-26 RX ORDER — SODIUM CHLORIDE 0.9 % (FLUSH) 0.9 %
5-40 SYRINGE (ML) INJECTION EVERY 12 HOURS SCHEDULED
Status: DISCONTINUED | OUTPATIENT
Start: 2025-01-26 | End: 2025-01-27 | Stop reason: HOSPADM

## 2025-01-26 RX ORDER — OSELTAMIVIR PHOSPHATE 75 MG/1
75 CAPSULE ORAL 2 TIMES DAILY
Status: DISCONTINUED | OUTPATIENT
Start: 2025-01-26 | End: 2025-01-26 | Stop reason: DRUGHIGH

## 2025-01-26 RX ORDER — POTASSIUM CHLORIDE 7.45 MG/ML
10 INJECTION INTRAVENOUS PRN
Status: DISCONTINUED | OUTPATIENT
Start: 2025-01-26 | End: 2025-01-27 | Stop reason: HOSPADM

## 2025-01-26 RX ORDER — ONDANSETRON 2 MG/ML
4 INJECTION INTRAMUSCULAR; INTRAVENOUS EVERY 6 HOURS PRN
Status: DISCONTINUED | OUTPATIENT
Start: 2025-01-26 | End: 2025-01-27 | Stop reason: HOSPADM

## 2025-01-26 RX ORDER — ACETAMINOPHEN 325 MG/1
650 TABLET ORAL EVERY 6 HOURS PRN
Status: DISCONTINUED | OUTPATIENT
Start: 2025-01-26 | End: 2025-01-27 | Stop reason: HOSPADM

## 2025-01-26 RX ORDER — DIGOXIN 125 MCG
125 TABLET ORAL DAILY
Status: DISCONTINUED | OUTPATIENT
Start: 2025-01-26 | End: 2025-01-26

## 2025-01-26 RX ORDER — SODIUM CHLORIDE, SODIUM LACTATE, POTASSIUM CHLORIDE, CALCIUM CHLORIDE 600; 310; 30; 20 MG/100ML; MG/100ML; MG/100ML; MG/100ML
INJECTION, SOLUTION INTRAVENOUS CONTINUOUS
Status: DISCONTINUED | OUTPATIENT
Start: 2025-01-26 | End: 2025-01-26

## 2025-01-26 RX ORDER — METOPROLOL SUCCINATE 25 MG/1
12.5 TABLET, EXTENDED RELEASE ORAL NIGHTLY
Status: DISCONTINUED | OUTPATIENT
Start: 2025-01-26 | End: 2025-01-27 | Stop reason: HOSPADM

## 2025-01-26 RX ORDER — PANTOPRAZOLE SODIUM 40 MG/1
40 TABLET, DELAYED RELEASE ORAL
Status: DISCONTINUED | OUTPATIENT
Start: 2025-01-27 | End: 2025-01-27 | Stop reason: HOSPADM

## 2025-01-26 RX ORDER — ENOXAPARIN SODIUM 100 MG/ML
40 INJECTION SUBCUTANEOUS DAILY
Status: DISCONTINUED | OUTPATIENT
Start: 2025-01-27 | End: 2025-01-26

## 2025-01-26 RX ORDER — LINEZOLID 2 MG/ML
600 INJECTION, SOLUTION INTRAVENOUS EVERY 12 HOURS
Status: DISCONTINUED | OUTPATIENT
Start: 2025-01-26 | End: 2025-01-27 | Stop reason: HOSPADM

## 2025-01-26 RX ORDER — OSELTAMIVIR PHOSPHATE 75 MG/1
75 CAPSULE ORAL ONCE
Status: DISCONTINUED | OUTPATIENT
Start: 2025-01-26 | End: 2025-01-27

## 2025-01-26 RX ADMIN — POTASSIUM CHLORIDE 40 MEQ: 1500 TABLET, EXTENDED RELEASE ORAL at 10:34

## 2025-01-26 RX ADMIN — SODIUM CHLORIDE, PRESERVATIVE FREE 10 ML: 5 INJECTION INTRAVENOUS at 10:36

## 2025-01-26 RX ADMIN — FUROSEMIDE 20 MG: 10 INJECTION, SOLUTION INTRAMUSCULAR; INTRAVENOUS at 10:35

## 2025-01-26 RX ADMIN — DIGOXIN 125 MCG: 125 TABLET ORAL at 10:34

## 2025-01-26 RX ADMIN — SODIUM CHLORIDE 30 ML: 9 INJECTION, SOLUTION INTRAVENOUS at 23:04

## 2025-01-26 RX ADMIN — SODIUM CHLORIDE, PRESERVATIVE FREE 10 ML: 5 INJECTION INTRAVENOUS at 20:04

## 2025-01-26 RX ADMIN — LINEZOLID 600 MG: 600 INJECTION, SOLUTION INTRAVENOUS at 10:45

## 2025-01-26 RX ADMIN — LINEZOLID 600 MG: 600 INJECTION, SOLUTION INTRAVENOUS at 23:05

## 2025-01-26 RX ADMIN — FUROSEMIDE 40 MG: 10 INJECTION, SOLUTION INTRAMUSCULAR; INTRAVENOUS at 20:06

## 2025-01-26 RX ADMIN — WATER 40 MG: 1 INJECTION INTRAMUSCULAR; INTRAVENOUS; SUBCUTANEOUS at 20:02

## 2025-01-26 RX ADMIN — METOLAZONE 2.5 MG: 2.5 TABLET ORAL at 08:45

## 2025-01-26 RX ADMIN — CEFEPIME 2000 MG: 2 INJECTION, POWDER, FOR SOLUTION INTRAVENOUS at 02:43

## 2025-01-26 RX ADMIN — PANTOPRAZOLE SODIUM 40 MG: 40 INJECTION, POWDER, FOR SOLUTION INTRAVENOUS at 10:35

## 2025-01-26 ASSESSMENT — PAIN SCALES - GENERAL
PAINLEVEL_OUTOF10: 0

## 2025-01-26 NOTE — PROGRESS NOTES
Patient transferred to [facility] on stretcher with all belongings by [employer].

## 2025-01-26 NOTE — PLAN OF CARE
Problem: Pain  Goal: Verbalizes/displays adequate comfort level or baseline comfort level  1/26/2025 0841 by Moraima Rodrigez RN  Outcome: Adequate for Discharge  1/25/2025 2302 by Dorie Leblanc RN  Outcome: Progressing     Problem: Confusion  Goal: Confusion, delirium, dementia, or psychosis is improved or at baseline  Description: INTERVENTIONS:  1. Assess for possible contributors to thought disturbance, including medications, impaired vision or hearing, underlying metabolic abnormalities, dehydration, psychiatric diagnoses, and notify attending LIP  2. [place] high risk fall precautions, as indicated  3. Provide frequent short contacts to provide reality reorientation, refocusing and direction  4. Decrease environmental stimuli, including noise as appropriate  5. Monitor and intervene to maintain adequate nutrition, hydration, elimination, sleep and activity  6. If unable to ensure safety without constant attention obtain sitter and review sitter guidelines with assigned personnel  7. Initiate Psychosocial CNS and Spiritual Care consult, as indicated  1/26/2025 0841 by Moraima Rodrigez RN  Outcome: Not Progressing  1/25/2025 2302 by Dorie Leblanc RN  Outcome: Progressing     Problem: Discharge Planning  Goal: Discharge to home or other facility with appropriate resources  1/26/2025 0841 by Moraima Rodrigez RN  Outcome: Not Progressing  1/25/2025 2302 by Dorie Leblanc RN  Outcome: Progressing     Problem: Skin/Tissue Integrity  Goal: Skin integrity remains intact  Description: 1.  Monitor for areas of redness and/or skin breakdown  2.  Assess vascular access sites hourly  3.  Every 4-6 hours minimum:  Change oxygen saturation probe site  4.  Every 4-6 hours:  If on nasal continuous positive airway pressure, respiratory therapy assess nares and determine need for appliance change or resting period  1/26/2025 0841 by Moraima Rodrigez RN  Outcome: Not Progressing  1/25/2025 2302 by Dorie Leblanc RN  Outcome:  guidelines with assigned personnel  7. Initiate Psychosocial CNS and Spiritual Care consult, as indicated  1/26/2025 0841 by Moraima Rodrigez RN  Outcome: Not Progressing  1/25/2025 2302 by Dorie Leblanc RN  Outcome: Progressing     Problem: Discharge Planning  Goal: Discharge to home or other facility with appropriate resources  1/26/2025 0841 by Moraima Rodrigez RN  Outcome: Not Progressing  1/25/2025 2302 by Dorie Leblanc RN  Outcome: Progressing     Problem: Skin/Tissue Integrity  Goal: Skin integrity remains intact  Description: 1.  Monitor for areas of redness and/or skin breakdown  2.  Assess vascular access sites hourly  3.  Every 4-6 hours minimum:  Change oxygen saturation probe site  4.  Every 4-6 hours:  If on nasal continuous positive airway pressure, respiratory therapy assess nares and determine need for appliance change or resting period  1/26/2025 0841 by Moraima Rodrigez RN  Outcome: Not Progressing  1/25/2025 2302 by Dorie Lelbanc RN  Outcome: Progressing     Problem: Nutrition Deficit:  Goal: Optimize nutritional status  1/26/2025 0841 by Moraima Rodrigez RN  Outcome: Not Progressing  1/25/2025 2302 by Dorie Leblanc RN  Outcome: Progressing     Problem: Genitourinary - Adult  Goal: Absence of urinary retention  1/26/2025 0841 by Moraima Rodrigez RN  Outcome: Not Progressing  1/25/2025 2302 by Dorie Leblanc RN  Outcome: Progressing

## 2025-01-26 NOTE — PROGRESS NOTES
Report called to [facility], Denise Mendes RN, all questions answered, verbalized understanding, informed patient is being transferred now.

## 2025-01-26 NOTE — PROGRESS NOTES
Packed pt belongings: cell phone (I did not see a cell phone ), dentures (uppers and lowers)  glasses x2, coat, slippers, under garments.

## 2025-01-26 NOTE — PROGRESS NOTES
Phone call to daughter in law, Claudia informed of transfer to [facility] room 1127.

## 2025-01-26 NOTE — H&P
Protonix  Chronic obstructive pulmonary disease not on exacerbation.  Start the patient on DuoNebs    Medical Decision Making:  The following items were considered in medical decision making:  Discussion of patient care with other providers  Reviewed clinical lab tests if any  Reviewed radiology tests if any  Reviewed other diagnostic tests/interventions  Independent review of radiologic images if any  Microbiology cultures and other micro tests if any    Estimated time spent for medical decision-making encompassing complexity of the case, history taking, medication review, physical examination, communication with family, RN, , discussion with specialists, and ancillary staff members to provide accurate care for the patient was around 25 minutes.    MDM (any 2 required for High level billing)     A. Problems (any 1)  [x] Acute/Chronic Illness/injury posing ongoing threat to life and/or bodily function without ongoing treatment    [] Severe exacerbation of chronic illness    --------------------------------------------------  B. Risk of Treatment (any 1)    [] Drugs/treatments that require intensive monitoring for toxicity    [] IV ABX (Vancomycin, Aminoglycosides, etc)     [] Post-Cath/Contrast study requiring serial monitoring    [] IV Narcotic analgesia    [] Aggressive IV diuresis    [] Hypertonic Saline    [] Critical electrolyte abnormalities requiring IV replacement    [] Insulin - Scheduled/SSI or Insulin gtt    [] Anticoagulation (Heparin gtt or Coumadin - other anticoagulants in special circumstances)    [] Cardiac Medications (IV Amiodarone/Diltiazem, Tikosyn, etc)    [] Hemodialysis    [] Other -    [] Change in code status    [] Decision to escalate care    [] Major surgery/procedure with associated risk factors    --------------------------------------------------  C. Data (any 2)    [x] Data Review (any 3)    [x] Consultant notes from yesterday/today    [x] All available current labs  word can be misinterpreted by the technology leading to omissions or inappropriate words, phrases or sentences.  Dictated and Electronically Signed By: Hill De La Garza MD 1/23/2025 19:11        CT HEAD WO CONTRAST    Result Date: 1/23/2025  PROCEDURE: CT HEAD WO CONTRAST DATE OF EXAM:  1/23/2025 19:00 INDICATION: HEAD TRAUMA, CLOSED, MILD, GCS >= 13, NO RISK FACTORS, NEURO EXAM NORMAL, fall COMPARISON: None TECHNIQUE: Axial CT imaging of the head was performed without intravenous contrast. Coronal reformations were evaluated. DOSE OPTIMIZATION: CT radiation dose optimization techniques (automated exposure  control, and use of iterative reconstruction techniques, or adjustment of the mA and/or kV according to patient size) were used to limit patient radiation dose. FINDINGS: Motion artifact mildly limits evaluation. Moderate global cerebral volume loss. No intracranial mass or hemorrhage. Mild burden of periventricular white matter hypoattenuation compatible with chronic microvascular ischemic changes. No CT evidence for acute territorial infarct. Arteriosclerosis of the cavernous carotid arteries and V4 segments and vertebral arteries. Orbits are unremarkable. Mild mucosal thickening of the maxillary and ethmoid sinuses. Mastoid air cells are clear. No acute osseous findings. IMPRESSION: 1. No acute intracranial findings. 2. Moderate global cerebral volume loss. Mild burden of white matter findings compatible with microvascular ischemic changes.  Dictated and Electronically Signed By: Glynn Pat 1/23/2025 19:07          Personally reviewed Lab Studies, Imaging    Electronically signed by Baljeet Huizar MD on 1/26/2025 at 6:41 PM

## 2025-01-26 NOTE — DISCHARGE SUMMARY
admission:  IP CONSULT TO DIETITIAN    Discharge Diagnosis:   Generalized weakness        Discharge Instruction:     Disposition: Discharged to:[facility]     Condition on discharge: Stable      Discharge Medications:        Medication List        ASK your doctor about these medications      albuterol sulfate  (90 Base) MCG/ACT inhaler  Commonly known as: PROVENTIL;VENTOLIN;PROAIR     digoxin 125 MCG tablet  Commonly known as: LANOXIN  Take 1 tablet by mouth daily     KLOR-CON PO  Ask about: Which instructions should I use?     lansoprazole 30 MG delayed release capsule  Commonly known as: PREVACID     loratadine 10 MG tablet  Commonly known as: CLARITIN     magnesium oxide 400 (240 Mg) MG tablet  Commonly known as: MAG-OX  Take 1 tablet by mouth daily     metOLazone 2.5 MG tablet  Commonly known as: ZAROXOLYN     metoprolol succinate 25 MG extended release tablet  Commonly known as: TOPROL XL  Take 0.5 tablets by mouth nightly     pravastatin 80 MG tablet  Commonly known as: PRAVACHOL  TAKE ONE TABLET BY MOUTH EVERY DAY     tiotropium 18 MCG inhalation capsule  Commonly known as: SPIRIVA     torsemide 20 MG tablet  Commonly known as: DEMADEX     vitamin D 50 MCG (2000 UT) Caps capsule  Commonly known as: CHOLECALCIFEROL             Objective Findings at Discharge:   BP (!) 149/92   Pulse 88   Temp 98.1 °F (36.7 °C) (Oral)   Resp 21   Ht 1.803 m (5' 11\")   Wt 96.8 kg (213 lb 8 oz)   SpO2 95%   BMI 29.78 kg/m²       Physical Exam:     1/26/2024  General: Chronically ill-appearing male, NAD  Eyes: EOMI  ENT: neck supple  Cardiovascular: Irregular, tachycardic rate.  Bilateral DP/PT pulses dopplerable.  Respiratory: Clear to auscultation  Gastrointestinal: Obese, soft, nontender.  Genitourinary: no suprapubic tenderness  Musculoskeletal: 2+ pitting bilateral lower extremity edema  Skin: Left knee edematous, warm to touch, tender.  Right first and second toe with black eschar.  Neuro: Alert, confused.  Follows  simple commands.  No overt neurological deficits.  Psych: Calm, cooperative.          Labs and Imaging   MRI FOOT RIGHT W WO CONTRAST    Result Date: 1/24/2025  MRI FOOT RIGHT W WO CONTRAST Date Of Service: 1/24/2025 13:24 Reason for study: Assess for deep infection r/o osteo, Comparison: None Technique: Multisequential multiplanar magnetic resonance imaging of the right forefoot performed including post gadolinium imaging Findings: 'S are degraded by motion artifact. There is also inhomogeneous fat suppression over the phalanges of the second through fifth digits likely positional. This does limit sensitivity for marrow signal alteration in these distributions Tarsometatarsal articulations are maintained Circumferential subcutaneous soft tissue edema within the forefoot. This is nonspecific and may indicate cellulitis. Deep soft tissue edema between the plantar aponeurosis and the metatarsals is identified. This does not demonstrate appreciable enhancement in the reactive edema. No clearly defined ulceration is clearly apparent. No definitive marrow signal abnormality identified. No clearly defined susceptibility artifact Flexor and extensor tendons are intact No definitive evidence of peripherally enhancing collection. IMPRESSION: 1. Limited exam due to motion artifact as well as inhomogeneous fat suppression particularly over the phalanges. This limits sensitivity for marrow signal abnormality within the phalanges particularly second through fifth digits. If concern in this distribution, bone scan could be considered in further assessment 2. Soft tissue edema within the midfoot and forefoot nonspecific but may indicate cellulitis as above. No definitive evidence of osteomyelitis within the  limitations of the study  Dictated and Electronically Signed By: Shane Mcghee MD 1/24/2025 15:37        CT KNEE LEFT WO CONTRAST    Result Date: 1/24/2025  EXAMINATION: CT KNEE LEFT WO CONTRAST DATE OF EXAM:  1/24/2025

## 2025-01-26 NOTE — PLAN OF CARE
Problem: Discharge Planning  Goal: Discharge to home or other facility with appropriate resources  Outcome: Progressing     Problem: Skin/Tissue Integrity  Goal: Skin integrity remains intact  Description: 1.  Monitor for areas of redness and/or skin breakdown  2.  Assess vascular access sites hourly  3.  Every 4-6 hours minimum:  Change oxygen saturation probe site  4.  Every 4-6 hours:  If on nasal continuous positive airway pressure, respiratory therapy assess nares and determine need for appliance change or resting period  Outcome: Progressing     Problem: ABCDS Injury Assessment  Goal: Absence of physical injury  Outcome: Progressing     Problem: Safety - Adult  Goal: Free from fall injury  Outcome: Progressing

## 2025-01-26 NOTE — PROGRESS NOTES
4 Eyes Skin Assessment     NAME:  Ray Gutierrez  YOB: 1940  MEDICAL RECORD NUMBER:  [id number]    The patient is being assessed for  Admission    I agree that at least one RN has performed a thorough Head to Toe Skin Assessment on the patient. ALL assessment sites listed below have been assessed.      Areas assessed by both nurses:    Head, Face, Ears, Shoulders, Back, Chest, Arms, Elbows, Hands, Sacrum. Buttock, Coccyx, Ischium, and Legs. Feet and Heels        Does the Patient have a Wound? Yes wound(s) were present on assessment. LDA wound assessment was Initiated and completed by RN       Holland Prevention initiated by RN: Yes  Wound Care Orders initiated by RN: Yes    Pressure Injury (Stage 3,4, Unstageable, DTI, NWPT, and Complex wounds) if present, place Wound referral order by RN under : Yes right foot and great toe and second toe and left second toe     New Ostomies, if present place, Ostomy referral order under : No     Nurse 1 eSignature: Electronically signed by Sherry Phoenix RN on 1/26/25 at 7:38 PM EST    **SHARE this note so that the co-signing nurse can place an eSignature**    Nurse 2 eSignature: Electronically signed by Denise Hatfield LPN on 1/26/25 at 5:41 PM EST

## 2025-01-26 NOTE — PLAN OF CARE
Problem: Pain  Goal: Verbalizes/displays adequate comfort level or baseline comfort level  1/25/2025 2302 by Dorie Leblanc RN  Outcome: Progressing  1/25/2025 1308 by Moraima Rodrigez RN  Outcome: Adequate for Discharge     Problem: Confusion  Goal: Confusion, delirium, dementia, or psychosis is improved or at baseline  Description: INTERVENTIONS:  1. Assess for possible contributors to thought disturbance, including medications, impaired vision or hearing, underlying metabolic abnormalities, dehydration, psychiatric diagnoses, and notify attending LIP  2. Paicines high risk fall precautions, as indicated  3. Provide frequent short contacts to provide reality reorientation, refocusing and direction  4. Decrease environmental stimuli, including noise as appropriate  5. Monitor and intervene to maintain adequate nutrition, hydration, elimination, sleep and activity  6. If unable to ensure safety without constant attention obtain sitter and review sitter guidelines with assigned personnel  7. Initiate Psychosocial CNS and Spiritual Care consult, as indicated  1/25/2025 2302 by Dorie Leblanc RN  Outcome: Progressing  1/25/2025 1308 by Moraima Rodrigez RN  Outcome: Not Progressing     Problem: Discharge Planning  Goal: Discharge to home or other facility with appropriate resources  1/25/2025 2302 by Dorie Leblanc RN  Outcome: Progressing  1/25/2025 1308 by Moraima Rodrigez RN  Outcome: Not Progressing     Problem: Skin/Tissue Integrity  Goal: Skin integrity remains intact  Description: 1.  Monitor for areas of redness and/or skin breakdown  2.  Assess vascular access sites hourly  3.  Every 4-6 hours minimum:  Change oxygen saturation probe site  4.  Every 4-6 hours:  If on nasal continuous positive airway pressure, respiratory therapy assess nares and determine need for appliance change or resting period  1/25/2025 2302 by Dorie Leblanc RN  Outcome: Progressing  1/25/2025 1308 by Moraima Rodrigez RN  Outcome: Not

## 2025-01-27 VITALS
DIASTOLIC BLOOD PRESSURE: 67 MMHG | RESPIRATION RATE: 19 BRPM | HEART RATE: 76 BPM | BODY MASS INDEX: 29.88 KG/M2 | HEIGHT: 71 IN | OXYGEN SATURATION: 92 % | TEMPERATURE: 97.4 F | SYSTOLIC BLOOD PRESSURE: 139 MMHG | WEIGHT: 213.41 LBS

## 2025-01-27 LAB
ANION GAP SERPL CALCULATED.3IONS-SCNC: 13 MMOL/L (ref 9–17)
BUN SERPL-MCNC: 40 MG/DL (ref 7–20)
CALCIUM SERPL-MCNC: 9.9 MG/DL (ref 8.3–10.6)
CHLORIDE SERPL-SCNC: 98 MMOL/L (ref 99–110)
CO2 SERPL-SCNC: 29 MMOL/L (ref 21–32)
CREAT SERPL-MCNC: 1.5 MG/DL (ref 0.8–1.3)
CRYSTALS FLD MICRO: NORMAL
ERYTHROCYTE [DISTWIDTH] IN BLOOD BY AUTOMATED COUNT: 16.3 % (ref 11.7–14.9)
GFR, ESTIMATED: 44 ML/MIN/1.73M2
GLUCOSE SERPL-MCNC: 133 MG/DL (ref 74–99)
HCT VFR BLD AUTO: 31.2 % (ref 42–52)
HGB BLD-MCNC: 10.4 G/DL (ref 13.5–18)
MAGNESIUM SERPL-MCNC: 2.1 MG/DL (ref 1.8–2.4)
MCH RBC QN AUTO: 31.7 PG (ref 27–31)
MCHC RBC AUTO-ENTMCNC: 33.3 G/DL (ref 32–36)
MCV RBC AUTO: 95.1 FL (ref 78–100)
MICROORGANISM SPEC CULT: NORMAL
PHOSPHATE SERPL-MCNC: 3.1 MG/DL (ref 2.5–4.9)
PLATELET # BLD AUTO: 222 K/UL (ref 140–440)
PMV BLD AUTO: 12.5 FL (ref 7.5–11.1)
POTASSIUM SERPL-SCNC: 3.1 MMOL/L (ref 3.5–5.1)
RBC # BLD AUTO: 3.28 M/UL (ref 4.6–6.2)
SODIUM SERPL-SCNC: 141 MMOL/L (ref 136–145)
SPECIMEN DESCRIPTION: NORMAL
SPECIMEN TYPE: NORMAL
WBC OTHER # BLD: 16.5 K/UL (ref 4–10.5)

## 2025-01-27 PROCEDURE — 94761 N-INVAS EAR/PLS OXIMETRY MLT: CPT

## 2025-01-27 PROCEDURE — 87070 CULTURE OTHR SPECIMN AEROBIC: CPT

## 2025-01-27 PROCEDURE — 2700000000 HC OXYGEN THERAPY PER DAY

## 2025-01-27 PROCEDURE — 85027 COMPLETE CBC AUTOMATED: CPT

## 2025-01-27 PROCEDURE — 36415 COLL VENOUS BLD VENIPUNCTURE: CPT

## 2025-01-27 PROCEDURE — 6370000000 HC RX 637 (ALT 250 FOR IP): Performed by: STUDENT IN AN ORGANIZED HEALTH CARE EDUCATION/TRAINING PROGRAM

## 2025-01-27 PROCEDURE — 87102 FUNGUS ISOLATION CULTURE: CPT

## 2025-01-27 PROCEDURE — 6360000002 HC RX W HCPCS: Performed by: STUDENT IN AN ORGANIZED HEALTH CARE EDUCATION/TRAINING PROGRAM

## 2025-01-27 PROCEDURE — 99223 1ST HOSP IP/OBS HIGH 75: CPT | Performed by: INTERNAL MEDICINE

## 2025-01-27 PROCEDURE — 87186 SC STD MICRODIL/AGAR DIL: CPT

## 2025-01-27 PROCEDURE — 83735 ASSAY OF MAGNESIUM: CPT

## 2025-01-27 PROCEDURE — 87075 CULTR BACTERIA EXCEPT BLOOD: CPT

## 2025-01-27 PROCEDURE — 6360000002 HC RX W HCPCS: Performed by: NURSE PRACTITIONER

## 2025-01-27 PROCEDURE — 89060 EXAM SYNOVIAL FLUID CRYSTALS: CPT

## 2025-01-27 PROCEDURE — 87205 SMEAR GRAM STAIN: CPT

## 2025-01-27 PROCEDURE — 0S9D3ZZ DRAINAGE OF LEFT KNEE JOINT, PERCUTANEOUS APPROACH: ICD-10-PCS | Performed by: STUDENT IN AN ORGANIZED HEALTH CARE EDUCATION/TRAINING PROGRAM

## 2025-01-27 PROCEDURE — 87077 CULTURE AEROBIC IDENTIFY: CPT

## 2025-01-27 PROCEDURE — 80048 BASIC METABOLIC PNL TOTAL CA: CPT

## 2025-01-27 PROCEDURE — 94640 AIRWAY INHALATION TREATMENT: CPT

## 2025-01-27 PROCEDURE — 2500000003 HC RX 250 WO HCPCS: Performed by: STUDENT IN AN ORGANIZED HEALTH CARE EDUCATION/TRAINING PROGRAM

## 2025-01-27 PROCEDURE — 84100 ASSAY OF PHOSPHORUS: CPT

## 2025-01-27 RX ADMIN — LINEZOLID 600 MG: 600 INJECTION, SOLUTION INTRAVENOUS at 13:16

## 2025-01-27 RX ADMIN — IPRATROPIUM BROMIDE AND ALBUTEROL SULFATE 1 DOSE: 2.5; .5 SOLUTION RESPIRATORY (INHALATION) at 11:24

## 2025-01-27 RX ADMIN — HEPARIN SODIUM 5000 UNITS: 5000 INJECTION INTRAVENOUS; SUBCUTANEOUS at 15:54

## 2025-01-27 RX ADMIN — PANTOPRAZOLE SODIUM 40 MG: 40 TABLET, DELAYED RELEASE ORAL at 05:58

## 2025-01-27 RX ADMIN — FUROSEMIDE 40 MG: 10 INJECTION, SOLUTION INTRAMUSCULAR; INTRAVENOUS at 10:50

## 2025-01-27 RX ADMIN — IPRATROPIUM BROMIDE AND ALBUTEROL SULFATE 1 DOSE: 2.5; .5 SOLUTION RESPIRATORY (INHALATION) at 07:58

## 2025-01-27 RX ADMIN — HEPARIN SODIUM 5000 UNITS: 5000 INJECTION INTRAVENOUS; SUBCUTANEOUS at 05:58

## 2025-01-27 RX ADMIN — SODIUM CHLORIDE, PRESERVATIVE FREE 10 ML: 5 INJECTION INTRAVENOUS at 10:51

## 2025-01-27 RX ADMIN — POTASSIUM CHLORIDE 40 MEQ: 1500 TABLET, EXTENDED RELEASE ORAL at 06:47

## 2025-01-27 RX ADMIN — IPRATROPIUM BROMIDE AND ALBUTEROL SULFATE 1 DOSE: 2.5; .5 SOLUTION RESPIRATORY (INHALATION) at 15:02

## 2025-01-27 NOTE — ED NOTES
Call to [address] [place] [address] where the patient is admitted and relayed the message from lab about 1 out of the two blood cultures are positive for gram positive cocci.

## 2025-01-27 NOTE — PROGRESS NOTES
Transfer has been initiated to [facility] awaiting callback to connect with the physician to discuss care

## 2025-01-27 NOTE — PLAN OF CARE
Problem: Discharge Planning  Goal: Discharge to home or other facility with appropriate resources  1/27/2025 0035 by Merrick Membreno RN  Outcome: Progressing  1/26/2025 1843 by Denise Hatfield LPN  Outcome: Progressing     Problem: Skin/Tissue Integrity  Goal: Skin integrity remains intact  Description: 1.  Monitor for areas of redness and/or skin breakdown  2.  Assess vascular access sites hourly  3.  Every 4-6 hours minimum:  Change oxygen saturation probe site  4.  Every 4-6 hours:  If on nasal continuous positive airway pressure, respiratory therapy assess nares and determine need for appliance change or resting period  1/27/2025 0035 by Merrick Membreno RN  Outcome: Progressing  1/26/2025 1843 by Denise Hatfield LPN  Outcome: Progressing     Problem: ABCDS Injury Assessment  Goal: Absence of physical injury  1/27/2025 0035 by Merrick Membreno RN  Outcome: Progressing  1/26/2025 1843 by Denise Hatfield LPN  Outcome: Progressing     Problem: Safety - Adult  Goal: Free from fall injury  1/27/2025 0035 by Merrick Membreno RN  Outcome: Progressing  1/26/2025 1843 by Denise Hatfield LPN  Outcome: Progressing

## 2025-01-27 NOTE — ED NOTES
Patient unable to follow commands. Unable to take PO medication. Perfect serve sent to Dr. Huizar.

## 2025-01-27 NOTE — CONSULTS
Orthopaedic Consult    Patient Name: Ray Gutierrez   (1940)  MRN   [id number]   Today's date:  1/27/2025    CHIEF COMPLAINT:   Left knee pain, effusion    HISTORY OF PRESENT ILLNESS:      The patient is a 84 y.o. male  who presented to the [facility] with generalized weakness and multiple falls and was found on the floor in which she laid overnight.  He was brought to the emergency room and further workup was concerning for sepsis.  There was also concern for a septic left knee.  He is a complicated history of the left knee and the original knee replacement occurred around 2005 per with the family is able to tell me by an outside physician.  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  He did have an abscess of the left thigh in August 2023 and was shipped to an outside facility but intraoperative findings confirmed that this was not within the knee joint and he improved but follow-up was not achieved.  He was subsequently transferred to [facility] due to the concern for sepsis and for further workup including being seen by infectious disease as well as orthopedics    Patient states the left knee swelling began a few days prior.  CT did demonstrate cellulitic findings as well as a large suprapatellar effusion.  He has been on antibiotics since his being admitted to the outside facility.    Patient family denies any prior true knee infection and has  not been on any recent antibiotics for the current symptoms. They deny and specific left knee injury or puncture.    Patient denies  chest pain, SOB, fever, chills, nausea, vomiting. Patient denies numbness, tingling.    Per the family, patient's not had any knee surgery in the last few years.  He has been seen in the past by Dr. Malhotra at an outside facility who is an other orthopedic surgeon     Patient denies any history of IV drug abuse.    Patient denies history of

## 2025-01-27 NOTE — CONSULTS
Infectious Disease Consult Note  2025   Patient Name: Ray Gutierrez : 1940     Assessment  MSSA bacteremia likely due to septic left knee prosthetic joint infection  History of left knee prosthetic joint infection  History of MRSA para-articular abscesses in 2023, and treated with intravenous vancomycin but developed encephalopathy.  Subsequently treated with linezolid but developed thrombocytopenia and eventually was discharged on Bactrim.  Antibiotic was recommended for 3 months and he did not follow-up with our infectious disease service.  Unlikely that this is the same infection as this is now MSSA bacteremia.  CT of the left knee shows a large effusion, this is suspected to be infected.  Requires evaluation for endovascular infection..  Chart review suggests that he was seen by Dr. Glynn Malhotra at the [facility].  Located in [facility] ([id number]).  I called the office and the confirmed that the patient was last seen in the office in 2023.  Appreciate Dr. Trinidad's evaluation.  Left knee aspiration yielded gray-colored purulent fluid.  They recommend transfer to tertiary care center.  Comorbid conditions: COPD, obesity, hypertension, hyperlipidemia    Plan  Therapeutic:  Continue linezolid.   If MSSA is confirmed by traditional culture methods, then discontinue linezolid and start IV nafcillin.  At the very least he will require a debridement and washout.  Ideally a two-stage revision should be performed but his functional state, will determine the best type of surgery.  Diagnostic:  Transthoracic echocardiogram  CRP, ESR, procalcitonin    F/u:  2025, blood cultures  Other:     Thank you for allowing me to consult in the care of this patient.  ------------------------  REASON FOR CONSULT: \"Staph bacteremia\"  Requested by: Baljeet Huizar MD  History obtained from chart review,    History obtained from chart review, as the patient is

## 2025-01-27 NOTE — PROGRESS NOTES
V2.0  [facility] [place]      Name:  Ray Gutierrez /Age/Sex: 1940  (84 y.o. male)   MRN & CSN:  [id number] & [id number] Encounter Date/Time: 2025 9:15 AM EST    Location:  [address] [place] [address]- PCP: Wei Le MD       Hospital Day: 2    Assessment and Plan:   Ray Gutierrez is a 84 y.o. male who presents with Cellulitis of left knee    Assessment and Plan:   Gram-positive bacteremia.  Need for infectious disease consultation with orthopedic consultation.  2 out of 2 cultures positive for staph infection sensitivities are pending.  MRI of the right foot showed edema of the midfoot and forefoot nonspecific which indicate cellulitis no definitive signs of osteomyelitis.  Left knee CT scan showed diffuse mild cutaneous edema which may represent cellulitis with a moderate-sized suprapatellar joint effusion. History of MRSA bacteremia along with artificial left knee.  Started on Zyvox. Consults are pending.  Will get bilateral lower extremity ARAMIS pending infectious disease surgical consult recommendations  Bilateral lower extremity swelling with underlying heart failure currently on Lasix 40 mg IV twice daily.  Not requiring fluid because of underlying heart failure concerns.  Continue to diurese with strict I's and O's.  Troponin elevation likely demand ischemia?  Trend troponins  History of left knee surgery.  Erythema and tenderness of the left knee with swelling.  Concerns for warmth erythema and swelling of the knee.  There is prepatellar bursitis concerns currently on antibiotics.  Discussed with surgery looks like Dr. Mcmahan did the surgery in   History of atrial fibrillation on digoxin and metoprolol not on anticoagulation due to history of GI bleeding  Chronic kidney disease stage III.  Monitor BMP avoid nephrotoxic agents  Coronary artery disease not on antiplatelet therapies because of concerns for bleeds in the past last history of stent placement bare-metal stent in the  LAD in 2011 on metoprolol and pravastatin   MGUS  Chronic GERD started on Protonix  Chronic obstructive pulmonary disease not on exacerbation.  Start the patient on DuoNebs also on steroids which can be discontinued    Medical Decision Making:  The following items were considered in medical decision making:  Discussion of patient care with other providers  Reviewed clinical lab tests if any  Reviewed radiology tests if any  Reviewed other diagnostic tests/interventions  Independent review of radiologic images if any  Microbiology cultures and other micro tests if any    Estimated time spent for medical decision-making encompassing complexity of the case, history taking, medication review, physical examination, communication with family, RN, , discussion with specialists, and ancillary staff members to provide accurate care for the patient was around 25 minutes.    MDM (any 2 required for High level billing)     A. Problems (any 1)  [] Acute/Chronic Illness/injury posing ongoing threat to life and/or bodily function without ongoing treatment    [] Severe exacerbation of chronic illness    --------------------------------------------------  B. Risk of Treatment (any 1)    [] Drugs/treatments that require intensive monitoring for toxicity    [] IV ABX (Vancomycin, Aminoglycosides, etc)     [] Post-Cath/Contrast study requiring serial monitoring    [] IV Narcotic analgesia    [] Aggressive IV diuresis    [] Hypertonic Saline    [] Critical electrolyte abnormalities requiring IV replacement    [] Insulin - Scheduled/SSI or Insulin gtt    [] Anticoagulation (Heparin gtt or Coumadin - other anticoagulants in special circumstances)    [] Cardiac Medications (IV Amiodarone/Diltiazem, Tikosyn, etc)    [] Hemodialysis    [] Other -    [] Change in code status    [] Decision to escalate care    [] Major surgery/procedure with associated risk factors    --------------------------------------------------  C. Data (any

## 2025-01-28 ENCOUNTER — PARAMEDICINE (OUTPATIENT)
Dept: OTHER | Age: 85
End: 2025-01-28

## 2025-01-28 LAB
MICROORGANISM SPEC CULT: ABNORMAL
MICROORGANISM SPEC CULT: NORMAL
MICROORGANISM/AGENT SPEC: ABNORMAL
MICROORGANISM/AGENT SPEC: NORMAL
SERVICE CMNT-IMP: ABNORMAL
SPECIMEN DESCRIPTION: ABNORMAL
SPECIMEN DESCRIPTION: NORMAL

## 2025-01-28 NOTE — PROGRESS NOTES
Pt is going to [facility] by transport at 1945. Room # 911. Report # is [phone].

## 2025-01-28 NOTE — PROGRESS NOTES
Patient is being transferred to [facility]. Will attempt to follow until discharge and follow up with family.

## 2025-01-29 LAB
MICROORGANISM SPEC CULT: ABNORMAL
MICROORGANISM/AGENT SPEC: ABNORMAL
SERVICE CMNT-IMP: ABNORMAL
SPECIMEN DESCRIPTION: ABNORMAL

## 2025-02-09 NOTE — DISCHARGE SUMMARY
Followed up as an outpatient by PCP or Specialist:     Discharge Medications:        Medication List        ASK your doctor about these medications      albuterol sulfate  (90 Base) MCG/ACT inhaler  Commonly known as: PROVENTIL;VENTOLIN;PROAIR     digoxin 125 MCG tablet  Commonly known as: LANOXIN  Take 1 tablet by mouth daily     KLOR-CON PO     lansoprazole 30 MG delayed release capsule  Commonly known as: PREVACID     loratadine 10 MG tablet  Commonly known as: CLARITIN     magnesium oxide 400 (240 Mg) MG tablet  Commonly known as: MAG-OX  Take 1 tablet by mouth daily     metOLazone 2.5 MG tablet  Commonly known as: ZAROXOLYN     metoprolol succinate 25 MG extended release tablet  Commonly known as: TOPROL XL  Take 0.5 tablets by mouth nightly     pravastatin 80 MG tablet  Commonly known as: PRAVACHOL  TAKE ONE TABLET BY MOUTH EVERY DAY     tiotropium 18 MCG inhalation capsule  Commonly known as: SPIRIVA     torsemide 20 MG tablet  Commonly known as: DEMADEX     vitamin D 50 MCG (2000 UT) Caps capsule  Commonly known as: CHOLECALCIFEROL             Objective Findings at Discharge:   /67   Pulse 76   Temp 97.4 °F (36.3 °C) (Axillary)   Resp 19   Ht 1.803 m (5' 10.98\")   Wt 96.8 kg (213 lb 6.5 oz)   SpO2 92%   BMI 29.78 kg/m²       Physical Exam:   Physical Exam  Vitals reviewed.   Constitutional:       Appearance: Normal appearance. He is obese.   HENT:      Head: Normocephalic.      Nose: Nose normal.      Mouth/Throat:      Mouth: Mucous membranes are moist.   Eyes:      Conjunctiva/sclera: Conjunctivae normal.      Pupils: Pupils are equal, round, and reactive to light.   Cardiovascular:      Rate and Rhythm: Normal rate and regular rhythm.      Pulses: Normal pulses.      Heart sounds: Normal heart sounds. No murmur heard.  Pulmonary:      Effort: Pulmonary effort is normal. No respiratory distress.      Breath sounds: Normal breath sounds. No wheezing, rhonchi or rales.   Abdominal:

## 2025-02-10 LAB
MICROORGANISM SPEC CULT: NORMAL
MICROORGANISM/AGENT SPEC: NORMAL
SPECIMEN DESCRIPTION: NORMAL

## 2025-02-17 LAB
MICROORGANISM SPEC CULT: NORMAL
MICROORGANISM/AGENT SPEC: NORMAL
SPECIMEN DESCRIPTION: NORMAL

## 2025-02-24 LAB
MICROORGANISM SPEC CULT: NORMAL
MICROORGANISM/AGENT SPEC: NORMAL
SPECIMEN DESCRIPTION: NORMAL

## 2025-03-03 LAB
MICROORGANISM SPEC CULT: NORMAL
MICROORGANISM/AGENT SPEC: NORMAL
SPECIMEN DESCRIPTION: NORMAL

## 2025-03-10 LAB
ACB COMPLEX DNA BLD POS QL NAA+NON-PROBE: NOT DETECTED
B FRAGILIS DNA BLD POS QL NAA+NON-PROBE: NOT DETECTED
C ALBICANS DNA BLD POS QL NAA+NON-PROBE: NOT DETECTED
C AURIS DNA BLD POS QL NAA+NON-PROBE: NOT DETECTED
C GATTII+NEOFOR DNA BLD POS QL NAA+N-PRB: NOT DETECTED
C GLABRATA DNA BLD POS QL NAA+NON-PROBE: NOT DETECTED
C KRUSEI DNA BLD POS QL NAA+NON-PROBE: NOT DETECTED
C PARAP DNA BLD POS QL NAA+NON-PROBE: NOT DETECTED
C TROPICLS DNA BLD POS QL NAA+NON-PROBE: NOT DETECTED
E CLOAC COMP DNA BLD POS NAA+NON-PROBE: NOT DETECTED
E COLI DNA BLD POS QL NAA+NON-PROBE: NOT DETECTED
E FAECALIS DNA BLD POS QL NAA+NON-PROBE: NOT DETECTED
E FAECIUM DNA BLD POS QL NAA+NON-PROBE: NOT DETECTED
ENTEROBACTERALES DNA BLD POS NAA+N-PRB: NOT DETECTED
GP B STREP DNA BLD POS QL NAA+NON-PROBE: NOT DETECTED
HAEM INFLU DNA BLD POS QL NAA+NON-PROBE: NOT DETECTED
K OXYTOCA DNA BLD POS QL NAA+NON-PROBE: NOT DETECTED
KLEBSIELLA SP DNA BLD POS QL NAA+NON-PRB: NOT DETECTED
KLEBSIELLA SP DNA BLD POS QL NAA+NON-PRB: NOT DETECTED
L MONOCYTOG DNA BLD POS QL NAA+NON-PROBE: NOT DETECTED
MECA+MECC+MREJ ISLT/SPM QL: NOT DETECTED
MICROORGANISM SPEC CULT: ABNORMAL
MICROORGANISM SPEC CULT: ABNORMAL
MICROORGANISM/AGENT SPEC: ABNORMAL
MICROORGANISM/AGENT SPEC: ABNORMAL
N MEN DNA BLD POS QL NAA+NON-PROBE: NOT DETECTED
P AERUGINOSA DNA BLD POS NAA+NON-PROBE: NOT DETECTED
PROTEUS SP DNA BLD POS QL NAA+NON-PROBE: NOT DETECTED
S AUREUS DNA BLD POS QL NAA+NON-PROBE: DETECTED
S AUREUS+CONS DNA BLD POS NAA+NON-PROBE: DETECTED
S EPIDERMIDIS DNA BLD POS QL NAA+NON-PRB: NOT DETECTED
S LUGDUNENSIS DNA BLD POS QL NAA+NON-PRB: NOT DETECTED
S MALTOPHILIA DNA BLD POS QL NAA+NON-PRB: NOT DETECTED
S MARCESCENS DNA BLD POS NAA+NON-PROBE: NOT DETECTED
S PNEUM DNA BLD POS QL NAA+NON-PROBE: NOT DETECTED
S PYO DNA BLD POS QL NAA+NON-PROBE: ABNORMAL
SALMONELLA DNA BLD POS QL NAA+NON-PROBE: NOT DETECTED
SERVICE CMNT-IMP: ABNORMAL
SERVICE CMNT-IMP: ABNORMAL
SPECIMEN DESCRIPTION: ABNORMAL
SPECIMEN DESCRIPTION: ABNORMAL
STREPTOCOCCUS DNA BLD POS NAA+NON-PROBE: NOT DETECTED

## 2025-03-26 LAB
MICROORGANISM SPEC CULT: ABNORMAL
MICROORGANISM/AGENT SPEC: ABNORMAL
MICROORGANISM/AGENT SPEC: ABNORMAL
SERVICE CMNT-IMP: ABNORMAL
SERVICE CMNT-IMP: ABNORMAL
SPECIMEN DESCRIPTION: ABNORMAL
SPECIMEN DESCRIPTION: ABNORMAL

## 2025-07-24 NOTE — TELEPHONE ENCOUNTER
Patient received my name and number from [facility] and reached out to me. Patient acknowledges that he is having difficulty caring for himself at home. He is only able to walk from kitchen to living room and that  is sometimes difficult. Patient states he has had some falls recently. He would like to have [facility] coming in. Patient has an appointment with his PCP tomorrow at 4:00. I advised him to speak with his PCP about his need for [address] [place] [address],[facility] [address] care. His PCP would be the one to make that referral. Patient states he might have to go to a facility but would like to avoid that if possible. Patient is going to call next week so we can talk about what he and his PCP decided. I told him we would talk about what he would like to do and I would help get him the resources he needed. Will continue to follow. None

## (undated) DEVICE — ENDOSCOPY KIT: Brand: MEDLINE INDUSTRIES, INC.

## (undated) DEVICE — Z DISCONTINUED (USE MFG CAT MVABO)  TUBING GAS SAMPLING STD 6.5 FT FEMALE CONN SMRT CAPNOLINE

## (undated) DEVICE — SNARE ENDOSCP L240CM SHTH DIA24MM LOOP W10MM POLYP RND REINF

## (undated) DEVICE — Z DISCONTINUED NO SUB IDED TUBING ETCO2 AD L6.5FT NSL ORAL CVD PRNG NONFLARED TIP OVR